# Patient Record
Sex: FEMALE | Race: BLACK OR AFRICAN AMERICAN | NOT HISPANIC OR LATINO | Employment: OTHER | URBAN - METROPOLITAN AREA
[De-identification: names, ages, dates, MRNs, and addresses within clinical notes are randomized per-mention and may not be internally consistent; named-entity substitution may affect disease eponyms.]

---

## 2017-01-03 ENCOUNTER — OFFICE VISIT (OUTPATIENT)
Dept: CARDIOLOGY | Facility: CLINIC | Age: 77
End: 2017-01-03
Payer: MEDICARE

## 2017-01-03 VITALS
HEIGHT: 60 IN | HEART RATE: 62 BPM | DIASTOLIC BLOOD PRESSURE: 66 MMHG | BODY MASS INDEX: 38.56 KG/M2 | WEIGHT: 196.44 LBS | OXYGEN SATURATION: 98 % | SYSTOLIC BLOOD PRESSURE: 143 MMHG

## 2017-01-03 DIAGNOSIS — Z98.61 POST PTCA: Chronic | ICD-10-CM

## 2017-01-03 DIAGNOSIS — I10 ESSENTIAL HYPERTENSION: Chronic | ICD-10-CM

## 2017-01-03 DIAGNOSIS — Z95.1 S/P CABG (CORONARY ARTERY BYPASS GRAFT): Chronic | ICD-10-CM

## 2017-01-03 DIAGNOSIS — Z79.4 TYPE 2 DIABETES MELLITUS WITH DIABETIC PERIPHERAL ANGIOPATHY WITHOUT GANGRENE, WITH LONG-TERM CURRENT USE OF INSULIN: ICD-10-CM

## 2017-01-03 DIAGNOSIS — E78.00 PURE HYPERCHOLESTEROLEMIA: Chronic | ICD-10-CM

## 2017-01-03 DIAGNOSIS — I25.10 CORONARY ARTERY DISEASE INVOLVING NATIVE CORONARY ARTERY OF NATIVE HEART WITHOUT ANGINA PECTORIS: Primary | Chronic | ICD-10-CM

## 2017-01-03 DIAGNOSIS — E11.51 TYPE 2 DIABETES MELLITUS WITH DIABETIC PERIPHERAL ANGIOPATHY WITHOUT GANGRENE, WITH LONG-TERM CURRENT USE OF INSULIN: ICD-10-CM

## 2017-01-03 PROCEDURE — 99999 PR PBB SHADOW E&M-EST. PATIENT-LVL III: CPT | Mod: PBBFAC,,, | Performed by: INTERNAL MEDICINE

## 2017-01-03 PROCEDURE — 99213 OFFICE O/P EST LOW 20 MIN: CPT | Mod: PBBFAC | Performed by: INTERNAL MEDICINE

## 2017-01-03 PROCEDURE — 99214 OFFICE O/P EST MOD 30 MIN: CPT | Mod: S$PBB,,, | Performed by: INTERNAL MEDICINE

## 2017-01-03 RX ORDER — ASPIRIN 81 MG/1
81 TABLET ORAL DAILY
Refills: 0 | COMMUNITY
Start: 2017-01-03 | End: 2019-05-06

## 2017-01-03 NOTE — PROGRESS NOTES
Subjective:   Patient ID:  Eliu Paz is a 76 y.o. female who presents for follow-up of Leg Swelling (x 2months) and Shortness of Breath (with exertion )      HPI:   Eliu Paz presents for follow up of coronary artery disease having had prior CABG as well as Intervention . She has had no chest pain but has been concerned about swelling of her ankles. She has some COATES ( a prior complaint ) but is limited activity due to pain. No orthopnea or PND. Normal Lv systolic and diastolic function on last echo. Eliu Paz has hypertension with elevation this visit but 120-130/ on recent Clinic visits. Eliu Paz has dyslipidemia  on high intensity statin. She has diabetes mellitus.    Review of Systems   Constitution: Negative for weakness, malaise/fatigue, weight gain and weight loss.   HENT: Negative for headaches.    Eyes: Negative for blurred vision.   Cardiovascular: Positive for dyspnea on exertion and leg swelling. Negative for chest pain, claudication, cyanosis, irregular heartbeat, near-syncope, orthopnea, palpitations, paroxysmal nocturnal dyspnea and syncope.   Respiratory: Negative for cough, shortness of breath and wheezing.    Musculoskeletal: Positive for arthritis and joint pain. Negative for falls and myalgias.   Gastrointestinal: Positive for abdominal pain. Negative for heartburn, nausea and vomiting.   Genitourinary: Negative for nocturia.   Neurological: Positive for paresthesias. Negative for brief paralysis, dizziness and focal weakness.   Psychiatric/Behavioral: Negative for altered mental status.       Current Outpatient Prescriptions   Medication Sig    amlodipine (NORVASC) 10 MG tablet TAKE 1 TABLET BY MOUTH ONCE DAILY.    baclofen (LIORESAL) 10 MG tablet Take 1 tablet (10 mg total) by mouth every evening.    carvedilol (COREG) 25 MG tablet TAKE 1 TABLET BY MOUTH TWICE DAILY.    clobetasol 0.05% (TEMOVATE) 0.05 % Oint Apply small amount to vulva area twice a day for 4  weeks then once a day for 4 weeks then once a day on Mondays and Thursdays    fluticasone (FLONASE) 50 mcg/actuation nasal spray SPRAY TWICE IN EACH NOSTRIL EVERY DAY    furosemide (LASIX) 40 MG tablet Take 1 tablet (40 mg total) by mouth once daily.    GENERLAC 10 gram/15 mL solution Take 30 mLs (20 g total) by mouth daily as needed (constipation).    hydrOXYzine pamoate (VISTARIL) 25 MG Cap Take 1 capsule (25 mg total) by mouth every 6 (six) hours as needed (Take 1-2 tablets as needed for itching).    insulin NPH-insulin regular, 70/30, (NOVOLIN 70/30) 100 unit/mL (70-30) injection Inject 18 Units into the skin 2 (two) times daily before meals.    isosorbide mononitrate (IMDUR) 30 MG 24 hr tablet Take 1 tablet (30 mg total) by mouth once daily.    ketoconazole (NIZORAL) 2 % shampoo Apply topically every other day.    latanoprost 0.005 % ophthalmic solution Place 1 drop into both eyes every evening.    losartan (COZAAR) 100 MG tablet TAKE 1 TABLET BY MOUTH ONCE DAILY.    nitroGLYCERIN 0.4 MG/DOSE TL SPRY (NITROLINGUAL) 400 mcg/spray spray PLACE 2 SPRAYS UNDER THE TONGUE EVERY 5 MINUTES AS NEEDED FOR CHEST PAIN    ONETOUCH DELICA LANCETS 30 gauge Misc 1 lancet by Misc.(Non-Drug; Combo Route) route 4 (four) times daily.    ONETOUCH VERIO Strp 1 strip by Misc.(Non-Drug; Combo Route) route 4 (four) times daily.    ranitidine (ZANTAC) 150 MG tablet Take 1 tablet (150 mg total) by mouth 2 (two) times daily.    rosuvastatin (CRESTOR) 40 MG Tab Take 1 tablet (40 mg total) by mouth once daily.    SITagliptan (JANUVIA) 100 MG Tab Take 1 tablet (100 mg total) by mouth once daily.    spironolactone (ALDACTONE) 50 MG tablet TAKE 1 TABLET (50 MG TOTAL) BY MOUTH ONCE DAILY.    TENS units Kirstin 1 Units by Misc.(Non-Drug; Combo Route) route once daily.    tramadol (ULTRAM) 50 mg tablet Take 1 tablet (50 mg total) by mouth 2 (two) times daily as needed.    triamcinolone acetonide 0.1% (KENALOG) 0.1 % ointment Apply  small amount to itchy areas on body BID PRN.  Do not use on face or in groin.    aspirin (ECOTRIN) 81 MG EC tablet Take 1 tablet (81 mg total) by mouth once daily.    travoprost (TRAVATAN Z) 0.004 % Drop Apply 1 drop to eye.     No current facility-administered medications for this visit.      Objective:   Physical Exam   Constitutional: She is oriented to person, place, and time. She appears well-developed. No distress.   BP (!) 143/66 (BP Location: Left arm, Patient Position: Sitting, BP Method: Automatic)  Pulse 62  Ht 5' (1.524 m)  Wt 89.1 kg (196 lb 6.9 oz)  SpO2 98%  BMI 38.36 kg/m2   HENT:   Head: Normocephalic.   Eyes: Conjunctivae are normal. Pupils are equal, round, and reactive to light. No scleral icterus.   Neck: Neck supple. No JVD present. No thyromegaly present.   Cardiovascular: Normal rate, regular rhythm, normal heart sounds and intact distal pulses.  PMI is not displaced.  Exam reveals no gallop and no friction rub.    No murmur heard.  Ankles puffy but no pitting edema.   Pulmonary/Chest: Effort normal and breath sounds normal. No respiratory distress. She has no wheezes. She has no rales.   Median sternotomy scar.   Abdominal: Soft. She exhibits no distension. There is no splenomegaly or hepatomegaly. There is no tenderness.   Musculoskeletal: She exhibits no edema or tenderness.   Gait normal   Neurological: She is alert and oriented to person, place, and time.   Skin: Skin is warm and dry. She is not diaphoretic.   Psychiatric: She has a normal mood and affect. Her behavior is normal.       Lab Results   Component Value Date     12/05/2016    K 3.9 12/05/2016     12/05/2016    CO2 21 (L) 12/05/2016    BUN 22 12/05/2016    CREATININE 1.2 12/05/2016     (H) 12/05/2016    HGBA1C 8.3 (H) 12/05/2016    MG 2.0 06/24/2015    AST 23 12/05/2016    ALT 13 12/05/2016    ALBUMIN 3.7 12/05/2016    PROT 7.6 12/05/2016    BILITOT 0.5 12/05/2016    WBC 7.60 10/20/2016    HGB 13.6  10/20/2016    HCT 41.1 10/20/2016    MCV 85 10/20/2016     10/20/2016    TSH 3.995 05/04/2015    CHOL 109 (L) 12/05/2016    HDL 37 (L) 12/05/2016    LDLCALC 58.8 (L) 12/05/2016    TRIG 66 12/05/2016       Assessment:     1. Coronary artery disease involving native coronary artery of native heart without angina pectoris : Stable   2. S/P CABG (coronary artery bypass graft)    3. Post PTCA    4. Essential hypertension : adequate control per other Clinic visits   5. Pure hypercholesterolemia : on high intensity statin At LDL goal   6. Type 2 diabetes mellitus with diabetic peripheral angiopathy without gangrene, with long-term current use of insulin : Not well controlled       Plan:     Eliu Steinberg was seen today for leg swelling and shortness of breath.    Diagnoses and all orders for this visit:    Coronary artery disease involving native coronary artery of native heart without angina pectoris    S/P CABG (coronary artery bypass graft)    Post PTCA    Essential hypertension  Continue current regimen  Would consider alternative for amlodipine but no pitting edema present at this time.  Pure hypercholesterolemia  Continue current regimen    Type 2 diabetes mellitus with diabetic peripheral angiopathy without gangrene, with long-term current use of insulin    Other orders  -     aspirin (ECOTRIN) 81 MG EC tablet; Take 1 tablet (81 mg total) by mouth once daily.

## 2017-01-03 NOTE — MR AVS SNAPSHOT
[facility] - Cardiology  [address]  [place] [address]  Phone: [phone]                  Eliu Paz   1/3/2017 4:00 PM   Office Visit    Description:  Female : 1940   Provider:  Manuel Aguilar MD   Department:  [facility]           Reason for Visit     Leg Swelling     Shortness of Breath           Diagnoses this Visit        Comments    Coronary artery disease involving native coronary artery of native heart without angina pectoris    -  Primary     S/P CABG (coronary artery bypass graft)         Post PTCA         Essential hypertension         Pure hypercholesterolemia         Type 2 diabetes mellitus with diabetic peripheral angiopathy without gangrene, with long-term current use of insulin                To Do List           Future Appointments        Provider Department Dept Phone    2017 9:40 AM Ryan Carlson MD [facility] - Internal Medicine [phone]    2017 8:30 AM LAB, APPOINTMENT [facility] [phone]    2017 9:00 AM DIABETES EMPOWERMENT PROGRAM [facility] -  Diabetes Program [phone]      Goals (5 Years of Data)     None      Follow-Up and Disposition     Return in about 8 months (around 9/3/2017).      PURCHASE these Medications (No prescription required)        Start End    aspirin (ECOTRIN) 81 MG EC tablet 1/3/2017 1/3/2018    Sig - Route: Take 1 tablet (81 mg total) by mouth once daily. - Oral    Class: OTC      [facility]     [facility] On Call Nurse Care Line -  Assistance  Registered nurses in the [facility] provide clinical advisement, health education, appointment booking, and other advisory services.  Call for this free service at [phone].             Medications           Message regarding Medications     Verify the changes and/or additions to your medication regime listed below are the same as discussed with your clinician today.  If any of these changes or additions  are incorrect, please notify your healthcare provider.        START taking these NEW medications        Refills    aspirin (ECOTRIN) 81 MG EC tablet 0    Sig: Take 1 tablet (81 mg total) by mouth once daily.    Class: OTC    Route: Oral      STOP taking these medications     folic acid (FOLVITE) 1 MG tablet TAKE 1 TABLET BY MOUTH ONCE DAILY.    aspirin 325 MG tablet Take 81 mg by mouth once daily.            Verify that the below list of medications is an accurate representation of the medications you are currently taking.  If none reported, the list may be blank. If incorrect, please contact your healthcare provider. Carry this list with you in case of emergency.           Current Medications     amlodipine (NORVASC) 10 MG tablet TAKE 1 TABLET BY MOUTH ONCE DAILY.    aspirin (ECOTRIN) 81 MG EC tablet Take 1 tablet (81 mg total) by mouth once daily.    baclofen (LIORESAL) 10 MG tablet Take 1 tablet (10 mg total) by mouth every evening.    carvedilol (COREG) 25 MG tablet TAKE 1 TABLET BY MOUTH TWICE DAILY.    clobetasol 0.05% (TEMOVATE) 0.05 % Oint Apply small amount to vulva area twice a day for 4 weeks then once a day for 4 weeks then once a day on Mondays and Thursdays    fluticasone (FLONASE) 50 mcg/actuation nasal spray SPRAY TWICE IN EACH NOSTRIL EVERY DAY    furosemide (LASIX) 40 MG tablet Take 1 tablet (40 mg total) by mouth once daily.    GENERLAC 10 gram/15 mL solution Take 30 mLs (20 g total) by mouth daily as needed (constipation).    hydrOXYzine pamoate (VISTARIL) 25 MG Cap Take 1 capsule (25 mg total) by mouth every 6 (six) hours as needed (Take 1-2 tablets as needed for itching).    insulin NPH-insulin regular, 70/30, (NOVOLIN 70/30) 100 unit/mL (70-30) injection Inject 18 Units into the skin 2 (two) times daily before meals.    isosorbide mononitrate (IMDUR) 30 MG 24 hr tablet Take 1 tablet (30 mg total) by mouth once daily.    ketoconazole (NIZORAL) 2 % shampoo Apply topically every other day.     latanoprost 0.005 % ophthalmic solution Place 1 drop into both eyes every evening.    losartan (COZAAR) 100 MG tablet TAKE 1 TABLET BY MOUTH ONCE DAILY.    nitroGLYCERIN 0.4 MG/DOSE TL SPRY (NITROLINGUAL) 400 mcg/spray spray PLACE 2 SPRAYS UNDER THE TONGUE EVERY 5 MINUTES AS NEEDED FOR CHEST PAIN    ONETOUCH DELICA LANCETS 30 gauge Misc 1 lancet by Misc.(Non-Drug; Combo Route) route 4 (four) times daily.    ONETOUCH VERIO Strp 1 strip by Misc.(Non-Drug; Combo Route) route 4 (four) times daily.    ranitidine (ZANTAC) 150 MG tablet Take 1 tablet (150 mg total) by mouth 2 (two) times daily.    rosuvastatin (CRESTOR) 40 MG Tab Take 1 tablet (40 mg total) by mouth once daily.    SITagliptan (JANUVIA) 100 MG Tab Take 1 tablet (100 mg total) by mouth once daily.    spironolactone (ALDACTONE) 50 MG tablet TAKE 1 TABLET (50 MG TOTAL) BY MOUTH ONCE DAILY.    TENS units Kirstin 1 Units by Misc.(Non-Drug; Combo Route) route once daily.    tramadol (ULTRAM) 50 mg tablet Take 1 tablet (50 mg total) by mouth 2 (two) times daily as needed.    travoprost (TRAVATAN Z) 0.004 % Drop Apply 1 drop to eye.    triamcinolone acetonide 0.1% (KENALOG) 0.1 % ointment Apply small amount to itchy areas on body BID PRN.  Do not use on face or in groin.           Clinical Reference Information           Vital Signs - Last Recorded  Most recent update: 1/3/2017  4:02 PM by Giselle Hunter MA    BP Pulse Ht Wt SpO2 BMI    (!) 143/66 (BP Location: Left arm, Patient Position: Sitting, BP Method: Automatic) 62 5' (1.524 m) 89.1 kg (196 lb 6.9 oz) 98% 38.36 kg/m2      Blood Pressure          Most Recent Value    Right Arm BP - Sitting  156/67    Left Arm BP - Sitting  143/66    BP  (!)  143/66      Allergies as of 1/3/2017     Pcn [Penicillins]    Trulicity [Dulaglutide]      Immunizations Administered on Date of Encounter - 1/3/2017     None      [facility] Sign-Up     Activating your [facility] account is as easy as 1-2-3!     1) Visit [email or web address],  select Sign Up Now, enter this activation code and your date of birth, then select Next.  [email or web address]  Expires: 1/16/2017 12:00 PM      2) Create a username and password to use when you visit [facility] in the future and select a security question in case you lose your password and select Next.    3) Enter your e-mail address and click Sign Up!    Additional Information  If you have questions, please e-mail [email or web address] or call [phone] to talk to our [facility] staff. Remember, [facility] is NOT to be used for urgent needs. For medical emergencies, dial 911.         Instructions    OK to stop folic acid  Continue all other medication  Take Furosemide ( fluid pill ) as needed for fluid retention

## 2017-01-03 NOTE — PATIENT INSTRUCTIONS
OK to stop folic acid  Continue all other medication  Take Furosemide ( fluid pill ) as needed for fluid retention

## 2017-02-07 ENCOUNTER — OFFICE VISIT (OUTPATIENT)
Dept: INTERNAL MEDICINE | Facility: CLINIC | Age: 77
End: 2017-02-07
Payer: MEDICARE

## 2017-02-07 VITALS
HEART RATE: 51 BPM | WEIGHT: 194 LBS | DIASTOLIC BLOOD PRESSURE: 60 MMHG | HEIGHT: 60 IN | SYSTOLIC BLOOD PRESSURE: 120 MMHG | BODY MASS INDEX: 38.09 KG/M2

## 2017-02-07 DIAGNOSIS — I25.10 CORONARY ARTERY DISEASE INVOLVING NATIVE CORONARY ARTERY OF NATIVE HEART WITHOUT ANGINA PECTORIS: Chronic | ICD-10-CM

## 2017-02-07 DIAGNOSIS — I10 ESSENTIAL HYPERTENSION: Chronic | ICD-10-CM

## 2017-02-07 DIAGNOSIS — E78.00 PURE HYPERCHOLESTEROLEMIA: Chronic | ICD-10-CM

## 2017-02-07 DIAGNOSIS — Z79.4 TYPE 2 DIABETES MELLITUS WITH DIABETIC POLYNEUROPATHY, WITH LONG-TERM CURRENT USE OF INSULIN: Primary | Chronic | ICD-10-CM

## 2017-02-07 DIAGNOSIS — E11.42 TYPE 2 DIABETES MELLITUS WITH DIABETIC POLYNEUROPATHY, WITH LONG-TERM CURRENT USE OF INSULIN: Primary | Chronic | ICD-10-CM

## 2017-02-07 PROCEDURE — 99213 OFFICE O/P EST LOW 20 MIN: CPT | Mod: PBBFAC | Performed by: INTERNAL MEDICINE

## 2017-02-07 PROCEDURE — 99214 OFFICE O/P EST MOD 30 MIN: CPT | Mod: S$PBB,,, | Performed by: INTERNAL MEDICINE

## 2017-02-07 PROCEDURE — 99999 PR PBB SHADOW E&M-EST. PATIENT-LVL III: CPT | Mod: PBBFAC,,, | Performed by: INTERNAL MEDICINE

## 2017-02-07 NOTE — PROGRESS NOTES
"Subjective:       Patient ID: Eliu Paz is a 76 y.o. female.    Chief Complaint: Follow-up    HPI Comments: Something wrong she cannot explain in her brain.    Feels like she has not been right since Trulicity use.    She has a chest cough, not severe no purulent sputum.    "I guess I am nervous".    "It's not my mind".    No new symptoms, all above chronic. Worried re her memory.      Review of Systems   Constitutional: Positive for fatigue. Negative for activity change and appetite change.   Eyes: Negative for visual disturbance.   Respiratory: Negative for chest tightness, shortness of breath and wheezing.    Cardiovascular: Negative for chest pain, palpitations and leg swelling.   Gastrointestinal: Negative for abdominal distention and abdominal pain.   Genitourinary: Negative for pelvic pain.   Musculoskeletal: Positive for back pain and neck pain. Negative for arthralgias.   Skin: Negative for rash.   Neurological: Positive for numbness. Negative for tremors, syncope, facial asymmetry, speech difficulty and weakness.   Hematological: Negative for adenopathy. Does not bruise/bleed easily.   Psychiatric/Behavioral: Negative for dysphoric mood. The patient is nervous/anxious.        Objective:      Physical Exam   Constitutional: She is oriented to person, place, and time. She appears well-developed and well-nourished. No distress.   HENT:   Head: Normocephalic and atraumatic.   Mouth/Throat: No oropharyngeal exudate.   TMs clear, roughened papule L earlobe   Eyes: Conjunctivae are normal. Pupils are equal, round, and reactive to light. No scleral icterus.   Neck: Normal range of motion. Neck supple. No thyromegaly present.   Cardiovascular: Normal rate, regular rhythm and normal heart sounds.    Pulmonary/Chest: Effort normal and breath sounds normal.   Abdominal: Soft. Bowel sounds are normal. She exhibits no distension. There is no tenderness.   Musculoskeletal: Normal range of motion. She exhibits no " edema or tenderness.   Lymphadenopathy:     She has no cervical adenopathy.   Neurological: She is alert and oriented to person, place, and time. She displays normal reflexes. No cranial nerve deficit. She exhibits normal muscle tone. Coordination normal.   Mild ankle edema, not significant lower extrem, edema   Skin: No rash noted. She is not diaphoretic.   Psychiatric: She has a normal mood and affect.   Odd complaints, diff to explain       Assessment:       No diagnosis found.    Plan:       Here for f/u.    She is stable and has not had any serious new medical issues for a while.    She has medically unexplained symptoms, reassured that her ears appears OK, she does not have a neck spinal infection, she does not have scalp dandruff, she does not have gangrene.    Dm2, has followup.    HTN well treated.    Over 25 minutes spent with patient and majority in counseling and patient education.    She declined vaccines    Return in about 6 months (around 8/7/2017).

## 2017-02-07 NOTE — MR AVS SNAPSHOT
[facility] - Internal Medicine  [address] [facility] [address]  Phone: [phone]  Fax: [phone]                  Eliu Paz   2017 9:40 AM   Office Visit    Description:  Female : 1940   Provider:  Ryan Carlson MD   Department:  [facility] - Internal Medicine           Reason for Visit     Follow-up                To Do List           Future Appointments        Provider Department Dept Phone    2017 9:30 AM Harinder Izaguirre MD [facility] - Vasc Diseases [phone]    2017 8:30 AM LAB, APPOINTMENT [facility] [phone]    2017 9:00 AM DIABETES EMPOWERMENT PROGRAM [facility] Diabetes [facility] [phone]    2017 9:00 AM Ata Fung MD [facility] - Rheumatology [phone]      Goals (5 Years of Data)     None      Follow-Up and Disposition     Return in about 6 months (around 2017).      [facility]     [facility] Care [facility] -  Assistance  Registered nurses in the [facility] provide clinical advisement, health education, appointment booking, and other advisory services.  Call for this free service at [phone].             Medications           Message regarding Medications     Verify the changes and/or additions to your medication regime listed below are the same as discussed with your clinician today.  If any of these changes or additions are incorrect, please notify your healthcare provider.        STOP taking these medications     ketoconazole (NIZORAL) 2 % shampoo Apply topically every other day.           Verify that the below list of medications is an accurate representation of the medications you are currently taking.  If none reported, the list may be blank. If incorrect, please contact your healthcare provider. Carry this list with you in case of emergency.           Current Medications     amlodipine (NORVASC) 10 MG tablet TAKE 1 TABLET BY MOUTH ONCE DAILY.     aspirin (ECOTRIN) 81 MG EC tablet Take 1 tablet (81 mg total) by mouth once daily.    baclofen (LIORESAL) 10 MG tablet Take 1 tablet (10 mg total) by mouth every evening.    carvedilol (COREG) 25 MG tablet TAKE 1 TABLET BY MOUTH TWICE DAILY.    clobetasol 0.05% (TEMOVATE) 0.05 % Oint Apply small amount to vulva area twice a day for 4 weeks then once a day for 4 weeks then once a day on Mondays and Thursdays    fluticasone (FLONASE) 50 mcg/actuation nasal spray SPRAY TWICE IN EACH NOSTRIL EVERY DAY    furosemide (LASIX) 40 MG tablet Take 1 tablet (40 mg total) by mouth once daily.    GENERLAC 10 gram/15 mL solution Take 30 mLs (20 g total) by mouth daily as needed (constipation).    hydrOXYzine pamoate (VISTARIL) 25 MG Cap Take 1 capsule (25 mg total) by mouth every 6 (six) hours as needed (Take 1-2 tablets as needed for itching).    insulin NPH-insulin regular, 70/30, (NOVOLIN 70/30) 100 unit/mL (70-30) injection Inject 18 Units into the skin 2 (two) times daily before meals.    isosorbide mononitrate (IMDUR) 30 MG 24 hr tablet Take 1 tablet (30 mg total) by mouth once daily.    latanoprost 0.005 % ophthalmic solution Place 1 drop into both eyes every evening.    losartan (COZAAR) 100 MG tablet TAKE 1 TABLET BY MOUTH ONCE DAILY.    nitroGLYCERIN 0.4 MG/DOSE TL SPRY (NITROLINGUAL) 400 mcg/spray spray PLACE 2 SPRAYS UNDER THE TONGUE EVERY 5 MINUTES AS NEEDED FOR CHEST PAIN    ONETOUCH DELICA LANCETS 30 gauge Misc 1 lancet by Misc.(Non-Drug; Combo Route) route 4 (four) times daily.    ONETOUCH VERIO Strp 1 strip by Misc.(Non-Drug; Combo Route) route 4 (four) times daily.    ranitidine (ZANTAC) 150 MG tablet Take 1 tablet (150 mg total) by mouth 2 (two) times daily.    rosuvastatin (CRESTOR) 40 MG Tab Take 1 tablet (40 mg total) by mouth once daily.    SITagliptan (JANUVIA) 100 MG Tab Take 1 tablet (100 mg total) by mouth once daily.    spironolactone (ALDACTONE) 50 MG tablet TAKE 1 TABLET (50 MG TOTAL) BY MOUTH ONCE  DAILY.    TENS units Kirstin 1 Units by Misc.(Non-Drug; Combo Route) route once daily.    tramadol (ULTRAM) 50 mg tablet Take 1 tablet (50 mg total) by mouth 2 (two) times daily as needed.    triamcinolone acetonide 0.1% (KENALOG) 0.1 % ointment Apply small amount to itchy areas on body BID PRN.  Do not use on face or in groin.    travoprost (TRAVATAN Z) 0.004 % Drop Apply 1 drop to eye.           Clinical Reference Information           Your Vitals Were     BP Pulse Height Weight BMI    120/60 51 5' (1.524 m) 88 kg (194 lb) 37.89 kg/m2      Blood Pressure          Most Recent Value    BP  120/60      Allergies as of 2/7/2017     Pcn [Penicillins]    Trulicity [Dulaglutide]      Immunizations Administered on Date of Encounter - 2/7/2017     None      [facility] Sign-Up     Activating your [facility] account is as easy as 1-2-3!     1) Visit [email or web address], select Sign Up Now, enter this activation code and your date of birth, then select Next.  [email or web address]  Expires: 3/24/2017 10:04 AM      2) Create a username and password to use when you visit [facility] in the future and select a security question in case you lose your password and select Next.    3) Enter your e-mail address and click Sign Up!    Additional Information  If you have questions, please e-mail [email or web address] or call [phone] to talk to our [facility] staff. Remember, [facility] is NOT to be used for urgent needs. For medical emergencies, dial 911.         Language Assistance Services     ATTENTION: Language assistance services are available, free of charge. Please call [phone].      ATENCIÓN: Si ana dina, tiene a jones disposición servicios gratuitos de asistencia lingüística. Llame al [phone].     CHÚ Ý: N?u b?n nói Ti?ng Vi?t, có các d?ch v? h? tr? ngôn ng? mi?n phí dành cho b?n. G?i s? [phone].         [facility] complies with applicable Federal civil rights laws and does not discriminate on the  basis of race, color, national origin, age, disability, or sex.

## 2017-02-13 ENCOUNTER — OFFICE VISIT (OUTPATIENT)
Dept: CARDIOLOGY | Facility: CLINIC | Age: 77
End: 2017-02-13
Payer: MEDICARE

## 2017-02-13 VITALS
HEART RATE: 57 BPM | DIASTOLIC BLOOD PRESSURE: 68 MMHG | WEIGHT: 197.75 LBS | BODY MASS INDEX: 38.82 KG/M2 | HEIGHT: 60 IN | SYSTOLIC BLOOD PRESSURE: 130 MMHG

## 2017-02-13 DIAGNOSIS — M25.569 KNEE PAIN, UNSPECIFIED CHRONICITY, UNSPECIFIED LATERALITY: ICD-10-CM

## 2017-02-13 DIAGNOSIS — I73.9 PAD (PERIPHERAL ARTERY DISEASE): ICD-10-CM

## 2017-02-13 DIAGNOSIS — R60.0 BILATERAL EDEMA OF LOWER EXTREMITY: Primary | ICD-10-CM

## 2017-02-13 PROCEDURE — 99999 PR PBB SHADOW E&M-EST. PATIENT-LVL IV: CPT | Mod: PBBFAC,,, | Performed by: INTERNAL MEDICINE

## 2017-02-13 PROCEDURE — 99213 OFFICE O/P EST LOW 20 MIN: CPT | Mod: S$PBB,,, | Performed by: INTERNAL MEDICINE

## 2017-02-13 PROCEDURE — 99214 OFFICE O/P EST MOD 30 MIN: CPT | Mod: PBBFAC | Performed by: INTERNAL MEDICINE

## 2017-02-13 NOTE — PROGRESS NOTES
Subjective:    Patient ID:  Eliu Keyes is a 76 y.o. female who presents for evaluation of leg pain.  HPI  Mrs. keyes presents today for a follow up visit, she reports bilateral ankle edema, she is concerned that it might be due to DVT. She still reports constant knee pain, she has bilateral knee tendon issues, she refused to undergo any procedure for her knees. Her last arterial doppler was  Mildly abnormal. Her BP, lipid are fairly controlled.          Review of Systems   Constitution: Negative for chills, decreased appetite and weight gain.   HENT: Negative for headaches, nosebleeds and stridor.    Eyes: Negative for blurred vision, vision loss in left eye, vision loss in right eye and visual disturbance.   Cardiovascular: Negative for chest pain.   Respiratory: Negative for cough, hemoptysis, shortness of breath, sleep disturbances due to breathing, sputum production and wheezing.    Hematologic/Lymphatic: Negative for bleeding problem. Does not bruise/bleed easily.   Skin: Negative for color change, dry skin, flushing and rash.   Musculoskeletal: Positive for arthritis and back pain. Negative for muscle cramps, muscle weakness and myalgias.   Gastrointestinal: Positive for constipation. Negative for abdominal pain, hematochezia and vomiting.   Genitourinary: Negative for hematuria.   Neurological: Positive for paresthesias. Negative for dizziness, loss of balance and numbness.   Psychiatric/Behavioral: Negative for altered mental status.   Allergic/Immunologic: Negative for hives and persistent infections.        Objective:    Physical Exam   Constitutional: She is oriented to person, place, and time. She appears well-developed and well-nourished. No distress.   HENT:   Head: Normocephalic and atraumatic.   Eyes: No scleral icterus.   Neck: No JVD present. Carotid bruit is not present.   Cardiovascular: Normal rate, regular rhythm, S1 normal, S2 normal, normal heart sounds and intact distal pulses.  Exam  reveals no gallop, no distant heart sounds and no friction rub.    No murmur heard.  Pulses:       Carotid pulses are 2+ on the right side, and 2+ on the left side.       Radial pulses are 2+ on the right side, and 2+ on the left side.        Dorsalis pedis pulses are 2+ on the right side, and 2+ on the left side.        Posterior tibial pulses are 1+ on the right side, and 1+ on the left side.   Pulmonary/Chest: Effort normal and breath sounds normal. No respiratory distress. She has no decreased breath sounds. She has no wheezes.   Abdominal: Soft. Normal aorta.   Musculoskeletal: She exhibits edema.   Trivial edema bilateral distal lower extremities.  Lower extremity skin tender to palpation.     Neurological: She is alert and oriented to person, place, and time.   Skin: Skin is warm and dry. No bruising, no ecchymosis and no rash noted. She is not diaphoretic. No cyanosis or erythema. No pallor. Nails show no clubbing.   Psychiatric: She has a normal mood and affect. Her behavior is normal.     Visit Vitals    /68 (BP Location: Right arm, Patient Position: Sitting, BP Method: Manual)    Pulse (!) 57    Ht 5' (1.524 m)    Wt 89.7 kg (197 lb 12 oz)    BMI 38.62 kg/m2     Past Medical History   Diagnosis Date    Allergy     Arthritis     Cataract     CKD (chronic kidney disease) stage 3, GFR 30-59 ml/min     Coronary artery disease     Diabetes mellitus type II 1981    Diabetic neuropathy     Glaucoma     Hyperlipidemia     Hypertension      Past Surgical History   Procedure Laterality Date    Carpal tunnel release       L    Tubal ligation  1970s    Colonoscopy N/A 11/2/2015     Procedure: COLONOSCOPY;  Surgeon: Sanju Clinton MD;  Location: [facility] ENDO ([address] [place] [address]);  Service: Endoscopy;  Laterality: N/A;    Cardiac catheterization  03/2010     x 2    Coronary artery bypass graft  08/13/1994     x 1     Social History   Substance Use Topics    Smoking status: Former Smoker     Years: 2.00      Quit date: 3/25/1963    Smokeless tobacco: Never Used    Alcohol use No     Current Outpatient Prescriptions   Medication Sig    amlodipine (NORVASC) 10 MG tablet TAKE 1 TABLET BY MOUTH ONCE DAILY.    aspirin (ECOTRIN) 81 MG EC tablet Take 1 tablet (81 mg total) by mouth once daily.    baclofen (LIORESAL) 10 MG tablet Take 1 tablet (10 mg total) by mouth every evening.    carvedilol (COREG) 25 MG tablet TAKE 1 TABLET BY MOUTH TWICE DAILY.    clobetasol 0.05% (TEMOVATE) 0.05 % Oint Apply small amount to vulva area twice a day for 4 weeks then once a day for 4 weeks then once a day on Mondays and Thursdays    fluticasone (FLONASE) 50 mcg/actuation nasal spray SPRAY TWICE IN EACH NOSTRIL EVERY DAY    furosemide (LASIX) 40 MG tablet Take 1 tablet (40 mg total) by mouth once daily.    GENERLAC 10 gram/15 mL solution Take 30 mLs (20 g total) by mouth daily as needed (constipation).    insulin NPH-insulin regular, 70/30, (NOVOLIN 70/30) 100 unit/mL (70-30) injection Inject 18 Units into the skin 2 (two) times daily before meals.    isosorbide mononitrate (IMDUR) 30 MG 24 hr tablet Take 1 tablet (30 mg total) by mouth once daily.    latanoprost 0.005 % ophthalmic solution Place 1 drop into both eyes every evening.    losartan (COZAAR) 100 MG tablet TAKE 1 TABLET BY MOUTH ONCE DAILY.    nitroGLYCERIN 0.4 MG/DOSE TL SPRY (NITROLINGUAL) 400 mcg/spray spray PLACE 2 SPRAYS UNDER THE TONGUE EVERY 5 MINUTES AS NEEDED FOR CHEST PAIN    ONETOUCH DELICA LANCETS 30 gauge Misc 1 lancet by Misc.(Non-Drug; Combo Route) route 4 (four) times daily.    ONETOUCH VERIO Strp 1 strip by Misc.(Non-Drug; Combo Route) route 4 (four) times daily.    ranitidine (ZANTAC) 150 MG tablet Take 1 tablet (150 mg total) by mouth 2 (two) times daily.    rosuvastatin (CRESTOR) 40 MG Tab Take 1 tablet (40 mg total) by mouth once daily.    spironolactone (ALDACTONE) 50 MG tablet TAKE 1 TABLET (50 MG TOTAL) BY MOUTH ONCE DAILY.    TENS  units Kirstin 1 Units by Misc.(Non-Drug; Combo Route) route once daily.    tramadol (ULTRAM) 50 mg tablet Take 1 tablet (50 mg total) by mouth 2 (two) times daily as needed.    triamcinolone acetonide 0.1% (KENALOG) 0.1 % ointment Apply small amount to itchy areas on body BID PRN.  Do not use on face or in groin.    hydrOXYzine pamoate (VISTARIL) 25 MG Cap Take 1 capsule (25 mg total) by mouth every 6 (six) hours as needed (Take 1-2 tablets as needed for itching).    SITagliptan (JANUVIA) 100 MG Tab Take 1 tablet (100 mg total) by mouth once daily.     No current facility-administered medications for this visit.      Review of patient's allergies indicates:   Allergen Reactions    Pcn [penicillins] Swelling     Swelling (extremities)^, Swelling (extremities)^  Swelling (extremities)^, Swelling (extremities)^  Swelling (extremities)^, Swelling (extremities)^    Trulicity [dulaglutide] Nausea Only and Rash     Carotid doppler 9/2016.  CONCLUSIONS   There is 20 - 39% right Internal Carotid stenosis.  There is 20 - 39% left Internal Carotid stenosis.  Arterial doppler 9/2016.  Impression:  1. Possible left PTA occlusin, no other significant stenosis.  2. Normal THIAGO.        Assessment/Plan:        1. Bilateral edema of lower extremity    2. Knee pain, unspecified chronicity, unspecified laterality    3. PAD (peripheral artery disease)      1. Venous doppler.  2. Compression stocking.  3. Continue CV risk modification manegment.  Harinder Izaguirre

## 2017-02-13 NOTE — MR AVS SNAPSHOT
[facility]  [address]  [place] [address]  Phone: [phone]                  Eliu Paz   2017 9:30 AM   Office Visit    Description:  Female : 1940   Provider:  Harinder Izagurire MD   Department:  [facility]           Reason for Visit     Follow-up           Diagnoses this Visit        Comments    Bilateral edema of lower extremity    -  Primary            To Do List           Future Appointments        Provider Department Dept Phone    2017 8:30 AM LAB, APPOINTMENT [facility] [phone]    2017 9:00 AM DIABETES EMPOWERMENT PROGRAM [facility] [phone]    2017 9:00 AM Ata Fung MD [facility] Rheumatology [phone]      Goals (5 Years of Data)     None      Follow-Up and Disposition     Call patient with results Return in about 1 year (around 2018).      [facility]     [facility] Care [facility] - 24/7 Assistance  Registered nurses in the [facility] provide clinical advisement, health education, appointment booking, and other advisory services.  Call for this free service at [phone].             Medications           Message regarding Medications     Verify the changes and/or additions to your medication regime listed below are the same as discussed with your clinician today.  If any of these changes or additions are incorrect, please notify your healthcare provider.        STOP taking these medications     travoprost (TRAVATAN Z) 0.004 % Drop Apply 1 drop to eye.           Verify that the below list of medications is an accurate representation of the medications you are currently taking.  If none reported, the list may be blank. If incorrect, please contact your healthcare provider. Carry this list with you in case of emergency.           Current Medications     amlodipine (NORVASC) 10 MG tablet TAKE 1 TABLET BY MOUTH ONCE  DAILY.    aspirin (ECOTRIN) 81 MG EC tablet Take 1 tablet (81 mg total) by mouth once daily.    baclofen (LIORESAL) 10 MG tablet Take 1 tablet (10 mg total) by mouth every evening.    carvedilol (COREG) 25 MG tablet TAKE 1 TABLET BY MOUTH TWICE DAILY.    clobetasol 0.05% (TEMOVATE) 0.05 % Oint Apply small amount to vulva area twice a day for 4 weeks then once a day for 4 weeks then once a day on Mondays and Thursdays    fluticasone (FLONASE) 50 mcg/actuation nasal spray SPRAY TWICE IN EACH NOSTRIL EVERY DAY    furosemide (LASIX) 40 MG tablet Take 1 tablet (40 mg total) by mouth once daily.    GENERLAC 10 gram/15 mL solution Take 30 mLs (20 g total) by mouth daily as needed (constipation).    insulin NPH-insulin regular, 70/30, (NOVOLIN 70/30) 100 unit/mL (70-30) injection Inject 18 Units into the skin 2 (two) times daily before meals.    isosorbide mononitrate (IMDUR) 30 MG 24 hr tablet Take 1 tablet (30 mg total) by mouth once daily.    latanoprost 0.005 % ophthalmic solution Place 1 drop into both eyes every evening.    losartan (COZAAR) 100 MG tablet TAKE 1 TABLET BY MOUTH ONCE DAILY.    nitroGLYCERIN 0.4 MG/DOSE TL SPRY (NITROLINGUAL) 400 mcg/spray spray PLACE 2 SPRAYS UNDER THE TONGUE EVERY 5 MINUTES AS NEEDED FOR CHEST PAIN    ONETOUCH DELICA LANCETS 30 gauge Misc 1 lancet by Misc.(Non-Drug; Combo Route) route 4 (four) times daily.    ONETOUCH VERIO Strp 1 strip by Misc.(Non-Drug; Combo Route) route 4 (four) times daily.    ranitidine (ZANTAC) 150 MG tablet Take 1 tablet (150 mg total) by mouth 2 (two) times daily.    rosuvastatin (CRESTOR) 40 MG Tab Take 1 tablet (40 mg total) by mouth once daily.    spironolactone (ALDACTONE) 50 MG tablet TAKE 1 TABLET (50 MG TOTAL) BY MOUTH ONCE DAILY.    TENS units Kirstin 1 Units by Misc.(Non-Drug; Combo Route) route once daily.    tramadol (ULTRAM) 50 mg tablet Take 1 tablet (50 mg total) by mouth 2 (two) times daily as needed.    triamcinolone acetonide 0.1% (KENALOG) 0.1  % ointment Apply small amount to itchy areas on body BID PRN.  Do not use on face or in groin.    hydrOXYzine pamoate (VISTARIL) 25 MG Cap Take 1 capsule (25 mg total) by mouth every 6 (six) hours as needed (Take 1-2 tablets as needed for itching).    SITagliptan (JANUVIA) 100 MG Tab Take 1 tablet (100 mg total) by mouth once daily.           Clinical Reference Information           Your Vitals Were     BP Pulse Height Weight BMI    130/68 (BP Location: Right arm, Patient Position: Sitting, BP Method: Manual) 57 5' (1.524 m) 89.7 kg (197 lb 12 oz) 38.62 kg/m2      Blood Pressure          Most Recent Value    BP  130/68      Allergies as of 2/13/2017     Pcn [Penicillins]    Trulicity [Dulaglutide]      Immunizations Administered on Date of Encounter - 2/13/2017     None      Orders Placed During Today's Visit     Future Labs/Procedures Expected by Expires    CAR Ultrasound doppler venous legs bilat  2/13/2017 (Approximate) 2/13/2018      [facility] Sign-Up     Activating your [facility] account is as easy as 1-2-3!     1) Visit [email or web address], select Sign Up Now, enter this activation code and your date of birth, then select Next.  [email or web address]  Expires: 3/24/2017 10:04 AM      2) Create a username and password to use when you visit [facility] in the future and select a security question in case you lose your password and select Next.    3) Enter your e-mail address and click Sign Up!    Additional Information  If you have questions, please e-mail [email or web address] or call [phone] to talk to our [facility] staff. Remember, [facility] is NOT to be used for urgent needs. For medical emergencies, dial 911.         Language Assistance Services     ATTENTION: Language assistance services are available, free of charge. Please call [phone].      ATENCIÓN: Si habla español, tiene a jones disposición servicios gratuitos de asistencia lingüística. Llame al [phone].     CHÚ Ý: N?u b?n nói Ti?ng Vi?t, có các  d?ch v? h? tr? ngôn ng? mi?n phí bebeh cho b?n. G?i s? [phone].         [facility] complies with applicable Federal civil rights laws and does not discriminate on the basis of race, color, national origin, age, disability, or sex.

## 2017-02-16 ENCOUNTER — CLINICAL SUPPORT (OUTPATIENT)
Dept: CARDIOLOGY | Facility: CLINIC | Age: 77
End: 2017-02-16
Payer: MEDICARE

## 2017-02-16 ENCOUNTER — TELEPHONE (OUTPATIENT)
Dept: CARDIOLOGY | Facility: CLINIC | Age: 77
End: 2017-02-16

## 2017-02-16 ENCOUNTER — LAB VISIT (OUTPATIENT)
Dept: LAB | Facility: HOSPITAL | Age: 77
End: 2017-02-16
Attending: NURSE PRACTITIONER
Payer: MEDICARE

## 2017-02-16 DIAGNOSIS — R60.0 BILATERAL EDEMA OF LOWER EXTREMITY: ICD-10-CM

## 2017-02-16 DIAGNOSIS — Z79.4 TYPE 2 DIABETES MELLITUS WITH DIABETIC PERIPHERAL ANGIOPATHY WITHOUT GANGRENE, WITH LONG-TERM CURRENT USE OF INSULIN: ICD-10-CM

## 2017-02-16 DIAGNOSIS — E11.51 TYPE 2 DIABETES MELLITUS WITH DIABETIC PERIPHERAL ANGIOPATHY WITHOUT GANGRENE, WITH LONG-TERM CURRENT USE OF INSULIN: ICD-10-CM

## 2017-02-16 LAB
ANION GAP SERPL CALC-SCNC: 12 MMOL/L
BUN SERPL-MCNC: 20 MG/DL
CALCIUM SERPL-MCNC: 9.8 MG/DL
CHLORIDE SERPL-SCNC: 107 MMOL/L
CO2 SERPL-SCNC: 23 MMOL/L
CREAT SERPL-MCNC: 1.2 MG/DL
EST. GFR  (AFRICAN AMERICAN): 51 ML/MIN/1.73 M^2
EST. GFR  (NON AFRICAN AMERICAN): 45 ML/MIN/1.73 M^2
GLUCOSE SERPL-MCNC: 104 MG/DL
POTASSIUM SERPL-SCNC: 4.1 MMOL/L
SODIUM SERPL-SCNC: 142 MMOL/L

## 2017-02-16 PROCEDURE — 93970 EXTREMITY STUDY: CPT | Mod: PBBFAC | Performed by: INTERNAL MEDICINE

## 2017-02-16 NOTE — TELEPHONE ENCOUNTER
Spoke with patient in regards to venous doppler results.    Informed patient that venous doppler was negative for DVT .    Continue  compression stockings.      Follow up in 1 year .    Patient verbalized understanding.

## 2017-02-16 NOTE — TELEPHONE ENCOUNTER
----- Message from Harinder Izaguirre MD sent at 2/16/2017 10:52 AM CST -----  Venous doppler is negative for DVT.    Use the compression stocking.

## 2017-02-17 LAB
ESTIMATED AVG GLUCOSE: 180 MG/DL
HBA1C MFR BLD HPLC: 7.9 %

## 2017-02-23 ENCOUNTER — CLINICAL SUPPORT (OUTPATIENT)
Dept: DIABETES | Facility: CLINIC | Age: 77
End: 2017-02-23
Payer: MEDICARE

## 2017-02-23 VITALS
DIASTOLIC BLOOD PRESSURE: 58 MMHG | SYSTOLIC BLOOD PRESSURE: 118 MMHG | HEART RATE: 58 BPM | BODY MASS INDEX: 38.75 KG/M2 | WEIGHT: 198.44 LBS

## 2017-02-23 DIAGNOSIS — N18.30 TYPE 2 DIABETES MELLITUS WITH STAGE 3 CHRONIC KIDNEY DISEASE, WITH LONG-TERM CURRENT USE OF INSULIN: Primary | Chronic | ICD-10-CM

## 2017-02-23 DIAGNOSIS — Z79.4 TYPE 2 DIABETES MELLITUS WITH STAGE 3 CHRONIC KIDNEY DISEASE, WITH LONG-TERM CURRENT USE OF INSULIN: Primary | Chronic | ICD-10-CM

## 2017-02-23 DIAGNOSIS — E11.42 TYPE 2 DIABETES MELLITUS WITH DIABETIC POLYNEUROPATHY, WITH LONG-TERM CURRENT USE OF INSULIN: Chronic | ICD-10-CM

## 2017-02-23 DIAGNOSIS — Z79.4 TYPE 2 DIABETES MELLITUS WITH HYPERGLYCEMIA, WITH LONG-TERM CURRENT USE OF INSULIN: Chronic | ICD-10-CM

## 2017-02-23 DIAGNOSIS — E11.22 TYPE 2 DIABETES MELLITUS WITH STAGE 3 CHRONIC KIDNEY DISEASE, WITH LONG-TERM CURRENT USE OF INSULIN: Primary | Chronic | ICD-10-CM

## 2017-02-23 DIAGNOSIS — E11.51 TYPE 2 DIABETES MELLITUS WITH DIABETIC PERIPHERAL ANGIOPATHY WITHOUT GANGRENE, WITH LONG-TERM CURRENT USE OF INSULIN: Chronic | ICD-10-CM

## 2017-02-23 DIAGNOSIS — I67.89 CEREBRAL MICROVASCULAR DISEASE: ICD-10-CM

## 2017-02-23 DIAGNOSIS — E78.00 PURE HYPERCHOLESTEROLEMIA: Chronic | ICD-10-CM

## 2017-02-23 DIAGNOSIS — E66.9 OBESITY, CLASS II, BMI 35-39.9: Chronic | ICD-10-CM

## 2017-02-23 DIAGNOSIS — I25.10 CORONARY ARTERY DISEASE INVOLVING NATIVE CORONARY ARTERY OF NATIVE HEART WITHOUT ANGINA PECTORIS: Chronic | ICD-10-CM

## 2017-02-23 DIAGNOSIS — N18.30 CKD (CHRONIC KIDNEY DISEASE) STAGE 3, GFR 30-59 ML/MIN: Chronic | ICD-10-CM

## 2017-02-23 DIAGNOSIS — I10 ESSENTIAL HYPERTENSION: Chronic | ICD-10-CM

## 2017-02-23 DIAGNOSIS — Z79.4 TYPE 2 DIABETES MELLITUS WITH DIABETIC POLYNEUROPATHY, WITH LONG-TERM CURRENT USE OF INSULIN: Chronic | ICD-10-CM

## 2017-02-23 DIAGNOSIS — Z79.4 TYPE 2 DIABETES MELLITUS WITH DIABETIC PERIPHERAL ANGIOPATHY WITHOUT GANGRENE, WITH LONG-TERM CURRENT USE OF INSULIN: Chronic | ICD-10-CM

## 2017-02-23 DIAGNOSIS — E11.65 TYPE 2 DIABETES MELLITUS WITH HYPERGLYCEMIA, WITH LONG-TERM CURRENT USE OF INSULIN: Chronic | ICD-10-CM

## 2017-02-23 PROCEDURE — 99999 PR PBB SHADOW E&M-EST. PATIENT-LVL V: CPT | Mod: PBBFAC,,,

## 2017-02-23 PROCEDURE — 99214 OFFICE O/P EST MOD 30 MIN: CPT | Mod: S$PBB,,, | Performed by: NURSE PRACTITIONER

## 2017-02-23 PROCEDURE — 99215 OFFICE O/P EST HI 40 MIN: CPT | Mod: PBBFAC

## 2017-02-23 RX ORDER — NAPROXEN SODIUM 220 MG
TABLET ORAL
Qty: 60 EACH | Refills: 11 | Status: SHIPPED | OUTPATIENT
Start: 2017-02-23 | End: 2017-06-15 | Stop reason: SDUPTHER

## 2017-02-23 RX ORDER — INSULIN ASPART 100 [IU]/ML
18 INJECTION, SUSPENSION SUBCUTANEOUS
Qty: 45 ML | Refills: 3 | Status: SHIPPED | OUTPATIENT
Start: 2017-02-23 | End: 2018-03-01 | Stop reason: SDUPTHER

## 2017-02-23 RX ORDER — PEN NEEDLE, DIABETIC 29 G X1/2"
NEEDLE, DISPOSABLE MISCELLANEOUS
Refills: 11 | COMMUNITY
Start: 2017-01-26 | End: 2017-02-23 | Stop reason: SDUPTHER

## 2017-02-23 NOTE — PATIENT INSTRUCTIONS
Change your insulin from Novolin to Novolog 70/30     Start Januvia 50 mg daily     Januvia will NOT cause a low blood sugar. If you have a low blood sugar, call me so I can decrease your insulin

## 2017-02-23 NOTE — PROGRESS NOTES
"CC:  Diabetes.     HPI: Eliu Paz is a 76 y.o. female referred by Dr. Carlson  for [facility] - Visit # 4.  The patient has had type 2 diabetes along with associated comorbidities indicated in the Visit Diagnosis section of this encounter. The patient was diagnosed with Type 2 diabetes in ~1981. Was on insulin soon after diagnosis - started on NPH/R and mixing them + Metformin. Then changed to Novolin 70/30. + strong family history of DM.     1st visit -She presented alone, walking with cane, with BG meter. + multiple episodes of hypoglycemia, one epsiode requiring transfer to ED 6/2015. Waking up overnight with hypoglycemia s/s (sweating, dizzy). At this visit, Novolin 70/30 doses were decreased and patient was tested for T1DM and other medications discussed.     2nd visit (4 week f/u)- Presented alone with BG logs on meter. Variable BG readings on meter, no hypo since dose adjustments, most readings <180. At this visit, attempted to start patient on Trulicity and d/c 70/30. However, patient has reports of nausea and fatigue on trulicity, changed back to 70/30    3rd visit -Presented alone with BG meter. No hypo noted on meter. Rescheduled CGMS, results received after visit. Variable BG with high readings midday noted on BG meter. CGMS illustrated uncontrolled prandial excursions    4th visit  - Presents alone with BG meter, variability still remains on logs, -270s. Did not start januvia because "scared of side effects with any medications"    DIABETES COMPLICATIONS: nephropathy, peripheral neuropathy, cardiovascular disease and cerebrovascular disease     CURRENT A1C:    Hemoglobin A1C   Date Value Ref Range Status   02/16/2017 7.9 (H) 4.5 - 6.2 % Final     Comment:     According to ADA guidelines, hemoglobin A1C <7.0% represents  optimal control in non-pregnant diabetic patients.  Different  metrics may apply to specific populations.   Standards of Medical Care in Diabetes - " 2016.  For the purpose of screening for the presence of diabetes:  <5.7%     Consistent with the absence of diabetes  5.7-6.4%  Consistent with increasing risk for diabetes   (prediabetes)  >or=6.5%  Consistent with diabetes  Currently no consensus exists for use of hemoglobin A1C  for diagnosis of diabetes for children.     12/05/2016 8.3 (H) 4.5 - 6.2 % Final     Comment:     According to ADA guidelines, hemoglobin A1C <7.0% represents  optimal control in non-pregnant diabetic patients.  Different  metrics may apply to specific populations.   Standards of Medical Care in Diabetes - 2016.  For the purpose of screening for the presence of diabetes:  <5.7%     Consistent with the absence of diabetes  5.7-6.4%  Consistent with increasing risk for diabetes   (prediabetes)  >or=6.5%  Consistent with diabetes  Currently no consensus exists for use of hemoglobin A1C  for diagnosis of diabetes for children.     11/28/2016 8.4 (H) 4.5 - 6.2 % Final     Comment:     According to ADA guidelines, hemoglobin A1C <7.0% represents  optimal control in non-pregnant diabetic patients.  Different  metrics may apply to specific populations.   Standards of Medical Care in Diabetes - 2016.  For the purpose of screening for the presence of diabetes:  <5.7%     Consistent with the absence of diabetes  5.7-6.4%  Consistent with increasing risk for diabetes   (prediabetes)  >or=6.5%  Consistent with diabetes  Currently no consensus exists for use of hemoglobin A1C  for diagnosis of diabetes for children.         GOAL A1C: <7.5% due to age    MEDICATIONS UPON START OF PROGRAM: Novolin 70/30 20 units qAM with breakfast and 15-20 units qPM with dinner  Self adjusted her doses due to hypoglycemia, missing 1-2 doses per week    CURRENT DIABETES MEDICATIONS:Novolin 70/30 18 units qAM with breakfast and 18 units qPM with dinner  Denies missing any doses    ANY DIFFICULTY AFFORDING YOUR CURRENT MEDICATION REGIMEN? no    HAVE YOU EVER APPLIED FOR  MEDICARE EXTRA HELP PROGRAM? no    DM MEDICATIONS USED IN THE PAST: Novolog and Lantus pens, Novolin 70/30, Trulicity, Levemir     HOSPITALIZATIONS FOR DIABETES OR RELATED COMPLICATIONS:  None on BG logs     BLOOD GLUCOSE MONITORING:  Checking BG 2 times per day on meter   BG on meter ranging on 117's-220s on meter     HYPOGLYCEMIA:  no    OCCUPATION: retired     CURRENT DAILY ROUTINE (meals/meds):   Eats 3 meals   Does not snack in between meals  Bedtime snack includes     ++ No changes to her diet since last visit ++    Drinks diet coke     CURRENT EXERCISE: walking daily for 30-35 minutes     MEDICATIONS, ALLERGIES, LABS, VS's, MEDICAL, SURGICAL, SOCIAL, AND FAMILY HISTORY reviewed and updated in the appropriate sections during this encounter    ROS:   Constitutional: Negative for weight change  Endocrine:  Denies polyuria, polydipsia, nocturia.  HENT: Negative for mouth sores or dental problems. Denies any personal or family history of thyroid cancer.  Denies neck swelling, lumps, horseness or trouble swallowing.   Eyes: + intermittent blurry vision   Respiratory: Negative for cough or shortness or breath.  Cardiovascular: Negative for chest pain, + intermittent claudication, no history of amputation.  Gastrointestinal: Negative for nausea, vomiting, bloating.  No history of pancreatitis or gastroparesis.  Genitourinary: Negative for urgency, frequency, frequent urinary tract infections.  Musculoskeletal: + chronic back pain.  + knee pain bilaterally  Skin: Denies prolonged wound healing, negative for rashes.  Neurological: Negative for syncope, gustatory sweating, + numbness/burning of extremities.  Psychiatric/Behavioral: Negative for depression or anxiety  All other systems reviewed and are negative.    CGMS interpretation:  Done 12/2016  No documentation of meals or DM medications taking  Excursions likely meal related but may be from snacking    PE:  Constitutional:   Visit Vitals    BP (!) 118/58     Pulse (!) 58    Wt 90 kg (198 lb 6.6 oz)    BMI 38.75 kg/m2      Well developed, well nourished, NAD.  EMNT:   External ears, nose: no masses. No significant findings.   Hearing: normal   Lips, teeth and gums:  Lips/oral mucosa normal, good dentition   Neck:  No trachial deviation present, No neck masses noticed   Thyroid:  No thyromegaly present.  No thyroid tenderness.      Cardiovascular:    Auscultation:  No murmurs or abnormal sounds   Lower extremities:  +1 pedal edema, no cyanosis.   No carotid bruits, no abdominal aorta bruits noted.  Respiratory:    Effort:  Normal, no use of accessory muscles.   Auscultation: breath sounds normal, no adventitious sounds.  Abdomen:     Exam:  Soft, non-tender, no masses.  Bowel sounds are normal.    No hernia noted.  Skin:    Inspection: no rashes, lesion or ulcers, + acanthosis nigracans.   Palpation: no induration or nodules.   Insulin injection sites: no lipoatrophy or lipohypertrophy.  Psychiatric:  Judgement and insight good with normal mood and affect.  Musculoskeletal:  Gait steady, walks with cane. No gross abnormalities.  Neurological:  Cranial nerves grossly intact.     Protective Sensation (w/ 10 gram monofilament):  Right: Intact  Left: Intact    Visual Inspection:  Callus -  bilateral heels    Pedal Pulses:   Right: Present  Left: Present    Posterior tibialis:   Right:Present  Left: Present     Decreased vibratory response to 128 Hz tuning fork bilaterally.     ______________________________________________________________________   Microalbumin/Creatinine ratio:  Lab Results   Component Value Date    MICALBCREAT 10.7 08/05/2016       ______________________________________________________________________     ASSESSMENT and PLAN:    1- Type 2 diabetes with CKD, neuropathy, hypoglycemia (past visit) and hyperglycemia - A1c improved but remains elevated, likely related to fluctuations in BG with prandial excursions and variation with 70/30.     Continue Novolin  70/30 18 units TWICE DAILY with meals. Start Januvia 50 mg daily. Discussed MOA, side effects.    Limited treatment options, as low dose Trulicity insufficient BG control and could not have tolerated increasing dose, BG too high on basal insulin alone, cannot use HALL with mixed insulin, does not wish to try Vgo - would be great Vgo candidate and would decrease BG variability if she ever wishes to try. Diabetes educator showed pt demo and demo given to pt to take home     8/2016 - Ceptide 2.8, adequate intrinsic insulin production, T2DM diagnosis confirmed.       Instructed to monitor BG twice daily, document on BG logs, and bring complete BG logs to all visits    Patient has completed the Diabetes Empowerment Program but would benefit from chronic DM care in the Endocrine department for frequent insulin titration. Transition of care discussed with patient.     2. Hypoglycemia - none since last visit.     3- CKD stage 3 - discussed correlation between uncontrolled DM and CKD    4 - Peripheral neuropathy - Educated patient to check feet daily for any foreign objects and/or wounds. Discussed with patient the importance of wearing appropriate footwear at all times, not to walk barefoot ever, and to check shoes before putting them on feet. Instructed patient to keep feet dry by regularly changing shoes and socks and drying feet after baths and exercises. Also, instructed patient to report any new lesions, discolorations, or swelling to a healthcare professional.    5. CAD - avoid hypoglycemia     6- Hypertension - goal < 140/80 mmHg -  At goal ,on ARB, continue current medications   BP Readings from Last 3 Encounters:   02/23/17 (!) 118/58   02/13/17 130/68   02/07/17 120/60     7- Hyperlipidemia - LDL at goal of <70 due to CAD, on crestor, LFT's WNL    Lab Results   Component Value Date    LDLCALC 58.8 (L) 12/05/2016     8- Body mass index is 38.75 kg/(m^2). = Obesity. Modest weight loss (5-10%) may greatly improve BG  control.       STANDARDS of CARE:  Statin:  Yes - lipitor   ACEI or ARB:  Yes - arb  ASA:  Yes - 325  Last eye exam 11/15 no retinopathy - reinforced need for yearly eye exam due this month

## 2017-02-23 NOTE — MR AVS SNAPSHOT
[facility]  [address]  [place] [address]  Phone: [phone]                  Eliu MACARIO Jackson   2017 9:00 AM   Clinical Support    Description:  Female : 1940   Provider:  [facility]   Department:  [facility]           Reason for Visit     Blood Sugar Problem           Diagnoses this Visit        Comments    Type 2 diabetes mellitus with stage 3 chronic kidney disease, with long-term current use of insulin    -  Primary     Type 2 diabetes mellitus with hyperglycemia, with long-term current use of insulin         Type 2 diabetes mellitus with diabetic polyneuropathy, with long-term current use of insulin         Type 2 diabetes mellitus with diabetic peripheral angiopathy without gangrene, with long-term current use of insulin         Pure hypercholesterolemia         Obesity, Class II, BMI 35-39.9         Essential hypertension         Coronary artery disease involving native coronary artery of native heart without angina pectoris         CKD (chronic kidney disease) stage 3, GFR 30-59 ml/min         Cerebral microvascular disease                To Do List           Future Appointments        Provider Department Dept Phone    2017 9:00 AM Ata Fung MD [facility] - Rheumatology [phone]    2017 8:00 AM LAB, APPOINTMENT [facility] [phone]    2017 10:00 AM JEANNE Cleveland,ANP-C [facility] Endocrinology [phone]      Goals (5 Years of Data)     None      Follow-Up and Disposition     Return in about 3 months (around 2017).       These Medications        Disp Refills Start End    insulin asp prt-insulin aspart, NOVOLOG 70/30, (NOVOLOG MIX 70-30) 100 unit/mL (70-30) Soln 45 mL 3 2017     Inject 18 Units into the skin 2 (two) times daily before meals. - Subcutaneous    Pharmacy: [facility]/pharmacy #[id number] - [place], [place] - [address] Ph #:  [phone]       SITagliptan (JANUVIA) 50 MG Tab 30 tablet 4 2/23/2017     Take 1 tablet (50 mg total) by mouth once daily. - Oral    Pharmacy: [facility]acy #[id number] [place] - [address] #: [phone]       insulin syringe-needle U-100 (BD INSULIN SYRINGE ULTRA-FINE) 0.5 mL 31 gauge x 5/16 Syrg 60 each 11 2/23/2017     Use to inject twice daily with insulin injections    [id number] [place], [place] - [address] #: [phone]         [facility]     [facility] On Call Nurse [facility] Line - 24/7 Assistance  Registered nurses in the [facility] Center provide clinical advisement, health education, appointment booking, and other advisory services.  Call for this free service at [phone].             Medications           Message regarding Medications     Verify the changes and/or additions to your medication regime listed below are the same as discussed with your clinician today.  If any of these changes or additions are incorrect, please notify your healthcare provider.        START taking these NEW medications        Refills    insulin asp prt-insulin aspart, NOVOLOG 70/30, (NOVOLOG MIX 70-30) 100 unit/mL (70-30) Soln 3    Sig: Inject 18 Units into the skin 2 (two) times daily before meals.    Class: Normal    Route: Subcutaneous    insulin syringe-needle U-100 (BD INSULIN SYRINGE ULTRA-FINE) 0.5 mL 31 gauge x 5/16 Syrg 11    Sig: Use to inject twice daily with insulin injections    Class: Normal      CHANGE how you are taking these medications     Start Taking Instead of    SITagliptan (JANUVIA) 50 MG Tab SITagliptan (JANUVIA) 100 MG Tab    Dosage:  Take 1 tablet (50 mg total) by mouth once daily. Dosage:  Take 1 tablet (100 mg total) by mouth once daily.    Reason for Change:  Reorder       STOP taking these medications     insulin NPH-insulin regular, 70/30, (NOVOLIN 70/30) 100 unit/mL (70-30) injection Inject 18 Units into the skin 2 (two) times daily before  meals.           Verify that the below list of medications is an accurate representation of the medications you are currently taking.  If none reported, the list may be blank. If incorrect, please contact your healthcare provider. Carry this list with you in case of emergency.           Current Medications     amlodipine (NORVASC) 10 MG tablet TAKE 1 TABLET BY MOUTH ONCE DAILY.    aspirin (ECOTRIN) 81 MG EC tablet Take 1 tablet (81 mg total) by mouth once daily.    baclofen (LIORESAL) 10 MG tablet Take 1 tablet (10 mg total) by mouth every evening.    carvedilol (COREG) 25 MG tablet TAKE 1 TABLET BY MOUTH TWICE DAILY.    clobetasol 0.05% (TEMOVATE) 0.05 % Oint Apply small amount to vulva area twice a day for 4 weeks then once a day for 4 weeks then once a day on Mondays and Thursdays    fluticasone (FLONASE) 50 mcg/actuation nasal spray SPRAY TWICE IN EACH NOSTRIL EVERY DAY    furosemide (LASIX) 40 MG tablet Take 1 tablet (40 mg total) by mouth once daily.    GENERLAC 10 gram/15 mL solution Take 30 mLs (20 g total) by mouth daily as needed (constipation).    hydrOXYzine pamoate (VISTARIL) 25 MG Cap Take 1 capsule (25 mg total) by mouth every 6 (six) hours as needed (Take 1-2 tablets as needed for itching).    insulin syringe-needle U-100 (BD INSULIN SYRINGE ULTRA-FINE) 0.5 mL 31 gauge x 5/16 Syrg Use to inject twice daily with insulin injections    isosorbide mononitrate (IMDUR) 30 MG 24 hr tablet Take 1 tablet (30 mg total) by mouth once daily.    latanoprost 0.005 % ophthalmic solution Place 1 drop into both eyes every evening.    losartan (COZAAR) 100 MG tablet TAKE 1 TABLET BY MOUTH ONCE DAILY.    nitroGLYCERIN 0.4 MG/DOSE TL SPRY (NITROLINGUAL) 400 mcg/spray spray PLACE 2 SPRAYS UNDER THE TONGUE EVERY 5 MINUTES AS NEEDED FOR CHEST PAIN    ONETOUCH DELICA LANCETS 30 gauge Misc 1 lancet by Misc.(Non-Drug; Combo Route) route 4 (four) times daily.    ONETOUCH VERIO Strp 1 strip by Misc.(Non-Drug; Combo Route) route  4 (four) times daily.    ranitidine (ZANTAC) 150 MG tablet Take 1 tablet (150 mg total) by mouth 2 (two) times daily.    rosuvastatin (CRESTOR) 40 MG Tab Take 1 tablet (40 mg total) by mouth once daily.    spironolactone (ALDACTONE) 50 MG tablet TAKE 1 TABLET (50 MG TOTAL) BY MOUTH ONCE DAILY.    TENS units Kirstin 1 Units by Misc.(Non-Drug; Combo Route) route once daily.    tramadol (ULTRAM) 50 mg tablet Take 1 tablet (50 mg total) by mouth 2 (two) times daily as needed.    triamcinolone acetonide 0.1% (KENALOG) 0.1 % ointment Apply small amount to itchy areas on body BID PRN.  Do not use on face or in groin.    insulin asp prt-insulin aspart, NOVOLOG 70/30, (NOVOLOG MIX 70-30) 100 unit/mL (70-30) Soln Inject 18 Units into the skin 2 (two) times daily before meals.    SITagliptan (JANUVIA) 50 MG Tab Take 1 tablet (50 mg total) by mouth once daily.           Clinical Reference Information           Your Vitals Were     BP Pulse Weight BMI       118/58 58 90 kg (198 lb 6.6 oz) 38.75 kg/m2       Blood Pressure          Most Recent Value    BP  (!)  118/58      Allergies as of 2/23/2017     Pcn [Penicillins]    Trulicity [Dulaglutide]      Immunizations Administered on Date of Encounter - 2/23/2017     None      Orders Placed During Today's Visit      Normal Orders This Visit    Ambulatory consult to Ophthalmology     Future Labs/Procedures Expected by Expires    Basic metabolic panel  2/23/2017 2/23/2018    Hemoglobin A1c  2/23/2017 2/23/2018    Microalbumin/creatinine urine ratio  2/23/2017 4/24/2018      [facility] Sign-Up     Activating your [facility] account is as easy as 1-2-3!     1) Visit [email or web address], select Sign Up Now, enter this activation code and your date of birth, then select Next.  [email or web address]  Expires: 3/24/2017 10:04 AM      2) Create a username and password to use when you visit [facility] in the future and select a security question in case you lose your password and select Next.    3) Enter  your e-mail address and click Sign Up!    Additional Information  If you have questions, please e-mail [email or web address] or call [phone] to talk to our [facility] staff. Remember, [facility] is NOT to be used for urgent needs. For medical emergencies, dial 911.         Instructions    Change your insulin from Novolin to Novolog 70/30     Start Januvia 50 mg daily     Januvia will NOT cause a low blood sugar. If you have a low blood sugar, call me so I can decrease your insulin        Language Assistance Services     ATTENTION: Language assistance services are available, free of charge. Please call [phone].      ATENCIÓN: Si habla español, tiene a jones disposición servicios gratuitos de asistencia lingüística. Llame al [phone].     CHÚ Ý: N?u b?n nói Ti?ng Vi?t, có các d?ch v? h? tr? ngôn ng? mi?n phí dành cho b?n. G?i s? [phone].         [facility] complies with applicable Federal civil rights laws and does not discriminate on the basis of race, color, national origin, age, disability, or sex.

## 2017-02-23 NOTE — Clinical Note
Eliu Paz has completed the Diabetes Empowerment program and would benefit from chronic Endocrine follow up for frequent insulin titration. An appointment has been scheduled in 3 months with an Endocrine NP for chronic management.   Thanks JEANNE Clark Endocrinology Nurse Practitioner

## 2017-03-08 ENCOUNTER — TELEPHONE (OUTPATIENT)
Dept: GASTROENTEROLOGY | Facility: CLINIC | Age: 77
End: 2017-03-08

## 2017-03-08 DIAGNOSIS — K21.00 GASTROESOPHAGEAL REFLUX DISEASE WITH ESOPHAGITIS: ICD-10-CM

## 2017-03-08 NOTE — TELEPHONE ENCOUNTER
----- Message from Alyssia Schaffer MA sent at 3/8/2017  1:55 PM CST -----  Contact: [facility]- [phone]      ----- Message -----     From: Nadine Shaw     Sent: 3/8/2017   9:36 AM       To: Felecia Wilcox Staff    Felecia- pharmacy called to get a 90 day supply of the pts rx ranitidine (ZANTAC) 150 MG tablet- please call pharmacy back at [phone]

## 2017-03-17 RX ORDER — NITROGLYCERIN 400 UG/1
SPRAY ORAL
Qty: 12 G | Refills: 0 | Status: SHIPPED | OUTPATIENT
Start: 2017-03-17 | End: 2017-05-06 | Stop reason: SDUPTHER

## 2017-04-11 ENCOUNTER — TELEPHONE (OUTPATIENT)
Dept: ENDOSCOPY | Facility: HOSPITAL | Age: 77
End: 2017-04-11

## 2017-04-11 ENCOUNTER — OFFICE VISIT (OUTPATIENT)
Dept: GASTROENTEROLOGY | Facility: CLINIC | Age: 77
End: 2017-04-11
Payer: MEDICARE

## 2017-04-11 VITALS
HEART RATE: 54 BPM | BODY MASS INDEX: 38.52 KG/M2 | DIASTOLIC BLOOD PRESSURE: 68 MMHG | HEIGHT: 60 IN | SYSTOLIC BLOOD PRESSURE: 140 MMHG | WEIGHT: 196.19 LBS

## 2017-04-11 DIAGNOSIS — K21.00 GASTROESOPHAGEAL REFLUX DISEASE WITH ESOPHAGITIS: ICD-10-CM

## 2017-04-11 DIAGNOSIS — K31.A0 INTESTINAL METAPLASIA OF GASTRIC MUCOSA: ICD-10-CM

## 2017-04-11 DIAGNOSIS — R13.10 DYSPHAGIA, UNSPECIFIED TYPE: Primary | ICD-10-CM

## 2017-04-11 PROCEDURE — 99214 OFFICE O/P EST MOD 30 MIN: CPT | Mod: PBBFAC | Performed by: NURSE PRACTITIONER

## 2017-04-11 PROCEDURE — 99214 OFFICE O/P EST MOD 30 MIN: CPT | Mod: S$PBB,,, | Performed by: NURSE PRACTITIONER

## 2017-04-11 PROCEDURE — 99999 PR PBB SHADOW E&M-EST. PATIENT-LVL IV: CPT | Mod: PBBFAC,,, | Performed by: NURSE PRACTITIONER

## 2017-04-11 NOTE — PROGRESS NOTES
[facility] Established Patient Visit    Reason for Visit:  The primary encounter diagnosis was Dysphagia, unspecified type. Diagnoses of Gastroesophageal reflux disease with esophagitis and Intestinal metaplasia of gastric mucosa were also pertinent to this visit.    PCP: Ryan Carlson    HPI:     This is a 77 y.o. female here for f/u of GERD and dysphagia. I saw Ms. Paz on 12/20/2016 and she was previously seen in GI by  and Dr. Calvo. She currently report she has been taking BID Zantac since her last visit with good control of retrosternal pyrosis as long as she avoids spicy foods. She states she will indulge in spicy food about once weekly. This results in retrosternal pyrosis and sometimes acid belch. She is still experiencing dysphagia to solids and liquids occurring 2-3 times per week. There is no problem initiating the swallow. She feels materials are getting hung up in her upper to mid esophagus. They will eventually pass with time or with repeated washing with liquids. She has not had to go to the ED for this. She does have a hx of LA grade A esophagitis and has been on daily PPIs in the past, but opted to switch to H2RAs in stead d/t potential s/e of long term PPI use. She also has a hx of IM dx in 2015.     Bowel habits - h/o constipation. Currently with 1 formed BM/day. Takes lactulose once weekly for bristol type 1 stools   GI bleeding - denies hematochezia, hematemesis, melena, BRBPR, black/tarry stools, and coffee ground emesis  NSAID usage - 81 mg ASA daily.     ROS:  Constitutional: No fevers, no chills, No unintentional weight loss, no fatigue,   ENT: No allergies  CV: No chest pain, no palpitations, + perif. edema, no sob on exertion  Pulm: No cough, No shortness of breath, no wheezes, no sputum  Ophtho: No vision changes  GI: see HPI; also no nausea, no vomiting, no change in appetite  Derm: No rash  Heme: No lymphadenopathy, No bruising  MSK: No arthritis, + muscle  pain, no muscle weakness  : No dysuria, No hematuria  Endo: No hot or cold intolerance  Neuro: No syncope, No seizure,     PMHX:  has a past medical history of Allergy; Arthritis; Cataract; CKD (chronic kidney disease) stage 3, GFR 30-59 ml/min; Coronary artery disease; Diabetes mellitus type II (1981); Diabetic neuropathy; GERD (gastroesophageal reflux disease); Glaucoma; Hyperlipidemia; and Hypertension.    PSHX:  has a past surgical history that includes Carpal tunnel release; Tubal ligation (1970s); Colonoscopy (N/A, 11/2/2015); Cardiac catheterization (03/2010); and Coronary artery bypass graft (08/13/1994).    The patient's social and family histories were reviewed by me and updated in the appropriate section of the electronic medical record.    Review of patient's allergies indicates:   Allergen Reactions    Pcn [penicillins] Swelling     Swelling (extremities)^, Swelling (extremities)^  Swelling (extremities)^, Swelling (extremities)^  Swelling (extremities)^, Swelling (extremities)^    Trulicity [dulaglutide] Nausea Only and Rash       Current Outpatient Prescriptions   Medication Sig    amlodipine (NORVASC) 10 MG tablet TAKE 1 TABLET BY MOUTH ONCE DAILY.    aspirin (ECOTRIN) 81 MG EC tablet Take 1 tablet (81 mg total) by mouth once daily.    baclofen (LIORESAL) 10 MG tablet Take 1 tablet (10 mg total) by mouth every evening.    carvedilol (COREG) 25 MG tablet TAKE 1 TABLET BY MOUTH TWICE DAILY.    clobetasol 0.05% (TEMOVATE) 0.05 % Oint Apply small amount to vulva area twice a day for 4 weeks then once a day for 4 weeks then once a day on Mondays and Thursdays    fluticasone (FLONASE) 50 mcg/actuation nasal spray SPRAY TWICE IN EACH NOSTRIL EVERY DAY    furosemide (LASIX) 40 MG tablet Take 1 tablet (40 mg total) by mouth once daily.    GENERLAC 10 gram/15 mL solution Take 30 mLs (20 g total) by mouth daily as needed (constipation).    hydrOXYzine pamoate (VISTARIL) 25 MG Cap Take 1 capsule (25  mg total) by mouth every 6 (six) hours as needed (Take 1-2 tablets as needed for itching).    insulin asp prt-insulin aspart, NOVOLOG 70/30, (NOVOLOG MIX 70-30) 100 unit/mL (70-30) Soln Inject 18 Units into the skin 2 (two) times daily before meals.    insulin syringe-needle U-100 (BD INSULIN SYRINGE ULTRA-FINE) 0.5 mL 31 gauge x 5/16 Syrg Use to inject twice daily with insulin injections    isosorbide mononitrate (IMDUR) 30 MG 24 hr tablet Take 1 tablet (30 mg total) by mouth once daily.    latanoprost 0.005 % ophthalmic solution Place 1 drop into both eyes every evening.    losartan (COZAAR) 100 MG tablet TAKE 1 TABLET BY MOUTH ONCE DAILY.    nitroGLYCERIN 0.4 MG/DOSE TL SPRY (NITROLINGUAL) 400 mcg/spray spray PLACE 2 SPRAYS UNDER THE TONGUE EVERY 5 MINUTES AS NEEDED FOR CHEST PAIN    ONETOUCH DELICA LANCETS 30 gauge Misc 1 lancet by Misc.(Non-Drug; Combo Route) route 4 (four) times daily.    ONETOUCH VERIO Strp 1 strip by Misc.(Non-Drug; Combo Route) route 4 (four) times daily.    ranitidine (ZANTAC) 150 MG tablet Take 1 tablet (150 mg total) by mouth 2 (two) times daily.    rosuvastatin (CRESTOR) 40 MG Tab Take 1 tablet (40 mg total) by mouth once daily.    SITagliptan (JANUVIA) 50 MG Tab Take 1 tablet (50 mg total) by mouth once daily.    spironolactone (ALDACTONE) 50 MG tablet TAKE 1 TABLET (50 MG TOTAL) BY MOUTH ONCE DAILY.    TENS units Kirstin 1 Units by Misc.(Non-Drug; Combo Route) route once daily.    tramadol (ULTRAM) 50 mg tablet Take 1 tablet (50 mg total) by mouth 2 (two) times daily as needed.    triamcinolone acetonide 0.1% (KENALOG) 0.1 % ointment Apply small amount to itchy areas on body BID PRN.  Do not use on face or in groin.     No current facility-administered medications for this visit.          Objective Findings:    Vital Signs:  BP (!) 140/68  Pulse (!) 54  Ht 5' (1.524 m)  Wt 89 kg (196 lb 3.4 oz)  BMI 38.32 kg/m2 Body mass index is 38.32 kg/(m^2).    Physical  "Exam:  General Appearance: Well appearing in no acute distress  Head:   Normocephalic, without obvious abnormality  Eyes:    No scleral icterus, EOMI  Throat: Lips, mucosa, and tongue normal; teeth and gums normal  Lungs: CTA bilaterally in anterior and posterior fields, no wheezes, no crackles.  Heart:  Regular rate and rhythm, S1, S2 normal, no murmurs heard  Abdomen: Soft, non tender, non distended with positive bowel sounds in all four quadrants. No hepatosplenomegaly, ascites, or mass  Extremities:    2+ radial pulses, no clubbing, or cyanosis + 1 edema to lower extremities bilaterally  Skin: No rash to exposed areas  Neurologic: A&Ox4      Labs:  Lab Results   Component Value Date    WBC 7.60 10/20/2016    HGB 13.6 10/20/2016    HCT 41.1 10/20/2016     10/20/2016    CHOL 109 (L) 12/05/2016    TRIG 66 12/05/2016    HDL 37 (L) 12/05/2016    ALT 13 12/05/2016    AST 23 12/05/2016     02/16/2017    K 4.1 02/16/2017     02/16/2017    CREATININE 1.2 02/16/2017    BUN 20 02/16/2017    CO2 23 02/16/2017    TSH 3.995 05/04/2015    INR 1.0 03/17/2015    HGBA1C 7.9 (H) 02/16/2017       Endoscopy:   11/2015 Colonoscopy Dr. Clinton-perianal skin tags. 10 mm transverse colon polyp. Pan tics. Path- adenovillious Repeat in 3 years (2018).     4/2015 EGD Dr. Miguelangel MARSH grade A reflux esophagitis. Small HH. Gastric erythema. Path- intestinal metaplasia. Repeat in 2-3 years (1825-5392).    Assessment:    1. Dysphagia, unspecified type    2. Gastroesophageal reflux disease with esophagitis    3. Intestinal metaplasia of gastric mucosa          Recommendations:  1.  Dysphagia- EGD r/o stricture, esophagitis, ring, obstructions. Pt states she had a test many years ago in which she swallowed "white chalky stuff" and would rather not have that testing again. Possible motility testing pending EGD results.     2. GERD- continue BID zantac and GERD healthy lifestyle. May have to take daily PPI pending EGD results.     3. " IM of gastric mucosa- EGD for surveillance.      Order summary:  Orders Placed This Encounter    Case request GI: ESOPHAGOGASTRODUODENOSCOPY (EGD)       Thank you for allowing me to participate in the care of JEANNE Perez, FNP-C

## 2017-04-11 NOTE — MR AVS SNAPSHOT
[facility]  [address] [address]  Phone: [phone]  Fax: [phone]                  Eliu Paz   2017 10:00 AM   Office Visit    Description:  Female : 1940   Provider:  Jeri Izquierdo NP   Department:  [facility]           Diagnoses this Visit        Comments    Dysphagia, unspecified type    -  Primary     Gastroesophageal reflux disease with esophagitis         Intestinal metaplasia of gastric mucosa                To Do List           Future Appointments        Provider Department Dept Phone    2017 9:00 AM Ata Fung MD [facility] [phone]    2017 8:00 AM LAB, APPOINTMENT [facility] [phone]    2017 8:10 AM LAB, APPOINTMENT [facility] [phone]    2017 10:00 AM JEANNE Cleveland,ANP-C [facility] [phone]      Goals (5 Years of Data)     None      [facility] Care Line -  Assistance  Unless otherwise directed by your provider, please contact [facility], our nurse care line that is available for  assistance.     Registered nurses in the [facility] provide: appointment scheduling, clinical advisement, health education, and other advisory services.  Call: [phone] (toll free)               Medications           Message regarding Medications     Verify the changes and/or additions to your medication regime listed below are the same as discussed with your clinician today.  If any of these changes or additions are incorrect, please notify your healthcare provider.             Verify that the below list of medications is an accurate representation of the medications you are currently taking.  If none reported, the list may be blank. If incorrect, please contact your healthcare provider. Carry this list with you in case of emergency.            Current Medications     amlodipine (NORVASC) 10 MG tablet TAKE 1 TABLET BY MOUTH ONCE DAILY.    aspirin (ECOTRIN) 81 MG EC tablet Take 1 tablet (81 mg total) by mouth once daily.    baclofen (LIORESAL) 10 MG tablet Take 1 tablet (10 mg total) by mouth every evening.    carvedilol (COREG) 25 MG tablet TAKE 1 TABLET BY MOUTH TWICE DAILY.    clobetasol 0.05% (TEMOVATE) 0.05 % Oint Apply small amount to vulva area twice a day for 4 weeks then once a day for 4 weeks then once a day on Mondays and Thursdays    fluticasone (FLONASE) 50 mcg/actuation nasal spray SPRAY TWICE IN EACH NOSTRIL EVERY DAY    furosemide (LASIX) 40 MG tablet Take 1 tablet (40 mg total) by mouth once daily.    GENERLAC 10 gram/15 mL solution Take 30 mLs (20 g total) by mouth daily as needed (constipation).    hydrOXYzine pamoate (VISTARIL) 25 MG Cap Take 1 capsule (25 mg total) by mouth every 6 (six) hours as needed (Take 1-2 tablets as needed for itching).    insulin asp prt-insulin aspart, NOVOLOG 70/30, (NOVOLOG MIX 70-30) 100 unit/mL (70-30) Soln Inject 18 Units into the skin 2 (two) times daily before meals.    insulin syringe-needle U-100 (BD INSULIN SYRINGE ULTRA-FINE) 0.5 mL 31 gauge x 5/16 Syrg Use to inject twice daily with insulin injections    isosorbide mononitrate (IMDUR) 30 MG 24 hr tablet Take 1 tablet (30 mg total) by mouth once daily.    latanoprost 0.005 % ophthalmic solution Place 1 drop into both eyes every evening.    losartan (COZAAR) 100 MG tablet TAKE 1 TABLET BY MOUTH ONCE DAILY.    nitroGLYCERIN 0.4 MG/DOSE TL SPRY (NITROLINGUAL) 400 mcg/spray spray PLACE 2 SPRAYS UNDER THE TONGUE EVERY 5 MINUTES AS NEEDED FOR CHEST PAIN    ONETOUCH DELICA LANCETS 30 gauge Misc 1 lancet by Misc.(Non-Drug; Combo Route) route 4 (four) times daily.    ONETOUCH VERIO Strp 1 strip by Misc.(Non-Drug; Combo Route) route 4 (four) times daily.    ranitidine (ZANTAC) 150 MG tablet Take 1 tablet (150 mg total) by mouth 2 (two) times daily.     rosuvastatin (CRESTOR) 40 MG Tab Take 1 tablet (40 mg total) by mouth once daily.    SITagliptan (JANUVIA) 50 MG Tab Take 1 tablet (50 mg total) by mouth once daily.    spironolactone (ALDACTONE) 50 MG tablet TAKE 1 TABLET (50 MG TOTAL) BY MOUTH ONCE DAILY.    TENS units Kirstin 1 Units by Misc.(Non-Drug; Combo Route) route once daily.    tramadol (ULTRAM) 50 mg tablet Take 1 tablet (50 mg total) by mouth 2 (two) times daily as needed.    triamcinolone acetonide 0.1% (KENALOG) 0.1 % ointment Apply small amount to itchy areas on body BID PRN.  Do not use on face or in groin.           Clinical Reference Information           Your Vitals Were     BP Pulse Height Weight BMI    140/68 54 5' (1.524 m) 89 kg (196 lb 3.4 oz) 38.32 kg/m2      Blood Pressure          Most Recent Value    BP  (!)  140/68      Allergies as of 4/11/2017     Pcn [Penicillins]    Trulicity [Dulaglutide]      Immunizations Administered on Date of Encounter - 4/11/2017     None      Orders Placed During Today's Visit      Normal Orders This Visit    Case request GI: ESOPHAGOGASTRODUODENOSCOPY (EGD)       Instructions    For GERD/Reflux:    Take Zantac/ranitadine twice daily as prescribed.    Remain upright for at least 3 hours after eating.    Elevate the head of the bead about 6 inches.  Some patients place cinder blocks under the head of the bed for elevation.    Avoid foods that you have noticed make your symptoms worse (possible triggers include:peppermint, chocolate, caffeine, spicy foods, greasy/fried foods, acidic foods).    Set a weight loss goal of 10% of your body weight.            Language Assistance Services     ATTENTION: Language assistance services are available, free of charge. Please call [phone].      ATENCIÓN: Si ana dina, tiene a jones disposición servicios gratuitos de asistencia lingüística. Llame al [phone].     CHÚ Ý: N?u b?n nói Ti?ng Vi?t, có các d?ch v? h? tr? ngôn ng? mi?n phí dành cho b?n. G?i s?  [phone].         [facility] complies with applicable Federal civil rights laws and does not discriminate on the basis of race, color, national origin, age, disability, or sex.

## 2017-04-11 NOTE — PATIENT INSTRUCTIONS
For GERD/Reflux:    Take Zantac/ranitadine twice daily as prescribed.    Remain upright for at least 3 hours after eating.    Elevate the head of the bead about 6 inches.  Some patients place cinder blocks under the head of the bed for elevation.    Avoid foods that you have noticed make your symptoms worse (possible triggers include:peppermint, chocolate, caffeine, spicy foods, greasy/fried foods, acidic foods).    Set a weight loss goal of 10% of your body weight.

## 2017-04-18 ENCOUNTER — SURGERY (OUTPATIENT)
Age: 77
End: 2017-04-18

## 2017-04-18 ENCOUNTER — HOSPITAL ENCOUNTER (OUTPATIENT)
Facility: HOSPITAL | Age: 77
Discharge: HOME | End: 2017-04-18
Attending: INTERNAL MEDICINE | Admitting: INTERNAL MEDICINE
Payer: MEDICARE

## 2017-04-18 ENCOUNTER — ANESTHESIA (OUTPATIENT)
Dept: ENDOSCOPY | Facility: HOSPITAL | Age: 77
End: 2017-04-18
Payer: MEDICARE

## 2017-04-18 ENCOUNTER — ANESTHESIA EVENT (OUTPATIENT)
Dept: ENDOSCOPY | Facility: HOSPITAL | Age: 77
End: 2017-04-18
Payer: MEDICARE

## 2017-04-18 VITALS
DIASTOLIC BLOOD PRESSURE: 68 MMHG | HEART RATE: 68 BPM | HEIGHT: 60 IN | WEIGHT: 191 LBS | TEMPERATURE: 98 F | RESPIRATION RATE: 19 BRPM | OXYGEN SATURATION: 99 % | SYSTOLIC BLOOD PRESSURE: 124 MMHG | BODY MASS INDEX: 37.5 KG/M2

## 2017-04-18 DIAGNOSIS — K21.00 GASTROESOPHAGEAL REFLUX DISEASE WITH ESOPHAGITIS: ICD-10-CM

## 2017-04-18 DIAGNOSIS — K59.09 OTHER CONSTIPATION: Primary | ICD-10-CM

## 2017-04-18 LAB — POCT GLUCOSE: 101 MG/DL (ref 70–110)

## 2017-04-18 PROCEDURE — 63600175 PHARM REV CODE 636 W HCPCS: Performed by: REGISTERED NURSE

## 2017-04-18 PROCEDURE — 43239 EGD BIOPSY SINGLE/MULTIPLE: CPT | Performed by: INTERNAL MEDICINE

## 2017-04-18 PROCEDURE — 88305 TISSUE EXAM BY PATHOLOGIST: CPT | Performed by: PATHOLOGY

## 2017-04-18 PROCEDURE — 88305 TISSUE EXAM BY PATHOLOGIST: CPT | Mod: 26,,, | Performed by: PATHOLOGY

## 2017-04-18 PROCEDURE — C1773 RET DEV, INSERTABLE: HCPCS | Performed by: INTERNAL MEDICINE

## 2017-04-18 PROCEDURE — 43239 EGD BIOPSY SINGLE/MULTIPLE: CPT | Mod: ,,, | Performed by: INTERNAL MEDICINE

## 2017-04-18 PROCEDURE — D9220A PRA ANESTHESIA: Mod: ANES,,, | Performed by: ANESTHESIOLOGY

## 2017-04-18 PROCEDURE — D9220A PRA ANESTHESIA: Mod: CRNA,,, | Performed by: REGISTERED NURSE

## 2017-04-18 PROCEDURE — 25000003 PHARM REV CODE 250: Performed by: INTERNAL MEDICINE

## 2017-04-18 PROCEDURE — 37000008 HC ANESTHESIA 1ST 15 MINUTES: Performed by: INTERNAL MEDICINE

## 2017-04-18 PROCEDURE — 27201012 HC FORCEPS, HOT/COLD, DISP: Performed by: INTERNAL MEDICINE

## 2017-04-18 PROCEDURE — 25000003 PHARM REV CODE 250: Performed by: REGISTERED NURSE

## 2017-04-18 PROCEDURE — 37000009 HC ANESTHESIA EA ADD 15 MINS: Performed by: INTERNAL MEDICINE

## 2017-04-18 PROCEDURE — 82962 GLUCOSE BLOOD TEST: CPT | Performed by: INTERNAL MEDICINE

## 2017-04-18 RX ORDER — LIDOCAINE HCL/PF 100 MG/5ML
SYRINGE (ML) INTRAVENOUS
Status: DISCONTINUED | OUTPATIENT
Start: 2017-04-18 | End: 2017-04-18

## 2017-04-18 RX ORDER — PROPOFOL 10 MG/ML
VIAL (ML) INTRAVENOUS CONTINUOUS PRN
Status: DISCONTINUED | OUTPATIENT
Start: 2017-04-18 | End: 2017-04-18

## 2017-04-18 RX ORDER — PROPOFOL 10 MG/ML
VIAL (ML) INTRAVENOUS
Status: DISCONTINUED | OUTPATIENT
Start: 2017-04-18 | End: 2017-04-18

## 2017-04-18 RX ORDER — SODIUM CHLORIDE 9 MG/ML
INJECTION, SOLUTION INTRAVENOUS CONTINUOUS
Status: DISCONTINUED | OUTPATIENT
Start: 2017-04-18 | End: 2017-04-18 | Stop reason: HOSPADM

## 2017-04-18 RX ADMIN — SODIUM CHLORIDE: 0.9 INJECTION, SOLUTION INTRAVENOUS at 09:04

## 2017-04-18 RX ADMIN — PROPOFOL 20 MG: 10 INJECTION, EMULSION INTRAVENOUS at 09:04

## 2017-04-18 RX ADMIN — PROPOFOL 150 MCG/KG/MIN: 10 INJECTION, EMULSION INTRAVENOUS at 09:04

## 2017-04-18 RX ADMIN — LIDOCAINE HYDROCHLORIDE 125 MG: 20 INJECTION, SOLUTION INTRAVENOUS at 09:04

## 2017-04-18 NOTE — ANESTHESIA RELEASE NOTE
Anesthesia Release from PACU Note    Patient: Eliu Paz    Procedure(s) Performed: Procedure(s) (LRB):  ESOPHAGOGASTRODUODENOSCOPY (EGD) (N/A)    Anesthesia type: General    Post pain: Adequate analgesia    Post assessment: no apparent anesthetic complications    Last Vitals:   Vitals:    04/18/17 0949   BP: 124/68   Pulse: 68   Resp: 19   Temp:    SpO2: 99%       Post vital signs: stable    Level of consciousness: awake    Complications: none    Airway Patency: patent    Respiratory: spontaneous    Cardiovascular: stable    Hydration: euvolemic

## 2017-04-18 NOTE — DISCHARGE INSTRUCTIONS

## 2017-04-18 NOTE — IP AVS SNAPSHOT
[facility]  [address]  [facility] [address]  Phone: [phone]           Patient Discharge Instructions   Our goal is to set you up for success. This packet includes information on your condition, medications, and your home care.  It will help you care for yourself to prevent having to return to the hospital.     Please ask your nurse if you have any questions.      There are many details to remember when preparing to leave the hospital. Here is what you will need to do:    1. Take your medicine. If you are prescribed medications, review your Medication List on the following pages. You may have new medications to  at the pharmacy and others that you'll need to stop taking. Review the instructions for how and when to take your medications. Talk with your doctor or nurses if you are unsure of what to do.     2. Go to your follow-up appointments. Specific follow-up information is listed in the following pages. Your may be contacted by a nurse or clinical provider about future appointments. Be sure we have all of the phone numbers to reach you. Please contact your provider's office if you are unable to make an appointment.     3. Watch for warning signs. Your doctor or nurse will give you detailed warning signs to watch for and when to call for assistance. These instructions may also include educational information about your condition. If you experience any of warning signs to your health, call your doctor.           [facility]  Unless otherwise directed by your provider, please   contact [facility], our nurse care line   that is available for 24/7 assistance.     [phone] (toll-free)     Registered nurses in the [facility]   provide: appointment scheduling, clinical advisement, health education, and other advisory services.                  ** Verify the list of medication(s) below is accurate and up to date. Carry this with you in case of  emergency. If your medications have changed, please notify your healthcare provider.             Medication List      CONTINUE taking these medications        Additional Info                      amlodipine 10 MG tablet   Commonly known as:  NORVASC   Quantity:  90 tablet   Refills:  11    Instructions:  TAKE 1 TABLET BY MOUTH ONCE DAILY.     Begin Date    AM    Noon    PM    Bedtime       aspirin 81 MG EC tablet   Commonly known as:  ECOTRIN   Refills:  0   Dose:  81 mg    Instructions:  Take 1 tablet (81 mg total) by mouth once daily.     Begin Date    AM    Noon    PM    Bedtime       baclofen 10 MG tablet   Commonly known as:  LIORESAL   Quantity:  90 tablet   Refills:  3   Dose:  10 mg    Instructions:  Take 1 tablet (10 mg total) by mouth every evening.     Begin Date    AM    Noon    PM    Bedtime       carvedilol 25 MG tablet   Commonly known as:  COREG   Quantity:  180 tablet   Refills:  3    Instructions:  TAKE 1 TABLET BY MOUTH TWICE DAILY.     Begin Date    AM    Noon    PM    Bedtime       clobetasol 0.05% 0.05 % Oint   Commonly known as:  TEMOVATE   Quantity:  60 g   Refills:  1    Instructions:  Apply small amount to vulva area twice a day for 4 weeks then once a day for 4 weeks then once a day on Mondays and Thursdays     Begin Date    AM    Noon    PM    Bedtime       fluticasone 50 mcg/actuation nasal spray   Commonly known as:  FLONASE   Quantity:  16 g   Refills:  11    Instructions:  SPRAY TWICE IN EACH NOSTRIL EVERY DAY     Begin Date    AM    Noon    PM    Bedtime       furosemide 40 MG tablet   Commonly known as:  LASIX   Quantity:  90 tablet   Refills:  3   Dose:  40 mg    Instructions:  Take 1 tablet (40 mg total) by mouth once daily.     Begin Date    AM    Noon    PM    Bedtime       GENERLAC 10 gram/15 mL solution   Quantity:  946 mL   Refills:  3   Dose:  20 g   Generic drug:  lactulose    Instructions:  Take 30 mLs (20 g total) by mouth daily as needed (constipation).     Begin Date     AM    Noon    PM    Bedtime       hydrOXYzine pamoate 25 MG Cap   Commonly known as:  VISTARIL   Quantity:  25 capsule   Refills:  0   Dose:  25 mg    Instructions:  Take 1 capsule (25 mg total) by mouth every 6 (six) hours as needed (Take 1-2 tablets as needed for itching).     Begin Date    AM    Noon    PM    Bedtime       insulin asp prt-insulin aspart (NOVOLOG 70/30) 100 unit/mL (70-30) Soln   Commonly known as:  NOVOLOG MIX 70-30   Quantity:  45 mL   Refills:  3   Dose:  18 Units    Instructions:  Inject 18 Units into the skin 2 (two) times daily before meals.     Begin Date    AM    Noon    PM    Bedtime       insulin syringe-needle U-100 0.5 mL 31 gauge x 5/16 Syrg   Commonly known as:  BD INSULIN SYRINGE ULTRA-FINE   Quantity:  60 each   Refills:  11    Instructions:  Use to inject twice daily with insulin injections     Begin Date    AM    Noon    PM    Bedtime       isosorbide mononitrate 30 MG 24 hr tablet   Commonly known as:  IMDUR   Quantity:  90 tablet   Refills:  3   Dose:  30 mg    Instructions:  Take 1 tablet (30 mg total) by mouth once daily.     Begin Date    AM    Noon    PM    Bedtime       latanoprost 0.005 % ophthalmic solution   Quantity:  3 Bottle   Refills:  4   Dose:  1 drop    Instructions:  Place 1 drop into both eyes every evening.     Begin Date    AM    Noon    PM    Bedtime       losartan 100 MG tablet   Commonly known as:  COZAAR   Quantity:  90 tablet   Refills:  3    Instructions:  TAKE 1 TABLET BY MOUTH ONCE DAILY.     Begin Date    AM    Noon    PM    Bedtime       nitroGLYCERIN 0.4 MG/DOSE TL SPRY 400 mcg/spray spray   Commonly known as:  NITROLINGUAL   Quantity:  12 g   Refills:  0    Instructions:  PLACE 2 SPRAYS UNDER THE TONGUE EVERY 5 MINUTES AS NEEDED FOR CHEST PAIN     Begin Date    AM    Noon    PM    Bedtime       ONETOUCH DELICA LANCETS 30 gauge Misc   Quantity:  150 each   Refills:  11   Dose:  1 lancet   Generic drug:  lancets    Instructions:  1 lancet by  Misc.(Non-Drug; Combo Route) route 4 (four) times daily.     Begin Date    AM    Noon    PM    Bedtime       ONETOUCH VERIO Strp   Quantity:  150 strip   Refills:  11   Dose:  1 strip   Generic drug:  blood sugar diagnostic    Instructions:  1 strip by Misc.(Non-Drug; Combo Route) route 4 (four) times daily.     Begin Date    AM    Noon    PM    Bedtime       ranitidine 150 MG tablet   Commonly known as:  ZANTAC   Quantity:  180 tablet   Refills:  0   Dose:  150 mg    Instructions:  Take 1 tablet (150 mg total) by mouth 2 (two) times daily.     Begin Date    AM    Noon    PM    Bedtime       rosuvastatin 40 MG Tab   Commonly known as:  CRESTOR   Quantity:  90 tablet   Refills:  3   Dose:  40 mg    Instructions:  Take 1 tablet (40 mg total) by mouth once daily.     Begin Date    AM    Noon    PM    Bedtime       SITagliptan 50 MG Tab   Commonly known as:  JANUVIA   Quantity:  30 tablet   Refills:  4   Dose:  50 mg    Instructions:  Take 1 tablet (50 mg total) by mouth once daily.     Begin Date    AM    Noon    PM    Bedtime       spironolactone 50 MG tablet   Commonly known as:  ALDACTONE   Quantity:  90 tablet   Refills:  11    Instructions:  TAKE 1 TABLET (50 MG TOTAL) BY MOUTH ONCE DAILY.     Begin Date    AM    Noon    PM    Bedtime       TENS units Kirstin   Quantity:  1 Device   Refills:  0   Dose:  1 Units    Instructions:  1 Units by Misc.(Non-Drug; Combo Route) route once daily.     Begin Date    AM    Noon    PM    Bedtime       tramadol 50 mg tablet   Commonly known as:  ULTRAM   Quantity:  60 tablet   Refills:  2   Dose:  50 mg   Comments:  This request is for a new prescription for a controlled substance as required by Federal/State law..    Instructions:  Take 1 tablet (50 mg total) by mouth 2 (two) times daily as needed.     Begin Date    AM    Noon    PM    Bedtime       triamcinolone acetonide 0.1% 0.1 % ointment   Commonly known as:  KENALOG   Refills:  0    Instructions:  Apply small amount to itchy  areas on body BID PRN.  Do not use on face or in groin.     Begin Date    AM    Noon    PM    Bedtime                  Please bring to all follow up appointments:    1. A copy of your discharge instructions.  2. All medicines you are currently taking in their original bottles.  3. Identification and insurance card.    Please arrive 15 minutes ahead of scheduled appointment time.    Please call 24 hours in advance if you must reschedule your appointment and/or time.        Your Scheduled Appointments     May 16, 2017  9:00 AM CDT   Established Patient Visit with MD Bimal Martinez - Rheumatology ([facility] )    [address]  [place] [address]   [phone]            May 25, 2017  8:00 AM CDT   Non-Fasting Lab with LAB, APPOINTMENT [facility] ([facility])    [address]  [place] [address]   [phone]            May 25, 2017  8:10 AM CDT   Urine with LAB, APPOINTMENT [facility] ([facility])    [address]  [place] [address]   [phone]            Jun 01, 2017 10:00 AM CDT   Established Patient with JEANNE Cleveland,ANP-C   [facility] Rosales - Endocrinology ([facility] )    [address] [facility] [address]   [phone]                Discharge Instructions     Future Orders    Diet general     Questions:    Total calories:      Fat restriction, if any:      Protein restriction, if any:      Na restriction, if any:      Fluid restriction:      Additional restrictions:          Discharge Instructions         Upper GI Endoscopy     During endoscopy, a long, flexible tube is used to view the inside of your upper GI tract.      Upper GI endoscopy allows your healthcare provider to look directly into the beginning of your gastrointestinal (GI) tract. The esophagus, stomach, and duodenum (the  first part of the small intestine) make up the upper GI tract.   Before the exam  Follow these and any other instructions you are given before your endoscopy. If you dont follow the healthcare providers instructions carefully, the test may need to be canceled or done over:  · Don't eat or drink anything after midnight the night before your exam. If your exam is in the afternoon, drink only clear liquids in the morning. Don't eat or drink anything for 8 hours before the exam. In some cases, you may be able to take medicines with sips of water until 2 hours before the procedure. Speak with your healthcare provider about this.   · Bring your X-rays and any other test results you have.  · Because you will be sedated, arrange for an adult to drive you home after the exam.  · Tell your healthcare provider before the exam if you are taking any medicines or have any medical problems.  The procedure  Here is what to expect:  · You will lie on the endoscopy table. Usually patients lie on the left side.  · You will be monitored and given oxygen.  · Your throat may be numbed with a spray or gargle. You are given medicine through an intravenous (IV) line that will help you relax and remain comfortable. You may be awake or asleep during the procedure.  · The healthcare provider will put the endoscope in your mouth and down your esophagus. It is thinner than most pieces of food that you swallow. It will not affect your breathing. The medicine helps keep you from gagging.  · Air is put into your GI tract to expand it. It can make you burp.  · During the procedure, the healthcare provider can take biopsies (tissue samples), remove abnormalities, such as polyps, or treat abnormalities through a variety of devices placed through the endoscope. You will not feel this.   · The endoscope carries images of your upper GI tract to a video screen. If you are awake, you may be able to look at the images.  · After the procedure is done, you  will rest for a time. An adult must drive you home.  When to call your healthcare provider  Contact your healthcare provider if you have:  · Black or tarry stools, or blood in your stool  · Fever  · Pain in your belly that does not go away  · Nausea and vomiting, or vomiting blood   Date Last Reviewed: 7/1/2016 © 2000-2016 [employer]. [address] [place] [address]. All rights reserved. This information is not intended as a substitute for professional medical care. Always follow your healthcare professional's instructions.          Instructions    Discharge Summary/Instructions for after EGD with Biopsy  Patient Name: Eliu Paz  Patient MRN: [id number]  Patient YOB: 1940  Tuesday, April 18, 2017    Hitesh Quiroga MD  RESTRICTIONS ON ACTIVITY:  - DO NOT drive a car or operate machinery until the day after the   procedure.  - Following Day:  Return to full activities including work.  - Diet:  Eat and drink normally unless instructed otherwise.  TREATMENT FOR COMMON SIDE EFFECTS:  - Sore Throat - treat with throat lozenges, gargle with warm salt water.  - Mild abdominal pain & bloating - rest and take liquids only.  SYMPTOMS TO WATCH FOR AND REPORT TO YOUR PHYSICIAN:  1.  Chills or fever occurring within 24 hours after procedure.  2.  Pain in chest.  3.  SEVERE abdominal pain or bloating.  4.  Rectal bleeding which would show as maroon or black stools.  Your doctor recommends these additional instructions:  If any biopsies were performed, my office will call you in 5 to 6 business   days with any results.  - Patient has a contact number available for emergencies.  The signs and   symptoms of potential delayed complications were discussed with the   patient.  Return to normal activities tomorrow.  Written discharge   instructions were provided to the patient.   - Discharge patient to home.   - Await pathology results.   - Repeat upper endoscopy in 3 years for surveillance based on  pathology   results.   - The findings and recommendations were discussed with the designated   responsible adult.   - Return to nurse practitioner as previously scheduled.   You have a contact number available for emergencies.  The signs and symptoms   of potential delayed complications were discussed with you.  You may return   to normal activities tomorrow.  Written discharge instructions were   provided to you.   You are being discharged to home.   We are waiting for your pathology results.   Your physician has recommended a repeat upper endoscopy in three years for   surveillance based on pathology results.   The findings and recommendations were discussed with your designated   responsible adult.   Return to your nurse practitioner as previously scheduled.  Patient has a contact number available for emergencies.  The signs and   symptoms of potential delayed complications were discussed with the   patient.  Return to normal activities tomorrow.  Written discharge   instructions were provided to the patient.   Discharge patient to home.   Await pathology results.   Repeat upper endoscopy in 3 years for surveillance based on pathology   results.   The findings and recommendations were discussed with the designated   responsible adult.   Return to nurse practitioner as previously scheduled.  If you have any questions or problems, please call your physician.  EMERGENCY PHONE NUMBER: [phone]  LAB RESULTS: [phone]         Hitesh Quiroga MD  4/18/2017 9:27:40 AM  This report has been verified and signed electronically.         Admission Information     Date & Time Provider Department CSN    4/18/2017  8:24 AM Hitesh Quiroga MD [facility] [id number]      Care Providers     Provider Role Specialty Primary office phone    Hitesh Quiroga MD Attending Provider Gastroenterology [phone]    Hitesh Quiroga MD Surgeon  Gastroenterology [phone]      Your Vitals Were     BP Pulse Temp Resp Height Weight     120/57 (BP Location: Left arm, Patient Position: Lying, BP Method: Automatic) 70 97.7 °F (36.5 °C) (Axillary) 18 5' (1.524 m) 86.6 kg (191 lb)    SpO2 BMI             98% 37.3 kg/m2         Recent Lab Values        10/15/2015 1/26/2016 5/31/2016 8/5/2016 11/8/2016 11/28/2016 12/5/2016 2/16/2017      9:52 AM 11:35 AM 10:02 AM 12:02 PM  8:24 AM  8:45 AM  8:10 AM  8:08 AM    A1C 7.9 (H) 8.0 (H) 8.4 (H) 8.2 (H) 9.2 (H) 8.4 (H) 8.3 (H) 7.9 (H)    Comment for A1C at 12:02 PM on 8/5/2016:  According to ADA guidelines, hemoglobin A1C <7.0% represents  optimal control in non-pregnant diabetic patients.  Different  metrics may apply to specific populations.   Standards of Medical Care in Diabetes - 2016.  For the purpose of screening for the presence of diabetes:  <5.7%     Consistent with the absence of diabetes  5.7-6.4%  Consistent with increasing risk for diabetes   (prediabetes)  >or=6.5%  Consistent with diabetes  Currently no consensus exists for use of hemoglobin A1C  for diagnosis of diabetes for children.      Comment for A1C at  8:24 AM on 11/8/2016:  According to ADA guidelines, hemoglobin A1C <7.0% represents  optimal control in non-pregnant diabetic patients.  Different  metrics may apply to specific populations.   Standards of Medical Care in Diabetes - 2016.  For the purpose of screening for the presence of diabetes:  <5.7%     Consistent with the absence of diabetes  5.7-6.4%  Consistent with increasing risk for diabetes   (prediabetes)  >or=6.5%  Consistent with diabetes  Currently no consensus exists for use of hemoglobin A1C  for diagnosis of diabetes for children.      Comment for A1C at  8:45 AM on 11/28/2016:  According to ADA guidelines, hemoglobin A1C <7.0% represents  optimal control in non-pregnant diabetic patients.  Different  metrics may apply to specific populations.   Standards of Medical Care in Diabetes - 2016.  For the purpose of screening for the presence of diabetes:  <5.7%      Consistent with the absence of diabetes  5.7-6.4%  Consistent with increasing risk for diabetes   (prediabetes)  >or=6.5%  Consistent with diabetes  Currently no consensus exists for use of hemoglobin A1C  for diagnosis of diabetes for children.      Comment for A1C at  8:10 AM on 12/5/2016:  According to ADA guidelines, hemoglobin A1C <7.0% represents  optimal control in non-pregnant diabetic patients.  Different  metrics may apply to specific populations.   Standards of Medical Care in Diabetes - 2016.  For the purpose of screening for the presence of diabetes:  <5.7%     Consistent with the absence of diabetes  5.7-6.4%  Consistent with increasing risk for diabetes   (prediabetes)  >or=6.5%  Consistent with diabetes  Currently no consensus exists for use of hemoglobin A1C  for diagnosis of diabetes for children.      Comment for A1C at  8:08 AM on 2/16/2017:  According to ADA guidelines, hemoglobin A1C <7.0% represents  optimal control in non-pregnant diabetic patients.  Different  metrics may apply to specific populations.   Standards of Medical Care in Diabetes - 2016.  For the purpose of screening for the presence of diabetes:  <5.7%     Consistent with the absence of diabetes  5.7-6.4%  Consistent with increasing risk for diabetes   (prediabetes)  >or=6.5%  Consistent with diabetes  Currently no consensus exists for use of hemoglobin A1C  for diagnosis of diabetes for children.        Pending Labs     Order Current Status    Specimen to Pathology - Surgery Collected (04/18/17 0922)      Allergies as of 4/18/2017        Reactions    Pcn [Penicillins] Swelling    Swelling (extremities)^, Swelling (extremities)^  Swelling (extremities)^, Swelling (extremities)^  Swelling (extremities)^, Swelling (extremities)^    Trulicity [Dulaglutide] Nausea Only, Rash      Advance Directives     An advance directive is a document which, in the event you are no longer able to make decisions for yourself, tells your healthcare  team what kind of treatment you do or do not want to receive, or who you would like to make those decisions for you.  If you do not currently have an advance directive, [facility] encourages you to create one.  For more information call:  [phone] ([phone]), [phone] ([phone]),  or log on to [email or web address].        Smoking Cessation     If you would like to quit smoking:   You may be eligible for free services if you are a [place] resident and started smoking cigarettes before September 1, 1988.  Call the Smoking Cessation Trust (SCT) toll free at (933) 983-8519 or (306) [phone].   Call 4-387-QUIT-NOW if you do not meet the above criteria.   Contact us via email: [email or web address]   View our website for more information: [email or web address]        Language Assistance Services     ATTENTION: Language assistance services are available, free of charge. Please call [phone].      ATENCIÓN: Si habla español, tiene a jones disposición servicios gratuitos de asistencia lingüística. Llame al [phone].     CHÚ Ý: N?u b?n nói Ti?ng Vi?t, có các d?ch v? h? tr? ngôn ng? mi?n phí dành cho b?n. G?i s? [phone].        Chronic Kindey Disease Education             Diabetes Discharge Instructions                                    [facility] complies with applicable Federal civil rights laws and does not discriminate on the basis of race, color, national origin, age, disability, or sex.

## 2017-04-18 NOTE — ANESTHESIA POSTPROCEDURE EVALUATION
Anesthesia Post Evaluation    Patient: Eliu Paz    Procedure(s) Performed: Procedure(s) (LRB):  ESOPHAGOGASTRODUODENOSCOPY (EGD) (N/A)    Final Anesthesia Type: general  Patient location during evaluation: PACU  Patient participation: Yes- Able to Participate  Level of consciousness: awake and alert  Post-procedure vital signs: reviewed and stable  Pain management: adequate  Airway patency: patent  PONV status at discharge: No PONV  Anesthetic complications: no      Cardiovascular status: blood pressure returned to baseline  Respiratory status: unassisted  Hydration status: euvolemic  Follow-up not needed.        Visit Vitals    /68    Pulse 68    Temp 36.5 °C (97.7 °F) (Axillary)    Resp 19    Ht 5' (1.524 m)    Wt 86.6 kg (191 lb)    SpO2 99%    Breastfeeding No    BMI 37.3 kg/m2       Pain/Linda Score: Pain Assessment Performed: Yes (4/18/2017  9:51 AM)  Presence of Pain: denies (4/18/2017  9:51 AM)  Linda Score: 10 (4/18/2017  9:51 AM)

## 2017-04-18 NOTE — PATIENT INSTRUCTIONS
Discharge Summary/Instructions for after EGD with Biopsy  Patient Name: Eliu Paz  Patient MRN: [id number]  Patient YOB: 1940  Tuesday, April 18, 2017    Hitesh Quiroga MD  RESTRICTIONS ON ACTIVITY:  - DO NOT drive a car or operate machinery until the day after the   procedure.  - Following Day:  Return to full activities including work.  - Diet:  Eat and drink normally unless instructed otherwise.  TREATMENT FOR COMMON SIDE EFFECTS:  - Sore Throat - treat with throat lozenges, gargle with warm salt water.  - Mild abdominal pain & bloating - rest and take liquids only.  SYMPTOMS TO WATCH FOR AND REPORT TO YOUR PHYSICIAN:  1.  Chills or fever occurring within 24 hours after procedure.  2.  Pain in chest.  3.  SEVERE abdominal pain or bloating.  4.  Rectal bleeding which would show as maroon or black stools.  Your doctor recommends these additional instructions:  If any biopsies were performed, my office will call you in 5 to 6 business   days with any results.  - Patient has a contact number available for emergencies.  The signs and   symptoms of potential delayed complications were discussed with the   patient.  Return to normal activities tomorrow.  Written discharge   instructions were provided to the patient.   - Discharge patient to home.   - Await pathology results.   - Repeat upper endoscopy in 3 years for surveillance based on pathology   results.   - The findings and recommendations were discussed with the designated   responsible adult.   - Return to nurse practitioner as previously scheduled.   You have a contact number available for emergencies.  The signs and symptoms   of potential delayed complications were discussed with you.  You may return   to normal activities tomorrow.  Written discharge instructions were   provided to you.   You are being discharged to home.   We are waiting for your pathology results.   Your physician has recommended a repeat upper endoscopy in three years for    surveillance based on pathology results.   The findings and recommendations were discussed with your designated   responsible adult.   Return to your nurse practitioner as previously scheduled.  Patient has a contact number available for emergencies.  The signs and   symptoms of potential delayed complications were discussed with the   patient.  Return to normal activities tomorrow.  Written discharge   instructions were provided to the patient.   Discharge patient to home.   Await pathology results.   Repeat upper endoscopy in 3 years for surveillance based on pathology   results.   The findings and recommendations were discussed with the designated   responsible adult.   Return to nurse practitioner as previously scheduled.  If you have any questions or problems, please call your physician.  EMERGENCY PHONE NUMBER: [phone]  LAB RESULTS: [phone]         Hitesh Quiroga MD  4/18/2017 9:27:40 AM  This report has been verified and signed electronically.

## 2017-04-18 NOTE — H&P (VIEW-ONLY)
[facility] Established Patient Visit    Reason for Visit:  The primary encounter diagnosis was Dysphagia, unspecified type. Diagnoses of Gastroesophageal reflux disease with esophagitis and Intestinal metaplasia of gastric mucosa were also pertinent to this visit.    PCP: Ryan Carlson    HPI:     This is a 77 y.o. female here for f/u of GERD and dysphagia. I saw Ms. Paz on 12/20/2016 and she was previously seen in GI by  and Dr. Calvo. She currently report she has been taking BID Zantac since her last visit with good control of retrosternal pyrosis as long as she avoids spicy foods. She states she will indulge in spicy food about once weekly. This results in retrosternal pyrosis and sometimes acid belch. She is still experiencing dysphagia to solids and liquids occurring 2-3 times per week. There is no problem initiating the swallow. She feels materials are getting hung up in her upper to mid esophagus. They will eventually pass with time or with repeated washing with liquids. She has not had to go to the ED for this. She does have a hx of LA grade A esophagitis and has been on daily PPIs in the past, but opted to switch to H2RAs in stead d/t potential s/e of long term PPI use. She also has a hx of IM dx in 2015.     Bowel habits - h/o constipation. Currently with 1 formed BM/day. Takes lactulose once weekly for bristol type 1 stools   GI bleeding - denies hematochezia, hematemesis, melena, BRBPR, black/tarry stools, and coffee ground emesis  NSAID usage - 81 mg ASA daily.     ROS:  Constitutional: No fevers, no chills, No unintentional weight loss, no fatigue,   ENT: No allergies  CV: No chest pain, no palpitations, + perif. edema, no sob on exertion  Pulm: No cough, No shortness of breath, no wheezes, no sputum  Ophtho: No vision changes  GI: see HPI; also no nausea, no vomiting, no change in appetite  Derm: No rash  Heme: No lymphadenopathy, No bruising  MSK: No arthritis, + muscle  pain, no muscle weakness  : No dysuria, No hematuria  Endo: No hot or cold intolerance  Neuro: No syncope, No seizure,     PMHX:  has a past medical history of Allergy; Arthritis; Cataract; CKD (chronic kidney disease) stage 3, GFR 30-59 ml/min; Coronary artery disease; Diabetes mellitus type II (1981); Diabetic neuropathy; GERD (gastroesophageal reflux disease); Glaucoma; Hyperlipidemia; and Hypertension.    PSHX:  has a past surgical history that includes Carpal tunnel release; Tubal ligation (1970s); Colonoscopy (N/A, 11/2/2015); Cardiac catheterization (03/2010); and Coronary artery bypass graft (08/13/1994).    The patient's social and family histories were reviewed by me and updated in the appropriate section of the electronic medical record.    Review of patient's allergies indicates:   Allergen Reactions    Pcn [penicillins] Swelling     Swelling (extremities)^, Swelling (extremities)^  Swelling (extremities)^, Swelling (extremities)^  Swelling (extremities)^, Swelling (extremities)^    Trulicity [dulaglutide] Nausea Only and Rash       Current Outpatient Prescriptions   Medication Sig    amlodipine (NORVASC) 10 MG tablet TAKE 1 TABLET BY MOUTH ONCE DAILY.    aspirin (ECOTRIN) 81 MG EC tablet Take 1 tablet (81 mg total) by mouth once daily.    baclofen (LIORESAL) 10 MG tablet Take 1 tablet (10 mg total) by mouth every evening.    carvedilol (COREG) 25 MG tablet TAKE 1 TABLET BY MOUTH TWICE DAILY.    clobetasol 0.05% (TEMOVATE) 0.05 % Oint Apply small amount to vulva area twice a day for 4 weeks then once a day for 4 weeks then once a day on Mondays and Thursdays    fluticasone (FLONASE) 50 mcg/actuation nasal spray SPRAY TWICE IN EACH NOSTRIL EVERY DAY    furosemide (LASIX) 40 MG tablet Take 1 tablet (40 mg total) by mouth once daily.    GENERLAC 10 gram/15 mL solution Take 30 mLs (20 g total) by mouth daily as needed (constipation).    hydrOXYzine pamoate (VISTARIL) 25 MG Cap Take 1 capsule (25  mg total) by mouth every 6 (six) hours as needed (Take 1-2 tablets as needed for itching).    insulin asp prt-insulin aspart, NOVOLOG 70/30, (NOVOLOG MIX 70-30) 100 unit/mL (70-30) Soln Inject 18 Units into the skin 2 (two) times daily before meals.    insulin syringe-needle U-100 (BD INSULIN SYRINGE ULTRA-FINE) 0.5 mL 31 gauge x 5/16 Syrg Use to inject twice daily with insulin injections    isosorbide mononitrate (IMDUR) 30 MG 24 hr tablet Take 1 tablet (30 mg total) by mouth once daily.    latanoprost 0.005 % ophthalmic solution Place 1 drop into both eyes every evening.    losartan (COZAAR) 100 MG tablet TAKE 1 TABLET BY MOUTH ONCE DAILY.    nitroGLYCERIN 0.4 MG/DOSE TL SPRY (NITROLINGUAL) 400 mcg/spray spray PLACE 2 SPRAYS UNDER THE TONGUE EVERY 5 MINUTES AS NEEDED FOR CHEST PAIN    ONETOUCH DELICA LANCETS 30 gauge Misc 1 lancet by Misc.(Non-Drug; Combo Route) route 4 (four) times daily.    ONETOUCH VERIO Strp 1 strip by Misc.(Non-Drug; Combo Route) route 4 (four) times daily.    ranitidine (ZANTAC) 150 MG tablet Take 1 tablet (150 mg total) by mouth 2 (two) times daily.    rosuvastatin (CRESTOR) 40 MG Tab Take 1 tablet (40 mg total) by mouth once daily.    SITagliptan (JANUVIA) 50 MG Tab Take 1 tablet (50 mg total) by mouth once daily.    spironolactone (ALDACTONE) 50 MG tablet TAKE 1 TABLET (50 MG TOTAL) BY MOUTH ONCE DAILY.    TENS units Kirstin 1 Units by Misc.(Non-Drug; Combo Route) route once daily.    tramadol (ULTRAM) 50 mg tablet Take 1 tablet (50 mg total) by mouth 2 (two) times daily as needed.    triamcinolone acetonide 0.1% (KENALOG) 0.1 % ointment Apply small amount to itchy areas on body BID PRN.  Do not use on face or in groin.     No current facility-administered medications for this visit.          Objective Findings:    Vital Signs:  BP (!) 140/68  Pulse (!) 54  Ht 5' (1.524 m)  Wt 89 kg (196 lb 3.4 oz)  BMI 38.32 kg/m2 Body mass index is 38.32 kg/(m^2).    Physical  "Exam:  General Appearance: Well appearing in no acute distress  Head:   Normocephalic, without obvious abnormality  Eyes:    No scleral icterus, EOMI  Throat: Lips, mucosa, and tongue normal; teeth and gums normal  Lungs: CTA bilaterally in anterior and posterior fields, no wheezes, no crackles.  Heart:  Regular rate and rhythm, S1, S2 normal, no murmurs heard  Abdomen: Soft, non tender, non distended with positive bowel sounds in all four quadrants. No hepatosplenomegaly, ascites, or mass  Extremities:    2+ radial pulses, no clubbing, or cyanosis + 1 edema to lower extremities bilaterally  Skin: No rash to exposed areas  Neurologic: A&Ox4      Labs:  Lab Results   Component Value Date    WBC 7.60 10/20/2016    HGB 13.6 10/20/2016    HCT 41.1 10/20/2016     10/20/2016    CHOL 109 (L) 12/05/2016    TRIG 66 12/05/2016    HDL 37 (L) 12/05/2016    ALT 13 12/05/2016    AST 23 12/05/2016     02/16/2017    K 4.1 02/16/2017     02/16/2017    CREATININE 1.2 02/16/2017    BUN 20 02/16/2017    CO2 23 02/16/2017    TSH 3.995 05/04/2015    INR 1.0 03/17/2015    HGBA1C 7.9 (H) 02/16/2017       Endoscopy:   11/2015 Colonoscopy Dr. Clinton-perianal skin tags. 10 mm transverse colon polyp. Pan tics. Path- adenovillious Repeat in 3 years (2018).     4/2015 EGD Dr. Miguelangel MARSH grade A reflux esophagitis. Small HH. Gastric erythema. Path- intestinal metaplasia. Repeat in 2-3 years (3826-4304).    Assessment:    1. Dysphagia, unspecified type    2. Gastroesophageal reflux disease with esophagitis    3. Intestinal metaplasia of gastric mucosa          Recommendations:  1.  Dysphagia- EGD r/o stricture, esophagitis, ring, obstructions. Pt states she had a test many years ago in which she swallowed "white chalky stuff" and would rather not have that testing again. Possible motility testing pending EGD results.     2. GERD- continue BID zantac and GERD healthy lifestyle. May have to take daily PPI pending EGD results.     3. " IM of gastric mucosa- EGD for surveillance.      Order summary:  Orders Placed This Encounter    Case request GI: ESOPHAGOGASTRODUODENOSCOPY (EGD)       Thank you for allowing me to participate in the care of JEANNE Perez, FNP-C

## 2017-04-18 NOTE — ANESTHESIA PREPROCEDURE EVALUATION
04/18/2017  Eliu Paz is a 77 y.o., female.    Anesthesia Evaluation         Review of Systems  Anesthesia Hx:  No problems with previous Anesthesia           Has had alopecia after anesthetics               Social:  Non-Smoker   Cardiovascular:   Exercise tolerance: good Hypertension CAD  CABG/stent   Denies Angina.  Functional Capacity Can you climb two flights of stairs? ==> Yes    Pulmonary:   Denies Asthma. Shortness of breath  Denies Recent URI.  Denies Sleep Apnea.    Renal/:  Renal/ Normal     Hepatic/GI:   Denies PUD. Denies Hiatal Hernia.  Denies GERD. Denies Liver Disease.  Denies Hepatitis.    Neurological:   Denies CVA. Denies Seizures.    Endocrine:   Diabetes Denies Hypothyroidism.        Physical Exam  General:  Well nourished    Airway/Jaw/Neck:  Airway Findings: Mouth Opening: Normal Tongue: Normal  General Airway Assessment: Adult  Mallampati: I  TM Distance: Normal, at least 6 cm  Jaw/Neck Findings:  Neck ROM: Normal ROM  Neck Findings:     Eyes/Ears/Nose:  EYES/EARS/NOSE FINDINGS: Normal   Dental:  Dental Findings: In tact, upper partial dentures   Chest/Lungs:  Chest/Lungs Findings: Clear to auscultation     Heart/Vascular:  Heart Findings: Rate: Normal  Rhythm: Regular Rhythm  Sounds: Normal        Mental Status:  Mental Status Findings:  Alert and Oriented         Anesthesia Plan  Type of Anesthesia, risks & benefits discussed:  Anesthesia Type:  general  Patient's Preference: Proceed with anesthesia understanding that the risks are very small but could be serious or life threatening.  Intra-op Monitoring Plan: standard ASA monitors  Intra-op Monitoring Plan Comments:   Post Op Pain Control Plan:   Post Op Pain Control Plan Comments:   Induction:   IV  Beta Blocker:  Patient is on a Beta-Blocker and has received one dose within the past 24 hours (No further documentation  required).       Informed Consent: Patient understands risks and agrees with Anesthesia plan.  Questions answered. Anesthesia consent signed with patient.  ASA Score: 3     Day of Surgery Review of History & Physical: I have interviewed and examined the patient. I have reviewed the patient's H&P dated:            Ready For Surgery From Anesthesia Perspective.

## 2017-04-18 NOTE — INTERVAL H&P NOTE
The patient has been examined and the H&P has been reviewed:    I concur with the findings and no changes have occurred since H&P was written.     History and Exam unchanged from visit.    Procedure - EGD  Neck - supple  Plan of anesthesia - General  ASA - per anesthesia  Mallampati - per anesthesia  Anesthesia problems - no  Family history of anesthesia problems - no      Anesthesia/Surgery risks, benefits and alternative options discussed and understood by patient/family.          There are no hospital problems to display for this patient.

## 2017-04-18 NOTE — TRANSFER OF CARE
Anesthesia Transfer of Care Note    Patient: Eliu Paz    Procedure(s) Performed: Procedure(s) (LRB):  ESOPHAGOGASTRODUODENOSCOPY (EGD) (N/A)    Patient location: GI    Anesthesia Type: general    Transport from OR: Transported from OR on 2-3 L/min O2 by NC with adequate spontaneous ventilation    Post pain: adequate analgesia    Post assessment: no apparent anesthetic complications and tolerated procedure well    Post vital signs: stable    Level of consciousness: sedated    Nausea/Vomiting: no nausea/vomiting    Complications: none          Last vitals:   Visit Vitals    BP (!) 120/57 (BP Location: Left arm, Patient Position: Lying, BP Method: Automatic)    Pulse 70    Temp 36.5 °C (97.7 °F) (Axillary)    Resp 18    Ht 5' (1.524 m)    Wt 86.6 kg (191 lb)    SpO2 98%    Breastfeeding No    BMI 37.3 kg/m2

## 2017-04-21 ENCOUNTER — TELEPHONE (OUTPATIENT)
Dept: GASTROENTEROLOGY | Facility: CLINIC | Age: 77
End: 2017-04-21

## 2017-04-21 DIAGNOSIS — R13.14 PHARYNGOESOPHAGEAL DYSPHAGIA: Primary | ICD-10-CM

## 2017-04-21 NOTE — TELEPHONE ENCOUNTER
Please call pt to inform: since her EGD looked good, she can stay on BID Zantac since it is controlling her reflux. I want her to practice swallow precautions for the difficulty swallowing: Take small bites.  Cut foods into tiny pieces before chewing.  Chew foods thoroughly  Wash (drink small amounts of liquids) with liquids between bites.  Take your time while eating.  Do not talk while eating.  Avoid trigger foods.    Let her know I have ordered an esophageal motility test to further evaluate her difficulty swallowing.    Thanks,  ROSALINDA Wilcox

## 2017-04-25 ENCOUNTER — TELEPHONE (OUTPATIENT)
Dept: ENDOSCOPY | Facility: HOSPITAL | Age: 77
End: 2017-04-25

## 2017-04-26 ENCOUNTER — TELEPHONE (OUTPATIENT)
Dept: GASTROENTEROLOGY | Facility: CLINIC | Age: 77
End: 2017-04-26

## 2017-05-08 RX ORDER — NITROGLYCERIN 400 UG/1
SPRAY ORAL
Qty: 12.9 G | Refills: 1 | Status: SHIPPED | OUTPATIENT
Start: 2017-05-08 | End: 2017-06-19 | Stop reason: SDUPTHER

## 2017-05-16 ENCOUNTER — OFFICE VISIT (OUTPATIENT)
Dept: RHEUMATOLOGY | Facility: CLINIC | Age: 77
End: 2017-05-16
Payer: MEDICARE

## 2017-05-16 VITALS — WEIGHT: 195.88 LBS | BODY MASS INDEX: 38.46 KG/M2 | HEIGHT: 60 IN

## 2017-05-16 DIAGNOSIS — M79.10 MYALGIA: Primary | ICD-10-CM

## 2017-05-16 DIAGNOSIS — Z79.4 TYPE 2 DIABETES MELLITUS WITH DIABETIC POLYNEUROPATHY, WITH LONG-TERM CURRENT USE OF INSULIN: Chronic | ICD-10-CM

## 2017-05-16 DIAGNOSIS — S76.112S QUADRICEPS TENDON RUPTURE, LEFT, SEQUELA: ICD-10-CM

## 2017-05-16 DIAGNOSIS — E11.42 TYPE 2 DIABETES MELLITUS WITH DIABETIC POLYNEUROPATHY, WITH LONG-TERM CURRENT USE OF INSULIN: Chronic | ICD-10-CM

## 2017-05-16 DIAGNOSIS — M15.9 PRIMARY OSTEOARTHRITIS INVOLVING MULTIPLE JOINTS: ICD-10-CM

## 2017-05-16 PROCEDURE — 99213 OFFICE O/P EST LOW 20 MIN: CPT | Mod: S$PBB,,, | Performed by: INTERNAL MEDICINE

## 2017-05-16 PROCEDURE — 99999 PR PBB SHADOW E&M-EST. PATIENT-LVL III: CPT | Mod: PBBFAC,,, | Performed by: INTERNAL MEDICINE

## 2017-05-16 PROCEDURE — 99213 OFFICE O/P EST LOW 20 MIN: CPT | Mod: PBBFAC | Performed by: INTERNAL MEDICINE

## 2017-05-16 NOTE — MR AVS SNAPSHOT
[facility]  [address]  [place] [address]  Phone: [phone]  Fax: [phone]                  Eliu Paz   2017 9:00 AM   Office Visit    Description:  Female : 1940   Provider:  Ata Fung MD   Department:  [facility] - Rheumatology           Reason for Visit     Disease Management           Diagnoses this Visit        Comments    Myalgia    -  Primary            To Do List           Future Appointments        Provider Department Dept Phone    2017 9:30 AM LAB, SAME DAY [facility] [phone]    2017 8:00 AM LAB, APPOINTMENT [facility] [phone]    2017 8:10 AM LAB, APPOINTMENT [facility] [phone]    2017 10:30 AM Jeri Izquierdo NP [facility] [phone]    2017 10:00 AM JEANNE Cleveland,ANP-C [facility] [phone]      Your Future Surgeries/Procedures     May 23, 2017   Surgery with Jorge A Funes MD   [facility] ([facility])    [address] [facility] [address] [address] [phone]              Goals (5 Years of Data)     None      Follow-Up and Disposition     Return in about 6 months (around 2017).      [facility]     [facility] Nurse Care Line -  Assistance  Unless otherwise directed by your provider, please contact [facility], our nurse care line that is available for  assistance.     Registered nurses in the [facility] provide: appointment scheduling, clinical advisement, health education, and other advisory services.  Call: [phone] (toll free)               Medications           Message regarding Medications     Verify the changes and/or additions to your medication regime listed below are the same as discussed with your clinician today.  If any of these changes or additions are incorrect, please  notify your healthcare provider.             Verify that the below list of medications is an accurate representation of the medications you are currently taking.  If none reported, the list may be blank. If incorrect, please contact your healthcare provider. Carry this list with you in case of emergency.           Current Medications     amlodipine (NORVASC) 10 MG tablet TAKE 1 TABLET BY MOUTH ONCE DAILY.    aspirin (ECOTRIN) 81 MG EC tablet Take 1 tablet (81 mg total) by mouth once daily.    baclofen (LIORESAL) 10 MG tablet Take 1 tablet (10 mg total) by mouth every evening.    carvedilol (COREG) 25 MG tablet TAKE 1 TABLET BY MOUTH TWICE DAILY.    clobetasol 0.05% (TEMOVATE) 0.05 % Oint Apply small amount to vulva area twice a day for 4 weeks then once a day for 4 weeks then once a day on Mondays and Thursdays    fluticasone (FLONASE) 50 mcg/actuation nasal spray SPRAY TWICE IN EACH NOSTRIL EVERY DAY    furosemide (LASIX) 40 MG tablet Take 1 tablet (40 mg total) by mouth once daily.    GENERLAC 10 gram/15 mL solution Take 30 mLs (20 g total) by mouth daily as needed (constipation).    hydrOXYzine pamoate (VISTARIL) 25 MG Cap Take 1 capsule (25 mg total) by mouth every 6 (six) hours as needed (Take 1-2 tablets as needed for itching).    insulin asp prt-insulin aspart, NOVOLOG 70/30, (NOVOLOG MIX 70-30) 100 unit/mL (70-30) Soln Inject 18 Units into the skin 2 (two) times daily before meals.    insulin syringe-needle U-100 (BD INSULIN SYRINGE ULTRA-FINE) 0.5 mL 31 gauge x 5/16 Syrg Use to inject twice daily with insulin injections    isosorbide mononitrate (IMDUR) 30 MG 24 hr tablet Take 1 tablet (30 mg total) by mouth once daily.    latanoprost 0.005 % ophthalmic solution Place 1 drop into both eyes every evening.    losartan (COZAAR) 100 MG tablet TAKE 1 TABLET BY MOUTH ONCE DAILY.    nitroGLYCERIN 0.4 MG/DOSE TL SPRY (NITROLINGUAL) 400 mcg/spray spray PLACE 2 SPRAYS UNDER THE TONGUE EVERY 5 MINUTES AS NEEDED FOR  CHEST PAIN    ONETOUCH DELICA LANCETS 30 gauge Misc 1 lancet by Misc.(Non-Drug; Combo Route) route 4 (four) times daily.    ONETOUCH VERIO Strp 1 strip by Misc.(Non-Drug; Combo Route) route 4 (four) times daily.    ranitidine (ZANTAC) 150 MG tablet Take 1 tablet (150 mg total) by mouth 2 (two) times daily.    rosuvastatin (CRESTOR) 40 MG Tab Take 1 tablet (40 mg total) by mouth once daily.    SITagliptan (JANUVIA) 50 MG Tab Take 1 tablet (50 mg total) by mouth once daily.    spironolactone (ALDACTONE) 50 MG tablet TAKE 1 TABLET (50 MG TOTAL) BY MOUTH ONCE DAILY.    TENS units Kirstin 1 Units by Misc.(Non-Drug; Combo Route) route once daily.    tramadol (ULTRAM) 50 mg tablet Take 1 tablet (50 mg total) by mouth 2 (two) times daily as needed.    triamcinolone acetonide 0.1% (KENALOG) 0.1 % ointment Apply small amount to itchy areas on body BID PRN.  Do not use on face or in groin.           Clinical Reference Information           Your Vitals Were     Height Weight BMI          5' (1.524 m) 88.9 kg (195 lb 14.4 oz) 38.26 kg/m2        Allergies as of 5/16/2017     Pcn [Penicillins]    Trulicity [Dulaglutide]      Immunizations Administered on Date of Encounter - 5/16/2017     None      Orders Placed During Today's Visit     Future Labs/Procedures Expected by Expires    Aldolase  5/16/2017 7/15/2018    C-reactive protein  5/16/2017 7/15/2018    CK  5/16/2017 7/15/2018    Sedimentation rate, manual  5/16/2017 7/15/2018      Language Assistance Services     ATTENTION: Language assistance services are available, free of charge. Please call [phone].      ATENCIÓN: Si ana dina, tiene a jones disposición servicios gratuitos de asistencia lingüística. Llame al [phone].     CHÚ Ý: N?u b?n nói Ti?ng Vi?t, có các d?ch v? h? tr? ngôn ng? mi?n phí dành cho b?n. G?i s? [phone].         [facility] complies with applicable Federal civil rights laws and does not discriminate on the basis of race, color,  national origin, age, disability, or sex.

## 2017-05-16 NOTE — PROGRESS NOTES
History of present illness: 77-year-old female I saw initially in June. She has a long-standing history of diabetes and was told that she has diabetic neuropathy. She injured her left leg 2 years ago and sustained a ruptured quadriceps tendon. She continues to wear a brace on that leg and uses a cane for ambulation. She has had some aching all over. She had developed a periorbital rash. She also had some dark patches on her arms. She was seen by infectious disease who did not feel it was an infection. An JOHANNE was obtained which was positive.  She also elevated CPK. Clinically I could not find evidence of an underlying connective tissue disease. I felt a lot of her problem was due to her diabetes.  I continued her on baclofen, which had been helping.  She has been intolerant of multiple medications.  She comes back for routine follow-up.    She complains of thinning of her hair.  She is now wearing a wig.  She has had no actual bald spots.  She has been told that her hair is growing back black.  She had also noticed some dark spots on her scalp.  Her aching is unchanged.  It is bad in the morning.  She has some pain with activity.  The pain does not wake her up at night.  She is also had some burning in her feet.  She complains of weakness in the arm.  She has had no joint swelling.  She denies other recent medical problems.    Physical examination:  Skin: She has no rashes.  She has thinning of the scalp but no actual alopecia  ENT: Adequate tears and saliva  Musculoskeletal: She has bony hypertrophy of the first CMC bilaterally.  She has no synovitis in the hands or wrist.  Elbows and shoulders are unremarkable.  She has bony hypertrophy of the knees but no effusion.  Ankles and feet were not examined since she is wearing support hose    Assessment:  1.  Her main problem appears to be osteoarthritis  2.  She also has evidence of diabetic neuropathy  3.  I find no evidence of an active connective tissue disease at  this time    Plans:  1.  Laboratory studies are obtained  2.  Continue baclofen as before  3.  Return to see me in 6 months

## 2017-05-17 ENCOUNTER — TELEPHONE (OUTPATIENT)
Dept: RHEUMATOLOGY | Facility: CLINIC | Age: 77
End: 2017-05-17

## 2017-05-26 RX ORDER — CARVEDILOL 25 MG/1
25 TABLET ORAL 2 TIMES DAILY
Qty: 180 TABLET | Refills: 0 | Status: SHIPPED | OUTPATIENT
Start: 2017-05-26 | End: 2017-06-15 | Stop reason: SDUPTHER

## 2017-05-26 NOTE — TELEPHONE ENCOUNTER
Spoke to pt informed pt prescription has been sent in to pharmacy and schedule appt for Carmenc[facility] 15 for 12pm

## 2017-05-29 DIAGNOSIS — R13.19 ESOPHAGEAL DYSPHAGIA: Primary | ICD-10-CM

## 2017-05-30 ENCOUNTER — LAB VISIT (OUTPATIENT)
Dept: LAB | Facility: HOSPITAL | Age: 77
End: 2017-05-30
Attending: NURSE PRACTITIONER
Payer: MEDICARE

## 2017-05-30 DIAGNOSIS — Z79.4 TYPE 2 DIABETES MELLITUS WITH STAGE 3 CHRONIC KIDNEY DISEASE, WITH LONG-TERM CURRENT USE OF INSULIN: Chronic | ICD-10-CM

## 2017-05-30 DIAGNOSIS — N18.30 TYPE 2 DIABETES MELLITUS WITH STAGE 3 CHRONIC KIDNEY DISEASE, WITH LONG-TERM CURRENT USE OF INSULIN: Chronic | ICD-10-CM

## 2017-05-30 DIAGNOSIS — E11.22 TYPE 2 DIABETES MELLITUS WITH STAGE 3 CHRONIC KIDNEY DISEASE, WITH LONG-TERM CURRENT USE OF INSULIN: Chronic | ICD-10-CM

## 2017-05-30 LAB
ANION GAP SERPL CALC-SCNC: 11 MMOL/L
BUN SERPL-MCNC: 17 MG/DL
CALCIUM SERPL-MCNC: 9.5 MG/DL
CHLORIDE SERPL-SCNC: 107 MMOL/L
CO2 SERPL-SCNC: 22 MMOL/L
CREAT SERPL-MCNC: 1.2 MG/DL
EST. GFR  (AFRICAN AMERICAN): 51 ML/MIN/1.73 M^2
EST. GFR  (NON AFRICAN AMERICAN): 44 ML/MIN/1.73 M^2
GLUCOSE SERPL-MCNC: 127 MG/DL
POTASSIUM SERPL-SCNC: 4.2 MMOL/L
SODIUM SERPL-SCNC: 140 MMOL/L

## 2017-05-30 PROCEDURE — 80048 BASIC METABOLIC PNL TOTAL CA: CPT

## 2017-05-30 PROCEDURE — 83036 HEMOGLOBIN GLYCOSYLATED A1C: CPT

## 2017-05-30 PROCEDURE — 36415 COLL VENOUS BLD VENIPUNCTURE: CPT

## 2017-05-31 LAB
ESTIMATED AVG GLUCOSE: 192 MG/DL
HBA1C MFR BLD HPLC: 8.3 %

## 2017-06-01 ENCOUNTER — OFFICE VISIT (OUTPATIENT)
Dept: ENDOCRINOLOGY | Facility: CLINIC | Age: 77
End: 2017-06-01
Payer: MEDICARE

## 2017-06-01 VITALS
HEART RATE: 69 BPM | SYSTOLIC BLOOD PRESSURE: 110 MMHG | DIASTOLIC BLOOD PRESSURE: 62 MMHG | BODY MASS INDEX: 37.31 KG/M2 | HEIGHT: 60 IN | WEIGHT: 190.06 LBS

## 2017-06-01 DIAGNOSIS — I25.10 CORONARY ARTERY DISEASE INVOLVING NATIVE CORONARY ARTERY OF NATIVE HEART WITHOUT ANGINA PECTORIS: Chronic | ICD-10-CM

## 2017-06-01 DIAGNOSIS — E11.42 TYPE 2 DIABETES MELLITUS WITH DIABETIC POLYNEUROPATHY, WITH LONG-TERM CURRENT USE OF INSULIN: Chronic | ICD-10-CM

## 2017-06-01 DIAGNOSIS — Z79.4 TYPE 2 DIABETES MELLITUS WITH DIABETIC POLYNEUROPATHY, WITH LONG-TERM CURRENT USE OF INSULIN: Chronic | ICD-10-CM

## 2017-06-01 DIAGNOSIS — Z79.4 TYPE 2 DIABETES MELLITUS WITH STAGE 3 CHRONIC KIDNEY DISEASE, WITH LONG-TERM CURRENT USE OF INSULIN: Primary | Chronic | ICD-10-CM

## 2017-06-01 DIAGNOSIS — E66.9 OBESITY, CLASS II, BMI 35-39.9: Chronic | ICD-10-CM

## 2017-06-01 DIAGNOSIS — E78.5 HYPERLIPIDEMIA, UNSPECIFIED HYPERLIPIDEMIA TYPE: ICD-10-CM

## 2017-06-01 DIAGNOSIS — I10 ESSENTIAL HYPERTENSION: Chronic | ICD-10-CM

## 2017-06-01 DIAGNOSIS — N18.30 TYPE 2 DIABETES MELLITUS WITH STAGE 3 CHRONIC KIDNEY DISEASE, WITH LONG-TERM CURRENT USE OF INSULIN: Primary | Chronic | ICD-10-CM

## 2017-06-01 DIAGNOSIS — E11.22 TYPE 2 DIABETES MELLITUS WITH STAGE 3 CHRONIC KIDNEY DISEASE, WITH LONG-TERM CURRENT USE OF INSULIN: Primary | Chronic | ICD-10-CM

## 2017-06-01 PROBLEM — E66.3 OVERWEIGHT (BMI 25.0-29.9): Status: ACTIVE | Noted: 2017-06-01

## 2017-06-01 PROCEDURE — 99214 OFFICE O/P EST MOD 30 MIN: CPT | Mod: PBBFAC | Performed by: NURSE PRACTITIONER

## 2017-06-01 PROCEDURE — 99999 PR PBB SHADOW E&M-EST. PATIENT-LVL IV: CPT | Mod: PBBFAC,,, | Performed by: NURSE PRACTITIONER

## 2017-06-01 PROCEDURE — 99214 OFFICE O/P EST MOD 30 MIN: CPT | Mod: S$PBB,,, | Performed by: NURSE PRACTITIONER

## 2017-06-01 NOTE — PROGRESS NOTES
"CHIEF COMPLAINT: Type 2 Diabetes     HPI: Ms. Zeng is a 77 y.o. female who was diagnosed with Type 2 DM at age 43. She was started on insulin soon after diagnosis--NPH/R + Metformin. Shew as then changed to Novolin 70/30. (+) FH of DM. She has been seen in [facility] with JEANNE Escobar (last seen 12/2016). Of note, she has tried Trulicity, but experienced nausea and itching. She stopped the medication after having 3 injections. Januvia ordered in addition to 70/30 in December 2016, but she reports she refused to take it. August 2016 C-peptide 2.8    Arrives today as a new patient to me.     Accompanied by significant other.     No BG logs to report.     States she is monitoring her BG 4x/day and readings are 120s-130s, but also reports higher readings up to 200s.     Symptomatic with dizziness with lower readings of 80s. States this doesn't occur often.     Doesn't miss insulin doses.     Doesn't always eat lunch. Snacks on "something I shouldn't" and recalls sandwiches or chocolate candy.     No exercise.     PREVIOUS DIABETES MEDICATIONS TRIED  Lantus and Novolog pens  Novolin 70/30  Trulicity  Levemir    CURRENT DIABETIC MEDS: Novolog 70/30: 18 units bid    Last Podiatry Exam: February 2014    REVIEW OF SYSTEMS  General: no weakness, fatigue, or weight changes.   Eyes: no double or blurred vision, eye pain, or redness; Last Eye Exam=November 2015.   Cardiovascular: no chest pain, palpitations, edema, or murmurs.   Respiratory: no cough or dyspnea.   GI: (+) chronic heartburn (takes Zantac, which she reports has been effective), nausea, or changes in bowel patterns; good appetite.   Skin: no rashes, dryness, itching, or reactions at insulin injection sites.  Neuro: bilateral legs with chronic numbness and tingling.   Endocrine: no polyuria, polydipsia, polyphagia, heat or cold intolerance.     Vital Signs  /62   Pulse 69   Ht 5' 9" (1.753 m)   Wt 86.2 kg (190 lb 0.6 oz)   BMI " 28.06 kg/m²     Hemoglobin A1C   Date Value Ref Range Status   05/30/2017 8.3 (H) 4.5 - 6.2 % Final     Comment:     According to ADA guidelines, hemoglobin A1C <7.0% represents  optimal control in non-pregnant diabetic patients.  Different  metrics may apply to specific populations.   Standards of Medical Care in Diabetes - 2016.  For the purpose of screening for the presence of diabetes:  <5.7%     Consistent with the absence of diabetes  5.7-6.4%  Consistent with increasing risk for diabetes   (prediabetes)  >or=6.5%  Consistent with diabetes  Currently no consensus exists for use of hemoglobin A1C  for diagnosis of diabetes for children.     02/16/2017 7.9 (H) 4.5 - 6.2 % Final     Comment:     According to ADA guidelines, hemoglobin A1C <7.0% represents  optimal control in non-pregnant diabetic patients.  Different  metrics may apply to specific populations.   Standards of Medical Care in Diabetes - 2016.  For the purpose of screening for the presence of diabetes:  <5.7%     Consistent with the absence of diabetes  5.7-6.4%  Consistent with increasing risk for diabetes   (prediabetes)  >or=6.5%  Consistent with diabetes  Currently no consensus exists for use of hemoglobin A1C  for diagnosis of diabetes for children.     12/05/2016 8.3 (H) 4.5 - 6.2 % Final     Comment:     According to ADA guidelines, hemoglobin A1C <7.0% represents  optimal control in non-pregnant diabetic patients.  Different  metrics may apply to specific populations.   Standards of Medical Care in Diabetes - 2016.  For the purpose of screening for the presence of diabetes:  <5.7%     Consistent with the absence of diabetes  5.7-6.4%  Consistent with increasing risk for diabetes   (prediabetes)  >or=6.5%  Consistent with diabetes  Currently no consensus exists for use of hemoglobin A1C  for diagnosis of diabetes for children.          Chemistry        Component Value Date/Time     05/30/2017 0817    K 4.2 05/30/2017 0817      05/30/2017 0817    CO2 22 (L) 05/30/2017 0817    BUN 17 05/30/2017 0817    CREATININE 1.2 05/30/2017 0817     (H) 05/30/2017 0817        Component Value Date/Time    CALCIUM 9.5 05/30/2017 0817    ALKPHOS 75 12/05/2016 0810    AST 23 12/05/2016 0810    ALT 13 12/05/2016 0810    BILITOT 0.5 12/05/2016 0810           Lab Results   Component Value Date    TSH 3.995 05/04/2015      Lab Results   Component Value Date    CHOL 109 (L) 12/05/2016    CHOL 115 (L) 11/28/2016    CHOL 117 (L) 11/08/2016     Lab Results   Component Value Date    HDL 37 (L) 12/05/2016    HDL 40 11/28/2016    HDL 41 11/08/2016     Lab Results   Component Value Date    LDLCALC 58.8 (L) 12/05/2016    LDLCALC 61.4 (L) 11/28/2016    LDLCALC 64.6 11/08/2016     Lab Results   Component Value Date    TRIG 66 12/05/2016    TRIG 68 11/28/2016    TRIG 57 11/08/2016     Lab Results   Component Value Date    CHOLHDL 33.9 12/05/2016    CHOLHDL 34.8 11/28/2016    CHOLHDL 35.0 11/08/2016         PHYSICAL EXAMINATION  Constitutional: Appears well, no distress  Neck: Supple, trachea midline.   Respiratory: CTA without wheezes, even and unlabored.  Cardiovascular: RRR; no carotid bruits or murmurs; (+) DP pulses; bilateral ankle edema.   Lymph: no lymphadenopathy palpated  Skin: warm and dry; no injection site reactions, no acanthosis nigracans observed.  Neuro:patient alert and cooperative, normal affect; steady gait.    Diabetes Foot Exam:   Protective Sensation (w/ 10 gram monofilament and tuning fork):  Right: Decreased  Left: Decreased    Visual Inspection:  Normal -  Bilateral    Pedal Pulses:   Right: Present  Left: Present    Assessment/Plan    Type 2 diabetes with CKD stage 3  DM uncontrolled.   No BG logs to review.   Continue current doses for now.   Logs to next visit.   Discussed A1C goal of 7-7.5%    Diabetic peripheral neuropathy associated with T2DM  Continue to improve BG readings.     CAD  Improve BG.   Avoid hypoglycemia.   On ASA, statin.    Managed by Cardiology, Dr. Aguilar. Last seen January 2017.     HTN  Stable.   Continue current medications.     HLP  Continue Crestor.     Obesity, class II  Body mass index is 28.06 kg/m².   Can worsen insulin resistance.   Continue to monitor diet.     FOLLOW UP  Return in about 3 months (around 9/1/2017).

## 2017-06-04 DIAGNOSIS — K21.00 GASTROESOPHAGEAL REFLUX DISEASE WITH ESOPHAGITIS: ICD-10-CM

## 2017-06-13 ENCOUNTER — HOSPITAL ENCOUNTER (OUTPATIENT)
Facility: HOSPITAL | Age: 77
Discharge: HOME | End: 2017-06-13
Attending: INTERNAL MEDICINE | Admitting: INTERNAL MEDICINE
Payer: MEDICARE

## 2017-06-13 ENCOUNTER — SURGERY (OUTPATIENT)
Age: 77
End: 2017-06-13

## 2017-06-13 VITALS
HEART RATE: 57 BPM | RESPIRATION RATE: 20 BRPM | WEIGHT: 190 LBS | OXYGEN SATURATION: 97 % | SYSTOLIC BLOOD PRESSURE: 170 MMHG | HEIGHT: 60 IN | TEMPERATURE: 99 F | DIASTOLIC BLOOD PRESSURE: 70 MMHG | BODY MASS INDEX: 37.3 KG/M2

## 2017-06-13 DIAGNOSIS — R13.10 DYSPHAGIA: ICD-10-CM

## 2017-06-13 LAB
GLUCOSE SERPL-MCNC: 177 MG/DL (ref 70–110)
POCT GLUCOSE: 177 MG/DL (ref 70–110)

## 2017-06-13 PROCEDURE — 91010 ESOPHAGUS MOTILITY STUDY: CPT | Mod: 26,,, | Performed by: INTERNAL MEDICINE

## 2017-06-13 PROCEDURE — 91010 ESOPHAGUS MOTILITY STUDY: CPT | Performed by: INTERNAL MEDICINE

## 2017-06-13 PROCEDURE — 25000003 PHARM REV CODE 250: Performed by: INTERNAL MEDICINE

## 2017-06-13 PROCEDURE — 91037 ESOPH IMPED FUNCTION TEST: CPT | Performed by: INTERNAL MEDICINE

## 2017-06-13 RX ORDER — LIDOCAINE HYDROCHLORIDE 20 MG/ML
JELLY TOPICAL ONCE
Status: COMPLETED | OUTPATIENT
Start: 2017-06-13 | End: 2017-06-13

## 2017-06-13 RX ADMIN — LIDOCAINE HYDROCHLORIDE 10 ML: 20 JELLY TOPICAL at 08:06

## 2017-06-13 NOTE — DISCHARGE INSTRUCTIONS
Esophageal Manometry     A catheter measures pressure along the esophagus.     Esophageal manometry is a test to measure the strength and function of the esophagus (the food pipe). Results can help identify causes of heartburn, swallowing problems, or chest pain. The test can also help plan surgery and determine the success of previous surgery.  Preparing for the test  Be sure to talk to your healthcare provider about any medicines you take. Some medicines can affect the test results. Also ask any questions you have about the risks of the test. These include irritation to the nose and throat. Be sure not to smoke, eat, or drink for up to 12 hours before the test.  During the test  Manometry takes about an hour. Usually you lie down during the test. Your nose and throat are numbed. Then a soft, thin tube is placed through the nose and down the esophagus. At first you may notice a gagging feeling. You will be asked to swallow several times. Holes along the tube measure the pressure while you swallow. Measurements are printed out as tracings, much like a heart test tracing. After the test, another catheter may be left in the esophagus for up to 24 hours to measure acid (pH) levels.  After esophageal manometry  Youll probably discuss the results of the test with your healthcare provider at another appointment. This is because time is needed to review the tracings. You may have a mild sore throat for a short time. As soon as the numbness in your throat is gone, you can return to eating and your normal activities.  Date Last Reviewed: 6/1/2016  © 7792-0349 [employer]. [address] [place] [address], [place] [address]. All rights reserved. This information is not intended as a substitute for professional medical care. Always follow your healthcare professional's instructions.

## 2017-06-13 NOTE — OR NURSING
Catheter to left naris without difficulty. Slight discomfort noted per facial expression during placement. Tolerated well.

## 2017-06-15 ENCOUNTER — OFFICE VISIT (OUTPATIENT)
Dept: INTERNAL MEDICINE | Facility: CLINIC | Age: 77
End: 2017-06-15
Payer: MEDICARE

## 2017-06-15 VITALS
SYSTOLIC BLOOD PRESSURE: 112 MMHG | WEIGHT: 189.63 LBS | HEIGHT: 60 IN | DIASTOLIC BLOOD PRESSURE: 56 MMHG | OXYGEN SATURATION: 98 % | HEART RATE: 63 BPM | BODY MASS INDEX: 37.23 KG/M2

## 2017-06-15 DIAGNOSIS — I25.10 CORONARY ARTERY DISEASE INVOLVING NATIVE CORONARY ARTERY OF NATIVE HEART WITHOUT ANGINA PECTORIS: Chronic | ICD-10-CM

## 2017-06-15 DIAGNOSIS — Z79.4 TYPE 2 DIABETES MELLITUS WITH STAGE 3 CHRONIC KIDNEY DISEASE, WITH LONG-TERM CURRENT USE OF INSULIN: Primary | Chronic | ICD-10-CM

## 2017-06-15 DIAGNOSIS — I25.83 CORONARY ARTERY DISEASE DUE TO LIPID RICH PLAQUE: ICD-10-CM

## 2017-06-15 DIAGNOSIS — K21.00 GASTROESOPHAGEAL REFLUX DISEASE WITH ESOPHAGITIS: ICD-10-CM

## 2017-06-15 DIAGNOSIS — I10 ESSENTIAL HYPERTENSION: Chronic | ICD-10-CM

## 2017-06-15 DIAGNOSIS — L65.9 ALOPECIA: ICD-10-CM

## 2017-06-15 DIAGNOSIS — I25.10 CORONARY ARTERY DISEASE DUE TO LIPID RICH PLAQUE: ICD-10-CM

## 2017-06-15 DIAGNOSIS — E11.22 TYPE 2 DIABETES MELLITUS WITH STAGE 3 CHRONIC KIDNEY DISEASE, WITH LONG-TERM CURRENT USE OF INSULIN: Primary | Chronic | ICD-10-CM

## 2017-06-15 DIAGNOSIS — B35.9 TINEA: ICD-10-CM

## 2017-06-15 DIAGNOSIS — N18.30 TYPE 2 DIABETES MELLITUS WITH STAGE 3 CHRONIC KIDNEY DISEASE, WITH LONG-TERM CURRENT USE OF INSULIN: Primary | Chronic | ICD-10-CM

## 2017-06-15 PROCEDURE — 99214 OFFICE O/P EST MOD 30 MIN: CPT | Mod: S$PBB,,, | Performed by: INTERNAL MEDICINE

## 2017-06-15 PROCEDURE — 99999 PR PBB SHADOW E&M-EST. PATIENT-LVL III: CPT | Mod: PBBFAC,,, | Performed by: INTERNAL MEDICINE

## 2017-06-15 PROCEDURE — 1157F ADVNC CARE PLAN IN RCRD: CPT | Mod: ,,, | Performed by: INTERNAL MEDICINE

## 2017-06-15 PROCEDURE — 3045F PR MOST RECENT HEMOGLOBIN A1C LEVEL 7.0-9.0%: CPT | Mod: ,,, | Performed by: INTERNAL MEDICINE

## 2017-06-15 PROCEDURE — 99213 OFFICE O/P EST LOW 20 MIN: CPT | Mod: PBBFAC | Performed by: INTERNAL MEDICINE

## 2017-06-15 PROCEDURE — 1159F MED LIST DOCD IN RCRD: CPT | Mod: ,,, | Performed by: INTERNAL MEDICINE

## 2017-06-15 PROCEDURE — 4010F ACE/ARB THERAPY RXD/TAKEN: CPT | Mod: ,,, | Performed by: INTERNAL MEDICINE

## 2017-06-15 RX ORDER — MINOXIDIL 2 %
SOLUTION, NON-ORAL TOPICAL 2 TIMES DAILY
Qty: 240 ML | Refills: 11 | Status: SHIPPED | OUTPATIENT
Start: 2017-06-15 | End: 2017-12-12

## 2017-06-15 RX ORDER — ROSUVASTATIN CALCIUM 40 MG/1
40 TABLET, COATED ORAL DAILY
Qty: 90 TABLET | Refills: 3 | Status: SHIPPED | OUTPATIENT
Start: 2017-06-15 | End: 2017-06-28

## 2017-06-15 RX ORDER — LOSARTAN POTASSIUM 100 MG/1
100 TABLET ORAL DAILY
Qty: 90 TABLET | Refills: 3 | Status: SHIPPED | OUTPATIENT
Start: 2017-06-15 | End: 2018-08-18 | Stop reason: SDUPTHER

## 2017-06-15 RX ORDER — KETOCONAZOLE 20 MG/G
CREAM TOPICAL DAILY
COMMUNITY
End: 2017-06-15 | Stop reason: SDUPTHER

## 2017-06-15 RX ORDER — CARVEDILOL 25 MG/1
25 TABLET ORAL 2 TIMES DAILY
Qty: 180 TABLET | Refills: 11 | Status: SHIPPED | OUTPATIENT
Start: 2017-06-15 | End: 2018-07-21 | Stop reason: SDUPTHER

## 2017-06-15 RX ORDER — ISOSORBIDE MONONITRATE 30 MG/1
30 TABLET, EXTENDED RELEASE ORAL DAILY
Qty: 90 TABLET | Refills: 3 | Status: SHIPPED | OUTPATIENT
Start: 2017-06-15 | End: 2017-08-01

## 2017-06-15 RX ORDER — SPIRONOLACTONE 50 MG/1
TABLET, FILM COATED ORAL
Qty: 90 TABLET | Refills: 11 | Status: SHIPPED | OUTPATIENT
Start: 2017-06-15 | End: 2018-07-27

## 2017-06-15 RX ORDER — KETOCONAZOLE 20 MG/G
CREAM TOPICAL DAILY
Qty: 30 G | Refills: 1 | Status: SHIPPED | OUTPATIENT
Start: 2017-06-15

## 2017-06-15 RX ORDER — AMLODIPINE BESYLATE 10 MG/1
10 TABLET ORAL DAILY
Qty: 90 TABLET | Refills: 11 | Status: SHIPPED | OUTPATIENT
Start: 2017-06-15 | End: 2018-07-05 | Stop reason: SDUPTHER

## 2017-06-15 RX ORDER — NAPROXEN SODIUM 220 MG
TABLET ORAL
Qty: 100 EACH | Refills: 11 | Status: SHIPPED | OUTPATIENT
Start: 2017-06-15 | End: 2018-10-26 | Stop reason: SDUPTHER

## 2017-06-15 RX ORDER — FUROSEMIDE 40 MG/1
40 TABLET ORAL DAILY
Qty: 90 TABLET | Refills: 3 | Status: SHIPPED | OUTPATIENT
Start: 2017-06-15 | End: 2018-12-04

## 2017-06-15 NOTE — PROGRESS NOTES
Subjective:       Patient ID: Eliu Paz is a 77 y.o. female.    Chief Complaint: Follow-up    Loss of hair, feels like sores on the scalp.    Worried re dementia just when she wakes up in am. But actually she does fine with memory as day goes on, just upon waking she feels a bit disoriented.          Review of Systems   Constitutional: Positive for fatigue. Negative for activity change and appetite change.   Eyes: Negative for visual disturbance.   Respiratory: Negative for chest tightness, shortness of breath and wheezing.    Cardiovascular: Negative for chest pain, palpitations and leg swelling.   Gastrointestinal: Negative for abdominal distention and abdominal pain.   Genitourinary: Negative for pelvic pain.   Musculoskeletal: Positive for back pain and neck pain. Negative for arthralgias.   Skin: Negative for rash.   Neurological: Positive for numbness. Negative for tremors, syncope, facial asymmetry, speech difficulty and weakness.   Hematological: Negative for adenopathy. Does not bruise/bleed easily.   Psychiatric/Behavioral: Negative for dysphoric mood. The patient is nervous/anxious.        Objective:      Physical Exam   Constitutional: She is oriented to person, place, and time. She appears well-developed and well-nourished. No distress.   HENT:   Head: Normocephalic and atraumatic.   Mouth/Throat: No oropharyngeal exudate.   TMs clear, roughened papule L earlobe   Eyes: Conjunctivae are normal. Pupils are equal, round, and reactive to light. No scleral icterus.   Neck: Normal range of motion. Neck supple. No thyromegaly present.   Cardiovascular: Normal rate, regular rhythm and normal heart sounds.    Pulmonary/Chest: Effort normal and breath sounds normal.   Abdominal: Soft. Bowel sounds are normal. She exhibits no distension. There is no tenderness.   Musculoskeletal: Normal range of motion. She exhibits no edema or tenderness.   Lymphadenopathy:     She has no cervical adenopathy.    Neurological: She is alert and oriented to person, place, and time. She displays normal reflexes. No cranial nerve deficit. She exhibits normal muscle tone. Coordination normal.   Mild ankle edema, not significant lower extrem, edema   Skin: No rash noted. She is not diaphoretic.   Thinning of hair on scalp, no focal areas of hair loss.  No seborrhea seen.  A few papules on scalp, some keratotic, some hyperpigmented, all benign appearing   Psychiatric: She has a normal mood and affect.       Assessment:       1. Tinea    2. Coronary artery disease involving native coronary artery of native heart without angina pectoris    3. Essential hypertension    4. Alopecia    5. Type 2 diabetes mellitus with stage 3 chronic kidney disease, with long-term current use of insulin    6. Coronary artery disease due to lipid rich plaque    7. Gastroesophageal reflux disease with esophagitis        Plan:       Eliu Steinberg was seen today for follow-up.    Diagnoses and all orders for this visit:    Tinea  -     ketoconazole (NIZORAL) 2 % cream; Apply topically once daily.  I did not visualize tinea on skin today but she requested RF    Coronary artery disease involving native coronary artery of native heart without angina pectoris  -     carvedilol (COREG) 25 MG tablet; Take 1 tablet (25 mg total) by mouth 2 (two) times daily.  asymptomatic    Essential hypertension  -     carvedilol (COREG) 25 MG tablet; Take 1 tablet (25 mg total) by mouth 2 (two) times daily.  -     spironolactone (ALDACTONE) 50 MG tablet; TAKE 1 TABLET (50 MG TOTAL) BY MOUTH ONCE DAILY.  -     furosemide (LASIX) 40 MG tablet; Take 1 tablet (40 mg total) by mouth once daily.  -     losartan (COZAAR) 100 MG tablet; Take 1 tablet (100 mg total) by mouth once daily.  -     amlodipine (NORVASC) 10 MG tablet; Take 1 tablet (10 mg total) by mouth once daily.  At goal    Alopecia  -     minoxidil (ROGAINE) 2 % external solution; Apply topically 2 (two) times daily.    Type  2 diabetes mellitus with stage 3 chronic kidney disease, with long-term current use of insulin  -     insulin syringe-needle U-100 (BD INSULIN SYRINGE ULTRA-FINE) 0.5 mL 31 gauge x 5/16 Syrg; Use to inject twice daily with insulin injections  -     Ambulatory Referral to Optometry  -     rosuvastatin (CRESTOR) 40 MG Tab; Take 1 tablet (40 mg total) by mouth once daily.  Lab Results   Component Value Date    HGBA1C 8.3 (H) 05/30/2017   she wants to see if she can stop attending endocrin f/u, she will discuss with Daysi Garcia      Coronary artery disease due to lipid rich plaque  -     isosorbide mononitrate (IMDUR) 30 MG 24 hr tablet; Take 1 tablet (30 mg total) by mouth once daily.    Gastroesophageal reflux disease with esophagitis  -     ranitidine (ZANTAC) 150 MG tablet; Take 1 tablet (150 mg total) by mouth 2 (two) times daily.    Return in about 6 months (around 12/15/2017).

## 2017-06-16 ENCOUNTER — OFFICE VISIT (OUTPATIENT)
Dept: GASTROENTEROLOGY | Facility: CLINIC | Age: 77
End: 2017-06-16
Payer: MEDICARE

## 2017-06-16 VITALS
DIASTOLIC BLOOD PRESSURE: 62 MMHG | BODY MASS INDEX: 37.23 KG/M2 | WEIGHT: 189.63 LBS | HEIGHT: 60 IN | SYSTOLIC BLOOD PRESSURE: 128 MMHG | HEART RATE: 50 BPM

## 2017-06-16 DIAGNOSIS — K59.09 CONSTIPATION, CHRONIC: ICD-10-CM

## 2017-06-16 DIAGNOSIS — K31.A0 INTESTINAL METAPLASIA OF GASTRIC MUCOSA: ICD-10-CM

## 2017-06-16 DIAGNOSIS — K21.9 GASTROESOPHAGEAL REFLUX DISEASE WITHOUT ESOPHAGITIS: ICD-10-CM

## 2017-06-16 DIAGNOSIS — R13.19 ESOPHAGEAL DYSPHAGIA: Primary | ICD-10-CM

## 2017-06-16 PROCEDURE — 99999 PR PBB SHADOW E&M-EST. PATIENT-LVL V: CPT | Mod: PBBFAC,,, | Performed by: NURSE PRACTITIONER

## 2017-06-16 PROCEDURE — 99215 OFFICE O/P EST HI 40 MIN: CPT | Mod: PBBFAC | Performed by: NURSE PRACTITIONER

## 2017-06-16 PROCEDURE — 1157F ADVNC CARE PLAN IN RCRD: CPT | Mod: ,,, | Performed by: NURSE PRACTITIONER

## 2017-06-16 PROCEDURE — 1126F AMNT PAIN NOTED NONE PRSNT: CPT | Mod: ,,, | Performed by: NURSE PRACTITIONER

## 2017-06-16 PROCEDURE — 1159F MED LIST DOCD IN RCRD: CPT | Mod: ,,, | Performed by: NURSE PRACTITIONER

## 2017-06-16 PROCEDURE — 99214 OFFICE O/P EST MOD 30 MIN: CPT | Mod: S$PBB,,, | Performed by: NURSE PRACTITIONER

## 2017-06-16 RX ORDER — LACTULOSE 10 G/15ML
20 SOLUTION ORAL; RECTAL DAILY PRN
Qty: 946 ML | Refills: 3 | Status: SHIPPED | OUTPATIENT
Start: 2017-06-16 | End: 2018-10-09 | Stop reason: SDUPTHER

## 2017-06-16 NOTE — PATIENT INSTRUCTIONS
Swallow precautions  Take small bites.  Cut foods into tiny pieces before chewing.  Chew foods thoroughly  Wash (drink small amounts of liquids) with liquids between each bite.  Take your time while eating.  Do not talk while eating.  Avoid trigger foods.

## 2017-06-16 NOTE — PROGRESS NOTES
[facility] Established Patient Visit    Reason for Visit:  The primary encounter diagnosis was Esophageal dysphagia. Diagnoses of Gastroesophageal reflux disease without esophagitis, Intestinal metaplasia of gastric mucosa, and Constipation, chronic were also pertinent to this visit.    PCP: Ryan Carlson    HPI: This is a 77 y.o. female here for f/u of GERD and dysphagia. Ms. Paz's last GI visit was with me on 4/11/2017. Subsequent EGD w/ bx revealed focal IM no dysplasia (initially dx in 2015).   She currently reports she has been taking BID Zantac with good control of reflux. She does have a hx of LA grade A esophagitis (normal esophagus on recent EGD) and has been on daily PPIs in the past, but opted to switch to H2RAs in stead d/t potential s/e of long term PPI use. Recent esophageal manometry was normal. She reports improved swallowing, but may have occasional difficulties swallowing pills, frannie if she attempts to swallow more than one at a time. She currently reports problems with thinning hair and states he PCP has RX meds for it that she is considering trying.     Bowel habits - h/o constipation. Currently with 1 formed BM/day almost daily. Takes lactulose PRN for bristol type 1 stools   GI bleeding - denies hematochezia, hematemesis, melena, BRBPR, black/tarry stools, and coffee ground emesis  NSAID usage - 81 mg ASA daily.     ROS:  Constitutional: No fevers, no chills, No unintentional weight loss, no fatigue,   ENT: No allergies  CV: No chest pain, no palpitations, + perif. edema, no sob on exertion  Pulm: No cough, No shortness of breath, no wheezes, no sputum  Ophtho: No vision changes  GI: see HPI; also no nausea, no vomiting, no change in appetite  Derm: No rash  Heme: No lymphadenopathy, No bruising  MSK: No arthritis, + muscle pain, no muscle weakness  : No dysuria, No hematuria  Endo: No hot or cold intolerance  Neuro: No syncope, No seizure,     PMHX:  has a past medical  history of Allergy; Arthritis; Cataract; CKD (chronic kidney disease) stage 3, GFR 30-59 ml/min; Coronary artery disease; Diabetes mellitus type II (1981); Diabetic neuropathy; GERD (gastroesophageal reflux disease); Glaucoma; Hyperlipidemia; and Hypertension.    PSHX:  has a past surgical history that includes Carpal tunnel release; Tubal ligation (1970s); Colonoscopy (N/A, 11/2/2015); Cardiac catheterization (03/2010); and Coronary artery bypass graft (08/13/1994).    The patient's social and family histories were reviewed by me and updated in the appropriate section of the electronic medical record.    Review of patient's allergies indicates:   Allergen Reactions    Pcn [penicillins] Swelling     Swelling (extremities)^, Swelling (extremities)^  Swelling (extremities)^, Swelling (extremities)^  Swelling (extremities)^, Swelling (extremities)^    Trulicity [dulaglutide] Nausea Only and Rash       Current Outpatient Prescriptions   Medication Sig    amlodipine (NORVASC) 10 MG tablet Take 1 tablet (10 mg total) by mouth once daily.    aspirin (ECOTRIN) 81 MG EC tablet Take 1 tablet (81 mg total) by mouth once daily.    baclofen (LIORESAL) 10 MG tablet Take 1 tablet (10 mg total) by mouth every evening.    carvedilol (COREG) 25 MG tablet Take 1 tablet (25 mg total) by mouth 2 (two) times daily.    clobetasol 0.05% (TEMOVATE) 0.05 % Oint Apply small amount to vulva area twice a day for 4 weeks then once a day for 4 weeks then once a day on Mondays and Thursdays    fluticasone (FLONASE) 50 mcg/actuation nasal spray SPRAY TWICE IN EACH NOSTRIL EVERY DAY    furosemide (LASIX) 40 MG tablet Take 1 tablet (40 mg total) by mouth once daily.    GENERLAC 10 gram/15 mL solution Take 30 mLs (20 g total) by mouth daily as needed (constipation).    hydrOXYzine pamoate (VISTARIL) 25 MG Cap Take 1 capsule (25 mg total) by mouth every 6 (six) hours as needed (Take 1-2 tablets as needed for itching).    insulin asp  prt-insulin aspart, NOVOLOG 70/30, (NOVOLOG MIX 70-30) 100 unit/mL (70-30) Soln Inject 18 Units into the skin 2 (two) times daily before meals.    insulin syringe-needle U-100 (BD INSULIN SYRINGE ULTRA-FINE) 0.5 mL 31 gauge x 5/16 Syrg Use to inject twice daily with insulin injections    isosorbide mononitrate (IMDUR) 30 MG 24 hr tablet Take 1 tablet (30 mg total) by mouth once daily.    ketoconazole (NIZORAL) 2 % cream Apply topically once daily.    latanoprost 0.005 % ophthalmic solution Place 1 drop into both eyes every evening.    losartan (COZAAR) 100 MG tablet Take 1 tablet (100 mg total) by mouth once daily.    minoxidil (ROGAINE) 2 % external solution Apply topically 2 (two) times daily.    nitroGLYCERIN 0.4 MG/DOSE TL SPRY (NITROLINGUAL) 400 mcg/spray spray PLACE 2 SPRAYS UNDER THE TONGUE EVERY 5 MINUTES AS NEEDED FOR CHEST PAIN    ONETOUCH DELICA LANCETS 30 gauge Misc 1 lancet by Misc.(Non-Drug; Combo Route) route 4 (four) times daily.    ONETOUCH VERIO Strp 1 strip by Misc.(Non-Drug; Combo Route) route 4 (four) times daily.    ranitidine (ZANTAC) 150 MG tablet Take 1 tablet (150 mg total) by mouth 2 (two) times daily.    rosuvastatin (CRESTOR) 40 MG Tab Take 1 tablet (40 mg total) by mouth once daily.    spironolactone (ALDACTONE) 50 MG tablet TAKE 1 TABLET (50 MG TOTAL) BY MOUTH ONCE DAILY.    tramadol (ULTRAM) 50 mg tablet Take 1 tablet (50 mg total) by mouth 2 (two) times daily as needed.    triamcinolone acetonide 0.1% (KENALOG) 0.1 % ointment Apply small amount to itchy areas on body BID PRN.  Do not use on face or in groin.     No current facility-administered medications for this visit.          Objective Findings:    Vital Signs:  /62   Pulse (!) 50   Ht 5' (1.524 m)   Wt 86 kg (189 lb 9.5 oz)   BMI 37.03 kg/m²  Body mass index is 37.03 kg/m².    Physical Exam:  General Appearance: Well appearing in no acute distress  Head:   Normocephalic, without obvious  abnormality  Eyes:    No scleral icterus, EOMI  Throat: Lips, mucosa, and tongue normal; teeth and gums normal  Lungs: CTA bilaterally in anterior and posterior fields, no wheezes, no crackles.  Heart:  Regular rate and rhythm, S1, S2 normal, no murmurs heard  Abdomen: Soft, non tender, non distended with positive bowel sounds in all four quadrants. No hepatosplenomegaly, ascites, or mass  Extremities:    2+ radial pulses, no clubbing, or cyanosis + 1 edema to lower extremities bilaterally  Skin: No rash to exposed areas  Neurologic: A&Ox4      Labs:  Lab Results   Component Value Date    WBC 7.60 10/20/2016    HGB 13.6 10/20/2016    HCT 41.1 10/20/2016     10/20/2016    CHOL 109 (L) 12/05/2016    TRIG 66 12/05/2016    HDL 37 (L) 12/05/2016    ALT 13 12/05/2016    AST 23 12/05/2016     05/30/2017    K 4.2 05/30/2017     05/30/2017    CREATININE 1.2 05/30/2017    BUN 17 05/30/2017    CO2 22 (L) 05/30/2017    TSH 3.995 05/04/2015    INR 1.0 03/17/2015    HGBA1C 8.3 (H) 05/30/2017       Endoscopy:   6/13/2017 esophageal manometry Dr. Quiroga- branden  4/18/2017 EGD -branden esophagus. Gastric erythema. bx-focal IM no dysplasia. Repeat in 3 years.     11/2015 Colonoscopy Dr. Clinton-perianal skin tags. 10 mm transverse colon polyp. Pan tics. Path- adenovillious Repeat in 3 years (2018).     4/2015 EGD Dr. Miguelangel MARSH grade A reflux esophagitis. Small HH. Gastric erythema. Path- intestinal metaplasia. Repeat in 2-3 years (4135-2896).    Assessment:    1. Esophageal dysphagia    2. Gastroesophageal reflux disease without esophagitis    3. Intestinal metaplasia of gastric mucosa    4. Constipation, chronic          Recommendations:  1.  Dysphagia- improved. Continue swallow precautions as per handout provided. Swallow one pill at at time.  2. GERD- continue BID zantac and GERD healthy lifestyle.    3. IM of gastric mucosa-no dysplasia. EGD in 3 years for surveillance.    4. Constipation- better. Continue  lactulose PRN.    Order summary:  Orders Placed This Encounter    GENERLAC 10 gram/15 mL solution     Visit time: 30 minutes with greater than 50% of the time spent in face to face pt  discussing the aforementioned diagnoses, recommendations, test results, and answering pt questions.    Thank you for allowing me to participate in the care of Eliu Izquierdo, JEANNE, FNP-C

## 2017-06-20 ENCOUNTER — TELEPHONE (OUTPATIENT)
Dept: ENDOSCOPY | Facility: HOSPITAL | Age: 77
End: 2017-06-20

## 2017-06-20 RX ORDER — NITROGLYCERIN 400 UG/1
SPRAY ORAL
Qty: 36 G | Refills: 0 | Status: SHIPPED | OUTPATIENT
Start: 2017-06-20 | End: 2018-12-27 | Stop reason: SDUPTHER

## 2017-06-28 RX ORDER — ROSUVASTATIN CALCIUM 40 MG/1
TABLET, COATED ORAL
Qty: 90 TABLET | Refills: 3 | Status: SHIPPED | OUTPATIENT
Start: 2017-06-28 | End: 2018-05-31

## 2017-06-29 ENCOUNTER — OFFICE VISIT (OUTPATIENT)
Dept: OPTOMETRY | Facility: CLINIC | Age: 77
End: 2017-06-29
Payer: MEDICARE

## 2017-06-29 ENCOUNTER — CLINICAL SUPPORT (OUTPATIENT)
Dept: OPHTHALMOLOGY | Facility: CLINIC | Age: 77
End: 2017-06-29
Payer: MEDICARE

## 2017-06-29 DIAGNOSIS — E11.9 DIABETES MELLITUS TYPE 2 WITHOUT RETINOPATHY: ICD-10-CM

## 2017-06-29 DIAGNOSIS — H25.13 NUCLEAR SCLEROSIS, BILATERAL: ICD-10-CM

## 2017-06-29 DIAGNOSIS — H40.1133 PRIMARY OPEN-ANGLE GLAUCOMA, BILATERAL, SEVERE STAGE: ICD-10-CM

## 2017-06-29 DIAGNOSIS — H40.1133 PRIMARY OPEN-ANGLE GLAUCOMA, BILATERAL, SEVERE STAGE: Primary | ICD-10-CM

## 2017-06-29 PROCEDURE — 92014 COMPRE OPH EXAM EST PT 1/>: CPT | Mod: S$PBB,,, | Performed by: OPTOMETRIST

## 2017-06-29 PROCEDURE — 92133 CPTRZD OPH DX IMG PST SGM ON: CPT | Mod: PBBFAC

## 2017-06-29 PROCEDURE — 92083 EXTENDED VISUAL FIELD XM: CPT | Mod: 26,S$PBB,, | Performed by: OPTOMETRIST

## 2017-06-29 PROCEDURE — 99999 PR PBB SHADOW E&M-EST. PATIENT-LVL II: CPT | Mod: PBBFAC,,, | Performed by: OPTOMETRIST

## 2017-06-29 PROCEDURE — 92083 EXTENDED VISUAL FIELD XM: CPT | Mod: PBBFAC

## 2017-06-29 PROCEDURE — 92133 CPTRZD OPH DX IMG PST SGM ON: CPT | Mod: 26,S$PBB,, | Performed by: OPTOMETRIST

## 2017-06-29 RX ORDER — LATANOPROST 50 UG/ML
1 SOLUTION/ DROPS OPHTHALMIC NIGHTLY
Qty: 3 BOTTLE | Refills: 4 | Status: SHIPPED | OUTPATIENT
Start: 2017-06-29 | End: 2018-05-15 | Stop reason: SDUPTHER

## 2017-06-29 NOTE — LETTER
June 29, 2017      Ryan Carlson MD  [address]  [facility] [address]           [facility]  [address]  [facility] [address]  Phone: [phone]  Fax: [phone]          Patient: Eliu Paz   MR Number: [id number]   YOB: 1940   Date of Visit: 6/29/2017       Dear Dr. Ryan Carlson:    Thank you for referring Eliu Paz to me for evaluation. Attached you will find relevant portions of my assessment and plan of care.    If you have questions, please do not hesitate to call me. I look forward to following Eliu Paz along with you.    Sincerely,    Joe Dudley, OD    Enclosure  CC:  No Recipients    If you would like to receive this communication electronically, please contact [email or web address] or [phone] to request more information on [employer] access.    For providers and/or their staff who would like to refer a patient to [facility], please contact us through our one-stop-shop provider referral line, [facility] , at [phone].    If you feel you have received this communication in error or would no longer like to receive these types of communications, please e-mail [email or web address]

## 2017-06-29 NOTE — PROGRESS NOTES
HPI      is here for annual diabetic/ Glaucoma  eye exam. Pt sts no   vision complaints; she uses eye drops as directed but misses 1-2x/wk and   hasn't used it in over week; pt sts feel like OS feels swollen mucous   discharge upon waking   Eyemeds:latanoprost QHS, last can't remember  Blood Sugar- 119 am   Hemoglobin A1C       Date                     Value               Ref Range             Status                05/30/2017               8.3 (H)             4.5 - 6.2 %           Final                 02/16/2017               7.9 (H)             4.5 - 6.2 %           Final                 12/05/2016               8.3 (H)             4.5 - 6.2 %           Final            (-)Flashes (-)Floaters  (+)Itch, (-)tear, (-)burn, (-)Dryness. (-) OTC Drops   (-)Photophobia  (-)Glare (-)diplopia (-) headaches          Last edited by Joe Dudley, OD on 6/29/2017  9:58 AM. (History)            Assessment /Plan     For exam results, see Encounter Report.    Primary open-angle glaucoma, bilateral, severe stage  -     OCT - Optic Nerve; Future  -     [place] Visual Field - OU - Extended - Both Eyes; Future  -     latanoprost 0.005 % ophthalmic solution; Place 1 drop into both eyes every evening.  Dispense: 3 Bottle; Refill: 4  -Poor Compliance, elevated IOP and progression  -Latanoprost QHS, reviewed importance of nightly instill    Diabetes mellitus type 2 without retinopathy  -No retinopathy noted today.  Continued control with primary care physician and annual comprehensive eye exam.    Nuclear sclerosis, bilateral  -     Ambulatory Referral to Ophthalmology  -Referral to Dr. Francis for cataract extraction evaluation.  Reviewed implant options and gave patient a copy of cataract FAQ.      RTC as directed OMD

## 2017-06-30 DIAGNOSIS — G56.01 CARPAL TUNNEL SYNDROME, RIGHT: ICD-10-CM

## 2017-06-30 RX ORDER — TRAMADOL HYDROCHLORIDE 50 MG/1
50 TABLET ORAL 2 TIMES DAILY PRN
Qty: 60 TABLET | Refills: 2 | Status: SHIPPED | OUTPATIENT
Start: 2017-06-30 | End: 2018-11-23 | Stop reason: SDUPTHER

## 2017-07-10 ENCOUNTER — INITIAL CONSULT (OUTPATIENT)
Dept: OPHTHALMOLOGY | Facility: CLINIC | Age: 77
End: 2017-07-10
Payer: MEDICARE

## 2017-07-10 DIAGNOSIS — E11.9 TYPE 2 DIABETES MELLITUS WITHOUT OPHTHALMIC MANIFESTATIONS: ICD-10-CM

## 2017-07-10 DIAGNOSIS — H35.363 MACULAR DRUSEN, BILATERAL: ICD-10-CM

## 2017-07-10 DIAGNOSIS — H25.013 CORTICAL AGE-RELATED CATARACT, BILATERAL: ICD-10-CM

## 2017-07-10 DIAGNOSIS — H40.1133 PRIMARY OPEN-ANGLE GLAUCOMA, BILATERAL, SEVERE STAGE: Primary | ICD-10-CM

## 2017-07-10 DIAGNOSIS — H25.13 SENILE NUCLEAR SCLEROSIS, BILATERAL: ICD-10-CM

## 2017-07-10 DIAGNOSIS — H35.30 MACULAR DEGENERATION, BILATERAL: ICD-10-CM

## 2017-07-10 PROCEDURE — 92014 COMPRE OPH EXAM EST PT 1/>: CPT | Mod: S$PBB,,, | Performed by: OPHTHALMOLOGY

## 2017-07-10 PROCEDURE — 99999 PR PBB SHADOW E&M-EST. PATIENT-LVL II: CPT | Mod: PBBFAC,,, | Performed by: OPHTHALMOLOGY

## 2017-07-10 PROCEDURE — 99212 OFFICE O/P EST SF 10 MIN: CPT | Mod: PBBFAC | Performed by: OPHTHALMOLOGY

## 2017-07-10 PROCEDURE — 92250 FUNDUS PHOTOGRAPHY W/I&R: CPT | Mod: PBBFAC | Performed by: OPHTHALMOLOGY

## 2017-07-10 NOTE — LETTER
July 13, 2017      Joe Dudley, OD  [address]  [place] [address]           [facility] - Ophthalmology  [address]  [place] [address]  Phone: [phone]  Fax: [phone]          Patient: Eliu Paz   MR Number: [id number]   YOB: 1940   Date of Visit: 7/10/2017       Dear Dr. Joe Dudley:    Thank you for referring Eliu aPz to me for evaluation. Attached you will find relevant portions of my assessment and plan of care.    If you have questions, please do not hesitate to call me. I look forward to following Eliu Paz along with you.    Sincerely,    Zuleika Francis MD    Enclosure  CC:  No Recipients    If you would like to receive this communication electronically, please contact [email or web address] or [phone] to request more information on [facility] Link access.    For providers and/or their staff who would like to refer a patient to [facility], please contact us through our one-stop-shop provider referral line, [facility] , at [phone].    If you feel you have received this communication in error or would no longer like to receive these types of communications, please e-mail [email or web address]

## 2017-07-13 NOTE — PROGRESS NOTES
HPI     Cataract    Additional comments: cataract eval           Comments   Pt here for cataract evaluation per Dr. Dudley. Pt has noticed a change in   her vision OS.    1. POAG OU  2. Type 2 DM no DR  3. NS OU    MEDS:  Latanoprost QHS OU       Last edited by Rand Vergara MA on 7/10/2017  9:06 AM. (History)            Assessment /Plan     For exam results, see Encounter Report.    Primary open-angle glaucoma, bilateral, severe stage  -     Color Fundus Photography - OU - Both Eyes  -     [place] Retina Tomography (HRT) - OU - Both Eyes; Future    Macular degeneration, bilateral  -     Color Fundus Photography - OU - Both Eyes    Macular drusen, bilateral    Senile nuclear sclerosis, bilateral    Cortical age-related cataract, bilateral    Type 2 diabetes mellitus without ophthalmic manifestations      ***

## 2017-07-13 NOTE — PROGRESS NOTES
HPI     Cataract    Additional comments: cataract eval           Comments   Pt here for cataract evaluation per Dr. Dudley. Pt has noticed a change in   her vision OS.    1. POAG OU  2. Type 2 DM no DR  3. NS OU    MEDS:  Latanoprost QHS OU       Last edited by Rand Vergara MA on 7/10/2017  9:06 AM. (History)            Assessment /Plan     For exam results, see Encounter Report.    Primary open-angle glaucoma, bilateral, severe stage  -     Color Fundus Photography - OU - Both Eyes  -     [place] Retina Tomography (HRT) - OU - Both Eyes; Future    Macular degeneration, bilateral  -     Color Fundus Photography - OU - Both Eyes    Macular drusen, bilateral    Senile nuclear sclerosis, bilateral    Cortical age-related cataract, bilateral    Type 2 diabetes mellitus without ophthalmic manifestations           Glaucoma (type and duration)    POAG severe   First HVF   2013   First photos   2017   Treatment / Drops started   Latanoprost, travatan           Family history    ?        Glaucoma meds    latanoprost        H/O adverse rxn to glaucoma drops    none        LASERS    none        GLAUCOMA SURGERIES    none        OTHER EYE SURGERIES    none        CDR    0.9/0.9        Tbase    16-20/16-20          Tmax    20/20            Ttarget    ?             HVF    4 test 2013 to  2017 - SAD, INS od // gen depression, SAD, IAD os        Gonio    3+ ou        CCT    ?        OCT    4 test 2013 to 2017 - RNFL - decr TS, T, globally od // TS, T, TI, N, and globally os        HRT    none        Disc photos    2017    - Ttoday    12/13  - Test done today    Disc photos    POAG severe  Severe RNFL thinning with progressive field loss OU, Disc photos today  - on latanoprost qhs OU, continue  - will get HRT, pachymetry next visit      NS cataract OU - hold on CE  stabalize glaucoma work up then can consider CE prn    Macular Drusen OU, significant, photos today  Consider Retinal eval prior to CE     F/u 2 mo, check HRT

## 2017-07-29 DIAGNOSIS — I25.83 CORONARY ARTERY DISEASE DUE TO LIPID RICH PLAQUE: ICD-10-CM

## 2017-07-29 DIAGNOSIS — I25.10 CORONARY ARTERY DISEASE DUE TO LIPID RICH PLAQUE: ICD-10-CM

## 2017-08-01 RX ORDER — ISOSORBIDE MONONITRATE 30 MG/1
30 TABLET, EXTENDED RELEASE ORAL DAILY
Qty: 90 TABLET | Refills: 2 | Status: SHIPPED | OUTPATIENT
Start: 2017-08-01 | End: 2018-05-26 | Stop reason: SDUPTHER

## 2017-08-16 ENCOUNTER — PATIENT OUTREACH (OUTPATIENT)
Dept: ADMINISTRATIVE | Facility: HOSPITAL | Age: 77
End: 2017-08-16

## 2017-08-16 NOTE — PROGRESS NOTES
Attempted to contact pt to schedule diabetes f/up with PCP. Per last visit note, Dr. Carlson would like to see pt back in 12/2017. No answer, left voice mail for return call.

## 2017-08-21 ENCOUNTER — TELEPHONE (OUTPATIENT)
Dept: INTERNAL MEDICINE | Facility: CLINIC | Age: 77
End: 2017-08-21

## 2017-08-21 NOTE — TELEPHONE ENCOUNTER
----- Message from Varun Shah sent at 8/21/2017  2:39 PM CDT -----  Contact: self [phone]  Patient is returning a call from the office . Please call and advise , Thanks !

## 2017-08-24 RX ORDER — BLOOD-GLUCOSE METER
EACH MISCELLANEOUS
Qty: 150 STRIP | Refills: 11 | Status: SHIPPED | OUTPATIENT
Start: 2017-08-24 | End: 2018-07-03 | Stop reason: SDUPTHER

## 2017-08-25 ENCOUNTER — LAB VISIT (OUTPATIENT)
Dept: LAB | Facility: HOSPITAL | Age: 77
End: 2017-08-25
Payer: MEDICARE

## 2017-08-25 DIAGNOSIS — N18.30 TYPE 2 DIABETES MELLITUS WITH STAGE 3 CHRONIC KIDNEY DISEASE, WITH LONG-TERM CURRENT USE OF INSULIN: Chronic | ICD-10-CM

## 2017-08-25 DIAGNOSIS — Z79.4 TYPE 2 DIABETES MELLITUS WITH STAGE 3 CHRONIC KIDNEY DISEASE, WITH LONG-TERM CURRENT USE OF INSULIN: Chronic | ICD-10-CM

## 2017-08-25 DIAGNOSIS — E11.22 TYPE 2 DIABETES MELLITUS WITH STAGE 3 CHRONIC KIDNEY DISEASE, WITH LONG-TERM CURRENT USE OF INSULIN: Chronic | ICD-10-CM

## 2017-08-25 LAB
ESTIMATED AVG GLUCOSE: 192 MG/DL
HBA1C MFR BLD HPLC: 8.3 %
T4 FREE SERPL-MCNC: 0.91 NG/DL
TSH SERPL DL<=0.005 MIU/L-ACNC: 4.22 UIU/ML

## 2017-08-25 PROCEDURE — 36415 COLL VENOUS BLD VENIPUNCTURE: CPT

## 2017-08-25 PROCEDURE — 83036 HEMOGLOBIN GLYCOSYLATED A1C: CPT

## 2017-08-25 PROCEDURE — 84439 ASSAY OF FREE THYROXINE: CPT

## 2017-08-25 PROCEDURE — 84443 ASSAY THYROID STIM HORMONE: CPT

## 2017-08-25 NOTE — LETTER
August 25, 2017    Eliu Paz  [address] [place] [address]             [facility] - Endo/Diab/Metab  [address]  [place] [address]  Phone: [phone]  Fax: [phone] Dear Ms. Siddiqi:    Below are the results from your recent visit:    Resulted Orders   Hemoglobin A1c   Result Value Ref Range    Hemoglobin A1C 8.3 (H) 4.0 - 5.6 %      Comment:      According to ADA guidelines, hemoglobin A1c <7.0% represents  optimal control in non-pregnant diabetic patients. Different  metrics may apply to specific patient populations.   Standards of Medical Care in Diabetes-2016.  For the purpose of screening for the presence of diabetes:  <5.7%     Consistent with the absence of diabetes  5.7-6.4%  Consistent with increasing risk for diabetes   (prediabetes)  >or=6.5%  Consistent with diabetes  Currently, no consensus exists for use of hemoglobin A1c  for diagnosis of diabetes for children.  This Hemoglobin A1c assay has significant interference with fetal   hemoglobin   (HbF). The results are invalid for patients with abnormal amounts of   HbF,   including those with known Hereditary Persistence   of Fetal Hemoglobin. Heterozygous hemoglobin variants (HbAS, HbAC,   HbAD, HbAE, HbA2) do not significantly interfere with this assay;   however, presence of multiple variants in a sample may impact the %   interference.      Estimated Avg Glucose 192 (H) 68 - 131 mg/dL     Your results are noted above. .  Please review the attached information and don't hesitate to call our office if you have any questions or concerns.      Sincerely,        Daysi Garcia DNP, NP

## 2017-08-25 NOTE — LETTER
August 28, 2017    Eliu Paz  [address] [facility] [place] [address]             [facility] - Endo/Diab/Metab  [address]  [place] [address]  Phone: [phone]  Fax: [phone] Dear Ms. Siddiqi:    Below are the results from your recent visit:    Resulted Orders   Hemoglobin A1c   Result Value Ref Range    Hemoglobin A1C 8.3 (H) 4.0 - 5.6 %      Comment:      According to ADA guidelines, hemoglobin A1c <7.0% represents  optimal control in non-pregnant diabetic patients. Different  metrics may apply to specific patient populations.   Standards of Medical Care in Diabetes-2016.  For the purpose of screening for the presence of diabetes:  <5.7%     Consistent with the absence of diabetes  5.7-6.4%  Consistent with increasing risk for diabetes   (prediabetes)  >or=6.5%  Consistent with diabetes  Currently, no consensus exists for use of hemoglobin A1c  for diagnosis of diabetes for children.  This Hemoglobin A1c assay has significant interference with fetal   hemoglobin   (HbF). The results are invalid for patients with abnormal amounts of   HbF,   including those with known Hereditary Persistence   of Fetal Hemoglobin. Heterozygous hemoglobin variants (HbAS, HbAC,   HbAD, HbAE, HbA2) do not significantly interfere with this assay;   however, presence of multiple variants in a sample may impact the %   interference.      Estimated Avg Glucose 192 (H) 68 - 131 mg/dL   TSH   Result Value Ref Range    TSH 4.221 (H) 0.400 - 4.000 uIU/mL   T4, free   Result Value Ref Range    Free T4 0.91 0.71 - 1.51 ng/dL     Your results are documented above..  Please review the attached information and don't hesitate to call our office if you have any questions or concerns.      Sincerely,        Daysi Garcia DNP, NP

## 2017-08-28 NOTE — ADDENDUM NOTE
Encounter addended by: Daysi Garcia DNP, NP on: 8/28/2017  9:23 AM<BR>    Actions taken: Letter status changed

## 2017-09-01 ENCOUNTER — TELEPHONE (OUTPATIENT)
Dept: ENDOCRINOLOGY | Facility: CLINIC | Age: 77
End: 2017-09-01

## 2017-09-01 ENCOUNTER — TELEPHONE (OUTPATIENT)
Dept: DERMATOLOGY | Facility: CLINIC | Age: 77
End: 2017-09-01

## 2017-09-01 ENCOUNTER — OFFICE VISIT (OUTPATIENT)
Dept: ENDOCRINOLOGY | Facility: CLINIC | Age: 77
End: 2017-09-01
Payer: MEDICARE

## 2017-09-01 VITALS
SYSTOLIC BLOOD PRESSURE: 108 MMHG | HEART RATE: 68 BPM | BODY MASS INDEX: 36.52 KG/M2 | WEIGHT: 186 LBS | HEIGHT: 60 IN | DIASTOLIC BLOOD PRESSURE: 62 MMHG

## 2017-09-01 DIAGNOSIS — E78.5 HYPERLIPIDEMIA, UNSPECIFIED HYPERLIPIDEMIA TYPE: ICD-10-CM

## 2017-09-01 DIAGNOSIS — I10 ESSENTIAL HYPERTENSION: Chronic | ICD-10-CM

## 2017-09-01 DIAGNOSIS — I25.10 CORONARY ARTERY DISEASE INVOLVING NATIVE CORONARY ARTERY OF NATIVE HEART WITHOUT ANGINA PECTORIS: Chronic | ICD-10-CM

## 2017-09-01 DIAGNOSIS — D22.9 MULTIPLE NEVI: ICD-10-CM

## 2017-09-01 DIAGNOSIS — Z79.4 TYPE 2 DIABETES MELLITUS WITH DIABETIC POLYNEUROPATHY, WITH LONG-TERM CURRENT USE OF INSULIN: Chronic | ICD-10-CM

## 2017-09-01 DIAGNOSIS — E11.22 TYPE 2 DIABETES MELLITUS WITH STAGE 3 CHRONIC KIDNEY DISEASE, WITH LONG-TERM CURRENT USE OF INSULIN: Primary | Chronic | ICD-10-CM

## 2017-09-01 DIAGNOSIS — Z79.4 TYPE 2 DIABETES MELLITUS WITH STAGE 3 CHRONIC KIDNEY DISEASE, WITH LONG-TERM CURRENT USE OF INSULIN: Primary | Chronic | ICD-10-CM

## 2017-09-01 DIAGNOSIS — N18.30 TYPE 2 DIABETES MELLITUS WITH STAGE 3 CHRONIC KIDNEY DISEASE, WITH LONG-TERM CURRENT USE OF INSULIN: Primary | Chronic | ICD-10-CM

## 2017-09-01 DIAGNOSIS — E66.9 OBESITY, CLASS II, BMI 35-39.9: Chronic | ICD-10-CM

## 2017-09-01 DIAGNOSIS — E11.42 TYPE 2 DIABETES MELLITUS WITH DIABETIC POLYNEUROPATHY, WITH LONG-TERM CURRENT USE OF INSULIN: Chronic | ICD-10-CM

## 2017-09-01 PROCEDURE — 99999 PR PBB SHADOW E&M-EST. PATIENT-LVL V: CPT | Mod: PBBFAC,,, | Performed by: NURSE PRACTITIONER

## 2017-09-01 PROCEDURE — 1126F AMNT PAIN NOTED NONE PRSNT: CPT | Mod: ,,, | Performed by: NURSE PRACTITIONER

## 2017-09-01 PROCEDURE — 3078F DIAST BP <80 MM HG: CPT | Mod: ,,, | Performed by: NURSE PRACTITIONER

## 2017-09-01 PROCEDURE — 99214 OFFICE O/P EST MOD 30 MIN: CPT | Mod: S$PBB,,, | Performed by: NURSE PRACTITIONER

## 2017-09-01 PROCEDURE — 1157F ADVNC CARE PLAN IN RCRD: CPT | Mod: ,,, | Performed by: NURSE PRACTITIONER

## 2017-09-01 PROCEDURE — 3045F PR MOST RECENT HEMOGLOBIN A1C LEVEL 7.0-9.0%: CPT | Mod: ,,, | Performed by: NURSE PRACTITIONER

## 2017-09-01 PROCEDURE — 3074F SYST BP LT 130 MM HG: CPT | Mod: ,,, | Performed by: NURSE PRACTITIONER

## 2017-09-01 PROCEDURE — 99215 OFFICE O/P EST HI 40 MIN: CPT | Mod: PBBFAC | Performed by: NURSE PRACTITIONER

## 2017-09-01 PROCEDURE — 1159F MED LIST DOCD IN RCRD: CPT | Mod: ,,, | Performed by: NURSE PRACTITIONER

## 2017-09-01 PROCEDURE — 4010F ACE/ARB THERAPY RXD/TAKEN: CPT | Mod: ,,, | Performed by: NURSE PRACTITIONER

## 2017-09-01 NOTE — PROGRESS NOTES
"CHIEF COMPLAINT: Type 2 Diabetes     HPI: Ms. Zeng is a 77 y.o. female who was diagnosed with Type 2 DM at age 43. She was started on insulin soon after diagnosis--NPH/R + Metformin. She was then changed to Novolin 70/30. (+) FH of DM. She has been seen in [facility] program with JEANNE Escobar (last seen 2016). Of note, she has tried Trulicity, but experienced nausea and itching. She stopped the medication after having 3 injections. Januvia ordered in addition to 70/30 in 2016, but she refused to take it. 2016 C-peptide 2.8    Since last visit, she had a fall and injured left knee. States this limits her from exercising.     No changes at last visit. A1C is stable.    No logs or meter to clinic. BG checks 4x/day. Recall:  Fastins  Dinner: 140-150s  Bedtime:180-190s    States BGs are only higher when she eats sweets.     Symptomatic with dizziness with lower readings of <120s. States this doesn't occur often.  Drinks glucose liquid to alleviate sx.    Doesn't miss insulin doses.     Doesn't always eat lunch. Snacks on "something I shouldn't" and recalls sandwiches or chocolate candy, crackers.     PREVIOUS DIABETES MEDICATIONS TRIED  Lantus and Novolog pens  Novolin 70/30  Trulicity  Levemir    CURRENT DIABETIC MEDS: Novolog 70/30: 18 units bid    Last Podiatry Exam: n/a    REVIEW OF SYSTEMS  General: no weakness, fatigue, 4# wt gain.   Eyes: no double or blurred vision, eye pain, or redness; Last Eye Exam=2017.   Cardiovascular: no chest pain, palpitations, or murmurs, +edema,.   Respiratory: no cough or dyspnea.   GI: (+) chronic heartburn (takes Zantac, which she reports has been effective), nausea, or changes in bowel patterns; good appetite.   Skin: + rashes, no dryness, itching, or reactions at insulin injection sites,+ moles.  Neuro: bilateral legs with chronic numbness and tingling.   Endocrine: no polyuria, polydipsia, polyphagia, heat or cold intolerance. "     Vital Signs  /62   Pulse 68   Ht 5' (1.524 m)   Wt 84.4 kg (186 lb)   BMI 36.33 kg/m²     Hemoglobin A1C   Date Value Ref Range Status   08/25/2017 8.3 (H) 4.0 - 5.6 % Final     Comment:     According to ADA guidelines, hemoglobin A1c <7.0% represents  optimal control in non-pregnant diabetic patients. Different  metrics may apply to specific patient populations.   Standards of Medical Care in Diabetes-2016.  For the purpose of screening for the presence of diabetes:  <5.7%     Consistent with the absence of diabetes  5.7-6.4%  Consistent with increasing risk for diabetes   (prediabetes)  >or=6.5%  Consistent with diabetes  Currently, no consensus exists for use of hemoglobin A1c  for diagnosis of diabetes for children.  This Hemoglobin A1c assay has significant interference with fetal   hemoglobin   (HbF). The results are invalid for patients with abnormal amounts of   HbF,   including those with known Hereditary Persistence   of Fetal Hemoglobin. Heterozygous hemoglobin variants (HbAS, HbAC,   HbAD, HbAE, HbA2) do not significantly interfere with this assay;   however, presence of multiple variants in a sample may impact the %   interference.     05/30/2017 8.3 (H) 4.5 - 6.2 % Final     Comment:     According to ADA guidelines, hemoglobin A1C <7.0% represents  optimal control in non-pregnant diabetic patients.  Different  metrics may apply to specific populations.   Standards of Medical Care in Diabetes - 2016.  For the purpose of screening for the presence of diabetes:  <5.7%     Consistent with the absence of diabetes  5.7-6.4%  Consistent with increasing risk for diabetes   (prediabetes)  >or=6.5%  Consistent with diabetes  Currently no consensus exists for use of hemoglobin A1C  for diagnosis of diabetes for children.     02/16/2017 7.9 (H) 4.5 - 6.2 % Final     Comment:     According to ADA guidelines, hemoglobin A1C <7.0% represents  optimal control in non-pregnant diabetic patients.   Different  metrics may apply to specific populations.   Standards of Medical Care in Diabetes - 2016.  For the purpose of screening for the presence of diabetes:  <5.7%     Consistent with the absence of diabetes  5.7-6.4%  Consistent with increasing risk for diabetes   (prediabetes)  >or=6.5%  Consistent with diabetes  Currently no consensus exists for use of hemoglobin A1C  for diagnosis of diabetes for children.          Chemistry        Component Value Date/Time     05/30/2017 0817    K 4.2 05/30/2017 0817     05/30/2017 0817    CO2 22 (L) 05/30/2017 0817    BUN 17 05/30/2017 0817    CREATININE 1.2 05/30/2017 0817     (H) 05/30/2017 0817        Component Value Date/Time    CALCIUM 9.5 05/30/2017 0817    ALKPHOS 75 12/05/2016 0810    AST 23 12/05/2016 0810    ALT 13 12/05/2016 0810    BILITOT 0.5 12/05/2016 0810           Lab Results   Component Value Date    TSH 4.221 (H) 08/25/2017      Lab Results   Component Value Date    CHOL 109 (L) 12/05/2016    CHOL 115 (L) 11/28/2016    CHOL 117 (L) 11/08/2016     Lab Results   Component Value Date    HDL 37 (L) 12/05/2016    HDL 40 11/28/2016    HDL 41 11/08/2016     Lab Results   Component Value Date    LDLCALC 58.8 (L) 12/05/2016    LDLCALC 61.4 (L) 11/28/2016    LDLCALC 64.6 11/08/2016     Lab Results   Component Value Date    TRIG 66 12/05/2016    TRIG 68 11/28/2016    TRIG 57 11/08/2016     Lab Results   Component Value Date    CHOLHDL 33.9 12/05/2016    CHOLHDL 34.8 11/28/2016    CHOLHDL 35.0 11/08/2016         PHYSICAL EXAMINATION  Constitutional: Appears well, no distress  Neck: Supple, trachea midline.   Respiratory: CTA without wheezes, even and unlabored.  Cardiovascular: RRR; no carotid bruits or murmurs; (+) DP pulses; bilateral ankle edema.   Lymph: no lymphadenopathy palpated  Skin: warm and dry; no injection site reactions, no acanthosis nigracans observed.  Neuro:patient alert and cooperative, normal affect; steady gait.  Diabetes  Foot Exam: see exam from note dated 6/1/2017    Assessment/Plan    Type 2 diabetes with CKD stage 3  DM uncontrolled.   Continue current doses for now as her higher readings are related to dietary indiscretion  Logs to next visit--continue to monitor 4x/day  Instructed on where to inject insulin  Discussed A1C goal of 7-7.5%  -takes ASA, statin, ARB    Diabetic peripheral neuropathy associated with T2DM  Continue to improve BG readings, continue gabapentin.    CAD  Improve BG.   Avoid hypoglycemia.   On ASA, statin.   Managed by Cardiology, Dr. Aguilar. Last seen January 2017.     HTN  Stable.   Continue current medications.     HLP  Continue Crestor.     Obesity, class II  Body mass index is 36.33 kg/m².   Can worsen insulin resistance.   Continue to monitor diet.     Multiple Nevi  Follow up with dermatology    FOLLOW UP  Return in about 3 months (around 12/1/2017).     Seen in conjunction with ELENO Magana PA-C

## 2017-09-01 NOTE — PATIENT INSTRUCTIONS
"  Using Insulin  Insulin is given with shots (injections) in the fatty layer under the skin (subcutaneous). Some people use an implanted device called an insulin pump, while others inject insulin using prefilled "pens." Your healthcare provider, nurse, diabetes educator, or others will give you instructions about using insulin. Make sure you follow all instructions about when and where you use it.  Where to inject your insulin     Injection sites in adults include the belly (abdomen), front of thighs, back of upper arms and upper buttocks.   · Injecting insulin in the same area (like your belly) means that insulin is absorbed the same way.  · Insulin is most often injected in the abdominal fat. That is where it is absorbed most quickly.  · Change the injection site each time you give yourself insulin. This helps prevent problems.  · Have an organized way of moving from site to site.  · Leave at least 2 inches around your belly button (navel).  When to inject your insulin  · Make sure you follow your healthcare provider's instructions about when you should give yourself insulin.  · The timing of insulin injections with meals or snacks is very important.  Getting ready to inject insulin from a bottle  · Wash your hands. Use soap and warm water.  · Wipe the top of the insulin bottle (vial) with alcohol.    Preparing the insulin  · Pull back the plunger until the end of the plunger is even with the number of units of insulin you take. Always read the numbers of units of insulin at your eye level.   · Insert the needle into the top of the bottle. Hold the needle and bottle straight up and down. Make sure the needle is in the insulin. Then push the plunger in all the way, pushing air into the insulin.  · Turn the bottle and syringe upside down so that the bottle is on top.  · Pull back on the plunger until the end of the plunger is even with the number of units of insulin you take.  · Remove the needle. Then tap the " syringe with a fingertip to remove any air bubbles.    Injecting the insulin  · Gently pinch up about 1 inch of skin. Do not squeeze the skin.  · Insert the needle.  · Push in the plunger. Press until the syringe is empty. Let go of the skin. Then remove the needle. Dont rub the site after you remove the needle.  Disposing of the syringe  · Put the needle and syringe in a sharps container. And dont recap the needle.  · When the sharps container is full, put it into a garbage bag and secure the top. Label the bag needles or sharps.  · Call your local waste company to ask about removing the sharps container. You can also check with the [employer] for Safe [employer] Needle Disposal at 217-428-9287, www.safeneedledisposal.org.  Storing your insulin  · Keep unopened insulin bottles in the refrigerator. An open bottle can be stored at room temperature (such as on the kitchen counter). But dont let the insulin get too hot. Always keep it below 86°F (30°C). And never let it freeze!  · Always use insulin before the expiration date on the bottle. Throw  bottles away.  · Use insulin within 28 days of opening the bottle. After 28 days, throw it away. To remember, write the date you opened it on the bottle.  · When you travel, be sure to take all of your diabetes supplies. Put them in a bag made to protect insulin from heat and cold. Always keep them with you. If there is a delay or your suitcase is lost, you will have what you need.  · Never leave insulin in the car. It can get too hot or too cold.  Date Last Reviewed: 2016  © 7712-8027 [employer]. [address] [place] [address], [place] [address]. All rights reserved. This information is not intended as a substitute for professional medical care. Always follow your healthcare professional's instructions.

## 2017-09-11 ENCOUNTER — OFFICE VISIT (OUTPATIENT)
Dept: OPHTHALMOLOGY | Facility: CLINIC | Age: 77
End: 2017-09-11
Payer: MEDICARE

## 2017-09-11 ENCOUNTER — CLINICAL SUPPORT (OUTPATIENT)
Dept: OPHTHALMOLOGY | Facility: CLINIC | Age: 77
End: 2017-09-11
Payer: MEDICARE

## 2017-09-11 DIAGNOSIS — H35.30 MACULAR DEGENERATION, BILATERAL: ICD-10-CM

## 2017-09-11 DIAGNOSIS — H40.1133 PRIMARY OPEN-ANGLE GLAUCOMA, BILATERAL, SEVERE STAGE: Primary | ICD-10-CM

## 2017-09-11 DIAGNOSIS — H25.13 SENILE NUCLEAR SCLEROSIS, BILATERAL: ICD-10-CM

## 2017-09-11 DIAGNOSIS — H40.1133 PRIMARY OPEN-ANGLE GLAUCOMA, BILATERAL, SEVERE STAGE: ICD-10-CM

## 2017-09-11 DIAGNOSIS — H25.013 CORTICAL AGE-RELATED CATARACT, BILATERAL: ICD-10-CM

## 2017-09-11 DIAGNOSIS — H35.363 MACULAR DRUSEN, BILATERAL: ICD-10-CM

## 2017-09-11 PROCEDURE — 92012 INTRM OPH EXAM EST PATIENT: CPT | Mod: S$PBB,,, | Performed by: OPHTHALMOLOGY

## 2017-09-11 PROCEDURE — 99212 OFFICE O/P EST SF 10 MIN: CPT | Mod: PBBFAC | Performed by: OPHTHALMOLOGY

## 2017-09-11 PROCEDURE — 92134 CPTRZ OPH DX IMG PST SGM RTA: CPT | Mod: 26,S$PBB,, | Performed by: OPHTHALMOLOGY

## 2017-09-11 PROCEDURE — 99999 PR PBB SHADOW E&M-EST. PATIENT-LVL II: CPT | Mod: PBBFAC,,, | Performed by: OPHTHALMOLOGY

## 2017-09-11 PROCEDURE — 92020 GONIOSCOPY: CPT | Mod: PBBFAC | Performed by: OPHTHALMOLOGY

## 2017-09-11 PROCEDURE — 92020 GONIOSCOPY: CPT | Mod: S$PBB,,, | Performed by: OPHTHALMOLOGY

## 2017-09-11 PROCEDURE — 92134 CPTRZ OPH DX IMG PST SGM RTA: CPT | Mod: 50,PBBFAC

## 2017-09-11 NOTE — PROGRESS NOTES
HPI     DLS: 7/10/17    Pt here for HRT review;  Pt states that her OS is drooping and been having a nose bleed out of left   nasal. Pt also c/o OU having white discharge.     Meds: Latanoprost qhs ou    1. Primary open angle glaucoma, bilateral, severe stage  2. Macular degeneration, bilateral  3. Macular drusen, bilateral  4. Senile nuclear sclerosis, bilateral  5. Cortical age-related cataract, bilateral  6. Type 2 diabetes mellitus without ophthalmic manifestations     Last edited by Kimberly Diaz on 9/11/2017  9:39 AM. (History)            Assessment /Plan     For exam results, see Encounter Report.    Primary open-angle glaucoma, bilateral, severe stage    Macular degeneration, bilateral    Macular drusen, bilateral    Senile nuclear sclerosis, bilateral    Cortical age-related cataract, bilateral             Glaucoma (type and duration)    POAG severe   First HVF   2013   First photos   2017   Treatment / Drops started   Latanoprost, travatan           Family history    ?        Glaucoma meds    latanoprost        H/O adverse rxn to glaucoma drops    none        LASERS    none        GLAUCOMA SURGERIES    none        OTHER EYE SURGERIES    none        CDR    0.9/0.9        Tbase    16-20/16-20          Tmax    20/20            Ttarget    ?             HVF    4 test 2013 to  2017 - SAD, INS od // gen depression, SAD, IAD os        Gonio    3+ ou        CCT    ?        OCT    4 test 2013 to 2017 - RNFL - decr TS, T, globally od // TS, T, TI, N, and globally os        HRT    none        Disc photos    2017    - Ttoday    12/12  - Test done today    HRT / gonio     POAG severe  Severe RNFL thinning with progressive field loss OU, Disc photos today  - on latanoprost qhs OU, continue  - will get HRT, pachymetry next visit      NS cataract OU - hold on CE  stabalize glaucoma work up then can consider CE prn    Macular Drusen OU, significant, photos today  Consider Retinal eval prior to CE     F/u 4 months with  AR/MR/BAT - cataract check and CCT ou

## 2017-10-13 ENCOUNTER — INITIAL CONSULT (OUTPATIENT)
Dept: DERMATOLOGY | Facility: CLINIC | Age: 77
End: 2017-10-13
Payer: MEDICARE

## 2017-10-13 DIAGNOSIS — L72.0 EPIDERMAL INCLUSION CYST: ICD-10-CM

## 2017-10-13 DIAGNOSIS — L29.9 PRURITIC CONDITION: ICD-10-CM

## 2017-10-13 DIAGNOSIS — L21.9 SEBORRHEIC DERMATITIS: Primary | ICD-10-CM

## 2017-10-13 DIAGNOSIS — D22.9 MULTIPLE BENIGN NEVI: ICD-10-CM

## 2017-10-13 DIAGNOSIS — L82.1 SEBORRHEIC KERATOSES: ICD-10-CM

## 2017-10-13 PROCEDURE — 99999 PR PBB SHADOW E&M-EST. PATIENT-LVL II: CPT | Mod: PBBFAC,,, | Performed by: DERMATOLOGY

## 2017-10-13 PROCEDURE — 99212 OFFICE O/P EST SF 10 MIN: CPT | Mod: PBBFAC | Performed by: DERMATOLOGY

## 2017-10-13 PROCEDURE — 99202 OFFICE O/P NEW SF 15 MIN: CPT | Mod: S$PBB,,, | Performed by: DERMATOLOGY

## 2017-10-13 RX ORDER — FLUOCINONIDE TOPICAL SOLUTION USP, 0.05% 0.5 MG/ML
SOLUTION TOPICAL 2 TIMES DAILY
Qty: 60 ML | Refills: 1 | Status: SHIPPED | OUTPATIENT
Start: 2017-10-13 | End: 2021-03-24

## 2017-10-13 RX ORDER — TRIAMCINOLONE ACETONIDE 1 MG/G
CREAM TOPICAL 2 TIMES DAILY
Qty: 30 G | Refills: 0 | Status: SHIPPED | OUTPATIENT
Start: 2017-10-13 | End: 2018-01-25 | Stop reason: SDUPTHER

## 2017-10-13 RX ORDER — KETOCONAZOLE 20 MG/ML
SHAMPOO, SUSPENSION TOPICAL
Qty: 120 ML | Refills: 5 | Status: SHIPPED | OUTPATIENT
Start: 2017-10-16 | End: 2018-09-18 | Stop reason: SDUPTHER

## 2017-10-13 NOTE — PROGRESS NOTES
Subjective:       Patient ID:  Eliu Paz is a 77 y.o. female who presents for   Chief Complaint   Patient presents with    Rash     1 year, right scalp, left hip, right shoulder, itches    Hair Loss     Rash  - Initial  Affected locations: left hip and right shoulder  Duration: 1 year  Signs / symptoms: itching  Severity: mild to moderate  Timing: constant  Aggravated by: nothing  Relieving factors/Treatments tried: nothing  Improvement on treatment: no relief    Hair Loss  - Initial  Affected locations: scalp  Duration: 1 year  Severity: mild  Timing: constant  Aggravated by: nothing  Relieving factors/Treatments tried: nothing  Improvement on treatment: no relief    Hair thinned out after wrist surgery (carpal tunnel)  Past Medical History:   Diagnosis Date    Allergy     Arthritis     Cataract     CKD (chronic kidney disease) stage 3, GFR 30-59 ml/min     Coronary artery disease     Diabetes mellitus type II 1981    Diabetic neuropathy     GERD (gastroesophageal reflux disease)     Glaucoma     Hyperlipidemia     Hypertension      Review of Systems   Constitutional: Negative for fever, chills, weight loss, weight gain, fatigue, night sweats and malaise.   Skin: Positive for itching and rash. Negative for dry skin, recent sunburn and dry lips.   Hematologic/Lymphatic: Does not bruise/bleed easily.        Objective:    Physical Exam   Constitutional: She appears well-developed and well-nourished.   HENT:   Ears:    Neurological: She is alert and oriented to person, place, and time.   Psychiatric: She has a normal mood and affect.   Skin:   Areas Examined (abnormalities noted in diagram):   Scalp / Hair Palpated and Inspected  Head / Face Inspection Performed  Neck Inspection Performed  Chest / Axilla Inspection Performed  Genitals / Buttocks / Groin Inspection Performed  Back Inspection Performed  RUE Inspected  LUE Inspection Performed                   Diagram Legend     Erythematous scaling  macule/papule c/w actinic keratosis       Vascular papule c/w angioma      Pigmented verrucoid papule/plaque c/w seborrheic keratosis      Yellow umbilicated papule c/w sebaceous hyperplasia      Irregularly shaped tan macule c/w lentigo     1-2 mm smooth white papules consistent with Milia      Movable subcutaneous cyst with punctum c/w epidermal inclusion cyst      Subcutaneous movable cyst c/w pilar cyst      Firm pink to brown papule c/w dermatofibroma      Pedunculated fleshy papule(s) c/w skin tag(s)      Evenly pigmented macule c/w junctional nevus     Mildly variegated pigmented, slightly irregular-bordered macule c/w mildly atypical nevus      Flesh colored to evenly pigmented papule c/w intradermal nevus       Pink pearly papule/plaque c/w basal cell carcinoma      Erythematous hyperkeratotic cursted plaque c/w SCC      Surgical scar with no sign of skin cancer recurrence      Open and closed comedones      Inflammatory papules and pustules      Verrucoid papule consistent consistent with wart     Erythematous eczematous patches and plaques     Dystrophic onycholytic nail with subungual debris c/w onychomycosis     Umbilicated papule    Erythematous-base heme-crusted tan verrucoid plaque consistent with inflamed seborrheic keratosis     Erythematous Silvery Scaling Plaque c/w Psoriasis     See annotation      Assessment / Plan:        Seborrheic dermatitis  Scalp looks well-controlled at this time  I discussed the side effects of topical steroids including atrophy, telangiectasias, striae.  Avoid use on the face and contact with the eyes  -     ketoconazole (NIZORAL) 2 % shampoo; Apply topically twice a week.  Dispense: 120 mL; Refill: 5  -     fluocinonide (LIDEX) 0.05 % external solution; Apply topically 2 (two) times daily.  Dispense: 60 mL; Refill: 1    Pruritic condition/Xerosis/Excoriations  Encouraged patient to stop scratching/rubbing as this can exacerbate the condition  -     triamcinolone  acetonide 0.1% (KENALOG) 0.1 % cream; Apply topically 2 (two) times daily.  Dispense: 30 g; Refill: 0    Benign Nevi  Reassurance that her nevi appear benign with regular and consistent pigment pattern on dermoscopy    Seborrheic Keratosis  These are benign inherited growths without a malignant potential. Reassurance given to patient. No treatment is necessary.     EIC  Discussed that this is a benign lesion and that mgmt options include observation vs excision.  Because the cyst is asymptomatic, pt would like to observe for now          Return in about 3 months (around 1/13/2018).

## 2017-10-13 NOTE — LETTER
October 15, 2017      Daysi Garcia, DNP, NP  [address] [facility] [address]           [facility] Dermatology  [address]  [place] [address]  Phone: [phone]  Fax: [phone]          Patient: Eliu Paz   MR Number: [id number]   YOB: 1940   Date of Visit: 10/13/2017       Dear Daysi Garcia:    Thank you for referring Eliu Paz to me for evaluation. Attached you will find relevant portions of my assessment and plan of care.    If you have questions, please do not hesitate to call me. I look forward to following Eliu Paz along with you.    Sincerely,    Keely Smith MD    Enclosure  CC:  No Recipients    If you would like to receive this communication electronically, please contact [email or web address] or [phone] to request more information on [email or web address] Link access.    For providers and/or their staff who would like to refer a patient to [facility], please contact us through our one-stop-shop provider referral line, [facility], [place], at [phone].    If you feel you have received this communication in error or would no longer like to receive these types of communications, please e-mail [email or web address]

## 2017-11-30 ENCOUNTER — LAB VISIT (OUTPATIENT)
Dept: LAB | Facility: HOSPITAL | Age: 77
End: 2017-11-30
Payer: MEDICARE

## 2017-11-30 DIAGNOSIS — N18.30 TYPE 2 DIABETES MELLITUS WITH STAGE 3 CHRONIC KIDNEY DISEASE, WITH LONG-TERM CURRENT USE OF INSULIN: Chronic | ICD-10-CM

## 2017-11-30 DIAGNOSIS — Z79.4 TYPE 2 DIABETES MELLITUS WITH STAGE 3 CHRONIC KIDNEY DISEASE, WITH LONG-TERM CURRENT USE OF INSULIN: Chronic | ICD-10-CM

## 2017-11-30 DIAGNOSIS — E11.22 TYPE 2 DIABETES MELLITUS WITH STAGE 3 CHRONIC KIDNEY DISEASE, WITH LONG-TERM CURRENT USE OF INSULIN: Chronic | ICD-10-CM

## 2017-11-30 LAB
ALBUMIN SERPL BCP-MCNC: 3.8 G/DL
ALP SERPL-CCNC: 75 U/L
ALT SERPL W/O P-5'-P-CCNC: 12 U/L
ANION GAP SERPL CALC-SCNC: 9 MMOL/L
AST SERPL-CCNC: 24 U/L
BILIRUB SERPL-MCNC: 0.5 MG/DL
BUN SERPL-MCNC: 18 MG/DL
CALCIUM SERPL-MCNC: 9.7 MG/DL
CHLORIDE SERPL-SCNC: 109 MMOL/L
CHOLEST SERPL-MCNC: 115 MG/DL
CHOLEST/HDLC SERPL: 2.3 {RATIO}
CO2 SERPL-SCNC: 22 MMOL/L
CREAT SERPL-MCNC: 1.1 MG/DL
EST. GFR  (AFRICAN AMERICAN): 57 ML/MIN/1.73 M^2
EST. GFR  (NON AFRICAN AMERICAN): 49 ML/MIN/1.73 M^2
ESTIMATED AVG GLUCOSE: 186 MG/DL
GLUCOSE SERPL-MCNC: 139 MG/DL
HBA1C MFR BLD HPLC: 8.1 %
HDLC SERPL-MCNC: 51 MG/DL
HDLC SERPL: 44.3 %
LDLC SERPL CALC-MCNC: 53 MG/DL
NONHDLC SERPL-MCNC: 64 MG/DL
POTASSIUM SERPL-SCNC: 4.5 MMOL/L
PROT SERPL-MCNC: 7.9 G/DL
SODIUM SERPL-SCNC: 140 MMOL/L
TRIGL SERPL-MCNC: 55 MG/DL

## 2017-11-30 PROCEDURE — 80053 COMPREHEN METABOLIC PANEL: CPT

## 2017-11-30 PROCEDURE — 83036 HEMOGLOBIN GLYCOSYLATED A1C: CPT

## 2017-11-30 PROCEDURE — 80061 LIPID PANEL: CPT

## 2017-11-30 PROCEDURE — 36415 COLL VENOUS BLD VENIPUNCTURE: CPT

## 2017-12-05 ENCOUNTER — OFFICE VISIT (OUTPATIENT)
Dept: INTERNAL MEDICINE | Facility: CLINIC | Age: 77
End: 2017-12-05
Payer: MEDICARE

## 2017-12-05 VITALS
DIASTOLIC BLOOD PRESSURE: 54 MMHG | BODY MASS INDEX: 37.95 KG/M2 | WEIGHT: 193.31 LBS | OXYGEN SATURATION: 98 % | SYSTOLIC BLOOD PRESSURE: 110 MMHG | HEIGHT: 60 IN | HEART RATE: 52 BPM

## 2017-12-05 DIAGNOSIS — M26.609 TMJ (TEMPOROMANDIBULAR JOINT SYNDROME): Primary | ICD-10-CM

## 2017-12-05 DIAGNOSIS — E66.01 SEVERE OBESITY WITH BODY MASS INDEX (BMI) OF 35.0 TO 35.9 AND COMORBIDITY: ICD-10-CM

## 2017-12-05 DIAGNOSIS — Z79.4 TYPE 2 DIABETES MELLITUS WITH DIABETIC POLYNEUROPATHY, WITH LONG-TERM CURRENT USE OF INSULIN: Chronic | ICD-10-CM

## 2017-12-05 DIAGNOSIS — E11.42 TYPE 2 DIABETES MELLITUS WITH DIABETIC POLYNEUROPATHY, WITH LONG-TERM CURRENT USE OF INSULIN: Chronic | ICD-10-CM

## 2017-12-05 DIAGNOSIS — K59.00 CONSTIPATION, UNSPECIFIED CONSTIPATION TYPE: ICD-10-CM

## 2017-12-05 PROCEDURE — 99214 OFFICE O/P EST MOD 30 MIN: CPT | Mod: S$PBB,,, | Performed by: INTERNAL MEDICINE

## 2017-12-05 PROCEDURE — 99999 PR PBB SHADOW E&M-EST. PATIENT-LVL III: CPT | Mod: PBBFAC,,, | Performed by: INTERNAL MEDICINE

## 2017-12-05 PROCEDURE — 99213 OFFICE O/P EST LOW 20 MIN: CPT | Mod: PBBFAC | Performed by: INTERNAL MEDICINE

## 2017-12-05 RX ORDER — POLYETHYLENE GLYCOL 3350 17 G/17G
17 POWDER, FOR SOLUTION ORAL DAILY
Qty: 30 PACKET | Refills: 11 | Status: SHIPPED | OUTPATIENT
Start: 2017-12-05 | End: 2018-10-25 | Stop reason: SDUPTHER

## 2017-12-05 NOTE — PROGRESS NOTES
Subjective:       Patient ID: Eliu Paz is a 77 y.o. female.    Chief Complaint: Follow-up    Inner ears bothering her, some TMJ pains as well.    Nit sure what to use steroid solution for.    No new other symptoms.      Review of Systems   Constitutional: Positive for fatigue. Negative for activity change and appetite change.   Eyes: Negative for visual disturbance.   Respiratory: Negative for chest tightness, shortness of breath and wheezing.    Cardiovascular: Positive for leg swelling (mild at ankles). Negative for chest pain and palpitations.   Gastrointestinal: Negative for abdominal distention and abdominal pain.   Genitourinary: Negative for pelvic pain.   Musculoskeletal: Positive for back pain and neck pain. Negative for arthralgias.   Skin: Negative for rash.   Neurological: Positive for numbness. Negative for tremors, syncope, facial asymmetry, speech difficulty and weakness.   Hematological: Negative for adenopathy. Does not bruise/bleed easily.   Psychiatric/Behavioral: Negative for dysphoric mood. The patient is nervous/anxious.        Objective:      Physical Exam   Constitutional: She is oriented to person, place, and time. She appears well-developed and well-nourished. No distress.   HENT:   Head: Normocephalic and atraumatic.   Mouth/Throat: No oropharyngeal exudate.   TMs clear bilat.  Mild TMJ tenderness to deep palpation bilat, normal jaw opening bilat   Eyes: Conjunctivae are normal. Pupils are equal, round, and reactive to light. No scleral icterus.   Neck: Normal range of motion. Neck supple. No thyromegaly present.   Cardiovascular: Normal rate, regular rhythm and normal heart sounds.    Pulmonary/Chest: Effort normal and breath sounds normal.   Abdominal: Soft. Bowel sounds are normal. She exhibits no distension. There is no tenderness.   Musculoskeletal: Normal range of motion. She exhibits edema (mild bilat ankles, not tender). She exhibits no tenderness.   Lymphadenopathy:      She has no cervical adenopathy.   Neurological: She is alert and oriented to person, place, and time. She displays normal reflexes. No cranial nerve deficit. She exhibits normal muscle tone. Coordination normal.   Mild ankle edema, not significant lower extrem, edema   Skin: No rash noted. She is not diaphoretic.   Thinning of hair on scalp, no focal areas of hair loss.  No seborrhea seen.  A few papules on scalp, some keratotic, some hyperpigmented, all benign appearing   Psychiatric: She has a normal mood and affect.       Assessment:       1. TMJ (temporomandibular joint syndrome)    2. Type 2 diabetes mellitus with diabetic polyneuropathy, with long-term current use of insulin    3. Severe obesity with body mass index (BMI) of 35.0 to 35.9 and comorbidity        Plan:       Eliu Steinberg was seen today for follow-up.    Diagnoses and all orders for this visit:    TMJ (temporomandibular joint syndrome)  -     Ambulatory Referral to [facility] ([facility]Way)  -     [facility] [employer] ASSIGN QUESTIONNAIRE SERIES (eZWay)  -     [facility] Patient Entered [facility] (MEDFIT)  Pt given handouts.      Type 2 diabetes mellitus with diabetic polyneuropathy, with long-term current use of insulin  -     Ambulatory Referral to [facility] ([facility]Way)  -     [facility] [employer] ASSIGN QUESTIONNAIRE SERIES (eZWay)  -     netomat Patient Entered [facility] (MEDFIT)  Continue with endocrin    Severe obesity with body mass index (BMI) of 35.0 to 35.9 and comorbidity  -     Ambulatory Referral to [facility] ([facility]Way)  -     [facility] [employer] ASSIGN QUESTIONNAIRE SERIES ([employer]ZWay)  -     netomat Patient Entered [facility] (MEDFIT)        Health Maintenance       Date Due Completion Date    Influenza Vaccine 08/01/2017 2/7/2017 (Declined)    Override on 2/7/2017: Declined (per provider note)    Override on 10/15/2015: Not Clinically Appropriate (declines)    Hemoglobin A1c 05/30/2018 11/30/2017    Foot Exam 06/01/2018 6/1/2017    Override on  12/2/2016: Done    Override on 8/24/2016: Done (by cardiologist)    Override on 7/14/2015: Done    Eye Exam 09/11/2018 9/11/2017    DEXA SCAN 11/05/2018 11/5/2015    Lipid Panel 11/30/2018 11/30/2017    Colonoscopy 11/02/2020 11/2/2015    TETANUS VACCINE 02/07/2027 2/7/2017 (Declined)    Override on 2/7/2017: Declined (per provider note)      Declines flu vax    Steroid solution -- call Dr Smith's office    Return in about 6 months (around 6/5/2018).

## 2017-12-05 NOTE — PATIENT INSTRUCTIONS
Dr. Keely Smith, skin doctor, [phone]     TMJ Syndrome  The temporomandibular joint (TMJ) is the joint that connects your lower jaw to your head. You can feel it in front of your ears when you open and close your mouth. TMJ disorders involve chronic or recurrent pain in the joint. When treated, symptoms of TMJ disorders usually go away within a few months.  Causes  There is no widely agreed-on cause of TMJ disorders. They have been linked to injury, arthritis, chronic fatigue syndrome, and fibromyalgia. A definite connection has not been shown, though.  Symptoms  · Pain in the face, jaw, or neck  · Pain with jaw movement or chewing  · Locking or catching sensation of the jaw  · Clicking, popping, or grinding sounds with movement of the TMJ  · Headache  · Ear pain  Home care  Modest, nonsurgical treatments are a good first step toward relieving symptoms. Try the approaches described below.  · Rest the jaw by avoiding crunchy or hard-to-chew foods. Do not eat hard or sticky candies. Soft foods and liquids are easier on the jaw.  · Protect your jaw while yawning. If you need to yawn, put your fist under your chin to prevent your mouth from opening up too wide.  · To help relieve pain, try applying hot or cold packs to the painful area. Try both hot and cold to find out which works best for you. If you use hot packs (small towels soaked in hot water), be careful not to burn yourself.  · You may take acetaminophen or ibuprofen for pain, unless you were given a different pain medicine. (Note: If you have chronic liver or kidney disease or have ever had a stomach ulcer or gastrointestinal bleeding, talk with your healthcare provider before using these medicines. Also talk to your provider if you are taking medicine to prevent blood clots.) Aspirin should never be given to anyone younger than 18 years of age who is ill with a viral infection or fever. It may cause severe liver or brain damage.  Reducing stress  If  stress seems to be contributing to your symptoms, try to identify the sources of stress in your life. These arent always obvious. Common stressors include:  · Everyday hassles (which can add up), such as traffic jams, missed appointments, or car trouble  · Major life changes, both good (such as a new baby or job promotion) and bad (loss of job or loss of a loved one)  · Overload: The feeling that you have too many responsibilities and can't take care of everything at once  · Helplessness: Feeling like your problems are more than you can solve  When possible, do something about your sources of stress. See if you can avoid hassles, limit the amount of change in your life at one time, and take breaks when you feel overloaded.  Unfortunately, many stressful situations cannot be avoided. So learning how to manage stress better is very important. Getting regular exercise, eating nutritious, balanced meals, and getting adequate rest all help to make everyday stress more manageable. Certain techniques are also helpful: relaxation and breathing exercises, visualization, biofeedback, meditation, or simply taking some time out to clear your mind. For more information, talk with your healthcare provider.  Follow-up care  Follow up with your healthcare provider, or as advised. Further testing and additional treatment may be required. If changes to your lifestyle do not improve your symptoms, talk with your healthcare provider about other available therapies. These include bite guards for help with teeth grinding, stress management techniques, and more. If stress is an important factor and does not respond to the above simple measures, talk to your doctor about a referral for stress management.  · If X-rays were done, they will be reviewed by a specialist. You will be notified of the results, especially if they affect treatment.  Call 911  Call emergency services right away if any of these occur:  · Trouble breathing or  swallowing, wheezing  · Confusion  · Extreme drowsiness or trouble awakening  · Fainting or loss of consciousness  · Rapid heart rate  When to seek medical advice  Call your healthcare provider right away if any of these occur:  · Your face becomes swollen or red.  · Your pain worsens.  · You have increasing neck, mouth, tooth, or throat pain.  · You develop a fever of 100.4F (38°C) or higher  Date Last Reviewed: 7/30/2015  © 8575-5267 [employer]. [address] [place] [address]. All rights reserved. This information is not intended as a substitute for professional medical care. Always follow your healthcare professional's instructions.

## 2017-12-08 ENCOUNTER — OFFICE VISIT (OUTPATIENT)
Dept: ENDOCRINOLOGY | Facility: CLINIC | Age: 77
End: 2017-12-08
Payer: MEDICARE

## 2017-12-08 VITALS
HEIGHT: 60 IN | SYSTOLIC BLOOD PRESSURE: 128 MMHG | HEART RATE: 68 BPM | BODY MASS INDEX: 38.24 KG/M2 | WEIGHT: 194.81 LBS | DIASTOLIC BLOOD PRESSURE: 72 MMHG

## 2017-12-08 DIAGNOSIS — Z79.4 TYPE 2 DIABETES MELLITUS WITH STAGE 3 CHRONIC KIDNEY DISEASE, WITH LONG-TERM CURRENT USE OF INSULIN: Primary | Chronic | ICD-10-CM

## 2017-12-08 DIAGNOSIS — E11.22 TYPE 2 DIABETES MELLITUS WITH STAGE 3 CHRONIC KIDNEY DISEASE, WITH LONG-TERM CURRENT USE OF INSULIN: Primary | Chronic | ICD-10-CM

## 2017-12-08 DIAGNOSIS — E66.9 OBESITY, CLASS II, BMI 35-39.9: Chronic | ICD-10-CM

## 2017-12-08 DIAGNOSIS — E78.5 HYPERLIPIDEMIA, UNSPECIFIED HYPERLIPIDEMIA TYPE: ICD-10-CM

## 2017-12-08 DIAGNOSIS — E11.42 DM TYPE 2 WITH DIABETIC PERIPHERAL NEUROPATHY: ICD-10-CM

## 2017-12-08 DIAGNOSIS — I10 ESSENTIAL HYPERTENSION: Chronic | ICD-10-CM

## 2017-12-08 DIAGNOSIS — N18.30 TYPE 2 DIABETES MELLITUS WITH STAGE 3 CHRONIC KIDNEY DISEASE, WITH LONG-TERM CURRENT USE OF INSULIN: Primary | Chronic | ICD-10-CM

## 2017-12-08 DIAGNOSIS — I25.10 CORONARY ARTERY DISEASE INVOLVING NATIVE CORONARY ARTERY OF NATIVE HEART WITHOUT ANGINA PECTORIS: Chronic | ICD-10-CM

## 2017-12-08 PROBLEM — E66.3 OVERWEIGHT (BMI 25.0-29.9): Status: RESOLVED | Noted: 2017-06-01 | Resolved: 2017-12-08

## 2017-12-08 PROCEDURE — 99214 OFFICE O/P EST MOD 30 MIN: CPT | Mod: S$PBB,,, | Performed by: NURSE PRACTITIONER

## 2017-12-08 PROCEDURE — 99214 OFFICE O/P EST MOD 30 MIN: CPT | Mod: PBBFAC | Performed by: NURSE PRACTITIONER

## 2017-12-08 PROCEDURE — 99999 PR PBB SHADOW E&M-EST. PATIENT-LVL IV: CPT | Mod: PBBFAC,,, | Performed by: NURSE PRACTITIONER

## 2017-12-08 NOTE — PATIENT INSTRUCTIONS
Duloxetine delayed-release capsules  What is this medicine?  DULOXETINE (doo LOX e teen) is used to treat depression, anxiety, and different types of chronic pain.  How should I use this medicine?  Take this medicine by mouth with a glass of water. Follow the directions on the prescription label. Do not cut, crush or chew this medicine. You can take this medicine with or without food. Take your medicine at regular intervals. Do not take your medicine more often than directed. Do not stop taking this medicine suddenly except upon the advice of your doctor. Stopping this medicine too quickly may cause serious side effects or your condition may worsen.  A special MedGuide will be given to you by the pharmacist with each prescription and refill. Be sure to read this information carefully each time.  Talk to your pediatrician regarding the use of this medicine in children. While this drug may be prescribed for children as young as 7 years of age for selected conditions, precautions do apply.  What side effects may I notice from receiving this medicine?  Side effects that you should report to your doctor or health care professional as soon as possible:  · allergic reactions like skin rash, itching or hives, swelling of the face, lips, or tongue  · changes in blood pressure  · confusion  · dark urine  · dizziness  · fast talking and excited feelings or actions that are out of control  · fast, irregular heartbeat  · fever  · general ill feeling or flu-like symptoms  · hallucination, loss of contact with reality  · light-colored stools  · loss of balance or coordination  · redness, blistering, peeling or loosening of the skin, including inside the mouth  · right upper belly pain  · seizures  · suicidal thoughts or other mood changes  · trouble concentrating  · trouble passing urine or change in the amount of urine  · unusual bleeding or bruising  · unusually weak or tired  · yellowing of the eyes or skin  Side effects that  usually do not require medical attention (report to your doctor or health care professional if they continue or are bothersome):  · blurred vision  · change in appetite  · change in sex drive or performance  · headache  · increased sweating  · nausea  What may interact with this medicine?  Do not take this medicine with any of the following medications:  · certain diet drugs like dexfenfluramine, fenfluramine  · desvenlafaxine  · linezolid  · MAOIs like Azilect, Carbex, Eldepryl, Marplan, Nardil, and Parnate  · methylene blue (intravenous)  · milnacipran  · thioridazine  · venlafaxine  This medicine may also interact with the following medications:  · alcohol  · aspirin and aspirin-like medicines  · certain antibiotics like ciprofloxacin and enoxacin  · certain medicines for blood pressure, heart disease, irregular heart beat  · certain medicines for depression, anxiety, or psychotic disturbances  · certain medicines for migraine headache like almotriptan, eletriptan, frovatriptan, naratriptan, rizatriptan, sumatriptan, zolmitriptan  · certain medicines that treat or prevent blood clots like warfarin, enoxaparin, and dalteparin  · cimetidine  · fentanyl  · lithium  · NSAIDS, medicines for pain and inflammation, like ibuprofen or naproxen  · phentermine  · procarbazine  · sibutramine  · Jolanta's wort  · theophylline  · tramadol  · tryptophan  What if I miss a dose?  If you miss a dose, take it as soon as you can. If it is almost time for your next dose, take only that dose. Do not take double or extra doses.  Where should I keep my medicine?  Keep out of the reach of children.  Store at room temperature between 20 and 25 degrees C (68 to 77 degrees F). Throw away any unused medicine after the expiration date.  What should I tell my health care provider before I take this medicine?  They need to know if you have any of these conditions:  · bipolar disorder or a family history of bipolar  disorder  · glaucoma  · kidney disease  · liver disease  · suicidal thoughts or a previous suicide attempt  · taken medicines called MAOIs like Carbex, Eldepryl, Marplan, Nardil, and Parnate within 14 days  · an unusual reaction to duloxetine, other medicines, foods, dyes, or preservatives  · pregnant or trying to get pregnant  · breast-feeding  What should I watch for while using this medicine?  Tell your doctor if your symptoms do not get better or if they get worse. Visit your doctor or health care professional for regular checks on your progress. Because it may take several weeks to see the full effects of this medicine, it is important to continue your treatment as prescribed by your doctor.  Patients and their families should watch out for new or worsening thoughts of suicide or depression. Also watch out for sudden changes in feelings such as feeling anxious, agitated, panicky, irritable, hostile, aggressive, impulsive, severely restless, overly excited and hyperactive, or not being able to sleep. If this happens, especially at the beginning of treatment or after a change in dose, call your health care professional.  You may get drowsy or dizzy. Do not drive, use machinery, or do anything that needs mental alertness until you know how this medicine affects you. Do not stand or sit up quickly, especially if you are an older patient. This reduces the risk of dizzy or fainting spells. Alcohol may interfere with the effect of this medicine. Avoid alcoholic drinks.  This medicine can cause an increase in blood pressure. This medicine can also cause a sudden drop in your blood pressure, which may make you feel faint and increase the chance of a fall. These effects are most common when you first start the medicine or when the dose is increased, or during use of other medicines that can cause a sudden drop in blood pressure. Check with your doctor for instructions on monitoring your blood pressure while taking this  medicine.  Your mouth may get dry. Chewing sugarless gum or sucking hard candy, and drinking plenty of water may help. Contact your doctor if the problem does not go away or is severe.  NOTE:This sheet is a summary. It may not cover all possible information. If you have questions about this medicine, talk to your doctor, pharmacist, or health care provider. Copyright© 2017 [employer]

## 2017-12-08 NOTE — PROGRESS NOTES
"CHIEF COMPLAINT: Type 2 Diabetes     HPI: Ms. Zeng is a 77 y.o. female who was diagnosed with Type 2 DM at age 43. She was started on insulin soon after diagnosis--NPH/R + Metformin. She was then changed to Novolin 70/30. (+) FH of DM. She has been seen in [facility] with JEANNE Escobar (last seen 12/2016). Of note, she has tried Trulicity, but experienced nausea and itching. She stopped the medication after having 3 injections. States she hasn't been the same since taking Trulicity. Adiuvia ordered in addition to 70/30 in December 2016, but she refused to take it. August 2016 C-peptide 2.8.    No changes at her last visit.     A1C has slightly trended down.     No logs to review; states BGs have been "outrageous" this week r/t her food intake, but reports with her improved appetite her readings are improving.     Reports AM readings are usually 150s-180s. Bedtime readings are 90s-lower 100s.     No frequent hypoglycemia.     Doesn't miss insulin doses.     Skips lunch at times.     PREVIOUS DIABETES MEDICATIONS TRIED  Lantus and Novolog pens  Novolin 70/30  Trulicity  Levemir    CURRENT DIABETIC MEDS: Novolog 70/30: 18 units bid    Last Podiatry Exam: n/a    REVIEW OF SYSTEMS  General: no weakness, fatigue, 8# wt gain.   Eyes: no double or blurred vision, eye pain, or redness; Last Eye Exam=September 2017.   Cardiovascular: no chest pain, palpitations, or murmurs, + chronic bilateral ankle edema.   Respiratory: no cough or dyspnea.   GI: (+) chronic heartburn (some effectiveness with use of Ranitidine); no current nausea or changes in bowel patterns; good appetite.   Skin: no rashes, no dryness, itching, or reactions at insulin injection sites.  Neuro: bilateral legs and toes with chronic numbness and tingling.   Endocrine: no polyuria, polydipsia, polyphagia, heat or cold intolerance.     Vital Signs  /72 (BP Location: Right arm, Patient Position: Sitting)   Pulse 68   Ht 5' (1.524 " m)   Wt 88.4 kg (194 lb 12.8 oz)   BMI 38.04 kg/m²     Hemoglobin A1C   Date Value Ref Range Status   11/30/2017 8.1 (H) 4.0 - 5.6 % Final     Comment:     According to ADA guidelines, hemoglobin A1c <7.0% represents  optimal control in non-pregnant diabetic patients. Different  metrics may apply to specific patient populations.   Standards of Medical Care in Diabetes-2016.  For the purpose of screening for the presence of diabetes:  <5.7%     Consistent with the absence of diabetes  5.7-6.4%  Consistent with increasing risk for diabetes   (prediabetes)  >or=6.5%  Consistent with diabetes  Currently, no consensus exists for use of hemoglobin A1c  for diagnosis of diabetes for children.  This Hemoglobin A1c assay has significant interference with fetal   hemoglobin   (HbF). The results are invalid for patients with abnormal amounts of   HbF,   including those with known Hereditary Persistence   of Fetal Hemoglobin. Heterozygous hemoglobin variants (HbAS, HbAC,   HbAD, HbAE, HbA2) do not significantly interfere with this assay;   however, presence of multiple variants in a sample may impact the %   interference.     08/25/2017 8.3 (H) 4.0 - 5.6 % Final     Comment:     According to ADA guidelines, hemoglobin A1c <7.0% represents  optimal control in non-pregnant diabetic patients. Different  metrics may apply to specific patient populations.   Standards of Medical Care in Diabetes-2016.  For the purpose of screening for the presence of diabetes:  <5.7%     Consistent with the absence of diabetes  5.7-6.4%  Consistent with increasing risk for diabetes   (prediabetes)  >or=6.5%  Consistent with diabetes  Currently, no consensus exists for use of hemoglobin A1c  for diagnosis of diabetes for children.  This Hemoglobin A1c assay has significant interference with fetal   hemoglobin   (HbF). The results are invalid for patients with abnormal amounts of   HbF,   including those with known Hereditary Persistence   of Fetal  Hemoglobin. Heterozygous hemoglobin variants (HbAS, HbAC,   HbAD, HbAE, HbA2) do not significantly interfere with this assay;   however, presence of multiple variants in a sample may impact the %   interference.     05/30/2017 8.3 (H) 4.5 - 6.2 % Final     Comment:     According to ADA guidelines, hemoglobin A1C <7.0% represents  optimal control in non-pregnant diabetic patients.  Different  metrics may apply to specific populations.   Standards of Medical Care in Diabetes - 2016.  For the purpose of screening for the presence of diabetes:  <5.7%     Consistent with the absence of diabetes  5.7-6.4%  Consistent with increasing risk for diabetes   (prediabetes)  >or=6.5%  Consistent with diabetes  Currently no consensus exists for use of hemoglobin A1C  for diagnosis of diabetes for children.          Chemistry        Component Value Date/Time     11/30/2017 0748    K 4.5 11/30/2017 0748     11/30/2017 0748    CO2 22 (L) 11/30/2017 0748    BUN 18 11/30/2017 0748    CREATININE 1.1 11/30/2017 0748     (H) 11/30/2017 0748        Component Value Date/Time    CALCIUM 9.7 11/30/2017 0748    ALKPHOS 75 11/30/2017 0748    AST 24 11/30/2017 0748    ALT 12 11/30/2017 0748    BILITOT 0.5 11/30/2017 0748           Lab Results   Component Value Date    TSH 4.221 (H) 08/25/2017      Lab Results   Component Value Date    CHOL 115 (L) 11/30/2017    CHOL 109 (L) 12/05/2016    CHOL 115 (L) 11/28/2016     Lab Results   Component Value Date    HDL 51 11/30/2017    HDL 37 (L) 12/05/2016    HDL 40 11/28/2016     Lab Results   Component Value Date    LDLCALC 53.0 (L) 11/30/2017    LDLCALC 58.8 (L) 12/05/2016    LDLCALC 61.4 (L) 11/28/2016     Lab Results   Component Value Date    TRIG 55 11/30/2017    TRIG 66 12/05/2016    TRIG 68 11/28/2016     Lab Results   Component Value Date    CHOLHDL 44.3 11/30/2017    CHOLHDL 33.9 12/05/2016    CHOLHDL 34.8 11/28/2016       Lab Results   Component Value Date    MICALBCREAT 37.4  (H) 05/30/2017     Assessment/Plan    Type 2 diabetes with CKD stage 3  DM uncontrolled.   Continue current doses for now as her higher readings are related to dietary indiscretion  Logs to next visit--continue to monitor 4x/day  Instructed on where to inject insulin  Discussed A1C goal of 7-7.5%    Diabetic peripheral neuropathy associated with T2DM  Continue to improve BG readings  Was taken off of Gabapentin r/t ineffective therapy with PCP  Discussed Cymbalta, but pt refuses to take medication at this time over fear of side effects  Literature given on Cymbalta to read    CAD  Improve BG.   Avoid hypoglycemia.   On ASA, statin.   Managed by Cardiology, Dr. Aguilar. Next appointment 12/17    HTN  Stable.   Continue current medications     HLP  Continue Crestor.     Obesity, class II  Body mass index is 38.04 kg/m².   Can worsen insulin resistance.   Slight weight gain since last visit.   Continue to monitor diet.     FOLLOW UP  Return in about 4 months (around 4/8/2018).

## 2017-12-12 ENCOUNTER — OFFICE VISIT (OUTPATIENT)
Dept: CARDIOLOGY | Facility: CLINIC | Age: 77
End: 2017-12-12
Payer: MEDICARE

## 2017-12-12 VITALS
HEART RATE: 56 BPM | HEIGHT: 60 IN | OXYGEN SATURATION: 98 % | DIASTOLIC BLOOD PRESSURE: 66 MMHG | WEIGHT: 194 LBS | SYSTOLIC BLOOD PRESSURE: 122 MMHG | BODY MASS INDEX: 38.09 KG/M2

## 2017-12-12 DIAGNOSIS — I25.10 CORONARY ARTERY DISEASE INVOLVING NATIVE CORONARY ARTERY OF NATIVE HEART WITHOUT ANGINA PECTORIS: Primary | Chronic | ICD-10-CM

## 2017-12-12 DIAGNOSIS — R06.02 SHORTNESS OF BREATH ON EXERTION: ICD-10-CM

## 2017-12-12 DIAGNOSIS — Z95.1 S/P CABG (CORONARY ARTERY BYPASS GRAFT): Chronic | ICD-10-CM

## 2017-12-12 DIAGNOSIS — E78.5 HYPERLIPIDEMIA, UNSPECIFIED HYPERLIPIDEMIA TYPE: ICD-10-CM

## 2017-12-12 DIAGNOSIS — E11.42 TYPE 2 DIABETES MELLITUS WITH DIABETIC POLYNEUROPATHY, WITH LONG-TERM CURRENT USE OF INSULIN: Chronic | ICD-10-CM

## 2017-12-12 DIAGNOSIS — K80.20 CALCULUS OF GALLBLADDER WITHOUT CHOLECYSTITIS WITHOUT OBSTRUCTION: ICD-10-CM

## 2017-12-12 DIAGNOSIS — E66.9 OBESITY, CLASS II, BMI 35-39.9: Chronic | ICD-10-CM

## 2017-12-12 DIAGNOSIS — Z79.4 TYPE 2 DIABETES MELLITUS WITH DIABETIC POLYNEUROPATHY, WITH LONG-TERM CURRENT USE OF INSULIN: Chronic | ICD-10-CM

## 2017-12-12 DIAGNOSIS — Z98.61 POST PTCA: Chronic | ICD-10-CM

## 2017-12-12 DIAGNOSIS — I10 ESSENTIAL HYPERTENSION: Chronic | ICD-10-CM

## 2017-12-12 PROCEDURE — 99214 OFFICE O/P EST MOD 30 MIN: CPT | Mod: S$PBB,,, | Performed by: INTERNAL MEDICINE

## 2017-12-12 PROCEDURE — 99999 PR PBB SHADOW E&M-EST. PATIENT-LVL IV: CPT | Mod: PBBFAC,,, | Performed by: INTERNAL MEDICINE

## 2017-12-12 PROCEDURE — 99214 OFFICE O/P EST MOD 30 MIN: CPT | Mod: PBBFAC | Performed by: INTERNAL MEDICINE

## 2017-12-12 NOTE — PROGRESS NOTES
"Subjective:   Patient ID:  Eliu Paz is a 77 y.o. female who presents for follow-up of Coronary Artery Disease (Annual Exam) and Shortness of Breath      HPI:   Eliu Paz presents for follow up of coronary artery disease having both CABG and intervention in the past. She has had problems with swallowing evaluated by Gi but also is having RUQ burning discomfort radiating to her right AAL when she eats fried or spicy foods and associated with " gas ". Has cholelithiasis documented previously. Has COATES that is chronic and mild edema. Is not active. No exertional chest pain. Eliu Paz denies palpitations, presyncope , or syncope. Eliu Paz has hypertension with good control.Eliu Paz has dyslipidemia At goal  Review of Systems   Constitution: Negative for weakness, malaise/fatigue, weight gain and weight loss.   Eyes: Negative for blurred vision.   Cardiovascular: Positive for dyspnea on exertion and leg swelling. Negative for chest pain, claudication, cyanosis, irregular heartbeat, near-syncope, orthopnea, palpitations, paroxysmal nocturnal dyspnea and syncope.   Respiratory: Negative for cough, shortness of breath and wheezing.    Musculoskeletal: Positive for arthritis and joint pain. Negative for falls and myalgias.   Gastrointestinal: Positive for bloating, abdominal pain and dysphagia. Negative for heartburn, nausea and vomiting.   Genitourinary: Negative for nocturia.   Neurological: Positive for paresthesias. Negative for brief paralysis, dizziness, focal weakness, headaches and numbness.   Psychiatric/Behavioral: Negative for altered mental status.       Current Outpatient Prescriptions   Medication Sig    amlodipine (NORVASC) 10 MG tablet Take 1 tablet (10 mg total) by mouth once daily.    aspirin (ECOTRIN) 81 MG EC tablet Take 1 tablet (81 mg total) by mouth once daily.    carvedilol (COREG) 25 MG tablet Take 1 tablet (25 mg total) by mouth 2 (two) times daily.    " clobetasol 0.05% (TEMOVATE) 0.05 % Oint Apply small amount to vulva area twice a day for 4 weeks then once a day for 4 weeks then once a day on Mondays and Thursdays    fluocinonide (LIDEX) 0.05 % external solution Apply topically 2 (two) times daily.    fluticasone (FLONASE) 50 mcg/actuation nasal spray SPRAY TWICE IN EACH NOSTRIL EVERY DAY    furosemide (LASIX) 40 MG tablet Take 1 tablet (40 mg total) by mouth once daily.    GENERLAC 10 gram/15 mL solution Take 30 mLs (20 g total) by mouth daily as needed (constipation).    insulin asp prt-insulin aspart, NOVOLOG 70/30, (NOVOLOG MIX 70-30) 100 unit/mL (70-30) Soln Inject 18 Units into the skin 2 (two) times daily before meals.    insulin syringe-needle U-100 (BD INSULIN SYRINGE ULTRA-FINE) 0.5 mL 31 gauge x 5/16 Syrg Use to inject twice daily with insulin injections    isosorbide mononitrate (IMDUR) 30 MG 24 hr tablet TAKE 1 TABLET (30 MG TOTAL) BY MOUTH ONCE DAILY.    ketoconazole (NIZORAL) 2 % cream Apply topically once daily.    ketoconazole (NIZORAL) 2 % shampoo Apply topically twice a week.    latanoprost 0.005 % ophthalmic solution Place 1 drop into both eyes every evening.    losartan (COZAAR) 100 MG tablet Take 1 tablet (100 mg total) by mouth once daily.    nitroGLYCERIN 0.4 MG/DOSE TL SPRY (NITROLINGUAL) 400 mcg/spray spray PLACE 2 SPRAYS UNDER THE TONGUE EVERY 5 MINUTES AS NEEDED FOR CHEST PAIN    ONETOUCH VERIO Strp USE TO TEST BLOOD SUGAR 4 TIMES A DAY    polyethylene glycol (GLYCOLAX) 17 gram PwPk TAKE 17 G BY MOUTH ONCE DAILY.    ranitidine (ZANTAC) 150 MG tablet Take 1 tablet (150 mg total) by mouth 2 (two) times daily.    rosuvastatin (CRESTOR) 40 MG Tab TAKE 1 TABLET BY MOUTH ONCE DAILY    spironolactone (ALDACTONE) 50 MG tablet TAKE 1 TABLET (50 MG TOTAL) BY MOUTH ONCE DAILY.    tramadol (ULTRAM) 50 mg tablet Take 1 tablet (50 mg total) by mouth 2 (two) times daily as needed.    triamcinolone acetonide 0.1% (KENALOG) 0.1 %  cream Apply topically 2 (two) times daily.    triamcinolone acetonide 0.1% (KENALOG) 0.1 % ointment Apply small amount to itchy areas on body BID PRN.  Do not use on face or in groin.    baclofen (LIORESAL) 10 MG tablet Take 1 tablet (10 mg total) by mouth every evening.    ONETOUCH DELICA LANCETS 30 gauge Misc 1 lancet by Misc.(Non-Drug; Combo Route) route 4 (four) times daily.     No current facility-administered medications for this visit.      Objective:   Physical Exam   Constitutional: She is oriented to person, place, and time. She appears well-developed. No distress.   /66 (BP Location: Left arm, Patient Position: Sitting, BP Method: Medium (Automatic))   Pulse (!) 56   Ht 5' (1.524 m)   Wt 88 kg (194 lb 0.1 oz)   SpO2 98%   BMI 37.89 kg/m²    HENT:   Head: Normocephalic.   Eyes: Conjunctivae are normal. Pupils are equal, round, and reactive to light. No scleral icterus.   Neck: Neck supple. No JVD present. No thyromegaly present.   Cardiovascular: Normal rate, regular rhythm and intact distal pulses.  PMI is not displaced.  Exam reveals no gallop and no friction rub.    Murmur heard.   Midsystolic murmur is present with a grade of 2/6  at the upper left sternal border, lower left sternal border Trace edema on the left  ? mild JVD  Pulmonary/Chest: Effort normal and breath sounds normal. No respiratory distress. She has no wheezes. She has no rales.   Median sternotomy scar.   Abdominal: Soft. She exhibits no distension. There is no splenomegaly or hepatomegaly. There is no tenderness.   Musculoskeletal: She exhibits no edema or tenderness.   Gait normal   Neurological: She is alert and oriented to person, place, and time.   Skin: Skin is warm and dry. She is not diaphoretic.   Psychiatric: She has a normal mood and affect. Her behavior is normal.       Lab Results   Component Value Date     11/30/2017    K 4.5 11/30/2017     11/30/2017    CO2 22 (L) 11/30/2017    BUN 18 11/30/2017     CREATININE 1.1 11/30/2017     (H) 11/30/2017    HGBA1C 8.1 (H) 11/30/2017    MG 2.0 06/24/2015    AST 24 11/30/2017    ALT 12 11/30/2017    ALBUMIN 3.8 11/30/2017    PROT 7.9 11/30/2017    BILITOT 0.5 11/30/2017    WBC 7.60 10/20/2016    HGB 13.6 10/20/2016    HCT 41.1 10/20/2016    MCV 85 10/20/2016     10/20/2016    TSH 4.221 (H) 08/25/2017    CHOL 115 (L) 11/30/2017    HDL 51 11/30/2017    LDLCALC 53.0 (L) 11/30/2017    TRIG 55 11/30/2017       Assessment:     1. Coronary artery disease involving native coronary artery of native heart without angina pectoris : Stable   2. S/P CABG (coronary artery bypass graft)    3. Post PTCA    4. Essential hypertension : Good control.   5. Hyperlipidemia, unspecified hyperlipidemia type : At goal   6. Obesity, Class II, BMI 35-39.9 : Stable   7. Type 2 diabetes mellitus with diabetic polyneuropathy, with long-term current use of insulin    8. Calculus of gallbladder without cholecystitis without obstruction : Appears symptomatic clinically   9. Shortness of breath on exertion : Doubt entirely cardiac in origin       Plan:     Eliu Steinberg was seen today for coronary artery disease and shortness of breath.    Diagnoses and all orders for this visit:    Coronary artery disease involving native coronary artery of native heart without angina pectoris    S/P CABG (coronary artery bypass graft)    Post PTCA    Essential hypertension  Continue current regimen    Hyperlipidemia, unspecified hyperlipidemia type  Continue current regimen    Obesity, Class II, BMI 35-39.9    Type 2 diabetes mellitus with diabetic polyneuropathy, with long-term current use of insulin    Calculus of gallbladder without cholecystitis without obstruction  -     Ambulatory consult to Gastroenterology    Shortness of breath on exertion  -     2D Echo w/ Color Flow Doppler; Future

## 2017-12-13 ENCOUNTER — HOSPITAL ENCOUNTER (OUTPATIENT)
Dept: CARDIOLOGY | Facility: CLINIC | Age: 77
Discharge: HOME | End: 2017-12-13
Attending: INTERNAL MEDICINE
Payer: MEDICARE

## 2017-12-13 DIAGNOSIS — R06.02 SHORTNESS OF BREATH ON EXERTION: ICD-10-CM

## 2017-12-13 LAB
ESTIMATED PA SYSTOLIC PRESSURE: 28.81
MITRAL VALVE MOBILITY: NORMAL
RETIRED EF AND QEF - SEE NOTES: 60 (ref 55–65)
TRICUSPID VALVE REGURGITATION: NORMAL

## 2017-12-13 PROCEDURE — 93306 TTE W/DOPPLER COMPLETE: CPT | Mod: PBBFAC | Performed by: INTERNAL MEDICINE

## 2017-12-14 ENCOUNTER — TELEPHONE (OUTPATIENT)
Dept: CARDIOLOGY | Facility: CLINIC | Age: 77
End: 2017-12-14

## 2017-12-14 NOTE — TELEPHONE ENCOUNTER
----- Message from Manuel Aguilar MD sent at 12/13/2017  1:03 PM CST -----  Please inform the patient that her echo has no significant abnormalities with her heart valves and she has  good heart squeezing function .

## 2017-12-20 ENCOUNTER — OFFICE VISIT (OUTPATIENT)
Dept: GASTROENTEROLOGY | Facility: CLINIC | Age: 77
End: 2017-12-20
Payer: MEDICARE

## 2017-12-20 VITALS
BODY MASS INDEX: 37.78 KG/M2 | SYSTOLIC BLOOD PRESSURE: 129 MMHG | WEIGHT: 192.44 LBS | HEART RATE: 59 BPM | DIASTOLIC BLOOD PRESSURE: 58 MMHG | HEIGHT: 60 IN

## 2017-12-20 DIAGNOSIS — D36.9 TUBULOVILLOUS ADENOMA: ICD-10-CM

## 2017-12-20 DIAGNOSIS — K80.20 GALLSTONES: Primary | ICD-10-CM

## 2017-12-20 DIAGNOSIS — K80.50 BILIARY COLIC: ICD-10-CM

## 2017-12-20 DIAGNOSIS — K21.9 GASTROESOPHAGEAL REFLUX DISEASE, ESOPHAGITIS PRESENCE NOT SPECIFIED: ICD-10-CM

## 2017-12-20 PROCEDURE — 99999 PR PBB SHADOW E&M-EST. PATIENT-LVL III: CPT | Mod: PBBFAC,GC,, | Performed by: INTERNAL MEDICINE

## 2017-12-20 PROCEDURE — 99214 OFFICE O/P EST MOD 30 MIN: CPT | Mod: S$PBB,GC,, | Performed by: INTERNAL MEDICINE

## 2017-12-20 PROCEDURE — 99213 OFFICE O/P EST LOW 20 MIN: CPT | Mod: PBBFAC | Performed by: INTERNAL MEDICINE

## 2017-12-20 RX ORDER — URSODIOL 300 MG/1
300 CAPSULE ORAL 2 TIMES DAILY
Qty: 60 CAPSULE | Refills: 11 | Status: SHIPPED | OUTPATIENT
Start: 2017-12-20 | End: 2018-09-04

## 2017-12-20 NOTE — PROGRESS NOTES
GASTROENTEROLOGY CLINIC NOTE    Reason for visit: The primary encounter diagnosis was Gallstones. A diagnosis of Biliary colic was also pertinent to this visit.  Referring provider/PCP: Ryan Carlson MD    HPI:  Eliu Paz is a 77 y.o. female here for evaluation of RUQ discomfort. She reports having burning sensation starting epigastrium and radiating to RUQ. Worse after meals and fatty food. Does not improve with antiacids. She has also GERD and burning sensation of throat along with belching and bloating but reports RUQ discomfort is different than this. Had RUQ US in 2014 showed gallstones. Recent EGD with HP negative. Colonoscopy in 2015 with TVA. Continues to have intermittent dysphagia but reports this is not bothersome.     Prior GI studies:  EGD:2017 negative HP  Colon:2015 - TVA    Review of Systems   Constitutional: Negative for chills, fever and weight loss.   HENT: Negative for sore throat.    Eyes: Negative for blurred vision and double vision.   Respiratory: Negative for cough, hemoptysis and stridor.    Cardiovascular: Negative for chest pain and palpitations.   Gastrointestinal: Positive for abdominal pain and heartburn. Negative for blood in stool, constipation, diarrhea, melena, nausea and vomiting.   Genitourinary: Negative for dysuria and flank pain.   Musculoskeletal: Negative for joint pain.   Skin: Negative for rash.   Neurological: Negative for dizziness and seizures.   Endo/Heme/Allergies: Negative for polydipsia.   Psychiatric/Behavioral: Negative for depression and suicidal ideas.       Past Medical History: has a past medical history of Allergy; Arthritis; Cataract; CKD (chronic kidney disease) stage 3, GFR 30-59 ml/min; Coronary artery disease; Diabetes mellitus type II; Diabetic neuropathy; GERD (gastroesophageal reflux disease); Glaucoma; Hyperlipidemia; and Hypertension.    Past Surgical History: has a past surgical history that includes Carpal tunnel release; Tubal  ligation (1970s); Colonoscopy (N/A, 11/2/2015); Cardiac catheterization (03/2010); and Coronary artery bypass graft (08/13/1994).    Family History:family history includes Blindness in her brother; Diabetes in her brother, father, maternal grandmother, paternal aunt, and paternal uncle.    Allergies:   Review of patient's allergies indicates:   Allergen Reactions    Pcn [penicillins] Swelling    Trulicity [dulaglutide] Nausea Only and Rash       Social History: reports that she quit smoking about 54 years ago. She quit after 2.00 years of use. She has never used smokeless tobacco. She reports that she does not drink alcohol or use drugs.    Home medications:   Current Outpatient Prescriptions on File Prior to Visit   Medication Sig Dispense Refill    amlodipine (NORVASC) 10 MG tablet Take 1 tablet (10 mg total) by mouth once daily. 90 tablet 11    aspirin (ECOTRIN) 81 MG EC tablet Take 1 tablet (81 mg total) by mouth once daily.  0    carvedilol (COREG) 25 MG tablet Take 1 tablet (25 mg total) by mouth 2 (two) times daily. 180 tablet 11    clobetasol 0.05% (TEMOVATE) 0.05 % Oint Apply small amount to vulva area twice a day for 4 weeks then once a day for 4 weeks then once a day on Mondays and Thursdays 60 g 1    fluocinonide (LIDEX) 0.05 % external solution Apply topically 2 (two) times daily. 60 mL 1    fluticasone (FLONASE) 50 mcg/actuation nasal spray SPRAY TWICE IN EACH NOSTRIL EVERY DAY 16 g 11    furosemide (LASIX) 40 MG tablet Take 1 tablet (40 mg total) by mouth once daily. 90 tablet 3    GENERLAC 10 gram/15 mL solution Take 30 mLs (20 g total) by mouth daily as needed (constipation). 946 mL 3    insulin asp prt-insulin aspart, NOVOLOG 70/30, (NOVOLOG MIX 70-30) 100 unit/mL (70-30) Soln Inject 18 Units into the skin 2 (two) times daily before meals. 45 mL 3    insulin syringe-needle U-100 (BD INSULIN SYRINGE ULTRA-FINE) 0.5 mL 31 gauge x 5/16 Syrg Use to inject twice daily with insulin injections  100 each 11    isosorbide mononitrate (IMDUR) 30 MG 24 hr tablet TAKE 1 TABLET (30 MG TOTAL) BY MOUTH ONCE DAILY. 90 tablet 2    ketoconazole (NIZORAL) 2 % cream Apply topically once daily. 30 g 1    ketoconazole (NIZORAL) 2 % shampoo Apply topically twice a week. 120 mL 5    latanoprost 0.005 % ophthalmic solution Place 1 drop into both eyes every evening. 3 Bottle 4    losartan (COZAAR) 100 MG tablet Take 1 tablet (100 mg total) by mouth once daily. 90 tablet 3    nitroGLYCERIN 0.4 MG/DOSE TL SPRY (NITROLINGUAL) 400 mcg/spray spray PLACE 2 SPRAYS UNDER THE TONGUE EVERY 5 MINUTES AS NEEDED FOR CHEST PAIN 36 g 0    ONETOUCH VERIO Strp USE TO TEST BLOOD SUGAR 4 TIMES A  strip 11    polyethylene glycol (GLYCOLAX) 17 gram PwPk TAKE 17 G BY MOUTH ONCE DAILY. 30 packet 11    ranitidine (ZANTAC) 150 MG tablet Take 1 tablet (150 mg total) by mouth 2 (two) times daily. 180 tablet 11    rosuvastatin (CRESTOR) 40 MG Tab TAKE 1 TABLET BY MOUTH ONCE DAILY 90 tablet 3    spironolactone (ALDACTONE) 50 MG tablet TAKE 1 TABLET (50 MG TOTAL) BY MOUTH ONCE DAILY. 90 tablet 11    tramadol (ULTRAM) 50 mg tablet Take 1 tablet (50 mg total) by mouth 2 (two) times daily as needed. 60 tablet 2    triamcinolone acetonide 0.1% (KENALOG) 0.1 % cream Apply topically 2 (two) times daily. 30 g 0    triamcinolone acetonide 0.1% (KENALOG) 0.1 % ointment Apply small amount to itchy areas on body BID PRN.  Do not use on face or in groin.      baclofen (LIORESAL) 10 MG tablet Take 1 tablet (10 mg total) by mouth every evening. 90 tablet 3    ONETOUCH DELICA LANCETS 30 gauge Misc 1 lancet by Misc.(Non-Drug; Combo Route) route 4 (four) times daily. 150 each 11     No current facility-administered medications on file prior to visit.        Vital signs:  BP (!) 129/58   Pulse (!) 59   Ht 5' (1.524 m)   Wt 87.3 kg (192 lb 7.4 oz)   BMI 37.59 kg/m²     Physical Exam   Constitutional: She is oriented to person, place, and time.  She appears well-developed and well-nourished.   Eyes: No scleral icterus.   Neck: Neck supple.   Cardiovascular: Normal rate.  Exam reveals no gallop.    Sternotomy scar   Pulmonary/Chest: Effort normal. No respiratory distress.   Abdominal: Soft. Bowel sounds are normal. She exhibits no distension and no mass. There is no tenderness. There is no rebound and no guarding. No hernia.   Musculoskeletal: She exhibits no edema.   Neurological: She is alert and oriented to person, place, and time.   Skin: Skin is warm.   Psychiatric: She has a normal mood and affect.       Routine labs:  Lab Results   Component Value Date    WBC 7.60 10/20/2016    HGB 13.6 10/20/2016    HCT 41.1 10/20/2016    MCV 85 10/20/2016     10/20/2016     Lab Results   Component Value Date    INR 1.0 03/17/2015     No results found for: IRON, FERRITIN, TIBC, FESATURATED  Lab Results   Component Value Date     11/30/2017    K 4.5 11/30/2017     11/30/2017    CO2 22 (L) 11/30/2017    BUN 18 11/30/2017    CREATININE 1.1 11/30/2017     Lab Results   Component Value Date    ALBUMIN 3.8 11/30/2017    ALT 12 11/30/2017    AST 24 11/30/2017    ALKPHOS 75 11/30/2017    BILITOT 0.5 11/30/2017     No results found for: GLUCOSE    Imaging:  As noted in HPI.    Assessment:  Eliu Paz is a 77 y.o. female with     1. Gallstones    2. Biliary colic    3. Tubulovillous adenoma    4. Gastroesophageal reflux disease, esophagitis presence not specified      She does have typical sx of biliary colic, including postprandial, worse with fatty meals, RUQ discomfort and US with gallstones. Negative EGD with H.pylori is reassuring.     Plan:      US Abdomen Limited (GALLBLADDER)    ursodiol (ACTIGALL) 300 mg capsule  Colonoscopy in 2018 for surveillance  Continue H2RB for GERD     We will repeat the RUQ US and try medical treatment with actigall to see if she will have any improvement. If not, we will discuss the surgical options with her.      RTC in 3 months with me.     Marianna Starkey MD  Gastroenterology & Hepatology Fellow  Pager: [phone]

## 2017-12-26 ENCOUNTER — HOSPITAL ENCOUNTER (OUTPATIENT)
Dept: RADIOLOGY | Facility: HOSPITAL | Age: 77
Discharge: HOME | End: 2017-12-26
Attending: INTERNAL MEDICINE
Payer: MEDICARE

## 2017-12-26 PROCEDURE — 76705 ECHO EXAM OF ABDOMEN: CPT | Mod: TC

## 2017-12-26 PROCEDURE — 76705 ECHO EXAM OF ABDOMEN: CPT | Mod: 26,GC,, | Performed by: RADIOLOGY

## 2018-01-03 ENCOUNTER — TELEPHONE (OUTPATIENT)
Dept: GASTROENTEROLOGY | Facility: CLINIC | Age: 78
End: 2018-01-03

## 2018-01-03 NOTE — TELEPHONE ENCOUNTER
----- Message from Marianna Starkey MD sent at 12/26/2017 12:33 PM CST -----  Erica, please let her know that US showed gallstones only. We will try medical therapy as discussed and see if there is any improvement of her abdominal pain. I will see her in clinic as scheduled.

## 2018-01-15 ENCOUNTER — OFFICE VISIT (OUTPATIENT)
Dept: OPHTHALMOLOGY | Facility: CLINIC | Age: 78
End: 2018-01-15
Payer: MEDICARE

## 2018-01-15 DIAGNOSIS — H35.363 MACULAR DRUSEN, BILATERAL: ICD-10-CM

## 2018-01-15 DIAGNOSIS — H40.1133 PRIMARY OPEN-ANGLE GLAUCOMA, BILATERAL, SEVERE STAGE: Primary | ICD-10-CM

## 2018-01-15 DIAGNOSIS — H35.3131 EARLY DRY STAGE NONEXUDATIVE AGE-RELATED MACULAR DEGENERATION OF BOTH EYES: ICD-10-CM

## 2018-01-15 DIAGNOSIS — E11.9 TYPE 2 DIABETES MELLITUS WITHOUT OPHTHALMIC MANIFESTATIONS: ICD-10-CM

## 2018-01-15 DIAGNOSIS — H25.13 SENILE NUCLEAR SCLEROSIS, BILATERAL: ICD-10-CM

## 2018-01-15 DIAGNOSIS — H25.013 CORTICAL AGE-RELATED CATARACT, BILATERAL: ICD-10-CM

## 2018-01-15 PROCEDURE — 92012 INTRM OPH EXAM EST PATIENT: CPT | Mod: S$PBB,,, | Performed by: OPHTHALMOLOGY

## 2018-01-15 PROCEDURE — 99211 OFF/OP EST MAY X REQ PHY/QHP: CPT | Mod: PBBFAC | Performed by: OPHTHALMOLOGY

## 2018-01-15 PROCEDURE — 99999 PR PBB SHADOW E&M-EST. PATIENT-LVL I: CPT | Mod: PBBFAC,,, | Performed by: OPHTHALMOLOGY

## 2018-01-15 RX ORDER — BACLOFEN 10 MG/1
10 TABLET ORAL NIGHTLY
Qty: 90 TABLET | Refills: 0 | Status: SHIPPED | OUTPATIENT
Start: 2018-01-15 | End: 2018-04-23 | Stop reason: SDUPTHER

## 2018-01-15 NOTE — PROGRESS NOTES
HPI     DLS: 9/11/17    Pt here for 4 month check;   Pt c/o droopy eyelid OS. Pt c/o a white substance above both eyelids that   happens everyday and pt is concerned about it.     Meds: Latanoprost qhs ou    1. Primary open angle glaucoma, bilateral, severe stage   2. Macular degeneration, bilateral   3. Macular drusen, bilateral   4. Senile nuclear sclerosis, bilateral   5. Cortical age-related cataract, bilateral   6. Type 2 diabetes mellitus without ophthalmic manifestations    Last edited by Kimberly Diaz on 1/15/2018  9:51 AM. (History)            Assessment /Plan     For exam results, see Encounter Report.    Primary open-angle glaucoma, bilateral, severe stage    Early dry stage nonexudative age-related macular degeneration of both eyes    Macular drusen, bilateral    Senile nuclear sclerosis, bilateral    Cortical age-related cataract, bilateral    Type 2 diabetes mellitus without ophthalmic manifestations         Glaucoma (type and duration)    POAG severe   First HVF   2013   First photos   2017   Treatment / Drops started   Latanoprost, travatan           Family history    ?        Glaucoma meds    latanoprost        H/O adverse rxn to glaucoma drops    none        LASERS    none        GLAUCOMA SURGERIES    none        OTHER EYE SURGERIES    none        CDR    0.9/0.9        Tbase    16-20/16-20          Tmax    20/20            Ttarget    ?             HVF    4 test 2013 to  2017 - SAD, INS od // gen depression, SAD, IAD os        Gonio    3+ ou        CCT    544/525        OCT    4 test 2013 to 2017 - RNFL - decr TS, T, globally od // TS, T, TI, N, and globally os        HRT    none        Disc photos    2017    - Ttoday    14/14  - Test done today    Follow up, CCT    POAG severe  Severe RNFL thinning with progressive field loss OU, Disc photos today  - on latanoprost qhs OU, continue  - will get HVF / DFE / OCT - 4 months       NS cataract OU - hold on CE - pt is not having any problems with vision  presently     Macular Drusen OU, significant, photos today  Consider Retinal eval prior to CE     F/u 4 months with HVF / DFE / OCT

## 2018-01-25 DIAGNOSIS — L29.9 PRURITIC CONDITION: ICD-10-CM

## 2018-01-26 RX ORDER — TRIAMCINOLONE ACETONIDE 1 MG/G
CREAM TOPICAL 2 TIMES DAILY
Qty: 30 G | Refills: 0 | Status: SHIPPED | OUTPATIENT
Start: 2018-01-26 | End: 2018-06-01 | Stop reason: SDUPTHER

## 2018-02-28 ENCOUNTER — OFFICE VISIT (OUTPATIENT)
Dept: GASTROENTEROLOGY | Facility: CLINIC | Age: 78
End: 2018-02-28
Payer: MEDICARE

## 2018-02-28 VITALS
HEIGHT: 60 IN | SYSTOLIC BLOOD PRESSURE: 132 MMHG | BODY MASS INDEX: 37.27 KG/M2 | HEART RATE: 60 BPM | WEIGHT: 189.81 LBS | DIASTOLIC BLOOD PRESSURE: 70 MMHG

## 2018-02-28 DIAGNOSIS — K21.00 GASTROESOPHAGEAL REFLUX DISEASE WITH ESOPHAGITIS: Primary | ICD-10-CM

## 2018-02-28 DIAGNOSIS — K59.09 OTHER CONSTIPATION: ICD-10-CM

## 2018-02-28 DIAGNOSIS — K80.20 CALCULUS OF GALLBLADDER WITHOUT CHOLECYSTITIS WITHOUT OBSTRUCTION: ICD-10-CM

## 2018-02-28 DIAGNOSIS — D36.9 TUBULOVILLOUS ADENOMA: ICD-10-CM

## 2018-02-28 PROCEDURE — 99214 OFFICE O/P EST MOD 30 MIN: CPT | Mod: S$PBB,GC,, | Performed by: INTERNAL MEDICINE

## 2018-02-28 PROCEDURE — 99215 OFFICE O/P EST HI 40 MIN: CPT | Mod: PBBFAC | Performed by: INTERNAL MEDICINE

## 2018-02-28 PROCEDURE — 99999 PR PBB SHADOW E&M-EST. PATIENT-LVL V: CPT | Mod: PBBFAC,GC,, | Performed by: INTERNAL MEDICINE

## 2018-02-28 NOTE — PROGRESS NOTES
I have discussed the case with the fellow, and I agree with the note and plan as outlined above.

## 2018-02-28 NOTE — PROGRESS NOTES
GASTROENTEROLOGY CLINIC NOTE    Reason for visit: The primary encounter diagnosis was Gastroesophageal reflux disease with esophagitis. A diagnosis of Calculus of gallbladder without cholecystitis without obstruction was also pertinent to this visit.  Referring provider/PCP: Ryan Carlson MD    HPI:  Eliu Paz is a 77 y.o. female here for follow up of RUQ discomfort, GERD and constipation. During her last visit, she reported having burning sensation starting epigastrium and radiating to RUQ. Worse after meals and fatty food. Does not improve with antiacids. US done showed gallstones without cholecystitis. She did not want to have cholecystectomy and we started her Actigall. She reports taking it intermittently without much benefit. Now, she also reports epigastric burning and heartburn. Does not take PPI but intermittently takes Zantac. Reports she does not like taking medications. Her previous workup with EGD with HP negative. Colonoscopy in 2015 with TVA.   Reports some straining and constipation. Improves with Miralax.     Prior GI studies:  EGD:2017 negative HP  Colon:2015 - TVA    Review of Systems   Constitutional: Negative for chills, fever and weight loss.   HENT: Negative for sore throat.    Eyes: Negative for blurred vision and double vision.   Respiratory: Negative for cough, hemoptysis and stridor.    Cardiovascular: Negative for chest pain and palpitations.   Gastrointestinal: Positive for abdominal pain and heartburn. Negative for blood in stool, constipation, diarrhea, melena, nausea and vomiting.   Genitourinary: Negative for dysuria and flank pain.   Musculoskeletal: Negative for joint pain.   Skin: Negative for rash.   Neurological: Negative for dizziness and seizures.   Endo/Heme/Allergies: Negative for polydipsia.   Psychiatric/Behavioral: Negative for depression and suicidal ideas.       Past Medical History: has a past medical history of Allergy; Arthritis; Cataract; CKD (chronic  kidney disease) stage 3, GFR 30-59 ml/min; Coronary artery disease; Diabetes mellitus type II; Diabetic neuropathy; GERD (gastroesophageal reflux disease); Glaucoma; Hyperlipidemia; and Hypertension.    Past Surgical History: has a past surgical history that includes Carpal tunnel release; Tubal ligation (1970s); Colonoscopy (N/A, 11/2/2015); Cardiac catheterization (03/2010); and Coronary artery bypass graft (08/13/1994).    Family History:family history includes Blindness in her brother; Diabetes in her brother, father, maternal grandmother, paternal aunt, and paternal uncle.    Allergies:   Review of patient's allergies indicates:   Allergen Reactions    Pcn [penicillins] Swelling    Trulicity [dulaglutide] Nausea Only and Rash       Social History: reports that she quit smoking about 54 years ago. She quit after 2.00 years of use. She has never used smokeless tobacco. She reports that she does not drink alcohol or use drugs.    Home medications:   Current Outpatient Prescriptions on File Prior to Visit   Medication Sig Dispense Refill    amlodipine (NORVASC) 10 MG tablet Take 1 tablet (10 mg total) by mouth once daily. 90 tablet 11    baclofen (LIORESAL) 10 MG tablet TAKE 1 TABLET (10 MG TOTAL) BY MOUTH EVERY EVENING. 90 tablet 0    carvedilol (COREG) 25 MG tablet Take 1 tablet (25 mg total) by mouth 2 (two) times daily. 180 tablet 11    clobetasol 0.05% (TEMOVATE) 0.05 % Oint Apply small amount to vulva area twice a day for 4 weeks then once a day for 4 weeks then once a day on Mondays and Thursdays 60 g 1    fluocinonide (LIDEX) 0.05 % external solution Apply topically 2 (two) times daily. 60 mL 1    fluticasone (FLONASE) 50 mcg/actuation nasal spray SPRAY TWICE IN EACH NOSTRIL EVERY DAY 16 g 11    furosemide (LASIX) 40 MG tablet Take 1 tablet (40 mg total) by mouth once daily. 90 tablet 3    GENERLAC 10 gram/15 mL solution Take 30 mLs (20 g total) by mouth daily as needed (constipation). 946 mL 3     insulin asp prt-insulin aspart, NOVOLOG 70/30, (NOVOLOG MIX 70-30) 100 unit/mL (70-30) Soln Inject 18 Units into the skin 2 (two) times daily before meals. 45 mL 3    insulin syringe-needle U-100 (BD INSULIN SYRINGE ULTRA-FINE) 0.5 mL 31 gauge x 5/16 Syrg Use to inject twice daily with insulin injections 100 each 11    isosorbide mononitrate (IMDUR) 30 MG 24 hr tablet TAKE 1 TABLET (30 MG TOTAL) BY MOUTH ONCE DAILY. 90 tablet 2    ketoconazole (NIZORAL) 2 % cream Apply topically once daily. 30 g 1    ketoconazole (NIZORAL) 2 % shampoo Apply topically twice a week. 120 mL 5    latanoprost 0.005 % ophthalmic solution Place 1 drop into both eyes every evening. 3 Bottle 4    losartan (COZAAR) 100 MG tablet Take 1 tablet (100 mg total) by mouth once daily. 90 tablet 3    nitroGLYCERIN 0.4 MG/DOSE TL SPRY (NITROLINGUAL) 400 mcg/spray spray PLACE 2 SPRAYS UNDER THE TONGUE EVERY 5 MINUTES AS NEEDED FOR CHEST PAIN 36 g 0    ONETOUCH VERIO Strp USE TO TEST BLOOD SUGAR 4 TIMES A  strip 11    polyethylene glycol (GLYCOLAX) 17 gram PwPk TAKE 17 G BY MOUTH ONCE DAILY. 30 packet 11    ranitidine (ZANTAC) 150 MG tablet Take 1 tablet (150 mg total) by mouth 2 (two) times daily. 180 tablet 11    rosuvastatin (CRESTOR) 40 MG Tab TAKE 1 TABLET BY MOUTH ONCE DAILY 90 tablet 3    spironolactone (ALDACTONE) 50 MG tablet TAKE 1 TABLET (50 MG TOTAL) BY MOUTH ONCE DAILY. 90 tablet 11    tramadol (ULTRAM) 50 mg tablet Take 1 tablet (50 mg total) by mouth 2 (two) times daily as needed. 60 tablet 2    triamcinolone acetonide 0.1% (KENALOG) 0.1 % cream Apply topically 2 (two) times daily. 30 g 0    triamcinolone acetonide 0.1% (KENALOG) 0.1 % ointment Apply small amount to itchy areas on body BID PRN.  Do not use on face or in groin.      ursodiol (ACTIGALL) 300 mg capsule Take 1 capsule (300 mg total) by mouth 2 (two) times daily. 60 capsule 11    aspirin (ECOTRIN) 81 MG EC tablet Take 1 tablet (81 mg total) by mouth  once daily.  0    ONETOUCH DELICA LANCETS 30 gauge Misc 1 lancet by Misc.(Non-Drug; Combo Route) route 4 (four) times daily. 150 each 11     No current facility-administered medications on file prior to visit.        Vital signs:  /70   Pulse 60   Ht 5' (1.524 m)   Wt 86.1 kg (189 lb 13.1 oz)   BMI 37.07 kg/m²     Physical Exam   Constitutional: She is oriented to person, place, and time. She appears well-developed and well-nourished.   Eyes: No scleral icterus.   Neck: Neck supple.   Cardiovascular: Normal rate.  Exam reveals no gallop.    Sternotomy scar   Pulmonary/Chest: Effort normal. No respiratory distress.   Abdominal: Soft. Bowel sounds are normal. She exhibits no distension and no mass. There is no tenderness. There is no rebound and no guarding. No hernia.   Musculoskeletal: She exhibits no edema.   Neurological: She is alert and oriented to person, place, and time.   Skin: Skin is warm.   Psychiatric: She has a normal mood and affect.       Routine labs:  Lab Results   Component Value Date    WBC 7.60 10/20/2016    HGB 13.6 10/20/2016    HCT 41.1 10/20/2016    MCV 85 10/20/2016     10/20/2016     Lab Results   Component Value Date    INR 1.0 03/17/2015     No results found for: IRON, FERRITIN, TIBC, FESATURATED  Lab Results   Component Value Date     11/30/2017    K 4.5 11/30/2017     11/30/2017    CO2 22 (L) 11/30/2017    BUN 18 11/30/2017    CREATININE 1.1 11/30/2017     Lab Results   Component Value Date    ALBUMIN 3.8 11/30/2017    ALT 12 11/30/2017    AST 24 11/30/2017    ALKPHOS 75 11/30/2017    BILITOT 0.5 11/30/2017     No results found for: GLUCOSE    Imaging:  As noted in HPI.    Assessment:  Eliu Paz is a 77 y.o. female with     1. Gastroesophageal reflux disease with esophagitis    2. Calculus of gallbladder without cholecystitis without obstruction    3. Tubulovillous adenoma    4. Other constipation      She continues to have sx of biliary colic,  including postprandial, worse with fatty meals, RUQ discomfort and US with gallstones. Negative EGD with H.pylori. No improvement with christine. Patient still would like to proceed with expectant management.     Plan:      Stop taking ACTIGALL    Colonoscopy in 2018 for surveillance  Stop taking Zantac and START taking Prilosec OTC for GERD  If no improvement, we will obtain CT A/P during our next visit and re-discuss surgery as an option but at this moment, she would like to have watchful waiting rather than cholecystectomy.   Take Miralax daily for constipation  She will also visit her PCP for annual exam and routine labs.      RTC in 3 months with me.     Marianna Starkey MD  Gastroenterology & Hepatology Fellow  Pager: [phone]

## 2018-03-01 DIAGNOSIS — Z79.4 TYPE 2 DIABETES MELLITUS WITH STAGE 3 CHRONIC KIDNEY DISEASE, WITH LONG-TERM CURRENT USE OF INSULIN: Chronic | ICD-10-CM

## 2018-03-01 DIAGNOSIS — E11.22 TYPE 2 DIABETES MELLITUS WITH STAGE 3 CHRONIC KIDNEY DISEASE, WITH LONG-TERM CURRENT USE OF INSULIN: Chronic | ICD-10-CM

## 2018-03-01 DIAGNOSIS — N18.30 TYPE 2 DIABETES MELLITUS WITH STAGE 3 CHRONIC KIDNEY DISEASE, WITH LONG-TERM CURRENT USE OF INSULIN: Chronic | ICD-10-CM

## 2018-03-01 RX ORDER — INSULIN ASPART 100 [IU]/ML
18 INJECTION, SUSPENSION SUBCUTANEOUS
Qty: 50 ML | Refills: 11 | Status: SHIPPED | OUTPATIENT
Start: 2018-03-01 | End: 2018-04-13 | Stop reason: SDUPTHER

## 2018-03-15 RX ORDER — TRIAMCINOLONE ACETONIDE 1 MG/G
OINTMENT TOPICAL 2 TIMES DAILY
Qty: 80 G | Refills: 1 | Status: SHIPPED | OUTPATIENT
Start: 2018-03-15 | End: 2021-03-24

## 2018-03-28 ENCOUNTER — LAB VISIT (OUTPATIENT)
Dept: LAB | Facility: HOSPITAL | Age: 78
End: 2018-03-28
Payer: MEDICARE

## 2018-03-28 DIAGNOSIS — Z79.4 TYPE 2 DIABETES MELLITUS WITH STAGE 3 CHRONIC KIDNEY DISEASE, WITH LONG-TERM CURRENT USE OF INSULIN: Chronic | ICD-10-CM

## 2018-03-28 DIAGNOSIS — N18.30 TYPE 2 DIABETES MELLITUS WITH STAGE 3 CHRONIC KIDNEY DISEASE, WITH LONG-TERM CURRENT USE OF INSULIN: Chronic | ICD-10-CM

## 2018-03-28 DIAGNOSIS — E11.22 TYPE 2 DIABETES MELLITUS WITH STAGE 3 CHRONIC KIDNEY DISEASE, WITH LONG-TERM CURRENT USE OF INSULIN: Chronic | ICD-10-CM

## 2018-03-28 LAB
ESTIMATED AVG GLUCOSE: 194 MG/DL
HBA1C MFR BLD HPLC: 8.4 %

## 2018-03-28 PROCEDURE — 83036 HEMOGLOBIN GLYCOSYLATED A1C: CPT

## 2018-03-28 PROCEDURE — 36415 COLL VENOUS BLD VENIPUNCTURE: CPT

## 2018-04-12 NOTE — PROGRESS NOTES
CHIEF COMPLAINT: Type 2 Diabetes     HPI: Ms. Zeng is a 78 y.o. female who was diagnosed with Type 2 DM at age 43. She was started on insulin soon after diagnosis--NPH/R + Metformin. She was then changed to Novolin 70/30. (+) FH of DM. She has been seen in [facility] program with JEANNE Escobar (last seen 12/2016). Of note, she has tried Trulicity, but experienced nausea and itching. She stopped the medication after having 3 injections. States she hasn't been the same since taking Trulicity. Januvia ordered in addition to 70/30 in December 2016, but she refused to take it. August 2016 C-peptide 2.8.    a1c elevated. Last seen by SHRUTHI Garcia DNP in 2017 and is now being seen by me for the first time.  Has concerns about circulation w/ legs.  Lab Results   Component Value Date    HGBA1C 8.4 (H) 03/28/2018     No logs to review; states BGs have been 120s-low 200s mainly. Checks mainly in the morning.    Reports bad reaction w/ trulicity, had f/u with GI in 2/2018. Seen by Dr. Aguilar in 2017 (cardiology), good report w/ recent echo.     No frequent hypoglycemia.      Doesn't miss insulin doses.     PREVIOUS DIABETES MEDICATIONS TRIED  Lantus and Novolog pens  Novolin 70/30  Trulicity-bad reaction   Levemir     CURRENT DIABETIC MEDS: Novolog 70/30: 18 units bid    Last Podiatry Exam: n/a    REVIEW OF SYSTEMS  General: no weakness, fatigue, 3# wt gain.   Eyes: no double or blurred vision, eye pain, or redness; Last Eye Exam=September 2017.   Cardiovascular: no chest pain, palpitations, or murmurs, + chronic bilateral ankle edema.   Respiratory: no cough or dyspnea.   GI: (+) chronic heartburn (some effectiveness with use of Ranitidine); no current nausea or changes in bowel patterns; good appetite. +dysphagia  Skin: no rashes, no dryness, itching, or reactions at insulin injection sites. H/o lesions (shingles outbreak) on face, trunk, hips x 10-13 years ago, +has pruritis at times on back  Neuro: bilateral legs  "and toes with chronic numbness and tingling.   Endocrine: no polyuria, polydipsia, polyphagia, heat or cold intolerance.     Vital Signs  BP (!) 100/50   Pulse 78   Resp 20   Ht 5' 2" (1.575 m)   Wt 85.8 kg (189 lb 3.2 oz)   BMI 34.61 kg/m²     Hemoglobin A1C   Date Value Ref Range Status   03/28/2018 8.4 (H) 4.0 - 5.6 % Final     Comment:     According to ADA guidelines, hemoglobin A1c <7.0% represents  optimal control in non-pregnant diabetic patients. Different  metrics may apply to specific patient populations.   Standards of Medical Care in Diabetes-2016.  For the purpose of screening for the presence of diabetes:  <5.7%     Consistent with the absence of diabetes  5.7-6.4%  Consistent with increasing risk for diabetes   (prediabetes)  >or=6.5%  Consistent with diabetes  Currently, no consensus exists for use of hemoglobin A1c  for diagnosis of diabetes for children.  This Hemoglobin A1c assay has significant interference with fetal   hemoglobin   (HbF). The results are invalid for patients with abnormal amounts of   HbF,   including those with known Hereditary Persistence   of Fetal Hemoglobin. Heterozygous hemoglobin variants (HbAS, HbAC,   HbAD, HbAE, HbA2) do not significantly interfere with this assay;   however, presence of multiple variants in a sample may impact the %   interference.     11/30/2017 8.1 (H) 4.0 - 5.6 % Final     Comment:     According to ADA guidelines, hemoglobin A1c <7.0% represents  optimal control in non-pregnant diabetic patients. Different  metrics may apply to specific patient populations.   Standards of Medical Care in Diabetes-2016.  For the purpose of screening for the presence of diabetes:  <5.7%     Consistent with the absence of diabetes  5.7-6.4%  Consistent with increasing risk for diabetes   (prediabetes)  >or=6.5%  Consistent with diabetes  Currently, no consensus exists for use of hemoglobin A1c  for diagnosis of diabetes for children.  This Hemoglobin A1c assay " has significant interference with fetal   hemoglobin   (HbF). The results are invalid for patients with abnormal amounts of   HbF,   including those with known Hereditary Persistence   of Fetal Hemoglobin. Heterozygous hemoglobin variants (HbAS, HbAC,   HbAD, HbAE, HbA2) do not significantly interfere with this assay;   however, presence of multiple variants in a sample may impact the %   interference.     08/25/2017 8.3 (H) 4.0 - 5.6 % Final     Comment:     According to ADA guidelines, hemoglobin A1c <7.0% represents  optimal control in non-pregnant diabetic patients. Different  metrics may apply to specific patient populations.   Standards of Medical Care in Diabetes-2016.  For the purpose of screening for the presence of diabetes:  <5.7%     Consistent with the absence of diabetes  5.7-6.4%  Consistent with increasing risk for diabetes   (prediabetes)  >or=6.5%  Consistent with diabetes  Currently, no consensus exists for use of hemoglobin A1c  for diagnosis of diabetes for children.  This Hemoglobin A1c assay has significant interference with fetal   hemoglobin   (HbF). The results are invalid for patients with abnormal amounts of   HbF,   including those with known Hereditary Persistence   of Fetal Hemoglobin. Heterozygous hemoglobin variants (HbAS, HbAC,   HbAD, HbAE, HbA2) do not significantly interfere with this assay;   however, presence of multiple variants in a sample may impact the %   interference.          Chemistry        Component Value Date/Time     11/30/2017 0748    K 4.5 11/30/2017 0748     11/30/2017 0748    CO2 22 (L) 11/30/2017 0748    BUN 18 11/30/2017 0748    CREATININE 1.1 11/30/2017 0748     (H) 11/30/2017 0748        Component Value Date/Time    CALCIUM 9.7 11/30/2017 0748    ALKPHOS 75 11/30/2017 0748    AST 24 11/30/2017 0748    ALT 12 11/30/2017 0748    BILITOT 0.5 11/30/2017 0748           Lab Results   Component Value Date    TSH 4.221 (H) 08/25/2017      Lab  Results   Component Value Date    CHOL 115 (L) 11/30/2017    CHOL 109 (L) 12/05/2016    CHOL 115 (L) 11/28/2016     Lab Results   Component Value Date    HDL 51 11/30/2017    HDL 37 (L) 12/05/2016    HDL 40 11/28/2016     Lab Results   Component Value Date    LDLCALC 53.0 (L) 11/30/2017    LDLCALC 58.8 (L) 12/05/2016    LDLCALC 61.4 (L) 11/28/2016     Lab Results   Component Value Date    TRIG 55 11/30/2017    TRIG 66 12/05/2016    TRIG 68 11/28/2016     Lab Results   Component Value Date    CHOLHDL 44.3 11/30/2017    CHOLHDL 33.9 12/05/2016    CHOLHDL 34.8 11/28/2016       Lab Results   Component Value Date    MICALBCREAT 37.4 (H) 05/30/2017     PHYSICAL EXAMINATION  Constitutional:  Well developed, well nourished, NAD.  ENT: External ears no masses with nose patent; normal hearing.   Neck:  Supple; trachea midline  Cardiovascular: Normal heart sounds, +1 ankle/LE edema.     Lungs:  Normal effort; even and unlabored.  Abdomen:  Soft, no masses,  no hernias.  MS: No clubbing or cyanosis of nails noted; normal gait w/ assistance of walker use  Skin: No rashes, lesions, or ulcers; no nodules.  Psychiatric: Good judgement and insight; normal mood and affect.  Neurological: Cranial nerves II to XII are grossly intact.      Assessment/Plan    1. DM type 2 with diabetic peripheral neuropathy  Hemoglobin A1c next time   F/u in 3 mos  Change novolog 70/30 16 units in am, 20 units at dinner time   Has fear of hypoglycemia  Watch portions of fruits, discussed dietary habits.   a1c goal less than 8%  a1c above goal-see above instructions   F/u with primary      2. Type 2 diabetes mellitus with stage 3 chronic kidney disease, with long-term current use of insulin  See above, kidney functions are stable    3. Type 2 diabetes mellitus with diabetic polyneuropathy, with long-term current use of insulin  See above   4. Type 2 diabetes mellitus with diabetic peripheral angiopathy without gangrene, with long-term current use of  insulin  Ambulatory referral to vascular medicine    5. CKD (chronic kidney disease) stage 3, GFR 30-59 ml/min  See above   6. Obesity, Class II, BMI 35-39.9  Body mass index is 34.61 kg/m². may increase insulin resistance.  Discussed walking more      7. Essential hypertension  Controlled, continue med(s)   8. Hyperlipidemia, unspecified hyperlipidemia type  Lab Results   Component Value Date    LDLCALC 53.0 (L) 11/30/2017     At goal, continue crestor   9. Coronary artery disease involving native coronary artery of native heart without angina pectoris  Ambulatory Referral to Vascular Medicine   10. Myalgia  F/u needed with rheumatology     FOLLOW UP  Follow-up in about 3 months (around 7/13/2018).

## 2018-04-13 ENCOUNTER — OFFICE VISIT (OUTPATIENT)
Dept: ENDOCRINOLOGY | Facility: CLINIC | Age: 78
End: 2018-04-13
Payer: MEDICARE

## 2018-04-13 VITALS
WEIGHT: 189.19 LBS | HEART RATE: 78 BPM | BODY MASS INDEX: 34.82 KG/M2 | RESPIRATION RATE: 20 BRPM | HEIGHT: 62 IN | SYSTOLIC BLOOD PRESSURE: 100 MMHG | DIASTOLIC BLOOD PRESSURE: 50 MMHG

## 2018-04-13 DIAGNOSIS — E66.9 OBESITY, CLASS II, BMI 35-39.9: Chronic | ICD-10-CM

## 2018-04-13 DIAGNOSIS — M79.10 MYALGIA: ICD-10-CM

## 2018-04-13 DIAGNOSIS — E78.5 HYPERLIPIDEMIA, UNSPECIFIED HYPERLIPIDEMIA TYPE: ICD-10-CM

## 2018-04-13 DIAGNOSIS — I25.10 CORONARY ARTERY DISEASE INVOLVING NATIVE CORONARY ARTERY OF NATIVE HEART WITHOUT ANGINA PECTORIS: Chronic | ICD-10-CM

## 2018-04-13 DIAGNOSIS — E11.42 TYPE 2 DIABETES MELLITUS WITH DIABETIC POLYNEUROPATHY, WITH LONG-TERM CURRENT USE OF INSULIN: Chronic | ICD-10-CM

## 2018-04-13 DIAGNOSIS — N18.30 CKD (CHRONIC KIDNEY DISEASE) STAGE 3, GFR 30-59 ML/MIN: Chronic | ICD-10-CM

## 2018-04-13 DIAGNOSIS — Z79.4 TYPE 2 DIABETES MELLITUS WITH STAGE 3 CHRONIC KIDNEY DISEASE, WITH LONG-TERM CURRENT USE OF INSULIN: Chronic | ICD-10-CM

## 2018-04-13 DIAGNOSIS — Z79.4 TYPE 2 DIABETES MELLITUS WITH DIABETIC PERIPHERAL ANGIOPATHY WITHOUT GANGRENE, WITH LONG-TERM CURRENT USE OF INSULIN: Chronic | ICD-10-CM

## 2018-04-13 DIAGNOSIS — I10 ESSENTIAL HYPERTENSION: Chronic | ICD-10-CM

## 2018-04-13 DIAGNOSIS — E11.22 TYPE 2 DIABETES MELLITUS WITH STAGE 3 CHRONIC KIDNEY DISEASE, WITH LONG-TERM CURRENT USE OF INSULIN: Chronic | ICD-10-CM

## 2018-04-13 DIAGNOSIS — Z79.4 TYPE 2 DIABETES MELLITUS WITH DIABETIC POLYNEUROPATHY, WITH LONG-TERM CURRENT USE OF INSULIN: Chronic | ICD-10-CM

## 2018-04-13 DIAGNOSIS — N18.30 TYPE 2 DIABETES MELLITUS WITH STAGE 3 CHRONIC KIDNEY DISEASE, WITH LONG-TERM CURRENT USE OF INSULIN: Chronic | ICD-10-CM

## 2018-04-13 DIAGNOSIS — E11.51 TYPE 2 DIABETES MELLITUS WITH DIABETIC PERIPHERAL ANGIOPATHY WITHOUT GANGRENE, WITH LONG-TERM CURRENT USE OF INSULIN: Chronic | ICD-10-CM

## 2018-04-13 DIAGNOSIS — E11.42 DM TYPE 2 WITH DIABETIC PERIPHERAL NEUROPATHY: Primary | ICD-10-CM

## 2018-04-13 PROCEDURE — 99215 OFFICE O/P EST HI 40 MIN: CPT | Mod: PBBFAC | Performed by: NURSE PRACTITIONER

## 2018-04-13 PROCEDURE — 99999 PR PBB SHADOW E&M-EST. PATIENT-LVL V: CPT | Mod: PBBFAC,,, | Performed by: NURSE PRACTITIONER

## 2018-04-13 PROCEDURE — 99215 OFFICE O/P EST HI 40 MIN: CPT | Mod: S$PBB,,, | Performed by: NURSE PRACTITIONER

## 2018-04-13 RX ORDER — INSULIN ASPART 100 [IU]/ML
INJECTION, SUSPENSION SUBCUTANEOUS
Qty: 50 ML | Refills: 11
Start: 2018-04-13 | End: 2019-05-25 | Stop reason: SDUPTHER

## 2018-04-13 NOTE — PATIENT INSTRUCTIONS
Novolog 70/30 16 units at breakfast  20 units at dinner       Snacks can be an important part of a balanced, healthy meal plan. They allow you to eat more frequently, feeling full and satisfied throughout the day. Also, they allow you to spread carbohydrates evenly, which may stabilize blood sugars.  Plus, snacks are enjoyable!     The amount of carbohydrate needed at snacks varies. Generally, about 15-30 grams of carbohydrate per snack is recommended.  Below you will find some tasty treats.       0-5 gm carb   Crystal Light   Vitamin Water Zero   Herbal tea, unsweetened   2 tsp peanut butter on celery   1./2 cup sugar-free jell-o   1 sugar-free popsicle   ¼ cup blueberries   8oz Blue Myla unsweetened almond milk   5 baby carrots & celery sticks, cucumbers, bell peppers dipped in ¼ cup salsa, 2Tbsp light ranch dressing or 2Tbsp plain Greek yogurt   10 Goldfish crackers   ½ oz low-fat cheese or string cheese   1 closed handful of nuts, unsalted   1 Tbsp of sunflower seeds, unsalted   1 cup Smart Pop popcorn   1 whole grain brown rice cake        15 gm carb   1 small piece of fruit or ½ banana or 1/2 cup lite canned fruit   3 nava cracker squares   3 cups Smart Pop popcorn, top spray butter, Barbosa lite salt or cinnamon and Truvia   5 Vanilla Wafers   ½ cup low fat, no added sugar ice cream or frozen yogurt (Blue bell, Blue Bunny, Weight Watchers, Skinny Cow)   ½ turkey, ham, or chicken sandwich   ½ c fruit with ½ c Cottage cheese   4-6 unsalted wheat crackers with 1 oz low fat cheese or 1 tbsp peanut butter    30-45 goldfish crackers (depending on flavor)    7-8 Worship mini brown rice cakes (caramel, apple cinnamon, chocolate)    12 Worship mini brown rice cakes (cheddar, bbq, ranch)    1/3 cup hummus dip with raw veg   1/2 whole wheat isaac, 1Tbsp hummus   Mini Pizza (1/2 whole wheat English muffin, low-fat  cheese, tomato sauce)   100 calorie snack pack (Oreo, Chips Ahoy, Ritz Mix,  Baked Cheetos)   4-6 oz. light or Greek Style yogurt (Chobani, Yoplait, Okios, [place])   ½ cup sugar-free pudding     6 in. wheat tortilla or isaac oven toasted chips (topped with spray butter flavoring, cinnamon, Truvia OR spray butter, garlic powder, chili powder)    18 BBQ Popchips (available at [employer], [employer], Fresh Market)                   Diabetes Support Group Meetings         Date: Topic:   February 8 Health Promotion/Cooking Demo   March 8 Taking Care of Your Kidneys   April 12 Taking Care of Your Feet   May 10 Ease Your Mind with Diabetes   Carmencita 14 Summer Treats/Cooking Demo   July 12 Super Market Sweep   August 9 Taking Care of Your Eyes   Sept 13 Technology/ADA updates   October 11 Recipes & Treats/Cooking Demo   November 8 Heart Health/Pump it up!   December 13 Year-End Close Out        Meetings are held in the [facility] (A) of the [facility] located at [address] [place] [address]. Please call [phone] for additional information.    Free service, offered every 2nd Thursday of every month! Family members and/or friends are welcome as well!  Support group is for patients with type 1 or type 2 diabetes.    From 3:30p to 4:30p

## 2018-04-14 RX ORDER — BACLOFEN 10 MG/1
10 TABLET ORAL NIGHTLY
Qty: 90 TABLET | Refills: 0 | OUTPATIENT
Start: 2018-04-14 | End: 2019-04-14

## 2018-04-23 RX ORDER — BACLOFEN 10 MG/1
10 TABLET ORAL NIGHTLY
Qty: 90 TABLET | Refills: 0 | Status: SHIPPED | OUTPATIENT
Start: 2018-04-23 | End: 2018-07-16 | Stop reason: SDUPTHER

## 2018-05-14 NOTE — PROGRESS NOTES
Assessment /Plan     For exam results, see Encounter Report.    Primary open-angle glaucoma, bilateral, severe stage    Senile nuclear sclerosis, bilateral    Cortical age-related cataract, bilateral    Macular drusen, bilateral    Bilateral nonexudative age-related macular degeneration, unspecified stage    Type 2 diabetes mellitus without ophthalmic manifestations           Glaucoma (type and duration)    POAG severe   First HVF   2013   First photos   2017   Treatment / Drops started   Latanoprost, travatan           Family history    ?        Glaucoma meds    latanoprost        H/O adverse rxn to glaucoma drops    none        LASERS    none        GLAUCOMA SURGERIES    none        OTHER EYE SURGERIES    none        CDR    0.9/0.9        Tbase    16-20/16-20          Tmax    20/20            Ttarget    15/15           HVF    5 test 2013 to  2018 - SAD od // gen depression, SAD, IAD os (?prog os0         Gonio    3+ ou        CCT    544/525        OCT    5 test 2013 to 2018 - RNFL - decr thru out od // dec. Thru out  os        HRT    none        Disc photos    2017    - Ttoday    14/14  - Test done today     POAG severe  Severe RNFL thinning with progressive field loss OU, Disc photos today  - on latanoprost qhs OU, continue    NS cataract OU - hold on CE - pt is not having any problems with vision presently     Macular Drusen OU, significant, photos today  Consider Retinal eval prior to CE    Ptosis   Pt C/O droopy lid OS > OD        F/u 4 months witth HRT  // consult retina - drusen armd // consider adding a second gtts if IOP increases   Hold off on CE for now - but may need to consider it soon

## 2018-05-15 ENCOUNTER — OFFICE VISIT (OUTPATIENT)
Dept: OPHTHALMOLOGY | Facility: CLINIC | Age: 78
End: 2018-05-15
Payer: MEDICARE

## 2018-05-15 ENCOUNTER — CLINICAL SUPPORT (OUTPATIENT)
Dept: OPHTHALMOLOGY | Facility: CLINIC | Age: 78
End: 2018-05-15
Payer: MEDICARE

## 2018-05-15 DIAGNOSIS — E11.9 TYPE 2 DIABETES MELLITUS WITHOUT OPHTHALMIC MANIFESTATIONS: ICD-10-CM

## 2018-05-15 DIAGNOSIS — H35.363 MACULAR DRUSEN, BILATERAL: ICD-10-CM

## 2018-05-15 DIAGNOSIS — H25.013 CORTICAL AGE-RELATED CATARACT, BILATERAL: ICD-10-CM

## 2018-05-15 DIAGNOSIS — H40.1133 PRIMARY OPEN-ANGLE GLAUCOMA, BILATERAL, SEVERE STAGE: Primary | ICD-10-CM

## 2018-05-15 DIAGNOSIS — H40.1133 PRIMARY OPEN-ANGLE GLAUCOMA, BILATERAL, SEVERE STAGE: ICD-10-CM

## 2018-05-15 DIAGNOSIS — H35.3130 BILATERAL NONEXUDATIVE AGE-RELATED MACULAR DEGENERATION, UNSPECIFIED STAGE: ICD-10-CM

## 2018-05-15 DIAGNOSIS — H25.13 SENILE NUCLEAR SCLEROSIS, BILATERAL: ICD-10-CM

## 2018-05-15 PROCEDURE — 99999 PR PBB SHADOW E&M-EST. PATIENT-LVL II: CPT | Mod: PBBFAC,,, | Performed by: OPHTHALMOLOGY

## 2018-05-15 PROCEDURE — 92133 CPTRZD OPH DX IMG PST SGM ON: CPT | Mod: 26,S$PBB,, | Performed by: OPHTHALMOLOGY

## 2018-05-15 PROCEDURE — 92014 COMPRE OPH EXAM EST PT 1/>: CPT | Mod: S$PBB,,, | Performed by: OPHTHALMOLOGY

## 2018-05-15 PROCEDURE — 92083 EXTENDED VISUAL FIELD XM: CPT | Mod: PBBFAC

## 2018-05-15 PROCEDURE — 99212 OFFICE O/P EST SF 10 MIN: CPT | Mod: PBBFAC | Performed by: OPHTHALMOLOGY

## 2018-05-15 PROCEDURE — 92083 EXTENDED VISUAL FIELD XM: CPT | Mod: 26,S$PBB,, | Performed by: OPHTHALMOLOGY

## 2018-05-15 PROCEDURE — 92133 CPTRZD OPH DX IMG PST SGM ON: CPT | Mod: PBBFAC

## 2018-05-15 RX ORDER — LATANOPROST 50 UG/ML
1 SOLUTION/ DROPS OPHTHALMIC NIGHTLY
Qty: 3 BOTTLE | Refills: 4 | Status: SHIPPED | OUTPATIENT
Start: 2018-05-15 | End: 2019-01-15 | Stop reason: SDUPTHER

## 2018-05-26 DIAGNOSIS — I25.10 CORONARY ARTERY DISEASE DUE TO LIPID RICH PLAQUE: ICD-10-CM

## 2018-05-26 DIAGNOSIS — I25.83 CORONARY ARTERY DISEASE DUE TO LIPID RICH PLAQUE: ICD-10-CM

## 2018-05-28 RX ORDER — ISOSORBIDE MONONITRATE 30 MG/1
30 TABLET, EXTENDED RELEASE ORAL DAILY
Qty: 90 TABLET | Refills: 11 | Status: SHIPPED | OUTPATIENT
Start: 2018-05-28 | End: 2019-07-31 | Stop reason: SDUPTHER

## 2018-05-31 DIAGNOSIS — N18.30 TYPE 2 DIABETES MELLITUS WITH STAGE 3 CHRONIC KIDNEY DISEASE, WITH LONG-TERM CURRENT USE OF INSULIN: Chronic | ICD-10-CM

## 2018-05-31 DIAGNOSIS — E11.22 TYPE 2 DIABETES MELLITUS WITH STAGE 3 CHRONIC KIDNEY DISEASE, WITH LONG-TERM CURRENT USE OF INSULIN: Chronic | ICD-10-CM

## 2018-05-31 DIAGNOSIS — Z79.4 TYPE 2 DIABETES MELLITUS WITH STAGE 3 CHRONIC KIDNEY DISEASE, WITH LONG-TERM CURRENT USE OF INSULIN: Chronic | ICD-10-CM

## 2018-05-31 RX ORDER — ROSUVASTATIN CALCIUM 40 MG/1
TABLET, COATED ORAL
Qty: 90 TABLET | Refills: 3 | Status: SHIPPED | OUTPATIENT
Start: 2018-05-31 | End: 2019-08-09 | Stop reason: SDUPTHER

## 2018-06-01 ENCOUNTER — INITIAL CONSULT (OUTPATIENT)
Dept: OPHTHALMOLOGY | Facility: CLINIC | Age: 78
End: 2018-06-01
Payer: MEDICARE

## 2018-06-01 VITALS — HEART RATE: 53 BPM | DIASTOLIC BLOOD PRESSURE: 60 MMHG | SYSTOLIC BLOOD PRESSURE: 130 MMHG

## 2018-06-01 DIAGNOSIS — E11.9 TYPE 2 DIABETES MELLITUS WITHOUT OPHTHALMIC MANIFESTATIONS: ICD-10-CM

## 2018-06-01 DIAGNOSIS — H35.3132 NONEXUDATIVE AGE-RELATED MACULAR DEGENERATION, BILATERAL, INTERMEDIATE DRY STAGE: Primary | ICD-10-CM

## 2018-06-01 DIAGNOSIS — H25.13 SENILE NUCLEAR SCLEROSIS, BILATERAL: ICD-10-CM

## 2018-06-01 DIAGNOSIS — H40.1133 PRIMARY OPEN-ANGLE GLAUCOMA, BILATERAL, SEVERE STAGE: ICD-10-CM

## 2018-06-01 PROCEDURE — 99213 OFFICE O/P EST LOW 20 MIN: CPT | Mod: PBBFAC | Performed by: OPHTHALMOLOGY

## 2018-06-01 PROCEDURE — 92134 CPTRZ OPH DX IMG PST SGM RTA: CPT | Mod: PBBFAC | Performed by: OPHTHALMOLOGY

## 2018-06-01 PROCEDURE — 99999 PR PBB SHADOW E&M-EST. PATIENT-LVL III: CPT | Mod: PBBFAC,,, | Performed by: OPHTHALMOLOGY

## 2018-06-01 PROCEDURE — 92225 PR SPECIAL EYE EXAM, INITIAL: CPT | Mod: 50,S$PBB,, | Performed by: OPHTHALMOLOGY

## 2018-06-01 PROCEDURE — 92225 PR SPECIAL EYE EXAM, INITIAL: CPT | Mod: 50,PBBFAC | Performed by: OPHTHALMOLOGY

## 2018-06-01 PROCEDURE — 92014 COMPRE OPH EXAM EST PT 1/>: CPT | Mod: S$PBB,,, | Performed by: OPHTHALMOLOGY

## 2018-06-01 NOTE — PROGRESS NOTES
HPI     DLS: 05/15/2018 Dr. Francis    Pt states that she was referred by Dr. Francis to get retina exam due to   the diabetes    Va stable and good OU.  No pain.  No Headaches, flashes ,floaters or double vision.    Meds: Latanoprost QHS - OU    LBS: 286 x today am. hour     Hemoglobin A1C       Date                     Value               Ref Range             Status                03/28/2018               8.4 (H)             4.0 - 5.6 %           Final                     11/30/2017               8.1 (H)             4.0 - 5.6 %           Final                    08/25/2017               8.3 (H)             4.0 - 5.6 %           Final                  1. Primary open angle glaucoma, bilateral, severe stage   2. Macular degeneration, bilateral   3. Macular drusen, bilateral   4. Senile nuclear sclerosis, bilateral   5. Cortical age-related cataract, bilateral   6. Type 2 diabetes mellitus without ophthalmic manifestations        Last edited by Ramos Cloud MD on 6/5/2018  4:33 PM. (History)        [place] SDOCT:   OD: good quality, drusen above RPE, no IRF/SRF  OS: good quality, drusen above RPE, no IRF/SRF      Assessment /Plan     For exam results, see Encounter Report.    Nonexudative age-related macular degeneration, bilateral, intermediate dry stage  -     Posterior Segment OCT Retina-Both eyes  -     Posterior Segment OCT Retina-Both eyes; Future    Senile nuclear sclerosis, bilateral    Primary open-angle glaucoma, bilateral, severe stage    Type 2 diabetes mellitus without ophthalmic manifestations      AMD OU- has opthlamoscopic appearance of dom drusen but look like reticular pseudodrusen on OCT  Discussed Dry and Wet AMD in detail  Recommend AREDS 2 Vitamins  Home [employer] Grid Testing    NS OU- happy with Va-observe    OAG CPM and f/u per Dr Francis  RTC 6 months sooner PRN    BS,BP,Chol control

## 2018-06-01 NOTE — LETTER
June 5, 2018      Zuleika Francis MD  [address]  [facility] [address]           [facility]hthalmology  [address]  [facility] [address]  Phone: [phone]  Fax: [phone]          Patient: Eliu Paz   MR Number: [id number]   YOB: 1940   Date of Visit: 6/1/2018       Dear Dr. Zuleika Francis:    Thank you for referring Eliu Paz to me for evaluation. Attached you will find relevant portions of my assessment and plan of care.    If you have questions, please do not hesitate to call me. I look forward to following Eliu Paz along with you.    Sincerely,    Ramos Cloud MD    Enclosure  CC:  No Recipients    If you would like to receive this communication electronically, please contact [email or web address] or [phone] to request more information on [employer] access.    For providers and/or their staff who would like to refer a patient to [facility], please contact us through our one-stop-shop provider referral line, [facility], at [phone].    If you feel you have received this communication in error or would no longer like to receive these types of communications, please e-mail [email or web address]

## 2018-06-04 ENCOUNTER — OFFICE VISIT (OUTPATIENT)
Dept: RHEUMATOLOGY | Facility: CLINIC | Age: 78
End: 2018-06-04
Payer: MEDICARE

## 2018-06-04 VITALS
WEIGHT: 184 LBS | SYSTOLIC BLOOD PRESSURE: 107 MMHG | HEART RATE: 58 BPM | BODY MASS INDEX: 33.65 KG/M2 | DIASTOLIC BLOOD PRESSURE: 62 MMHG

## 2018-06-04 DIAGNOSIS — M15.9 PRIMARY OSTEOARTHRITIS INVOLVING MULTIPLE JOINTS: Primary | ICD-10-CM

## 2018-06-04 PROCEDURE — 99999 PR PBB SHADOW E&M-EST. PATIENT-LVL III: CPT | Mod: PBBFAC,,, | Performed by: INTERNAL MEDICINE

## 2018-06-04 PROCEDURE — 99213 OFFICE O/P EST LOW 20 MIN: CPT | Mod: PBBFAC | Performed by: INTERNAL MEDICINE

## 2018-06-04 PROCEDURE — 99213 OFFICE O/P EST LOW 20 MIN: CPT | Mod: S$PBB,,, | Performed by: INTERNAL MEDICINE

## 2018-06-06 NOTE — PROGRESS NOTES
History of present illness:  78-year-old female  I had initially evaluated 2 years ago.  She had diffuse aching.  She had a positive JOHANNE.  I did not find evidence of an underlying connective tissue disease.  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  I had her on baclofen.  She comes back for follow-up.      Her aching persists.  It is bad in the morning.  She denies any joint swelling.  Baclofen is not helping as much as it did initially.  She is still tolerating it.  She is taking Tylenol with some relief.  Her diabetes is not doing that well.    Physical examination was not performed, the entire time was counseling.    Assessment:  Chronic pain     Plans:  Increase baclofen to 20 mg at bedtime.  If this does not help, she is to contact me.  Otherwise I will see her in follow-up in 12 months.

## 2018-07-03 RX ORDER — BLOOD-GLUCOSE METER
EACH MISCELLANEOUS
Qty: 150 STRIP | Refills: 11 | Status: SHIPPED | OUTPATIENT
Start: 2018-07-03 | End: 2019-04-30

## 2018-07-05 DIAGNOSIS — I10 ESSENTIAL HYPERTENSION: Chronic | ICD-10-CM

## 2018-07-05 RX ORDER — AMLODIPINE BESYLATE 10 MG/1
10 TABLET ORAL DAILY
Qty: 90 TABLET | Refills: 11 | Status: SHIPPED | OUTPATIENT
Start: 2018-07-05 | End: 2019-07-17 | Stop reason: SDUPTHER

## 2018-07-18 RX ORDER — BACLOFEN 20 MG/1
20 TABLET ORAL NIGHTLY
Qty: 90 TABLET | Refills: 0 | Status: SHIPPED | OUTPATIENT
Start: 2018-07-18 | End: 2018-10-13 | Stop reason: SDUPTHER

## 2018-07-21 DIAGNOSIS — I10 ESSENTIAL HYPERTENSION: Chronic | ICD-10-CM

## 2018-07-21 DIAGNOSIS — I25.10 CORONARY ARTERY DISEASE INVOLVING NATIVE CORONARY ARTERY OF NATIVE HEART WITHOUT ANGINA PECTORIS: Chronic | ICD-10-CM

## 2018-07-23 ENCOUNTER — TELEPHONE (OUTPATIENT)
Dept: OPTOMETRY | Facility: CLINIC | Age: 78
End: 2018-07-23

## 2018-07-23 RX ORDER — CARVEDILOL 25 MG/1
25 TABLET ORAL 2 TIMES DAILY
Qty: 180 TABLET | Refills: 11 | Status: SHIPPED | OUTPATIENT
Start: 2018-07-23 | End: 2019-09-16 | Stop reason: SDUPTHER

## 2018-07-27 RX ORDER — SPIRONOLACTONE 25 MG/1
TABLET ORAL
Qty: 180 TABLET | Refills: 0 | Status: SHIPPED | OUTPATIENT
Start: 2018-07-27 | End: 2018-12-13 | Stop reason: SDUPTHER

## 2018-07-27 NOTE — TELEPHONE ENCOUNTER
Hi, please call patient and let the patient know that at [facility] we have developed a diabetes registry of patients and I see that this patient is due to see me back and have the diabetes evaluated along with overall health. Please offer an appointment.  I have sent in her spironolactone.  Thank you, Ryan Carlson

## 2018-08-18 DIAGNOSIS — I10 ESSENTIAL HYPERTENSION: Chronic | ICD-10-CM

## 2018-08-20 RX ORDER — LOSARTAN POTASSIUM 100 MG/1
100 TABLET ORAL DAILY
Qty: 90 TABLET | Refills: 3 | Status: SHIPPED | OUTPATIENT
Start: 2018-08-20 | End: 2019-07-08 | Stop reason: ALTCHOICE

## 2018-08-28 ENCOUNTER — TELEPHONE (OUTPATIENT)
Dept: INTERNAL MEDICINE | Facility: CLINIC | Age: 78
End: 2018-08-28

## 2018-08-28 NOTE — TELEPHONE ENCOUNTER
----- Message from Devi Daniels sent at 8/28/2018  2:03 PM CDT -----  Contact: call pt at [phone]  Patient is returning a phone call.  Who left a message for the patient: Zainab Cruz MA   Does patient know what this is regarding:    Comments:

## 2018-08-29 ENCOUNTER — TELEPHONE (OUTPATIENT)
Dept: INTERNAL MEDICINE | Facility: CLINIC | Age: 78
End: 2018-08-29

## 2018-09-04 ENCOUNTER — OFFICE VISIT (OUTPATIENT)
Dept: GASTROENTEROLOGY | Facility: CLINIC | Age: 78
End: 2018-09-04
Payer: MEDICARE

## 2018-09-04 VITALS
DIASTOLIC BLOOD PRESSURE: 56 MMHG | SYSTOLIC BLOOD PRESSURE: 123 MMHG | HEART RATE: 54 BPM | HEIGHT: 60 IN | BODY MASS INDEX: 36.1 KG/M2 | WEIGHT: 183.88 LBS

## 2018-09-04 DIAGNOSIS — R11.2 NAUSEA AND VOMITING, INTRACTABILITY OF VOMITING NOT SPECIFIED, UNSPECIFIED VOMITING TYPE: Primary | ICD-10-CM

## 2018-09-04 DIAGNOSIS — Z12.11 SCREEN FOR COLON CANCER: ICD-10-CM

## 2018-09-04 DIAGNOSIS — R68.81 EARLY SATIETY: ICD-10-CM

## 2018-09-04 DIAGNOSIS — K21.00 GASTROESOPHAGEAL REFLUX DISEASE WITH ESOPHAGITIS: ICD-10-CM

## 2018-09-04 DIAGNOSIS — K59.00 CONSTIPATION, UNSPECIFIED CONSTIPATION TYPE: ICD-10-CM

## 2018-09-04 PROCEDURE — 99999 PR PBB SHADOW E&M-EST. PATIENT-LVL V: CPT | Mod: PBBFAC,,, | Performed by: NURSE PRACTITIONER

## 2018-09-04 PROCEDURE — 99215 OFFICE O/P EST HI 40 MIN: CPT | Mod: PBBFAC | Performed by: NURSE PRACTITIONER

## 2018-09-04 PROCEDURE — 99214 OFFICE O/P EST MOD 30 MIN: CPT | Mod: S$PBB,,, | Performed by: NURSE PRACTITIONER

## 2018-09-04 NOTE — PATIENT INSTRUCTIONS
For reflux/heartburn:  Take Zantac/ranitadine 150 mg twice daily, every day.    For constipation:  Stop lactulose/generlac and start Miralax once daily, every day. You may increase it to twice daily if needed.

## 2018-09-04 NOTE — PROGRESS NOTES
[facility] Established Patient Visit    Reason for Visit:  The primary encounter diagnosis was Nausea and vomiting, intractability of vomiting not specified, unspecified vomiting type. Diagnoses of Early satiety, Gastroesophageal reflux disease with esophagitis, Constipation, unspecified constipation type, and Screen for colon cancer were also pertinent to this visit.    PCP: Ryan Carlson    HPI:   This is a 78 y.o. female here for f/u of GERD and constipation. . Ms. Paz's last GI visit was with me in 6/2017. 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   She was subsequently seen by Dr. Starkey in fellows clinic with GB stones,which were being treated medically with actigall. She reported no improvement in s/s at that time with actigall, so it was d/c. She currently reports she has been taking  Zantac PRN for reflux. She has prysos occurring almost daily. She does have a hx of LA grade A esophagitis w/ documented healing. She had been on daily PPIs in the past, but opted to switch to H2RAs in stead d/t potential s/e of long term PPI use. She had dysphagia only if she swallowing a handful of pills at one time. No problems with foods nor liquids. Previous esophageal manometry was normal.     Nausea, vomiting, and early satiety if eats regular sized meal, so eats smaller portions. Occurring for 1-2 months. Occurs once a week or once a month. H/o DM. Sugars have been fluctuating. Last HbA1c 8.4 in 3/2018  abd pain-occ LUQ abd discomfort.  Bowel habits - h/o constipation. currently with one soft BM/ week on lactulose 10 g once weekly. Does not take daily d/t S/e gas.   GI bleeding - denies hematochezia, hematemesis, melena, BRBPR, black/tarry stools, and coffee ground emesis  NSAID usage - 81 mg ASA daily.     ROS:  Constitutional: No fevers, no chills, No unintentional weight loss, no fatigue,   ENT: No allergies  CV: No chest pain, no palpitations, + perif. edema, no  sob on exertion  Pulm: No cough, No shortness of breath, no wheezes, no sputum  Ophtho: No vision changes  GI: see HPI; also no nausea, no vomiting, no change in appetite  Derm: No rash  Heme: No lymphadenopathy, No bruising  MSK: No arthritis, + muscle pain, no muscle weakness  : No dysuria, No hematuria  Endo: No hot or cold intolerance  Neuro: No syncope, No seizure,     PMHX:  has a past medical history of Allergy, Arthritis, Cataract, CKD (chronic kidney disease) stage 3, GFR 30-59 ml/min, Coronary artery disease, Diabetes mellitus type II (1981), Diabetic neuropathy, GERD (gastroesophageal reflux disease), Glaucoma, Hyperlipidemia, and Hypertension.    PSHX:  has a past surgical history that includes Carpal tunnel release; Tubal ligation (1970s); Cardiac catheterization (03/2010); Coronary artery bypass graft (08/13/1994); MANOMETRY-ESOPHAGEAL-WITH IMPEDANCE (N/A, 6/13/2017); ESOPHAGOGASTRODUODENOSCOPY (EGD) (N/A, 4/18/2017); Carpal Tunnel Release, Right hand (Right, 10/19/2016); RELEASE-FINGER-TRIGGER / Index / Long (Right, 10/19/2016); COLONOSCOPY (N/A, 11/2/2015); and ESOPHAGOGASTRODUODENOSCOPY (EGD) (N/A, 4/27/2015).    The patient's social and family histories were reviewed by me and updated in the appropriate section of the electronic medical record.    Review of patient's allergies indicates:   Allergen Reactions    Pcn [penicillins] Swelling     Swelling (extremities)^, Swelling (extremities)^  Swelling (extremities)^, Swelling (extremities)^  Swelling (extremities)^, Swelling (extremities)^    Trulicity [dulaglutide] Nausea Only and Rash       Current Outpatient Medications   Medication Sig    amLODIPine (NORVASC) 10 MG tablet TAKE 1 TABLET (10 MG TOTAL) BY MOUTH ONCE DAILY.    baclofen (LIORESAL) 20 MG tablet Take 1 tablet (20 mg total) by mouth every evening.    carvedilol (COREG) 25 MG tablet TAKE 1 TABLET (25 MG TOTAL) BY MOUTH 2 (TWO) TIMES DAILY.    clobetasol 0.05% (TEMOVATE) 0.05 %  Oint Apply small amount to vulva area twice a day for 4 weeks then once a day for 4 weeks then once a day on Mondays and Thursdays    fluocinonide (LIDEX) 0.05 % external solution Apply topically 2 (two) times daily.    fluticasone (FLONASE) 50 mcg/actuation nasal spray SPRAY TWICE IN EACH NOSTRIL EVERY DAY    furosemide (LASIX) 40 MG tablet Take 1 tablet (40 mg total) by mouth once daily.    GENERLAC 10 gram/15 mL solution Take 30 mLs (20 g total) by mouth daily as needed (constipation).    insulin asp prt-insulin aspart, NOVOLOG 70/30, (NOVOLOG MIX 70-30 U-100 INSULN) 100 unit/mL (70-30) Soln Inject 16 units w/ breakfast and 20 units at dinner time.    insulin syringe-needle U-100 (BD INSULIN SYRINGE ULTRA-FINE) 0.5 mL 31 gauge x 5/16 Syrg Use to inject twice daily with insulin injections    isosorbide mononitrate (IMDUR) 30 MG 24 hr tablet TAKE 1 TABLET (30 MG TOTAL) BY MOUTH ONCE DAILY.    ketoconazole (NIZORAL) 2 % cream Apply topically once daily.    ketoconazole (NIZORAL) 2 % shampoo Apply topically twice a week.    latanoprost 0.005 % ophthalmic solution Place 1 drop into both eyes every evening.    losartan (COZAAR) 100 MG tablet TAKE 1 TABLET (100 MG TOTAL) BY MOUTH ONCE DAILY.    nitroGLYCERIN 0.4 MG/DOSE TL SPRY (NITROLINGUAL) 400 mcg/spray spray PLACE 2 SPRAYS UNDER THE TONGUE EVERY 5 MINUTES AS NEEDED FOR CHEST PAIN    ONETOUCH VERIO Strp USE TO TEST BLOOD SUGAR 4 TIMES A DAY    polyethylene glycol (GLYCOLAX) 17 gram PwPk TAKE 17 G BY MOUTH ONCE DAILY.    ranitidine (ZANTAC) 150 MG tablet Take 1 tablet (150 mg total) by mouth 2 (two) times daily.    rosuvastatin (CRESTOR) 40 MG Tab TAKE 1 TABLET BY MOUTH ONCE DAILY.    spironolactone (ALDACTONE) 25 MG tablet TAKE 2 TABLETS BY MOUTH EVERY DAY    tramadol (ULTRAM) 50 mg tablet Take 1 tablet (50 mg total) by mouth 2 (two) times daily as needed.    triamcinolone acetonide 0.1% (KENALOG) 0.1 % ointment Apply topically 2 (two) times daily.  Apply small amount to itchy areas on body BID PRN.  Do not use on face or in groin.    aspirin (ECOTRIN) 81 MG EC tablet Take 1 tablet (81 mg total) by mouth once daily.    ONETOUCH DELICA LANCETS 30 gauge Misc 1 lancet by Misc.(Non-Drug; Combo Route) route 4 (four) times daily.     No current facility-administered medications for this visit.          Objective Findings:    Vital Signs:  BP (!) 123/56   Pulse (!) 54   Ht 5' (1.524 m)   Wt 83.4 kg (183 lb 13.8 oz)   BMI 35.91 kg/m²  Body mass index is 35.91 kg/m².    Physical Exam:  General Appearance: Well appearing in no acute distress  Head:   Normocephalic, without obvious abnormality  Eyes:    No scleral icterus, EOMI  Throat: Lips, mucosa, and tongue normal; teeth and gums normal  Lungs: CTA bilaterally in anterior and posterior fields, no wheezes, no crackles.  Heart:  Regular rate and rhythm, S1, S2 normal, no murmurs heard  Abdomen: Soft, non tender, non distended with positive bowel sounds in all four quadrants. No hepatosplenomegaly, ascites, or mass  Extremities:    2+ radial pulses, no clubbing, or cyanosis + 1 edema to lower extremities bilaterally  Skin: No rash to exposed areas  Neurologic: A&Ox4      Labs:  Lab Results   Component Value Date    WBC 7.60 10/20/2016    HGB 13.6 10/20/2016    HCT 41.1 10/20/2016     10/20/2016    CHOL 115 (L) 11/30/2017    TRIG 55 11/30/2017    HDL 51 11/30/2017    ALT 12 11/30/2017    AST 24 11/30/2017     11/30/2017    K 4.5 11/30/2017     11/30/2017    CREATININE 1.1 11/30/2017    BUN 18 11/30/2017    CO2 22 (L) 11/30/2017    TSH 4.221 (H) 08/25/2017    INR 1.0 03/17/2015    HGBA1C 8.4 (H) 03/28/2018       Endoscopy:   6/13/2017 esophageal manometry Dr. Quiroga- branden  4/18/2017 EGD -normal esophagus. Gastric erythema. bx-focal IM no dysplasia. Repeat in 3 years.     11/2015 Colonoscopy Dr. Clinton-perianal skin tags. 10 mm transverse colon polyp. Pan tics. Path- adenovillious Repeat in 3  years (2018).     4/2015 EGD Dr. Miguelangel MARSH grade A reflux esophagitis. Small HH. Gastric erythema. Path- intestinal metaplasia. Repeat in 2-3 years ([id number]).    Assessment:    1. Nausea and vomiting, intractability of vomiting not specified, unspecified vomiting type    2. Early satiety    3. Gastroesophageal reflux disease with esophagitis    4. Constipation, unspecified constipation type    5. Screen for colon cancer          Recommendations:  1,2.  Nausea and vomiting, early statiety-GES  3. GERD-take zantac 150 mg BID  4. Constipation- stop lactulose. take miralax 1-2 times daily, every day  5. Screen for colon cancer- due this November. Order placed.    Order summary:  Orders Placed This Encounter    NM Gastric Emptying    Case request GI: COLONOSCOPY     Visit time: 30 minutes with greater than 50% of the time spent in face to face pt  discussing the aforementioned diagnoses, recommendations, test results, and answering pt questions.    Thank you for allowing me to participate in the care of Eliu Izquierdo, APRN, FNP-C

## 2018-09-05 ENCOUNTER — OFFICE VISIT (OUTPATIENT)
Dept: INTERNAL MEDICINE | Facility: CLINIC | Age: 78
End: 2018-09-05
Payer: MEDICARE

## 2018-09-05 ENCOUNTER — TELEPHONE (OUTPATIENT)
Dept: GASTROENTEROLOGY | Facility: CLINIC | Age: 78
End: 2018-09-05

## 2018-09-05 ENCOUNTER — LAB VISIT (OUTPATIENT)
Dept: LAB | Facility: HOSPITAL | Age: 78
End: 2018-09-05
Attending: INTERNAL MEDICINE
Payer: MEDICARE

## 2018-09-05 VITALS
DIASTOLIC BLOOD PRESSURE: 56 MMHG | WEIGHT: 184.5 LBS | BODY MASS INDEX: 36.22 KG/M2 | HEIGHT: 60 IN | OXYGEN SATURATION: 98 % | HEART RATE: 55 BPM | SYSTOLIC BLOOD PRESSURE: 110 MMHG

## 2018-09-05 DIAGNOSIS — S76.112S RUPTURE OF LEFT QUADRICEPS TENDON, SEQUELA: ICD-10-CM

## 2018-09-05 DIAGNOSIS — E66.01 MORBID OBESITY: ICD-10-CM

## 2018-09-05 DIAGNOSIS — Z79.4 TYPE 2 DIABETES MELLITUS WITH DIABETIC POLYNEUROPATHY, WITH LONG-TERM CURRENT USE OF INSULIN: Chronic | ICD-10-CM

## 2018-09-05 DIAGNOSIS — I67.89 CEREBRAL MICROVASCULAR DISEASE: ICD-10-CM

## 2018-09-05 DIAGNOSIS — E11.42 TYPE 2 DIABETES MELLITUS WITH DIABETIC POLYNEUROPATHY, WITH LONG-TERM CURRENT USE OF INSULIN: Chronic | ICD-10-CM

## 2018-09-05 DIAGNOSIS — H91.90 HEARING LOSS, UNSPECIFIED HEARING LOSS TYPE, UNSPECIFIED LATERALITY: Primary | ICD-10-CM

## 2018-09-05 LAB
ESTIMATED AVG GLUCOSE: 197 MG/DL
HBA1C MFR BLD HPLC: 8.5 %

## 2018-09-05 PROCEDURE — 83036 HEMOGLOBIN GLYCOSYLATED A1C: CPT

## 2018-09-05 PROCEDURE — 36415 COLL VENOUS BLD VENIPUNCTURE: CPT

## 2018-09-05 PROCEDURE — 99999 PR PBB SHADOW E&M-EST. PATIENT-LVL IV: CPT | Mod: PBBFAC,,, | Performed by: INTERNAL MEDICINE

## 2018-09-05 PROCEDURE — 99214 OFFICE O/P EST MOD 30 MIN: CPT | Mod: PBBFAC | Performed by: INTERNAL MEDICINE

## 2018-09-05 PROCEDURE — 99214 OFFICE O/P EST MOD 30 MIN: CPT | Mod: S$PBB,,, | Performed by: INTERNAL MEDICINE

## 2018-09-05 NOTE — TELEPHONE ENCOUNTER
Message left for patient to return my call regarding Gastric Emptying Study.  She is scheduled on 9/11 for 8:30.  Mailed confirmation along with instructions.

## 2018-09-05 NOTE — PROGRESS NOTES
Subjective:       Patient ID: Eliu Paz is a 78 y.o. female.    Chief Complaint: Annual Exam and Edema    Patient is here for followup for chronic conditions.    bilat ankle swelling, not new.    Chronic neck and back pains.    Worried that infection at base of brain returning.    Also worried since neurologist said that she has spots on her brain 4 yrs ago.    DM2:  Claims good med adherence  Does not adhere to dm diet, no changed Diet  > 140 AM sugars  usu <200 Post-prandial sugars  stable Numbness in feet  no sores in feet  no Visual changes  no Polyuria/polydipsia.          Review of Systems   Constitutional: Positive for fatigue. Negative for activity change and appetite change.   Eyes: Negative for visual disturbance.   Respiratory: Negative for chest tightness, shortness of breath and wheezing.    Cardiovascular: Positive for leg swelling (mild at ankles). Negative for chest pain and palpitations.   Gastrointestinal: Negative for abdominal distention and abdominal pain.   Genitourinary: Negative for pelvic pain.   Musculoskeletal: Positive for back pain and neck pain. Negative for arthralgias.   Skin: Negative for rash.   Neurological: Positive for weakness (L leg from quad tear) and numbness. Negative for tremors, syncope, facial asymmetry and speech difficulty.   Hematological: Negative for adenopathy. Does not bruise/bleed easily.   Psychiatric/Behavioral: Negative for dysphoric mood. The patient is nervous/anxious.        Objective:      Physical Exam   Constitutional: She is oriented to person, place, and time. She appears well-developed and well-nourished. No distress.   HENT:   Head: Normocephalic and atraumatic.   Mouth/Throat: No oropharyngeal exudate.   TMs clear bilat.  Mild TMJ tenderness to deep palpation bilat, normal jaw opening bilat   Eyes: Conjunctivae are normal. Pupils are equal, round, and reactive to light. No scleral icterus.   TMs clear bilat   Neck: Normal range of motion. Neck  supple. No thyromegaly present.   Cardiovascular: Normal rate, regular rhythm and normal heart sounds.   Pulmonary/Chest: Effort normal and breath sounds normal.   Abdominal: Soft. Bowel sounds are normal. She exhibits no distension. There is no tenderness.   Musculoskeletal: Normal range of motion. She exhibits edema (mild bilat ankles, not tender). She exhibits no tenderness.   Weak L quadricep mechanism    Protective Sensation (w/ 10 gram monofilament):  Right: Intact  Left: Intact    Visual Inspection:  Normal -  Bilateral    Pedal Pulses:   Right: Present  Left: Present    Posterior tibialis:   Right:Present  Left: Present     Lymphadenopathy:     She has no cervical adenopathy.   Neurological: She is alert and oriented to person, place, and time. She displays normal reflexes. No cranial nerve deficit. She exhibits normal muscle tone. Coordination normal.   Mild ankle edema, not significant lower extrem, edema   Skin: No rash noted. She is not diaphoretic.   Thinning of hair on scalp, no focal areas of hair loss.  No seborrhea seen.  A few papules on scalp, some keratotic, some hyperpigmented, all benign appearing    Mild keloid formation scalp line posterior neck   Psychiatric: She has a normal mood and affect. Her behavior is normal.       Assessment:       1. Hearing loss, unspecified hearing loss type, unspecified laterality    2. Type 2 diabetes mellitus with diabetic polyneuropathy, with long-term current use of insulin    3. Morbid obesity    4. Body mass index (BMI) of 35.0 to 35.9 with comorbidity    5. Rupture of left quadriceps tendon, sequela    6. Cerebral microvascular disease        Plan:       Eliu Steinberg was seen today for annual exam and edema.    Diagnoses and all orders for this visit:    Hearing loss, unspecified hearing loss type, unspecified laterality  -     Ambulatory Referral to ENT  -     W[facility]ER FOR HOME USE  She feels like aides to not fit well    Type 2 diabetes mellitus with diabetic  polyneuropathy, with long-term current use of insulin  -     Hemoglobin A1c; Future  Not at goal mostly from diet non-adh since she does adhere well to insulin    Morbid obesity  Body mass index (BMI) of 35.0 to 35.9 with comorbidity  -     WALKER FOR HOME USE  Lengthy discussion re importance of wt loss    Rupture of left quadriceps tendon, sequela  -     WALKER FOR HOME USE    Cerebral microvascular disease  -     WALKER FOR HOME USE  Offered car US to monitor CVD she declines      Health Maintenance       Date Due Completion Date    Influenza Vaccine 08/01/2018 2/7/2017 (Declined)    Override on 2/7/2017: Declined (per provider note)    Override on 10/15/2015: Not Clinically Appropriate (declines)    Hemoglobin A1c 09/28/2018 3/28/2018    DEXA SCAN 11/05/2018 11/5/2015    Lipid Panel 11/30/2018 11/30/2017    Eye Exam 06/01/2019 6/1/2018    Foot Exam 09/05/2019 9/5/2018 (Done)    Override on 9/5/2018: Done    Override on 9/1/2017: Done (podiatry)    Override on 12/2/2016: Done    Override on 8/24/2016: Done (by cardiologist)    Override on 7/14/2015: Done    Colonoscopy 11/02/2020 11/2/2015    TETANUS VACCINE 02/07/2027 2/7/2017 (Declined)    Override on 2/7/2017: Declined (per provider note)          Follow-up in about 3 months (around 12/5/2018).    Future Appointments   Date Time Provider Department Center   9/10/2018 10:30 AM Manuel Aguilar MD [facility] CARDIO [facility]   9/11/2018  8:30 AM [facility] NM3 MG1 400LB LIMIT [facility] NUCLEAR [facility]   9/18/2018  9:45 AM STEVENS, VISUAL FIELDS [facility] OPHTHAL [facility]   9/18/2018 10:00 AM Zuleika Francis MD [facility] OPHTHAL [facility]   10/15/2018  1:45 PM Ran Grullon MD [facility] ENT [facility]   12/6/2018  9:20 AM Ryan Carlson MD [facility] PCW

## 2018-09-05 NOTE — PATIENT INSTRUCTIONS
I will ask my assistant to send in the order to [facility] again. Here is there number as well, [phone], you can call the office directly and ask about the status of the order.

## 2018-09-10 ENCOUNTER — OFFICE VISIT (OUTPATIENT)
Dept: CARDIOLOGY | Facility: CLINIC | Age: 78
End: 2018-09-10
Payer: MEDICARE

## 2018-09-10 VITALS
HEIGHT: 60 IN | HEART RATE: 55 BPM | SYSTOLIC BLOOD PRESSURE: 132 MMHG | BODY MASS INDEX: 36.44 KG/M2 | WEIGHT: 185.63 LBS | DIASTOLIC BLOOD PRESSURE: 62 MMHG

## 2018-09-10 DIAGNOSIS — I25.10 CORONARY ARTERY DISEASE INVOLVING NATIVE CORONARY ARTERY OF NATIVE HEART WITHOUT ANGINA PECTORIS: Primary | Chronic | ICD-10-CM

## 2018-09-10 DIAGNOSIS — E78.5 HYPERLIPIDEMIA, UNSPECIFIED HYPERLIPIDEMIA TYPE: ICD-10-CM

## 2018-09-10 DIAGNOSIS — I10 ESSENTIAL HYPERTENSION: Chronic | ICD-10-CM

## 2018-09-10 DIAGNOSIS — E11.42 TYPE 2 DIABETES MELLITUS WITH DIABETIC POLYNEUROPATHY, WITH LONG-TERM CURRENT USE OF INSULIN: Chronic | ICD-10-CM

## 2018-09-10 DIAGNOSIS — Z98.61 POST PTCA: Chronic | ICD-10-CM

## 2018-09-10 DIAGNOSIS — Z79.4 TYPE 2 DIABETES MELLITUS WITH DIABETIC POLYNEUROPATHY, WITH LONG-TERM CURRENT USE OF INSULIN: Chronic | ICD-10-CM

## 2018-09-10 DIAGNOSIS — E66.9 OBESITY, CLASS II, BMI 35-39.9: Chronic | ICD-10-CM

## 2018-09-10 DIAGNOSIS — Z95.1 S/P CABG (CORONARY ARTERY BYPASS GRAFT): Chronic | ICD-10-CM

## 2018-09-10 PROCEDURE — 99213 OFFICE O/P EST LOW 20 MIN: CPT | Mod: PBBFAC | Performed by: INTERNAL MEDICINE

## 2018-09-10 PROCEDURE — 99999 PR PBB SHADOW E&M-EST. PATIENT-LVL III: CPT | Mod: PBBFAC,,, | Performed by: INTERNAL MEDICINE

## 2018-09-10 PROCEDURE — 99214 OFFICE O/P EST MOD 30 MIN: CPT | Mod: S$PBB,,, | Performed by: INTERNAL MEDICINE

## 2018-09-10 NOTE — PATIENT INSTRUCTIONS
Continue current regimen  Increase activity as tolerated.  Mediterranean Diet recommended with carbohydrate restriction as we discussed

## 2018-09-10 NOTE — PROGRESS NOTES
"Subjective:   Patient ID:  Eliu Paz is a 78 y.o. female who presents for follow-up of Coronary artery disease involving native coronary artery of  (8 months fu)      HPI:   Eliu Paz presents for follow up of coronary artery disease having had prior CABG and intervention. @ once a month, she will notice a " pressure" sensation in her upper chest relieved by belching and occasionally she will spray TNG.  No exertional discomfort although she is at limited activity. Eliu Paz has hypertension with adequate control. Eliu Paz denies  shortness of breath,  presyncope , or syncope. Some mild edema not currently present. Rare sensation of " fluttering ' in her upper chest and back.. Eliu Paz has dyslipidemia At LDL goal  on high intensity statin.  Review of Systems   Constitution: Negative for weakness, malaise/fatigue, weight gain and weight loss.   HENT: Positive for hearing loss.    Eyes: Negative for blurred vision.   Cardiovascular: Positive for chest pain and leg swelling. Negative for claudication, cyanosis, dyspnea on exertion, irregular heartbeat, near-syncope, orthopnea, palpitations, paroxysmal nocturnal dyspnea and syncope.   Respiratory: Negative for cough, shortness of breath and wheezing.    Musculoskeletal: Positive for back pain, joint pain and neck pain. Negative for falls and myalgias.        Occasional pain in both legs.   Gastrointestinal: Positive for dysphagia. Negative for abdominal pain, heartburn, nausea and vomiting.   Genitourinary: Negative for nocturia.   Neurological: Positive for paresthesias. Negative for brief paralysis, dizziness, focal weakness, headaches and numbness.   Psychiatric/Behavioral: Negative for altered mental status.       Current Outpatient Medications   Medication Sig    amLODIPine (NORVASC) 10 MG tablet TAKE 1 TABLET (10 MG TOTAL) BY MOUTH ONCE DAILY.    aspirin (ECOTRIN) 81 MG EC tablet Take 1 tablet (81 mg total) by mouth once " daily.    baclofen (LIORESAL) 20 MG tablet Take 1 tablet (20 mg total) by mouth every evening.    carvedilol (COREG) 25 MG tablet TAKE 1 TABLET (25 MG TOTAL) BY MOUTH 2 (TWO) TIMES DAILY.    clobetasol 0.05% (TEMOVATE) 0.05 % Oint Apply small amount to vulva area twice a day for 4 weeks then once a day for 4 weeks then once a day on Mondays and Thursdays    fluocinonide (LIDEX) 0.05 % external solution Apply topically 2 (two) times daily.    fluticasone (FLONASE) 50 mcg/actuation nasal spray SPRAY TWICE IN EACH NOSTRIL EVERY DAY    furosemide (LASIX) 40 MG tablet Take 1 tablet (40 mg total) by mouth once daily.    GENERLAC 10 gram/15 mL solution Take 30 mLs (20 g total) by mouth daily as needed (constipation).    insulin asp prt-insulin aspart, NOVOLOG 70/30, (NOVOLOG MIX 70-30 U-100 INSULN) 100 unit/mL (70-30) Soln Inject 16 units w/ breakfast and 20 units at dinner time.    insulin syringe-needle U-100 (BD INSULIN SYRINGE ULTRA-FINE) 0.5 mL 31 gauge x 5/16 Syrg Use to inject twice daily with insulin injections    isosorbide mononitrate (IMDUR) 30 MG 24 hr tablet TAKE 1 TABLET (30 MG TOTAL) BY MOUTH ONCE DAILY.    ketoconazole (NIZORAL) 2 % cream Apply topically once daily.    ketoconazole (NIZORAL) 2 % shampoo Apply topically twice a week.    latanoprost 0.005 % ophthalmic solution Place 1 drop into both eyes every evening.    losartan (COZAAR) 100 MG tablet TAKE 1 TABLET (100 MG TOTAL) BY MOUTH ONCE DAILY.    nitroGLYCERIN 0.4 MG/DOSE TL SPRY (NITROLINGUAL) 400 mcg/spray spray PLACE 2 SPRAYS UNDER THE TONGUE EVERY 5 MINUTES AS NEEDED FOR CHEST PAIN    ONETOUCH DELICA LANCETS 30 gauge Misc 1 lancet by Misc.(Non-Drug; Combo Route) route 4 (four) times daily.    ONETOUCH VERIO Strp USE TO TEST BLOOD SUGAR 4 TIMES A DAY    polyethylene glycol (GLYCOLAX) 17 gram PwPk TAKE 17 G BY MOUTH ONCE DAILY.    ranitidine (ZANTAC) 150 MG tablet Take 1 tablet (150 mg total) by mouth 2 (two) times daily.     rosuvastatin (CRESTOR) 40 MG Tab TAKE 1 TABLET BY MOUTH ONCE DAILY.    spironolactone (ALDACTONE) 25 MG tablet TAKE 2 TABLETS BY MOUTH EVERY DAY    tramadol (ULTRAM) 50 mg tablet Take 1 tablet (50 mg total) by mouth 2 (two) times daily as needed.    triamcinolone acetonide 0.1% (KENALOG) 0.1 % ointment Apply topically 2 (two) times daily. Apply small amount to itchy areas on body BID PRN.  Do not use on face or in groin.     No current facility-administered medications for this visit.      Objective:   Physical Exam   Constitutional: She is oriented to person, place, and time. She appears well-developed. No distress.   /62 (BP Location: Left arm, Patient Position: Sitting, BP Method: X-Large (Automatic))   Pulse (!) 55   Ht 5' (1.524 m)   Wt 84.2 kg (185 lb 10 oz)   BMI 36.25 kg/m²    HENT:   Head: Normocephalic.   Eyes: Conjunctivae are normal. Pupils are equal, round, and reactive to light. No scleral icterus.   Neck: Neck supple. No JVD present. No thyromegaly present.   Cardiovascular: Normal rate, regular rhythm and intact distal pulses. PMI is not displaced. Exam reveals no gallop and no friction rub.   Murmur heard.   Medium-pitched midsystolic murmur is present with a grade of 2/6 at the upper left sternal border and lower left sternal border.  Pulmonary/Chest: Effort normal and breath sounds normal. No respiratory distress. She has no wheezes. She has no rales.   Median sternotomy scar.    Abdominal: Soft. She exhibits no distension. There is no splenomegaly or hepatomegaly. There is no tenderness.   Musculoskeletal: She exhibits no edema or tenderness.   Uses a cane.   Neurological: She is alert and oriented to person, place, and time.   Skin: Skin is warm and dry. She is not diaphoretic.   Psychiatric: She has a normal mood and affect. Her behavior is normal.       Lab Results   Component Value Date     11/30/2017    K 4.5 11/30/2017     11/30/2017    CO2 22 (L) 11/30/2017    BUN  18 11/30/2017    CREATININE 1.1 11/30/2017     (H) 11/30/2017    HGBA1C 8.5 (H) 09/05/2018    MG 2.0 06/24/2015    AST 24 11/30/2017    ALT 12 11/30/2017    ALBUMIN 3.8 11/30/2017    PROT 7.9 11/30/2017    BILITOT 0.5 11/30/2017    WBC 7.60 10/20/2016    HGB 13.6 10/20/2016    HCT 41.1 10/20/2016    MCV 85 10/20/2016     10/20/2016    TSH 4.221 (H) 08/25/2017    CHOL 115 (L) 11/30/2017    HDL 51 11/30/2017    LDLCALC 53.0 (L) 11/30/2017    TRIG 55 11/30/2017       Assessment:     1. Coronary artery disease involving native coronary artery of native heart without angina pectoris ; stable   2. Post PTCA    3. S/P CABG (coronary artery bypass graft)    4. Essential hypertension : Adequate control   5. Hyperlipidemia, unspecified hyperlipidemia type :  on high intensity statin At LDL goal   6. Obesity, Class II, BMI 35-39.9    7. Type 2 diabetes mellitus with diabetic polyneuropathy, with long-term current use of insulin; not well controlled ( likes sweets)        Plan:     Eliu Steinberg was seen today for coronary artery disease involving native coronary artery of .    Diagnoses and all orders for this visit:    Coronary artery disease involving native coronary artery of native heart without angina pectoris    Post PTCA    S/P CABG (coronary artery bypass graft)    Essential hypertension  Continue current regimen    Hyperlipidemia, unspecified hyperlipidemia type  Continue current regimen  Mediterranean Diet recommended with carbohydrate restriction  Obesity, Class II, BMI 35-39.9  See above   Type 2 diabetes mellitus with diabetic polyneuropathy, with long-term current use of insulin  See above

## 2018-09-11 ENCOUNTER — HOSPITAL ENCOUNTER (OUTPATIENT)
Dept: RADIOLOGY | Facility: HOSPITAL | Age: 78
Discharge: HOME | End: 2018-09-11
Attending: NURSE PRACTITIONER
Payer: MEDICARE

## 2018-09-11 DIAGNOSIS — R68.81 EARLY SATIETY: ICD-10-CM

## 2018-09-11 DIAGNOSIS — R11.2 NAUSEA AND VOMITING, INTRACTABILITY OF VOMITING NOT SPECIFIED, UNSPECIFIED VOMITING TYPE: ICD-10-CM

## 2018-09-11 PROCEDURE — 78264 GASTRIC EMPTYING IMG STUDY: CPT | Mod: TC

## 2018-09-11 PROCEDURE — 78264 GASTRIC EMPTYING IMG STUDY: CPT | Mod: 26,,, | Performed by: RADIOLOGY

## 2018-09-11 PROCEDURE — A9541 TC99M SULFUR COLLOID: HCPCS

## 2018-09-18 ENCOUNTER — CLINICAL SUPPORT (OUTPATIENT)
Dept: OPHTHALMOLOGY | Facility: CLINIC | Age: 78
End: 2018-09-18
Payer: MEDICARE

## 2018-09-18 ENCOUNTER — OFFICE VISIT (OUTPATIENT)
Dept: OPHTHALMOLOGY | Facility: CLINIC | Age: 78
End: 2018-09-18
Payer: MEDICARE

## 2018-09-18 DIAGNOSIS — E11.9 TYPE 2 DIABETES MELLITUS WITHOUT OPHTHALMIC MANIFESTATIONS: ICD-10-CM

## 2018-09-18 DIAGNOSIS — H25.013 CORTICAL AGE-RELATED CATARACT, BILATERAL: ICD-10-CM

## 2018-09-18 DIAGNOSIS — H35.3132 NONEXUDATIVE AGE-RELATED MACULAR DEGENERATION, BILATERAL, INTERMEDIATE DRY STAGE: ICD-10-CM

## 2018-09-18 DIAGNOSIS — H40.1133 PRIMARY OPEN-ANGLE GLAUCOMA, BILATERAL, SEVERE STAGE: Primary | ICD-10-CM

## 2018-09-18 DIAGNOSIS — H40.1133 PRIMARY OPEN-ANGLE GLAUCOMA, BILATERAL, SEVERE STAGE: ICD-10-CM

## 2018-09-18 DIAGNOSIS — H25.13 SENILE NUCLEAR SCLEROSIS, BILATERAL: ICD-10-CM

## 2018-09-18 PROCEDURE — 92012 INTRM OPH EXAM EST PATIENT: CPT | Mod: S$PBB,,, | Performed by: OPHTHALMOLOGY

## 2018-09-18 PROCEDURE — 92133 CPTRZD OPH DX IMG PST SGM ON: CPT | Mod: PBBFAC

## 2018-09-18 PROCEDURE — 99212 OFFICE O/P EST SF 10 MIN: CPT | Mod: PBBFAC,25 | Performed by: OPHTHALMOLOGY

## 2018-09-18 PROCEDURE — 99999 PR PBB SHADOW E&M-EST. PATIENT-LVL II: CPT | Mod: PBBFAC,,, | Performed by: OPHTHALMOLOGY

## 2018-09-18 NOTE — PROGRESS NOTES
Assessment /Plan     For exam results, see Encounter Report.    Primary open-angle glaucoma, bilateral, severe stage    Senile nuclear sclerosis, bilateral    Cortical age-related cataract, bilateral    Nonexudative age-related macular degeneration, bilateral, intermediate dry stage    Type 2 diabetes mellitus without ophthalmic manifestations         Glaucoma (type and duration)    POAG severe   First HVF   2013   First photos   2017   Treatment / Drops started   Latanoprost, travatan           Family history    ?        Glaucoma meds    latanoprost        H/O adverse rxn to glaucoma drops    none        LASERS    none        GLAUCOMA SURGERIES    none        OTHER EYE SURGERIES    none        CDR    0.9/0.9        Tbase    16-20/16-20          Tmax    20/20            Ttarget    15/15           HVF    5 test 2013 to  2018 - SAD od // gen depression, SAD, IAD os (?prog os0         Gonio    3+ ou        CCT    544/525        OCT    5 test 2013 to 2018 - RNFL - decr thru out od // dec. Thru out  os        HRT   2 test 2017 to 2018 MR -  Dec. S od // dec. I, abdield S/T os /// CDR 0.78 od // 0.760 os        Disc photos    2017    - Ttoday    16/16  - Test done today HRT / IOP    POAG severe  Severe RNFL thinning with progressive field loss   - on latanoprost qhs OU, continue    NS cataract OU - hold on CE - pt is not having any problems with vision presently     Macular Drusen OU  AMD OU, seeing [facility]   AREDS 2 Vitamins  Home A[facility]ler Grid Testing    Ptosis   Pt C/O droopy lid OS > OD        F/u 4 months gonio    - seen by  retina - drusen armeugenia - saw [facility] 6/1/2018 - to F/U in 6 months     -  consider adding a second gtts if IOP increases     - Hold off on CE for now - but may need to consider it soon

## 2018-09-25 ENCOUNTER — TELEPHONE (OUTPATIENT)
Dept: GASTROENTEROLOGY | Facility: CLINIC | Age: 78
End: 2018-09-25

## 2018-09-25 NOTE — TELEPHONE ENCOUNTER
Please call pt to inform: GES shows delayed gastric emptying. This means the stomach empties too slowly. This can cause nausea, vomiting, and abdominal pain. I want her to start a gastroparesis (slow stomach) diet (please mail her the handout on the gastroparesis diet). Please have her schedule a follow up appt with me in general GI to discuss further.    Thanks,  ROSALINDA Wilcox

## 2018-10-09 RX ORDER — LACTULOSE 10 G/15ML
SOLUTION ORAL; RECTAL
Qty: 946 ML | Refills: 1 | Status: SHIPPED | OUTPATIENT
Start: 2018-10-09 | End: 2020-07-14 | Stop reason: SDUPTHER

## 2018-10-15 ENCOUNTER — OFFICE VISIT (OUTPATIENT)
Dept: OTOLARYNGOLOGY | Facility: CLINIC | Age: 78
End: 2018-10-15
Payer: MEDICARE

## 2018-10-15 ENCOUNTER — CLINICAL SUPPORT (OUTPATIENT)
Dept: AUDIOLOGY | Facility: CLINIC | Age: 78
End: 2018-10-15
Payer: MEDICARE

## 2018-10-15 VITALS — HEIGHT: 60 IN | WEIGHT: 182.31 LBS | BODY MASS INDEX: 35.79 KG/M2

## 2018-10-15 DIAGNOSIS — H90.3 SENSORINEURAL HEARING LOSS, BILATERAL: Primary | ICD-10-CM

## 2018-10-15 DIAGNOSIS — H91.13 PRESBYCUSIS OF BOTH EARS: Primary | ICD-10-CM

## 2018-10-15 PROCEDURE — 99213 OFFICE O/P EST LOW 20 MIN: CPT | Mod: PBBFAC,25 | Performed by: OTOLARYNGOLOGY

## 2018-10-15 PROCEDURE — 92557 COMPREHENSIVE HEARING TEST: CPT | Mod: PBBFAC | Performed by: AUDIOLOGIST

## 2018-10-15 PROCEDURE — 99999 PR PBB SHADOW E&M-EST. PATIENT-LVL III: CPT | Mod: PBBFAC,,, | Performed by: OTOLARYNGOLOGY

## 2018-10-15 PROCEDURE — 99214 OFFICE O/P EST MOD 30 MIN: CPT | Mod: S$PBB,,, | Performed by: OTOLARYNGOLOGY

## 2018-10-15 PROCEDURE — 92567 TYMPANOMETRY: CPT | Mod: PBBFAC | Performed by: AUDIOLOGIST

## 2018-10-15 RX ORDER — BACLOFEN 20 MG/1
TABLET ORAL
Qty: 90 TABLET | Refills: 0 | Status: SHIPPED | OUTPATIENT
Start: 2018-10-15 | End: 2018-12-25

## 2018-10-15 NOTE — PROGRESS NOTES
Subjective:       Patient ID: Eliu Paz is a 78 y.o. female.    Chief Complaint: Hearing Loss (Bilateral)    HPI: Hx of prog HL.    Troub in noise/ understanding.    Has ITC's.    Past Medical History: Patient has a past medical history of Allergy, Arthritis, Cataract, CKD (chronic kidney disease) stage 3, GFR 30-59 ml/min, Coronary artery disease, Diabetes mellitus type II (1981), Diabetic neuropathy, GERD (gastroesophageal reflux disease), Glaucoma, Hyperlipidemia, and Hypertension.    Past Surgical History: Patient has a past surgical history that includes Carpal tunnel release; Tubal ligation (1970s); Cardiac catheterization (03/2010); Coronary artery bypass graft (08/13/1994); MANOMETRY-ESOPHAGEAL-WITH IMPEDANCE (N/A, 6/13/2017); ESOPHAGOGASTRODUODENOSCOPY (EGD) (N/A, 4/18/2017); Carpal Tunnel Release, Right hand (Right, 10/19/2016); RELEASE-FINGER-TRIGGER / Index / Long (Right, 10/19/2016); COLONOSCOPY (N/A, 11/2/2015); and ESOPHAGOGASTRODUODENOSCOPY (EGD) (N/A, 4/27/2015).    Social History: Patient reports that she quit smoking about 55 years ago. She quit after 2.00 years of use. she has never used smokeless tobacco. She reports that she does not drink alcohol or use drugs.    Family History: family history includes Blindness in her brother; Diabetes in her brother, father, maternal grandmother, paternal aunt, and paternal uncle.    Medications:   Current Outpatient Medications   Medication Sig    amLODIPine (NORVASC) 10 MG tablet TAKE 1 TABLET (10 MG TOTAL) BY MOUTH ONCE DAILY.    baclofen (LIORESAL) 20 MG tablet TAKE 1 TABLET BY MOUTH EVERY DAY IN THE EVENING    carvedilol (COREG) 25 MG tablet TAKE 1 TABLET (25 MG TOTAL) BY MOUTH 2 (TWO) TIMES DAILY.    clobetasol 0.05% (TEMOVATE) 0.05 % Oint Apply small amount to vulva area twice a day for 4 weeks then once a day for 4 weeks then once a day on Mondays and Thursdays    fluocinonide (LIDEX) 0.05 % external solution Apply topically 2 (two) times  daily.    fluticasone (FLONASE) 50 mcg/actuation nasal spray SPRAY TWICE IN EACH NOSTRIL EVERY DAY    furosemide (LASIX) 40 MG tablet Take 1 tablet (40 mg total) by mouth once daily.    insulin asp prt-insulin aspart, NOVOLOG 70/30, (NOVOLOG MIX 70-30 U-100 INSULN) 100 unit/mL (70-30) Soln Inject 16 units w/ breakfast and 20 units at dinner time.    insulin syringe-needle U-100 (BD INSULIN SYRINGE ULTRA-FINE) 0.5 mL 31 gauge x 5/16 Syrg Use to inject twice daily with insulin injections    isosorbide mononitrate (IMDUR) 30 MG 24 hr tablet TAKE 1 TABLET (30 MG TOTAL) BY MOUTH ONCE DAILY.    ketoconazole (NIZORAL) 2 % cream Apply topically once daily.    lactulose (CHRONULAC) 10 gram/15 mL solution TAKE 30 MLS (20 G TOTAL) BY MOUTH DAILY AS NEEDED (CONSTIPATION).    latanoprost 0.005 % ophthalmic solution Place 1 drop into both eyes every evening.    losartan (COZAAR) 100 MG tablet TAKE 1 TABLET (100 MG TOTAL) BY MOUTH ONCE DAILY.    nitroGLYCERIN 0.4 MG/DOSE TL SPRY (NITROLINGUAL) 400 mcg/spray spray PLACE 2 SPRAYS UNDER THE TONGUE EVERY 5 MINUTES AS NEEDED FOR CHEST PAIN    ONETOUCH VERIO Strp USE TO TEST BLOOD SUGAR 4 TIMES A DAY    polyethylene glycol (GLYCOLAX) 17 gram PwPk TAKE 17 G BY MOUTH ONCE DAILY.    ranitidine (ZANTAC) 150 MG tablet Take 1 tablet (150 mg total) by mouth 2 (two) times daily.    rosuvastatin (CRESTOR) 40 MG Tab TAKE 1 TABLET BY MOUTH ONCE DAILY.    spironolactone (ALDACTONE) 25 MG tablet TAKE 2 TABLETS BY MOUTH EVERY DAY    tramadol (ULTRAM) 50 mg tablet Take 1 tablet (50 mg total) by mouth 2 (two) times daily as needed.    triamcinolone acetonide 0.1% (KENALOG) 0.1 % ointment Apply topically 2 (two) times daily. Apply small amount to itchy areas on body BID PRN.  Do not use on face or in groin.    aspirin (ECOTRIN) 81 MG EC tablet Take 1 tablet (81 mg total) by mouth once daily.    ONETOUCH DELICA LANCETS 30 gauge Misc 1 lancet by Misc.(Non-Drug; Combo Route) route 4  (four) times daily.     No current facility-administered medications for this visit.        Allergies: Patient is allergic to penicillins and trulicity [dulaglutide].        Review of Systems   Constitutional: Negative for activity change, appetite change, chills, diaphoresis, fatigue, fever and unexpected weight change.   HENT: Positive for hearing loss. Negative for congestion, ear discharge, ear pain, facial swelling, nosebleeds, postnasal drip, rhinorrhea, sinus pressure, sneezing, sore throat, tinnitus, trouble swallowing and voice change.    Eyes: Negative for photophobia, pain, discharge, redness and visual disturbance.   Respiratory: Negative for cough, chest tightness, shortness of breath and wheezing.    Cardiovascular: Negative for chest pain and palpitations.   Gastrointestinal: Negative for abdominal pain, constipation, diarrhea and nausea.   Genitourinary: Negative for dysuria and frequency.   Musculoskeletal: Negative for arthralgias, back pain, gait problem, joint swelling, myalgias, neck pain and neck stiffness.   Skin: Negative for color change, pallor and rash.   Neurological: Negative for dizziness, tremors, seizures, syncope, facial asymmetry, speech difficulty, weakness, light-headedness, numbness and headaches.   Hematological: Negative for adenopathy. Does not bruise/bleed easily.   Psychiatric/Behavioral: Negative for agitation, confusion, decreased concentration, dysphoric mood and sleep disturbance. The patient is not nervous/anxious and is not hyperactive.        Objective:      Physical Exam   Constitutional: She is oriented to person, place, and time. She appears well-developed and well-nourished. She is cooperative.  Non-toxic appearance. She does not have a sickly appearance. She does not appear ill. No distress.   HENT:   Head: Normocephalic and atraumatic. Not macrocephalic and not microcephalic. Head is without raccoon's eyes, without Leiva's sign, without abrasion, without  contusion, without laceration, without right periorbital erythema and without left periorbital erythema. Hair is normal.   Right Ear: Ear canal normal. No lacerations. No drainage, swelling or tenderness. No foreign bodies. No mastoid tenderness. Tympanic membrane is not injected, not scarred, not perforated, not erythematous, not retracted and not bulging. Tympanic membrane mobility is normal. No middle ear effusion. No hemotympanum. Decreased hearing is noted.   Left Ear: Ear canal normal. No lacerations. No drainage, swelling or tenderness. No foreign bodies. No mastoid tenderness. Tympanic membrane is not injected, not scarred, not perforated, not erythematous, not retracted and not bulging. Tympanic membrane mobility is normal.  No middle ear effusion. No hemotympanum. Decreased hearing is noted.   Nose: No mucosal edema, rhinorrhea, nose lacerations, sinus tenderness, nasal deformity, septal deviation or nasal septal hematoma. No epistaxis.  No foreign bodies. Right sinus exhibits no maxillary sinus tenderness. Left sinus exhibits no maxillary sinus tenderness.   Eyes: Conjunctivae, EOM and lids are normal. Pupils are equal, round, and reactive to light.   Neck: Normal range of motion. Neck supple. No JVD present. No tracheal tenderness and no muscular tenderness present. No neck rigidity. No tracheal deviation, no edema, no erythema and normal range of motion present. No thyroid mass and no thyromegaly present.   Cardiovascular: Normal rate and regular rhythm.   Pulmonary/Chest: Effort normal. No accessory muscle usage or stridor. No apnea, no tachypnea and no bradypnea. No respiratory distress.   Abdominal: Soft. Normal appearance.   Musculoskeletal: Normal range of motion.   Lymphadenopathy:        Head (right side): No submental, no submandibular, no tonsillar, no preauricular and no posterior auricular adenopathy present.        Head (left side): No submental, no submandibular, no tonsillar, no  preauricular and no posterior auricular adenopathy present.     She has no cervical adenopathy.        Right cervical: No superficial cervical, no deep cervical and no posterior cervical adenopathy present.       Left cervical: No superficial cervical, no deep cervical and no posterior cervical adenopathy present.   Neurological: She is alert and oriented to person, place, and time. She displays no atrophy and no tremor. No cranial nerve deficit or sensory deficit. She exhibits normal muscle tone. She displays no seizure activity.   Skin: Skin is warm, dry and intact. No abrasion, no bruising, no burn, no ecchymosis, no laceration, no lesion and no rash noted. She is not diaphoretic. No erythema. No pallor.   Psychiatric: She has a normal mood and affect. Her behavior is normal. Judgment and thought content normal. Her speech is not rapid and/or pressured and not slurred. Cognition and memory are normal. She is communicative.   Nursing note and vitals reviewed.              Assessment:       1. Presbycusis of both ears        Plan:         Patient to see Audiology for hearing aid evaluation.    RTC 1 yr.    May need CI eval in future.

## 2018-10-15 NOTE — LETTER
October 15, 2018      Ryan Carlson MD  [address]  [facility] [address]           [facility] - Otorhinolaryngology  [address]  [facility] [address]  Phone: [phone]  Fax: [phone]          Patient: Eliu Paz   MR Number: [id number]   YOB: 1940   Date of Visit: 10/15/2018       Dear Dr. Ryan Carlson:    Thank you for referring Eliu Paz to me for evaluation. Attached you will find relevant portions of my assessment and plan of care.    If you have questions, please do not hesitate to call me. I look forward to following Eliu Paz along with you.    Sincerely,    Ran Grullon MD    Enclosure  CC:  No Recipients    If you would like to receive this communication electronically, please contact [email or web address] or [phone] to request more information on [facility] access.    For providers and/or their staff who would like to refer a patient to [facility], please contact us through our one-stop-shop provider referral line, [facility], at [phone].    If you feel you have received this communication in error or would no longer like to receive these types of communications, please e-mail [email or web address]

## 2018-10-25 ENCOUNTER — OFFICE VISIT (OUTPATIENT)
Dept: GASTROENTEROLOGY | Facility: CLINIC | Age: 78
End: 2018-10-25
Payer: MEDICARE

## 2018-10-25 VITALS
HEART RATE: 63 BPM | BODY MASS INDEX: 36.1 KG/M2 | HEIGHT: 60 IN | WEIGHT: 183.88 LBS | SYSTOLIC BLOOD PRESSURE: 135 MMHG | DIASTOLIC BLOOD PRESSURE: 65 MMHG

## 2018-10-25 DIAGNOSIS — K59.00 CONSTIPATION, UNSPECIFIED CONSTIPATION TYPE: ICD-10-CM

## 2018-10-25 DIAGNOSIS — K21.00 GASTROESOPHAGEAL REFLUX DISEASE WITH ESOPHAGITIS: ICD-10-CM

## 2018-10-25 DIAGNOSIS — K31.84 GASTROPARESIS: Primary | ICD-10-CM

## 2018-10-25 PROCEDURE — 99215 OFFICE O/P EST HI 40 MIN: CPT | Mod: PBBFAC | Performed by: NURSE PRACTITIONER

## 2018-10-25 PROCEDURE — 99999 PR PBB SHADOW E&M-EST. PATIENT-LVL V: CPT | Mod: PBBFAC,,, | Performed by: NURSE PRACTITIONER

## 2018-10-25 PROCEDURE — 99213 OFFICE O/P EST LOW 20 MIN: CPT | Mod: S$PBB,,, | Performed by: NURSE PRACTITIONER

## 2018-10-25 RX ORDER — POLYETHYLENE GLYCOL 3350 17 G/17G
17 POWDER, FOR SOLUTION ORAL 2 TIMES DAILY
Qty: 1020 G | Refills: 11 | Status: SHIPPED | OUTPATIENT
Start: 2018-10-25 | End: 2018-11-24

## 2018-10-25 NOTE — PATIENT INSTRUCTIONS
For constipation: take miralax 1 cap twice daily.  For reflux: take zantac 150 mg twice daily.   For gastroparesis (slow stomach) Start the gastroparesis diet. We have referred you to a dietitian for further assistance this diet.   Try elis for nausea.

## 2018-10-25 NOTE — PROGRESS NOTES
[facility] Established Patient Visit    Reason for Visit:  The primary encounter diagnosis was Gastroparesis. Diagnoses of Gastroesophageal reflux disease with esophagitis and Constipation, unspecified constipation type were also pertinent to this visit.    PCP: Ryan Carlson    HPI:    This is a 78 y.o. female here for f/u of GERD,constipation, nausea. Ms. Paz's last GI visit was with me in 9/2018. Subsequent GES revealed delayed gastric emptying. 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  Previously treated with acitigall for GB stones.    We discussed her GES today. States she did receive information re GP and GP diet in the mail.    She is treating her acid reflux with zantac 150 mg 1-2 times daily with some improvement in almost daily pyrosis. She does have a hx of LA grade A esophagitis w/ documented healing. She had been on daily PPIs in the past, but opted to switch to H2RAs in stead d/t potential s/e of long term PPI use.     Nausea, vomiting, and early satiety if eats regular sized meal, so eats smaller portions. Nausea occurring almost daily. Does not vomit as often.  H/o DM. H/o elevated hba1c. Was on trulicity, but d/c d/t s/e.     abd pain-occ LUQ abd discomfort.  Bowel habits - h/o constipation.Tried miralax once daily , but feels it takes too long to work. Cannot recall how long she tried it for. Still taking lactulose PRN- it does cause gas. Having 1 bristol type 4 followed by type 5 BM/week  GI bleeding - denies hematochezia, hematemesis, melena, BRBPR, black/tarry stools, and coffee ground emesis  NSAID usage - ASA 81 daily.     ROS:  Constitutional: No fevers, no chills, No unintentional weight loss, no fatigue,   ENT: No allergies  CV: No chest pain, no palpitations, + perif. edema, no sob on exertion  Pulm: No cough, No shortness of breath, no wheezes, no sputum  Ophtho: No vision changes  GI: see HPI;   Derm: No rash  Heme: No lymphadenopathy,  No bruising  MSK: No arthritis, + muscle pain/back, no muscle weakness  : No dysuria, No hematuria  Endo: No hot or cold intolerance  Neuro: No syncope, No seizure,     PMHX:  has a past medical history of Allergy, Arthritis, Cataract, CKD (chronic kidney disease) stage 3, GFR 30-59 ml/min, Coronary artery disease, Diabetes mellitus type II (1981), Diabetic neuropathy, GERD (gastroesophageal reflux disease), Glaucoma, Hyperlipidemia, and Hypertension.    PSHX:  has a past surgical history that includes Carpal tunnel release; Tubal ligation (1970s); Cardiac catheterization (03/2010); Coronary artery bypass graft (08/13/1994); MANOMETRY-ESOPHAGEAL-WITH IMPEDANCE (N/A, 6/13/2017); ESOPHAGOGASTRODUODENOSCOPY (EGD) (N/A, 4/18/2017); Carpal Tunnel Release, Right hand (Right, 10/19/2016); RELEASE-FINGER-TRIGGER / Index / Long (Right, 10/19/2016); COLONOSCOPY (N/A, 11/2/2015); and ESOPHAGOGASTRODUODENOSCOPY (EGD) (N/A, 4/27/2015).    The patient's social and family histories were reviewed by me and updated in the appropriate section of the electronic medical record.    Review of patient's allergies indicates:   Allergen Reactions    Pcn [penicillins] Swelling     Swelling (extremities)^, Swelling (extremities)^  Swelling (extremities)^, Swelling (extremities)^  Swelling (extremities)^, Swelling (extremities)^    Trulicity [dulaglutide] Nausea Only and Rash       Current Outpatient Medications   Medication Sig    amLODIPine (NORVASC) 10 MG tablet TAKE 1 TABLET (10 MG TOTAL) BY MOUTH ONCE DAILY.    baclofen (LIORESAL) 20 MG tablet TAKE 1 TABLET BY MOUTH EVERY DAY IN THE EVENING    carvedilol (COREG) 25 MG tablet TAKE 1 TABLET (25 MG TOTAL) BY MOUTH 2 (TWO) TIMES DAILY.    clobetasol 0.05% (TEMOVATE) 0.05 % Oint Apply small amount to vulva area twice a day for 4 weeks then once a day for 4 weeks then once a day on Mondays and Thursdays    fluocinonide (LIDEX) 0.05 % external solution Apply topically 2 (two) times  daily.    fluticasone (FLONASE) 50 mcg/actuation nasal spray SPRAY TWICE IN EACH NOSTRIL EVERY DAY    furosemide (LASIX) 40 MG tablet Take 1 tablet (40 mg total) by mouth once daily.    insulin asp prt-insulin aspart, NOVOLOG 70/30, (NOVOLOG MIX 70-30 U-100 INSULN) 100 unit/mL (70-30) Soln Inject 16 units w/ breakfast and 20 units at dinner time.    insulin syringe-needle U-100 (BD INSULIN SYRINGE ULTRA-FINE) 0.5 mL 31 gauge x 5/16 Syrg Use to inject twice daily with insulin injections    isosorbide mononitrate (IMDUR) 30 MG 24 hr tablet TAKE 1 TABLET (30 MG TOTAL) BY MOUTH ONCE DAILY.    ketoconazole (NIZORAL) 2 % cream Apply topically once daily.    lactulose (CHRONULAC) 10 gram/15 mL solution TAKE 30 MLS (20 G TOTAL) BY MOUTH DAILY AS NEEDED (CONSTIPATION).    latanoprost 0.005 % ophthalmic solution Place 1 drop into both eyes every evening.    losartan (COZAAR) 100 MG tablet TAKE 1 TABLET (100 MG TOTAL) BY MOUTH ONCE DAILY.    nitroGLYCERIN 0.4 MG/DOSE TL SPRY (NITROLINGUAL) 400 mcg/spray spray PLACE 2 SPRAYS UNDER THE TONGUE EVERY 5 MINUTES AS NEEDED FOR CHEST PAIN    ONETOUCH VERIO Strp USE TO TEST BLOOD SUGAR 4 TIMES A DAY    rosuvastatin (CRESTOR) 40 MG Tab TAKE 1 TABLET BY MOUTH ONCE DAILY.    spironolactone (ALDACTONE) 25 MG tablet TAKE 2 TABLETS BY MOUTH EVERY DAY    tramadol (ULTRAM) 50 mg tablet Take 1 tablet (50 mg total) by mouth 2 (two) times daily as needed.    triamcinolone acetonide 0.1% (KENALOG) 0.1 % ointment Apply topically 2 (two) times daily. Apply small amount to itchy areas on body BID PRN.  Do not use on face or in groin.    aspirin (ECOTRIN) 81 MG EC tablet Take 1 tablet (81 mg total) by mouth once daily.    ONETOUCH DELICA LANCETS 30 gauge Misc 1 lancet by Misc.(Non-Drug; Combo Route) route 4 (four) times daily.    polyethylene glycol (GLYCOLAX) 17 gram/dose powder Take 17 g by mouth 2 (two) times daily.    ranitidine (ZANTAC) 150 MG tablet Take 1 tablet (150 mg  total) by mouth 2 (two) times daily.     No current facility-administered medications for this visit.          Objective Findings:    Vital Signs:  /65   Pulse 63   Ht 5' (1.524 m)   Wt 83.4 kg (183 lb 13.8 oz)   BMI 35.91 kg/m²  Body mass index is 35.91 kg/m².    Physical Exam:  General Appearance: Well appearing in no acute distress  Head:   Normocephalic, without obvious abnormality  Eyes:    No scleral icterus, EOMI  Throat: Lips, mucosa, and tongue normal; teeth and gums normal  Lungs: CTA bilaterally in anterior and posterior fields, no wheezes, no crackles.  Heart:  Regular rate and rhythm, S1, S2 normal, no murmurs heard  Abdomen: Soft, non tender, non distended with positive bowel sounds in all four quadrants. No hepatosplenomegaly, ascites, or mass  Extremities:    2+ radial pulses, no clubbing, or cyanosis + 2 edema to lower extremities bilaterally  Skin: No rash to exposed areas  Neurologic: A&Ox4      Labs:  Lab Results   Component Value Date    WBC 7.60 10/20/2016    HGB 13.6 10/20/2016    HCT 41.1 10/20/2016     10/20/2016    CHOL 115 (L) 11/30/2017    TRIG 55 11/30/2017    HDL 51 11/30/2017    ALT 12 11/30/2017    AST 24 11/30/2017     11/30/2017    K 4.5 11/30/2017     11/30/2017    CREATININE 1.1 11/30/2017    BUN 18 11/30/2017    CO2 22 (L) 11/30/2017    TSH 4.221 (H) 08/25/2017    INR 1.0 03/17/2015    HGBA1C 8.5 (H) 09/05/2018       Endoscopy:   6/13/2017 esophageal manometry Dr. Miguelangel otero    4/18/2017 EGD  esophagus. Gastric erythema. bx-focal IM no dysplasia. Repeat in 3 years.     11/2015 Colonoscopy Dr. Clniton-perianal skin tags. 10 mm transverse colon polyp. Pan tics. Path- adenovillious Repeat in 3 years (2018). colonoscopy is scheduled  for 11/2019.     4/2015 EGD Dr. Means LA grade A reflux esophagitis. Small HH. Gastric erythema. Path- intestinal metaplasia. Repeat in 2-3 years (0840-5468).    Assessment:    1. Gastroparesis    2.  Gastroesophageal reflux disease with esophagitis    3. Constipation, unspecified constipation type          Recommendations:  1. Gastroparesis -Discussed pathophysiology, prognosis, workup and treatment of gastroparesis, including surgical options.  Provided handout. Discussed gastroparesis diet, provided handout.  Referral to GI dietitian. Try elis for nausea.   2. GERD- Zantac 150 mg BID. GP diet.  3. Constipation- miralax BID.     Order summary:  Orders Placed This Encounter    polyethylene glycol (GLYCOLAX) 17 gram/dose powder    ranitidine (ZANTAC) 150 MG tablet         Thank you for allowing me to participate in the care of Eliu Izquierdo, APRN, FNP-C

## 2018-10-26 DIAGNOSIS — E11.22 TYPE 2 DIABETES MELLITUS WITH STAGE 3 CHRONIC KIDNEY DISEASE, WITH LONG-TERM CURRENT USE OF INSULIN: Chronic | ICD-10-CM

## 2018-10-26 DIAGNOSIS — N18.30 TYPE 2 DIABETES MELLITUS WITH STAGE 3 CHRONIC KIDNEY DISEASE, WITH LONG-TERM CURRENT USE OF INSULIN: Chronic | ICD-10-CM

## 2018-10-26 DIAGNOSIS — Z79.4 TYPE 2 DIABETES MELLITUS WITH STAGE 3 CHRONIC KIDNEY DISEASE, WITH LONG-TERM CURRENT USE OF INSULIN: Chronic | ICD-10-CM

## 2018-10-26 RX ORDER — PEN NEEDLE, DIABETIC 29 G X1/2"
NEEDLE, DISPOSABLE MISCELLANEOUS
Qty: 100 EACH | Refills: 7 | Status: SHIPPED | OUTPATIENT
Start: 2018-10-26 | End: 2018-12-04

## 2018-11-06 ENCOUNTER — NUTRITION (OUTPATIENT)
Dept: GASTROENTEROLOGY | Facility: CLINIC | Age: 78
End: 2018-11-06

## 2018-11-06 VITALS — HEIGHT: 60 IN | BODY MASS INDEX: 35.93 KG/M2 | WEIGHT: 183 LBS

## 2018-11-06 DIAGNOSIS — E11.22 TYPE 2 DIABETES MELLITUS WITH STAGE 3 CHRONIC KIDNEY DISEASE, WITH LONG-TERM CURRENT USE OF INSULIN: Primary | ICD-10-CM

## 2018-11-06 DIAGNOSIS — Z79.4 TYPE 2 DIABETES MELLITUS WITH STAGE 3 CHRONIC KIDNEY DISEASE, WITH LONG-TERM CURRENT USE OF INSULIN: Primary | ICD-10-CM

## 2018-11-06 DIAGNOSIS — N18.30 TYPE 2 DIABETES MELLITUS WITH STAGE 3 CHRONIC KIDNEY DISEASE, WITH LONG-TERM CURRENT USE OF INSULIN: Primary | ICD-10-CM

## 2018-11-06 DIAGNOSIS — R68.81 EARLY SATIETY: ICD-10-CM

## 2018-11-06 DIAGNOSIS — K31.84 GASTROPARESIS: ICD-10-CM

## 2018-11-06 DIAGNOSIS — K59.09 OTHER CONSTIPATION: ICD-10-CM

## 2018-11-06 PROCEDURE — 99999 PR PBB SHADOW E&M-EST. PATIENT-LVL I: CPT | Mod: PBBFAC,,,

## 2018-11-06 NOTE — PROGRESS NOTES
Referring Physician:  Jeri Torres NP   Patient did not bring her glasses today and is hard of hearing ; 25 minutes spent obtaining needed information   Type of Visit : an initial evaluation  Reason for Visit:  Gastroparesis   DM     Clinical Signs and Symptoms:   Early satiety , nausea , vomiting , wide variations in BG , constipation     Pertinent Social History:  Marital Status:   Occupation: retired teacher   Activity/nutrition: physically active with adl's cooking , housekeeping   Living situation: with spouse      Previous Medical History:  Past Medical History:   Diagnosis Date    Allergy     Arthritis     Cataract     CKD (chronic kidney disease) stage 3, GFR 30-59 ml/min     Coronary artery disease     Diabetes mellitus type II 1981    Diabetic neuropathy     GERD (gastroesophageal reflux disease)     Glaucoma     Hyperlipidemia     Hypertension        Previous Surgical History:  Past Surgical History:   Procedure Laterality Date    CARDIAC CATHETERIZATION  03/2010    x 2    CARPAL TUNNEL RELEASE      L    Carpal Tunnel Release, Right hand Right 10/19/2016    Performed by Marnie Sanders MD at [facility] OR 1ST FLR    COLONOSCOPY N/A 11/2/2015    Procedure: COLONOSCOPY;  Surgeon: Sanju Clinton MD;  Location: [address]);  Service: Endoscopy;  Laterality: N/A;    COLONOSCOPY N/A 11/2/2015    Performed by Sanju Clinton MD at [facility] (4TH FLR)    CORONARY ARTERY BYPASS GRAFT  08/13/1994    x 1    ESOPHAGOGASTRODUODENOSCOPY (EGD) N/A 4/18/2017    Performed by Hitesh Quiroga MD at [facility] (4TH FLR)    ESOPHAGOGASTRODUODENOSCOPY (EGD) N/A 4/27/2015    Performed by Hitesh Quiroga MD at [facility] (4TH FLR)    MANOMETRY-ESOPHAGEAL-WITH IMPEDANCE N/A 6/13/2017    Performed by Kenneth Oconnell MD at [facility] (4TH FLR)    RELEASE-FINGER-TRIGGER / Index / Long Right 10/19/2016    Performed by Marnie Sanders MD at [facility] OR 1ST [facility]    TUBAL LIGATION  1970s        Medication:    Current Outpatient Medications:     amLODIPine (NORVASC) 10 MG tablet, TAKE 1 TABLET (10 MG TOTAL) BY MOUTH ONCE DAILY., Disp: 90 tablet, Rfl: 11    aspirin (ECOTRIN) 81 MG EC tablet, Take 1 tablet (81 mg total) by mouth once daily., Disp: , Rfl: 0    baclofen (LIORESAL) 20 MG tablet, TAKE 1 TABLET BY MOUTH EVERY DAY IN THE EVENING, Disp: 90 tablet, Rfl: 0    BD INSULIN SYRINGE ULTRA-FINE 0.5 mL 31 gauge x 5/16 Syrg, USE TO INJECT TWICE DAILY WITH INSULIN INJECTIONS, Disp: 100 each, Rfl: 7    carvedilol (COREG) 25 MG tablet, TAKE 1 TABLET (25 MG TOTAL) BY MOUTH 2 (TWO) TIMES DAILY., Disp: 180 tablet, Rfl: 11    clobetasol 0.05% (TEMOVATE) 0.05 % Oint, Apply small amount to vulva area twice a day for 4 weeks then once a day for 4 weeks then once a day on Mondays and Thursdays, Disp: 60 g, Rfl: 1    fluocinonide (LIDEX) 0.05 % external solution, Apply topically 2 (two) times daily., Disp: 60 mL, Rfl: 1    fluticasone (FLONASE) 50 mcg/actuation nasal spray, SPRAY TWICE IN EACH NOSTRIL EVERY DAY, Disp: 16 g, Rfl: 11    furosemide (LASIX) 40 MG tablet, Take 1 tablet (40 mg total) by mouth once daily., Disp: 90 tablet, Rfl: 3    insulin asp prt-insulin aspart, NOVOLOG 70/30, (NOVOLOG MIX 70-30 U-100 INSULN) 100 unit/mL (70-30) Soln, Inject 16 units w/ breakfast and 20 units at dinner time., Disp: 50 mL, Rfl: 11    isosorbide mononitrate (IMDUR) 30 MG 24 hr tablet, TAKE 1 TABLET (30 MG TOTAL) BY MOUTH ONCE DAILY., Disp: 90 tablet, Rfl: 11    ketoconazole (NIZORAL) 2 % cream, Apply topically once daily., Disp: 30 g, Rfl: 1    lactulose (CHRONULAC) 10 gram/15 mL solution, TAKE 30 MLS (20 G TOTAL) BY MOUTH DAILY AS NEEDED (CONSTIPATION)., Disp: 946 mL, Rfl: 1    latanoprost 0.005 % ophthalmic solution, Place 1 drop into both eyes every evening., Disp: 3 Bottle, Rfl: 4    losartan (COZAAR) 100 MG tablet, TAKE 1 TABLET (100 MG TOTAL) BY MOUTH ONCE DAILY., Disp: 90 tablet, Rfl: 3     nitroGLYCERIN 0.4 MG/DOSE TL SPRY (NITROLINGUAL) 400 mcg/spray spray, PLACE 2 SPRAYS UNDER THE TONGUE EVERY 5 MINUTES AS NEEDED FOR CHEST PAIN, Disp: 36 g, Rfl: 0    ONETOUCH DELICA LANCETS 30 gauge Misc, 1 lancet by Misc.(Non-Drug; Combo Route) route 4 (four) times daily., Disp: 150 each, Rfl: 11    ONETOUCH VERIO Strp, USE TO TEST BLOOD SUGAR 4 TIMES A DAY, Disp: 150 strip, Rfl: 11    polyethylene glycol (GLYCOLAX) 17 gram/dose powder, Take 17 g by mouth 2 (two) times daily., Disp: 1020 g, Rfl: 11    ranitidine (ZANTAC) 150 MG tablet, Take 1 tablet (150 mg total) by mouth 2 (two) times daily., Disp: 60 tablet, Rfl: 11    rosuvastatin (CRESTOR) 40 MG Tab, TAKE 1 TABLET BY MOUTH ONCE DAILY., Disp: 90 tablet, Rfl: 3    spironolactone (ALDACTONE) 25 MG tablet, TAKE 2 TABLETS BY MOUTH EVERY DAY, Disp: 180 tablet, Rfl: 0    tramadol (ULTRAM) 50 mg tablet, Take 1 tablet (50 mg total) by mouth 2 (two) times daily as needed., Disp: 60 tablet, Rfl: 2    triamcinolone acetonide 0.1% (KENALOG) 0.1 % ointment, Apply topically 2 (two) times daily. Apply small amount to itchy areas on body BID PRN.  Do not use on face or in groin., Disp: 80 g, Rfl: 1      Vitamin/Supplements/Herbs:     Potential interactions with food    Allergies:  Review of patient's allergies indicates:   Allergen Reactions    Penicillins Swelling     Swelling (extremities)    Trulicity [dulaglutide] Nausea Only and Rash       Labs:Last  Checks BG frequently throughout the day - did not bring BG results but notes changes : will awaken during the night or early morning with low BG requiring treatment ( reg peanut butter ) ; when she treats low BG notes it will rise into 200's ; recent change: consuming less food due to longstanding satiety after a meal   Glucose   Date Value Ref Range Status   11/30/2017 139 (H) 70 - 110 mg/dL Final   05/30/2017 127 (H) 70 - 110 mg/dL Final     BUN, Bld   Date Value Ref Range Status   11/30/2017 18 8 - 23 mg/dL Final    05/30/2017 17 8 - 23 mg/dL Final     Creatinine   Date Value Ref Range Status   11/30/2017 1.1 0.5 - 1.4 mg/dL Final   05/30/2017 1.2 0.5 - 1.4 mg/dL Final     Sodium   Date Value Ref Range Status   11/30/2017 140 136 - 145 mmol/L Final   05/30/2017 140 136 - 145 mmol/L Final     Potassium   Date Value Ref Range Status   11/30/2017 4.5 3.5 - 5.1 mmol/L Final   05/30/2017 4.2 3.5 - 5.1 mmol/L Final     Phosphorus   Date Value Ref Range Status   06/24/2015 3.1 2.7 - 4.5 mg/dL Final   01/08/2014 3.0 2.7 - 4.5 mg/dL Final     Calcium   Date Value Ref Range Status   11/30/2017 9.7 8.7 - 10.5 mg/dL Final   05/30/2017 9.5 8.7 - 10.5 mg/dL Final     No results found for: PREALBUMIN  Total Protein   Date Value Ref Range Status   11/30/2017 7.9 6.0 - 8.4 g/dL Final   12/05/2016 7.6 6.0 - 8.4 g/dL Final     Cholesterol   Date Value Ref Range Status   11/30/2017 115 (L) 120 - 199 mg/dL Final     Comment:     The National Cholesterol Education Program (NCEP) has set the  following guidelines (reference ranges) for Cholesterol:  Optimal.....................<200 mg/dL  Borderline High.............200-239 mg/dL  High........................> or = 240 mg/dL     12/05/2016 109 (L) 120 - 199 mg/dL Final     Comment:     The National Cholesterol Education Program (NCEP) has set the  following guidelines (reference ranges) for Cholesterol:  Optimal.....................<200 mg/dL  Borderline High.............200-239 mg/dL  High........................> or = 240 mg/dL       Hemoglobin A1C   Date Value Ref Range Status   09/05/2018 8.5 (H) 4.0 - 5.6 % Final     Comment:     ADA Screening Guidelines:  5.7-6.4%  Consistent with prediabetes  >or=6.5%  Consistent with diabetes  High levels of fetal hemoglobin interfere with the HbA1C  assay. Heterozygous hemoglobin variants (HbS, HgC, etc)do  not significantly interfere with this assay.   However, presence of multiple variants may affect accuracy.     03/28/2018 8.4 (H) 4.0 - 5.6 % Final      "Comment:     According to ADA guidelines, hemoglobin A1c <7.0% represents  optimal control in non-pregnant diabetic patients. Different  metrics may apply to specific patient populations.   Standards of Medical Care in Diabetes-2016.  For the purpose of screening for the presence of diabetes:  <5.7%     Consistent with the absence of diabetes  5.7-6.4%  Consistent with increasing risk for diabetes   (prediabetes)  >or=6.5%  Consistent with diabetes  Currently, no consensus exists for use of hemoglobin A1c  for diagnosis of diabetes for children.  This Hemoglobin A1c assay has significant interference with fetal   hemoglobin   (HbF). The results are invalid for patients with abnormal amounts of   HbF,   including those with known Hereditary Persistence   of Fetal Hemoglobin. Heterozygous hemoglobin variants (HbAS, HbAC,   HbAD, HbAE, HbA2) do not significantly interfere with this assay;   however, presence of multiple variants in a sample may impact the %   interference.       Hemoglobin   Date Value Ref Range Status   10/20/2016 13.6 12.0 - 16.0 g/dL Final   2016 12.9 12.0 - 16.0 g/dL Final     Hematocrit   Date Value Ref Range Status   10/20/2016 41.1 37.0 - 48.5 % Final   2016 40.2 37.0 - 48.5 % Final     No results found for: IRON  No components found for: FROLATE  No results found for: [id number]  WBC   Date Value Ref Range Status   10/20/2016 7.60 3.90 - 12.70 K/uL Final   2016 6.99 3.90 - 12.70 K/uL Final       : 1940  Age: 78 y.o.    Anthropometrics  Estimated body mass index is 35.74 kg/m² as calculated from the following:    Height as of this encounter: 5' (1.524 m).    Weight as of this encounter: 83 kg (182 lb 15.7 oz).    For Adults 20 Years and Older:    BMI Weight Status   Below 18.5 Underweight   18.6-24.9 Normal/Healthy   25.0-29.9 Overweight   30.0 & Above Obese     Ideal Weight Range for Your Height: 5'0" = 97 - 127 lbs    Weight History:  Wt Readings from Last 12 " Encounters:   11/06/18 83 kg (182 lb 15.7 oz)   10/25/18 83.4 kg (183 lb 13.8 oz)   10/15/18 82.7 kg (182 lb 5.1 oz)   09/10/18 84.2 kg (185 lb 10 oz)   09/05/18 83.7 kg (184 lb 8.4 oz)   09/04/18 83.4 kg (183 lb 13.8 oz)   06/04/18 83.5 kg (184 lb)   04/13/18 85.8 kg (189 lb 3.2 oz)   02/28/18 86.1 kg (189 lb 13.1 oz)   12/20/17 87.3 kg (192 lb 7.4 oz)   12/12/17 88 kg (194 lb 0.1 oz)   12/08/17 88.4 kg (194 lb 12.8 oz)   ]      Weight Changes/Trend:   has decreased 12  pounds over last year    Estimated Nutrition Needs:   Energy Needs:  (MSJ x  PAL)1400 calorie ( 1.1)  Protein Needs: ( gm Protein/kg)70 grams   Fluid Needs:   (1 mL/calorie)1800cc     Nutrition Focused Physical Findings:   Well Nourished. No Malnutrition Focus Physical findings present on exa       Nutrition History Historically has consumed a high fiber diet but notes recently that feelings of fullness persist hours after meal . Admits to sweet tooth for chocolate candies, sweetened oatmeal, use of regular jelly at home     Meal Pattern: meals per day  Breakfast:Instant sweetened oatmeal with apples and raisins added  Or grits toast with egg   Lunch:sandwich with whole wheat bread and cold cuts varying in fat content ( chopped ham, bologna)   Dinner: large salad , beans, rice, pork chop   Snacks: nuts, chocolate while watching TV   Beverage Intake: diet coke, decreased intake of water '  lactose free milk ,  coffee     Food Intolerance: raw and dried fruits and raw vegetables ( salad) and high fat meals ( nuts) cause fullness     Meal Preparation:self   Dining Out: limited     Dentition: Difficulty chewing or swallowing? No       Motivation to make Changes :   action - ready to set action plan and implement to improve BG and nausea/ constipation     Anticipated barriers to making changes and/ or expected compliance   Food and nutrition-related knowledge deficit      Nutrition Diagnosis  Nutrition Problem  Altered GI Function  Inadequate oral food  or beverage intake  Related to (etiology):   Gastroparesis     Signs and Symptoms (as evidenced by):    nausea, early satiety , erratic BG     Nutrition Diagnosis Status:   New      Recommendations/Interventions/Goals:  Provided [facility] GI Nutrition Gastroparesis and Diabetes diet ; stressed liquids empty better than solids, cooked better than raw and to avoid whole grains and high fat foods . We also discussed possible use of Glucerna or yogurt as a bedtime snack in lieu of chocolate and nuts .   WRITTEN INFORMATION GIVEN:   1. Nutrition Therapy for Gastroparesis (Nutrition Care Manual)  2. Gastroparesis ([facility])  3. Low Fiber Diet ([facility])  Gastroparesis Diet Tips & Diet Intervention for Gastroparesis ([facility])Gastroparesis  Gastroparesis (also called delayed gastric emptying) occurs when the stomach takes longer than normal to empty of food. This is due to a problem with motility (the movement of the muscles in the digestive tract). For many people, gastroparesis is a lifelong condition. But treatment can help relieve symptoms and prevent complications. Read on to learn more about gastroparesis and how it can be managed.  How Gastroparesis Develops     Gastroparesis means that food and fluids move too slowly out of the stomach into the duodenum.   With normal motility, signals from nerves tell the stomach muscles when to contract. These muscles move food from the stomach into the duodenum (first part of the small bowel). With gastroparesis, the nerves or muscles are damaged. This causes motility to slow down or stop completely. As a result, food cannot move from the stomach properly. This delayed emptying can cause nausea, vomiting, and other symptoms. Malnutrition can result. Bezoars (hardened lumps of food) can form in the stomach and cause other complications as well.  Causes of Gastroparesis  Gastroparesis can be caused by any of the following:  Diabetes  Surgery involving any  of the digestive organs, such as the stomach and bowels  Certain medications, such as strong pain medications (narcotics)  Certain conditions, such as systemic scleroderma, Parkinsons disease, and thyroid disease  In many cases, the cause of gastroparesis cannot be found.  Signs and Symptoms of Gastroparesis  These can include:  Nausea and vomiting  Feeling full quickly when eating  Abdominal pain  Heartburn  Abdominal bloating  Weight loss  Loss of appetite  High and low blood sugar levels (in diabetes patients)  Diagnosing Gastroparesis  Your doctor will ask about your symptoms and health history. Youll also be examined. In addition, blood tests and x-rays are often done to check your health and rule out other problems. To confirm the problem, you may need other tests as well. These can include:  Upper endoscopy. This is done to see inside the stomach and duodenum. For the test, an endoscope is used. This is a thin, flexible tube with a tiny camera on the end. Its inserted through the mouth and down into the stomach and duodenum.  Upper gastrointestinal (GI) series. This is done to take x-rays of the upper GI tract from the mouth to the small bowel. For the test, a substance called barium is used. The barium coats the upper GI tract so that it will show up clearly on x-rays.  Gastric emptying scan. This is done to measure how quickly food leaves the stomach. For the test, a meal containing a harmless radioactive substance (tracer) is eaten. Then scans of the stomach are done. The tracer shows up clearly on the scans and shows the movement of the food through the stomach.  Gastroduodenal manometry. This is done to measure the pressure changes that occur as the muscles in the stomach and small bowel contract during digestion. For the test, a thin tube is inserted through the nose and guided down into the stomach and small bowel. The tube is connected to a computer that collects and records the data.  Wireless  capsule device. This is a harmless device shaped like a pill that is swallowed to collect information about the GI tract. As the capsule travels through the GI tract, data is sent to a  worn on a necklace or belt. This data is then analyzed. The capsule is passed out of the body through the stool within a few days.  Treating Gastroparesis  The goal of treatment is to help you manage your condition. Treatment may include one or more of the following:  Dietary changes. You may need to make changes to your eating habits and daily diet. For instance, your doctor may instruct you to eat small meals throughout the day. Doing this can keep you from feeling full too quickly. You may be placed on a liquid or soft diet. This means youll eat liquid foods or foods that are mashed or put through a . In addition, you may need to avoid foods high in fats and fiber. These can slow digestion. For more help with your diet, your doctor can refer you to a dietitian. In severe cases, you may need a feeding tube. This sends liquid food or medication directly to your small bowel.  Medications. These can help manage symptoms, such as nausea and vomiting. They can also improve motility. Each medication has specific risks and side effects. Your doctor can tell you more about any medication that is prescribed for you.  Surgery. This may be needed to place a tube into the stomach. The tube removes excess air and fluid. This can relieve severe symptoms of nausea and vomiting. In rare cases, other surgery may be needed on the stomach or small bowel. This is to create a new passageway for food to be emptied from the stomach.  Other treatments. These include botulin toxin and gastric electrical stimulation. They are done less often and may not be available. Your doctor can tell you more about these treatments, if they are options for you.  Diabetes and Gastroparesis  If you have diabetes, gastroparesis can make it harder to  manage your blood sugar level. Youll need to take extra steps in your treatment to prevent complications. Work with your doctor to learn what you can do to protect your health. For more information, contact the American Diabetes Association, www.diabetes.org.   Long-term Concerns   With treatment, most people can manage their symptoms and maintain their usual routine. If your symptoms are moderate to severe, you may need to see your doctor more frequently for checkups. Also, other treatments will likely be needed.  4. © 3430-3952 [employer], [address] [place] [address], [place] [address]. All rights reserved. This information is not intended as a substitute for professional medical care. Always follow your healthcare professional's instructions.        Comprehension: of recommendations :   Ability to recall nutrition goals and Self-monitoring at agreed upon rate    Reviewed treatment for low BG at home and when out of home  . Has glucose tablets and suggested these over peanut butter     Monitoring :   weight, BG , status of nausea ( improved or not)       Routed to Jeri Torres NP and Angie Ozuna MD     Consultation Time:60 minutes.      Follow Up:1 month.

## 2018-11-23 DIAGNOSIS — G56.01 CARPAL TUNNEL SYNDROME, RIGHT: ICD-10-CM

## 2018-11-25 RX ORDER — TRAMADOL HYDROCHLORIDE 50 MG/1
TABLET ORAL
Qty: 60 TABLET | Refills: 0 | Status: SHIPPED | OUTPATIENT
Start: 2018-11-25 | End: 2019-07-23 | Stop reason: SDUPTHER

## 2018-12-04 ENCOUNTER — LAB VISIT (OUTPATIENT)
Dept: LAB | Facility: HOSPITAL | Age: 78
End: 2018-12-04
Attending: INTERNAL MEDICINE
Payer: MEDICARE

## 2018-12-04 ENCOUNTER — OFFICE VISIT (OUTPATIENT)
Dept: INTERNAL MEDICINE | Facility: CLINIC | Age: 78
End: 2018-12-04
Payer: MEDICARE

## 2018-12-04 VITALS
BODY MASS INDEX: 36.05 KG/M2 | OXYGEN SATURATION: 98 % | SYSTOLIC BLOOD PRESSURE: 118 MMHG | DIASTOLIC BLOOD PRESSURE: 60 MMHG | HEART RATE: 64 BPM | HEIGHT: 60 IN | WEIGHT: 183.63 LBS

## 2018-12-04 DIAGNOSIS — N18.30 TYPE 2 DIABETES MELLITUS WITH STAGE 3 CHRONIC KIDNEY DISEASE, WITH LONG-TERM CURRENT USE OF INSULIN: Chronic | ICD-10-CM

## 2018-12-04 DIAGNOSIS — E11.22 TYPE 2 DIABETES MELLITUS WITH STAGE 3 CHRONIC KIDNEY DISEASE, WITH LONG-TERM CURRENT USE OF INSULIN: Chronic | ICD-10-CM

## 2018-12-04 DIAGNOSIS — Z79.4 TYPE 2 DIABETES MELLITUS WITH STAGE 3 CHRONIC KIDNEY DISEASE, WITH LONG-TERM CURRENT USE OF INSULIN: Chronic | ICD-10-CM

## 2018-12-04 LAB
ALBUMIN SERPL BCP-MCNC: 3.7 G/DL
ALP SERPL-CCNC: 74 U/L
ALT SERPL W/O P-5'-P-CCNC: 14 U/L
ANION GAP SERPL CALC-SCNC: 9 MMOL/L
AST SERPL-CCNC: 21 U/L
BASOPHILS # BLD AUTO: 0.01 K/UL
BASOPHILS NFR BLD: 0.2 %
BILIRUB SERPL-MCNC: 0.5 MG/DL
BUN SERPL-MCNC: 16 MG/DL
CALCIUM SERPL-MCNC: 9.6 MG/DL
CHLORIDE SERPL-SCNC: 108 MMOL/L
CHOLEST SERPL-MCNC: 113 MG/DL
CHOLEST/HDLC SERPL: 2.3 {RATIO}
CO2 SERPL-SCNC: 22 MMOL/L
CREAT SERPL-MCNC: 1.1 MG/DL
DIFFERENTIAL METHOD: ABNORMAL
EOSINOPHIL # BLD AUTO: 0.2 K/UL
EOSINOPHIL NFR BLD: 2.9 %
ERYTHROCYTE [DISTWIDTH] IN BLOOD BY AUTOMATED COUNT: 15.3 %
EST. GFR  (AFRICAN AMERICAN): 56 ML/MIN/1.73 M^2
EST. GFR  (NON AFRICAN AMERICAN): 49 ML/MIN/1.73 M^2
ESTIMATED AVG GLUCOSE: 180 MG/DL
GLUCOSE SERPL-MCNC: 143 MG/DL
HBA1C MFR BLD HPLC: 7.9 %
HCT VFR BLD AUTO: 36.9 %
HDLC SERPL-MCNC: 50 MG/DL
HDLC SERPL: 44.2 %
HGB BLD-MCNC: 11.3 G/DL
LDLC SERPL CALC-MCNC: 45.8 MG/DL
LYMPHOCYTES # BLD AUTO: 1.7 K/UL
LYMPHOCYTES NFR BLD: 28.6 %
MCH RBC QN AUTO: 27 PG
MCHC RBC AUTO-ENTMCNC: 30.6 G/DL
MCV RBC AUTO: 88 FL
MONOCYTES # BLD AUTO: 0.6 K/UL
MONOCYTES NFR BLD: 9.6 %
NEUTROPHILS # BLD AUTO: 3.5 K/UL
NEUTROPHILS NFR BLD: 58.5 %
NONHDLC SERPL-MCNC: 63 MG/DL
PLATELET # BLD AUTO: 156 K/UL
PMV BLD AUTO: 11.2 FL
POTASSIUM SERPL-SCNC: 4.4 MMOL/L
PROT SERPL-MCNC: 7.6 G/DL
RBC # BLD AUTO: 4.18 M/UL
SODIUM SERPL-SCNC: 139 MMOL/L
TRIGL SERPL-MCNC: 86 MG/DL
WBC # BLD AUTO: 5.94 K/UL

## 2018-12-04 PROCEDURE — 99213 OFFICE O/P EST LOW 20 MIN: CPT | Mod: PBBFAC | Performed by: INTERNAL MEDICINE

## 2018-12-04 PROCEDURE — 99999 PR PBB SHADOW E&M-EST. PATIENT-LVL III: CPT | Mod: PBBFAC,,, | Performed by: INTERNAL MEDICINE

## 2018-12-04 PROCEDURE — 80061 LIPID PANEL: CPT

## 2018-12-04 PROCEDURE — 80053 COMPREHEN METABOLIC PANEL: CPT

## 2018-12-04 PROCEDURE — 85025 COMPLETE CBC W/AUTO DIFF WBC: CPT

## 2018-12-04 PROCEDURE — 83036 HEMOGLOBIN GLYCOSYLATED A1C: CPT

## 2018-12-04 PROCEDURE — 99214 OFFICE O/P EST MOD 30 MIN: CPT | Mod: S$PBB,,, | Performed by: INTERNAL MEDICINE

## 2018-12-04 PROCEDURE — 36415 COLL VENOUS BLD VENIPUNCTURE: CPT

## 2018-12-04 RX ORDER — NAPROXEN SODIUM 220 MG
TABLET ORAL
Qty: 300 EACH | Refills: 11 | Status: SHIPPED | OUTPATIENT
Start: 2018-12-04 | End: 2019-12-26

## 2018-12-04 NOTE — PROGRESS NOTES
Subjective:       Patient ID: Eliu Paz is a 78 y.o. female.    Chief Complaint: Follow-up (3 month)    Patient is here for followup for chronic conditions.    Wants to see another ENT doctor, chronic hearing loss and tinnitus. Reviewed that she saw ENT 2 mos ago and had hearing test as well.    DM2:  good med adherence  no changed Diet  100 this am, 140 AM sugars  <200 mostly Post-prandial sugars  stable Numbness in feet  no sores in feet  no Visual changes  no Polyuria/polydipsia.          Review of Systems   Constitutional: Positive for fatigue. Negative for activity change and appetite change.   Eyes: Negative for visual disturbance.   Respiratory: Negative for chest tightness, shortness of breath and wheezing.    Cardiovascular: Positive for leg swelling (mild at ankles). Negative for chest pain and palpitations.   Gastrointestinal: Negative for abdominal distention and abdominal pain.   Genitourinary: Negative for pelvic pain.   Musculoskeletal: Positive for back pain and neck pain. Negative for arthralgias.   Skin: Negative for rash.   Neurological: Positive for weakness (L leg from quad tear) and numbness. Negative for tremors, syncope, facial asymmetry and speech difficulty.   Hematological: Negative for adenopathy. Does not bruise/bleed easily.   Psychiatric/Behavioral: Negative for dysphoric mood. The patient is nervous/anxious.        Objective:      Physical Exam   Constitutional: She is oriented to person, place, and time. She appears well-developed and well-nourished. No distress.   HENT:   Head: Normocephalic and atraumatic.   Mouth/Throat: No oropharyngeal exudate.   TMs clear bilat.  Mild TMJ tenderness to deep palpation bilat, normal jaw opening bilat   Eyes: Conjunctivae are normal. Pupils are equal, round, and reactive to light. No scleral icterus.   Neck: Normal range of motion. Neck supple. No thyromegaly present.   Cardiovascular: Normal rate, regular rhythm and normal heart sounds.  "  Pulmonary/Chest: Effort normal and breath sounds normal.   Abdominal: Soft. Bowel sounds are normal. She exhibits no distension. There is no tenderness.   Musculoskeletal: Normal range of motion. She exhibits edema (mild bilat ankles, not tender). She exhibits no tenderness.   Weak L quadricep mechanism    Some painful R knee rom, has brace R knee   Lymphadenopathy:     She has no cervical adenopathy.   Neurological: She is alert and oriented to person, place, and time. She displays normal reflexes. No cranial nerve deficit. She exhibits normal muscle tone. Coordination normal.   Mild ankle edema, not significant lower extrem, edema   Skin: No rash noted. She is not diaphoretic.   Thinning of hair on scalp, no focal areas of hair loss.  No seborrhea seen.  A few papules on scalp, some keratotic, some hyperpigmented, all benign appearing    Mild keloid formation scalp line posterior neck   Psychiatric: She has a normal mood and affect. Her behavior is normal.       Assessment:       1. Type 2 diabetes mellitus with stage 3 chronic kidney disease, with long-term current use of insulin        Plan:       lEiu Steinberg was seen today for follow-up.    Diagnoses and all orders for this visit:    Type 2 diabetes mellitus with stage 3 chronic kidney disease, with long-term current use of insulin  -     insulin syringe-needle U-100 (BD INSULIN SYRINGE ULTRA-FINE) 0.5 mL 31 gauge x 5/16" Syrg; USE TO INJECT TWICE DAILY WITH INSULIN INJECTIONS  -     CBC auto differential; Future  -     Comprehensive metabolic panel; Future  -     Lipid panel; Future  -     Hemoglobin A1c; Future    Hearing loss, reviewed last ENT visit    Health Maintenance       Date Due Completion Date    Influenza Vaccine 08/01/2018 2/7/2017 (Declined)    Override on 2/7/2017: Declined (per provider note)    Override on 10/15/2015: Not Clinically Appropriate (declines)    DEXA SCAN 11/05/2018 11/5/2015    Hemoglobin A1c 06/04/2019 12/4/2018    Foot Exam " 09/05/2019 9/5/2018    Override on 9/5/2018: Done    Override on 9/1/2017: Done (podiatry)    Override on 12/2/2016: Done    Override on 8/24/2016: Done (by cardiologist)    Override on 7/14/2015: Done    Eye Exam 09/18/2019 9/18/2018    Lipid Panel 12/04/2019 12/4/2018    Colonoscopy 11/02/2020 11/2/2015    TETANUS VACCINE 02/07/2027 2/7/2017 (Declined)    Override on 2/7/2017: Declined (per provider note)      Declines vaccines    Follow-up in about 6 months (around 6/4/2019).    Future Appointments   Date Time Provider Department Center   1/15/2019 10:30 AM Zuleika Francis MD [facility]   1/30/2019  9:30 AM Jeri Izquierdo NP [facility]

## 2018-12-13 RX ORDER — SPIRONOLACTONE 25 MG/1
TABLET ORAL
Qty: 180 TABLET | Refills: 11 | Status: SHIPPED | OUTPATIENT
Start: 2018-12-13 | End: 2020-01-16

## 2018-12-13 NOTE — TELEPHONE ENCOUNTER
Patient is complaining of leg and knee pain in both. Knees are swollen and the ligaments are damaged. Ankles are swollen. Can barely  walk and get up and down.

## 2018-12-13 NOTE — TELEPHONE ENCOUNTER
----- Message from Cammy Ruvalcaba sent at 12/13/2018  2:39 PM CST -----  Contact: Patient [phone]  Patient is requesting a call back in regard to leg problems (pain).     Please call and advise  Thank you

## 2018-12-14 ENCOUNTER — TELEPHONE (OUTPATIENT)
Dept: INTERNAL MEDICINE | Facility: CLINIC | Age: 78
End: 2018-12-14

## 2018-12-14 NOTE — TELEPHONE ENCOUNTER
----- Message from Eduarda Mendoza sent at 12/14/2018  3:53 PM CST -----  Contact: Patient [phone]  Type: Returning a call    Who left a message?Maude Larson LPN    When did the practice call?Today    Comments:Please call back.    Thanks

## 2018-12-14 NOTE — TELEPHONE ENCOUNTER
Hi, I could see her in clinic today with resident doctor -- Dr Byrd at 315 if she would like an appointment. This sounds like her chronic pain issues and not actually something new though. Let me know if she had a new injury or if she thinks this is something new.  Dr Fung had previously given her Baclofen for pain/muscle pains, please ask if she has been taking that.    Let me know if patient has any questions.  Thank you, Ryan Carlson

## 2018-12-18 ENCOUNTER — TELEPHONE (OUTPATIENT)
Dept: INTERNAL MEDICINE | Facility: CLINIC | Age: 78
End: 2018-12-18

## 2018-12-18 ENCOUNTER — HOSPITAL ENCOUNTER (OUTPATIENT)
Dept: RADIOLOGY | Facility: HOSPITAL | Age: 78
Discharge: HOME | End: 2018-12-18
Attending: INTERNAL MEDICINE
Payer: MEDICARE

## 2018-12-18 ENCOUNTER — OFFICE VISIT (OUTPATIENT)
Dept: INTERNAL MEDICINE | Facility: CLINIC | Age: 78
End: 2018-12-18
Payer: MEDICARE

## 2018-12-18 VITALS
OXYGEN SATURATION: 98 % | HEIGHT: 62 IN | SYSTOLIC BLOOD PRESSURE: 126 MMHG | BODY MASS INDEX: 33.26 KG/M2 | WEIGHT: 180.75 LBS | HEART RATE: 63 BPM | DIASTOLIC BLOOD PRESSURE: 60 MMHG

## 2018-12-18 DIAGNOSIS — M54.50 CHRONIC BILATERAL LOW BACK PAIN WITHOUT SCIATICA: ICD-10-CM

## 2018-12-18 DIAGNOSIS — R29.898 LEG WEAKNESS, BILATERAL: Primary | ICD-10-CM

## 2018-12-18 DIAGNOSIS — G89.29 CHRONIC BILATERAL LOW BACK PAIN WITHOUT SCIATICA: ICD-10-CM

## 2018-12-18 DIAGNOSIS — E11.42 TYPE 2 DIABETES MELLITUS WITH DIABETIC POLYNEUROPATHY, WITH LONG-TERM CURRENT USE OF INSULIN: Chronic | ICD-10-CM

## 2018-12-18 DIAGNOSIS — Z79.4 TYPE 2 DIABETES MELLITUS WITH DIABETIC POLYNEUROPATHY, WITH LONG-TERM CURRENT USE OF INSULIN: Chronic | ICD-10-CM

## 2018-12-18 DIAGNOSIS — R29.898 LEG WEAKNESS, BILATERAL: ICD-10-CM

## 2018-12-18 PROCEDURE — 99214 OFFICE O/P EST MOD 30 MIN: CPT | Mod: PBBFAC,25 | Performed by: INTERNAL MEDICINE

## 2018-12-18 PROCEDURE — 72100 X-RAY EXAM L-S SPINE 2/3 VWS: CPT | Mod: TC

## 2018-12-18 PROCEDURE — 99214 OFFICE O/P EST MOD 30 MIN: CPT | Mod: S$PBB,,, | Performed by: INTERNAL MEDICINE

## 2018-12-18 PROCEDURE — 72100 X-RAY EXAM L-S SPINE 2/3 VWS: CPT | Mod: 26,,, | Performed by: RADIOLOGY

## 2018-12-18 PROCEDURE — 99999 PR PBB SHADOW E&M-EST. PATIENT-LVL IV: CPT | Mod: PBBFAC,,, | Performed by: INTERNAL MEDICINE

## 2018-12-18 NOTE — PROGRESS NOTES
Subjective:       Patient ID: Eliu Paz is a 78 y.o. female.    Chief Complaint: Leg Pain (ankles swollen flare up )    Patient is here for followup for chronic conditions.    She feels like increased swelling bilat ankles.    Feels like she can barely walk due to leg pains. Denies significant am stiffness. Current symptoms really localized to lower extrem, denies upper extrem pains. She further describes some low back pains (corried re kindeys) and endorses that her leg pains eased with walking with a cart and are exertional.      Review of Systems   Constitutional: Positive for fatigue. Negative for activity change and appetite change.   Eyes: Negative for visual disturbance.   Respiratory: Negative for chest tightness, shortness of breath and wheezing.    Cardiovascular: Positive for leg swelling (mild at ankles). Negative for chest pain and palpitations.   Gastrointestinal: Negative for abdominal distention and abdominal pain.   Genitourinary: Negative for pelvic pain.   Musculoskeletal: Positive for back pain and neck pain. Negative for arthralgias.   Skin: Negative for rash.   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 Negative for tremors, syncope, facial asymmetry and speech difficulty.   Hematological: Negative for adenopathy. Does not bruise/bleed easily.   Psychiatric/Behavioral: Negative for dysphoric mood. The patient is nervous/anxious.        Objective:      Physical Exam   Constitutional: She is oriented to person, place, and time. She appears well-developed and well-nourished. No distress.   HENT:   Head: Normocephalic and atraumatic.   Mouth/Throat: No oropharyngeal exudate.   Eyes: Conjunctivae are normal. Pupils are equal, round, and reactive to light. No scleral icterus.   Neck: Normal range of motion. Neck supple. No thyromegaly present.   Cardiovascular: Normal rate, regular rhythm and normal heart sounds.   Pulmonary/Chest: Effort  normal and breath sounds normal.   Abdominal: Soft. Bowel sounds are normal. She exhibits no distension. There is no tenderness.   Musculoskeletal: Normal range of motion. She exhibits edema (mild bilat ankles, not tender). She exhibits no tenderness.   No midline spine tenderness to deep palpation.  Difficulty rising to exam table. No focal lower leg weakness except to LLE extension at knee 2/2 quad rupture   Lymphadenopathy:     She has no cervical adenopathy.   Neurological: She is alert and oriented to person, place, and time. She displays normal reflexes. No cranial nerve deficit. She exhibits normal muscle tone. Coordination normal.   Mild ankle edema, not significant lower extrem, edema   Skin: No rash noted. She is not diaphoretic.   Thinning of hair on scalp, no focal areas of hair loss.  No seborrhea seen.  A few papules on scalp, some keratotic, some hyperpigmented, all benign appearing    Mild keloid formation scalp line posterior neck   Psychiatric: She has a normal mood and affect. Her behavior is normal.       Assessment:       1. Leg weakness, bilateral    2. Chronic bilateral low back pain without sciatica    3. Type 2 diabetes mellitus with diabetic polyneuropathy, with long-term current use of insulin        Plan:       Eliu Steinberg was seen today for leg pain.    Diagnoses and all orders for this visit:    Leg weakness, bilateral  -     X-Ray Lumbar Spine Ap And Lateral; Future  -     Ambulatory Referral to Physical Medicine Rehab  Chronic bilateral low back pain without sciatica  -     X-Ray Lumbar Spine Ap And Lateral; Future  -     Ambulatory Referral to Physical Medicine Rehab  I think her symptoms are from spinal stenosis/neurogenic claudication.      Type 2 diabetes mellitus with diabetic polyneuropathy, with long-term current use of insulin  -     Ambulatory referral to Podiatry        Health Maintenance       Date Due Completion Date    Influenza Vaccine 08/01/2018 2/7/2017 (Declined)     Override on 2/7/2017: Declined (per provider note)    Override on 10/15/2015: Not Clinically Appropriate (declines)    DEXA SCAN 11/05/2018 11/5/2015    Hemoglobin A1c 06/04/2019 12/4/2018    Foot Exam 09/05/2019 9/5/2018    Override on 9/5/2018: Done    Override on 9/1/2017: Done (podiatry)    Override on 12/2/2016: Done    Override on 8/24/2016: Done (by cardiologist)    Override on 7/14/2015: Done    Eye Exam 09/18/2019 9/18/2018    Lipid Panel 12/04/2019 12/4/2018    Colonoscopy 11/02/2020 11/2/2015    TETANUS VACCINE 02/07/2027 2/7/2017 (Declined)    Override on 2/7/2017: Declined (per provider note)          Return to clinic previously agreed (at last visit) next due visit      Future Appointments   Date Time Provider Department Center   12/20/2018  8:30 AM Johnson Lorenzo DPM [facility]   1/8/2019  9:00 AM Wendi Merida MD [facility]   1/15/2019 10:30 AM Zuleika Francis MD [facility]   1/30/2019  9:30 AM Jeri Izquierdo NP [facility]

## 2018-12-18 NOTE — TELEPHONE ENCOUNTER
----- Message from Lorin Montano sent at 12/17/2018  4:53 PM CST -----  Contact: [phone]  Type: Returning a call    Who left a message?  Maude    When did the practice call? today    Comments:  Please advise, thank you

## 2018-12-20 ENCOUNTER — OFFICE VISIT (OUTPATIENT)
Dept: PODIATRY | Facility: CLINIC | Age: 78
End: 2018-12-20
Payer: MEDICARE

## 2018-12-20 VITALS
SYSTOLIC BLOOD PRESSURE: 101 MMHG | DIASTOLIC BLOOD PRESSURE: 54 MMHG | BODY MASS INDEX: 33.13 KG/M2 | WEIGHT: 180 LBS | HEART RATE: 64 BPM | HEIGHT: 62 IN

## 2018-12-20 DIAGNOSIS — B35.1 ONYCHOMYCOSIS DUE TO DERMATOPHYTE: ICD-10-CM

## 2018-12-20 DIAGNOSIS — M20.11 VALGUS DEFORMITY OF BOTH GREAT TOES: ICD-10-CM

## 2018-12-20 DIAGNOSIS — M20.12 VALGUS DEFORMITY OF BOTH GREAT TOES: ICD-10-CM

## 2018-12-20 DIAGNOSIS — M20.41 HAMMER TOES OF BOTH FEET: ICD-10-CM

## 2018-12-20 DIAGNOSIS — M20.42 HAMMER TOES OF BOTH FEET: ICD-10-CM

## 2018-12-20 DIAGNOSIS — E11.49 TYPE II DIABETES MELLITUS WITH NEUROLOGICAL MANIFESTATIONS: Primary | ICD-10-CM

## 2018-12-20 PROCEDURE — 99203 OFFICE O/P NEW LOW 30 MIN: CPT | Mod: S$PBB,,, | Performed by: PODIATRIST

## 2018-12-20 PROCEDURE — 99999 PR PBB SHADOW E&M-EST. PATIENT-LVL III: CPT | Mod: PBBFAC,,, | Performed by: PODIATRIST

## 2018-12-20 PROCEDURE — 99213 OFFICE O/P EST LOW 20 MIN: CPT | Mod: PBBFAC | Performed by: PODIATRIST

## 2018-12-20 RX ORDER — CICLOPIROX 80 MG/ML
SOLUTION TOPICAL NIGHTLY
Qty: 6.6 ML | Refills: 11 | Status: SHIPPED | OUTPATIENT
Start: 2018-12-20 | End: 2020-08-14

## 2018-12-20 NOTE — PROGRESS NOTES
Subjective:      Patient ID: Eliu Paz is a 78 y.o. female.    Chief Complaint: Diabetes Mellitus (dr page12/18/2018) and Diabetic Foot Exam    Eliu Steinberg is a 78 y.o. female who presents to the clinic upon referral from Dr. Page  for evaluation and treatment of diabetic feet. Eliu Steinberg has a past medical history of Allergy, Arthritis, Cataract, CKD (chronic kidney disease) stage 3, GFR 30-59 ml/min, Coronary artery disease, Diabetes mellitus type II (1981), Diabetic neuropathy, GERD (gastroesophageal reflux disease), Glaucoma, Hyperlipidemia, and Hypertension. Patient relates no major problem with feet. Only complaints today consist of Diabetes, increased risk amputation needing evaluation/management/optomization of foot care.    .    PCP: Ryan Page MD    Date Last Seen by PCP:   Chief Complaint   Patient presents with    Diabetes Mellitus     dr page12/18/2018    Diabetic Foot Exam        Current shoe gear: Casual shoes    Hemoglobin A1C   Date Value Ref Range Status   12/04/2018 7.9 (H) 4.0 - 5.6 % Final     Comment:     ADA Screening Guidelines:  5.7-6.4%  Consistent with prediabetes  >or=6.5%  Consistent with diabetes  High levels of fetal hemoglobin interfere with the HbA1C  assay. Heterozygous hemoglobin variants (HbS, HgC, etc)do  not significantly interfere with this assay.   However, presence of multiple variants may affect accuracy.     09/05/2018 8.5 (H) 4.0 - 5.6 % Final     Comment:     ADA Screening Guidelines:  5.7-6.4%  Consistent with prediabetes  >or=6.5%  Consistent with diabetes  High levels of fetal hemoglobin interfere with the HbA1C  assay. Heterozygous hemoglobin variants (HbS, HgC, etc)do  not significantly interfere with this assay.   However, presence of multiple variants may affect accuracy.     03/28/2018 8.4 (H) 4.0 - 5.6 % Final     Comment:     According to ADA guidelines, hemoglobin A1c <7.0% represents  optimal control in non-pregnant diabetic patients.  Different  metrics may apply to specific patient populations.   Standards of Medical Care in Diabetes-2016.  For the purpose of screening for the presence of diabetes:  <5.7%     Consistent with the absence of diabetes  5.7-6.4%  Consistent with increasing risk for diabetes   (prediabetes)  >or=6.5%  Consistent with diabetes  Currently, no consensus exists for use of hemoglobin A1c  for diagnosis of diabetes for children.  This Hemoglobin A1c assay has significant interference with fetal   hemoglobin   (HbF). The results are invalid for patients with abnormal amounts of   HbF,   including those with known Hereditary Persistence   of Fetal Hemoglobin. Heterozygous hemoglobin variants (HbAS, HbAC,   HbAD, HbAE, HbA2) do not significantly interfere with this assay;   however, presence of multiple variants in a sample may impact the %   interference.             Review of Systems   Constitution: Negative for chills, diaphoresis, fever, malaise/fatigue and night sweats.   Cardiovascular: Positive for leg swelling. Negative for claudication, cyanosis and syncope.   Skin: Positive for nail changes. Negative for color change, dry skin, rash, suspicious lesions and unusual hair distribution.   Musculoskeletal: Negative for falls, joint pain, joint swelling, muscle cramps, muscle weakness and stiffness.   Gastrointestinal: Negative for constipation, diarrhea, nausea and vomiting.   Neurological: Positive for sensory change. Negative for brief paralysis, disturbances in coordination, focal weakness, numbness, paresthesias and tremors.           Objective:      Physical Exam   Constitutional: She is oriented to person, place, and time. She appears well-developed and well-nourished. She is cooperative.   Oriented to time, place, and person.   Cardiovascular:   Pulses:       Dorsalis pedis pulses are 1+ on the right side, and 1+ on the left side.        Posterior tibial pulses are 1+ on the right side, and 1+ on the left side.    Capillary fill time 3-5 seconds.  All toes warm to touch.      <2+ non pitting lower extremity edema bilateral.    Negative elevational pallor and dependent rubor bilateral.     Musculoskeletal:   Normal angle, base, station of gait. Decreased stride length, early heel off, moderately propulsive toe off bilateral.    All ten toes without clubbing, cyanosis, or signs of ischemia.      Patient has hammertoes of digits 2-5 bilateral                  partially reducible without symptom today.    Bunions both feet without symptom.    No pain to palpation bilateral lower extremities.      Range of motion, stability, muscle strength, and muscle tone are age and health appropriate normal bilateral feet and legs.       Lymphadenopathy:   Negative lymphadenopathy bilateral popliteal fossa and tarsal tunnel.  Negative lymphangitic streaking bilateral foot/ankle bilateral.     Neurological: She is alert and oriented to person, place, and time. She has normal strength. She is not disoriented. She displays no atrophy and no tremor. A sensory deficit is present. She exhibits normal muscle tone.   Reflex Scores:       Patellar reflexes are 2+ on the right side and 2+ on the left side.       Achilles reflexes are 2+ on the right side and 2+ on the left side.  Decreased/absent vibratory sensation bilateral feet to 128Hz tuning fork.     Skin: Skin is warm, dry and intact. No abrasion, no bruising, no burn, no ecchymosis, no laceration, no lesion, no petechiae and no rash noted. She is not diaphoretic. No cyanosis or erythema. No pallor. Nails show no clubbing.   Skin thin, atrophic, with decreased density and distribution of pedal hair bilateral, but without hyperpigmentation, therese discoloration,  ulcers, masses, nodules or cords palpated bilateral feet and legs.      Toenails 1st, right 5th  left are hypertrophic thickened 2-3 mm, dystrophic, discolored tanish brown with tan, gray crumbly subungual debris.  Not tender to distal  nail plate pressure, without periungual skin abnormality of each.               Assessment:       Encounter Diagnoses   Name Primary?    Type II diabetes mellitus with neurological manifestations Yes    Onychomycosis due to dermatophyte     Hammer toes of both feet     Valgus deformity of both great toes          Plan:       Eliu Steinberg was seen today for diabetes mellitus and diabetic foot exam.    Diagnoses and all orders for this visit:    Type II diabetes mellitus with neurological manifestations  -     DIABETIC SHOES FOR HOME USE    Onychomycosis due to dermatophyte  -     DIABETIC SHOES FOR HOME USE    Hammer toes of both feet  -     DIABETIC SHOES FOR HOME USE    Valgus deformity of both great toes  -     DIABETIC SHOES FOR HOME USE    Other orders  -     ciclopirox (PENLAC) 8 % Soln; Apply topically nightly.      I counseled the patient on her conditions, their implications and medical management.        - Shoe inspection. Diabetic Foot Education. Patient reminded of the importance of good nutrition and blood sugar control to help prevent podiatric complications of diabetes. Patient instructed on proper foot hygeine. We discussed wearing proper shoe gear, daily foot inspections, never walking without protective shoe gear, never putting sharp instruments to feet, routine podiatric nail visits every 2-3 months.      Rx penlac    Discussed conservative treatment with shoes of adequate dimensions, material, and style to alleviate symptoms and delay or prevent surgical intervention.    Rx DM shoes, 3 pair heat molded inserts.        Follow-up in about 1 year (around 12/20/2019).

## 2018-12-20 NOTE — LETTER
December 20, 2018      Ryan Carlson MD  [address]  [place] [address]           [facility] - Podiatry  [address]  [place] [address]  Phone: [phone]          Patient: Eliu Paz   MR Number: [id number]   YOB: 1940   Date of Visit: 12/20/2018       Dear Dr. Ryan Carlson:    Thank you for referring Eliu Paz to me for evaluation. Attached you will find relevant portions of my assessment and plan of care.    If you have questions, please do not hesitate to call me. I look forward to following Eliu Paz along with you.    Sincerely,    Johnson Lorenzo, MARYSOL    Enclosure  CC:  No Recipients    If you would like to receive this communication electronically, please contact [email or web address] or [phone] to request more information on [employer] Link access.    For providers and/or their staff who would like to refer a patient to [facility], please contact us through our one-stop-shop provider referral line, [facility], at [phone].    If you feel you have received this communication in error or would no longer like to receive these types of communications, please e-mail [email or web address]

## 2018-12-25 ENCOUNTER — HOSPITAL ENCOUNTER (EMERGENCY)
Facility: HOSPITAL | Age: 78
Discharge: HOME | End: 2018-12-25
Attending: EMERGENCY MEDICINE
Payer: MEDICARE

## 2018-12-25 VITALS
HEIGHT: 62 IN | RESPIRATION RATE: 16 BRPM | SYSTOLIC BLOOD PRESSURE: 142 MMHG | WEIGHT: 180 LBS | DIASTOLIC BLOOD PRESSURE: 64 MMHG | OXYGEN SATURATION: 95 % | HEART RATE: 66 BPM | TEMPERATURE: 99 F | BODY MASS INDEX: 33.13 KG/M2

## 2018-12-25 DIAGNOSIS — R79.82 ELEVATED C-REACTIVE PROTEIN (CRP): ICD-10-CM

## 2018-12-25 DIAGNOSIS — S16.1XXA STRAIN OF NECK MUSCLE, INITIAL ENCOUNTER: Primary | ICD-10-CM

## 2018-12-25 DIAGNOSIS — R70.0 ELEVATED SED RATE: ICD-10-CM

## 2018-12-25 LAB
ALBUMIN SERPL BCP-MCNC: 3.4 G/DL
ALP SERPL-CCNC: 82 U/L
ALT SERPL W/O P-5'-P-CCNC: 8 U/L
ANION GAP SERPL CALC-SCNC: 10 MMOL/L
AST SERPL-CCNC: 18 U/L
BASOPHILS # BLD AUTO: 0.03 K/UL
BASOPHILS NFR BLD: 0.3 %
BILIRUB SERPL-MCNC: 0.7 MG/DL
BUN SERPL-MCNC: 15 MG/DL
CALCIUM SERPL-MCNC: 9.8 MG/DL
CHLORIDE SERPL-SCNC: 103 MMOL/L
CO2 SERPL-SCNC: 22 MMOL/L
CREAT SERPL-MCNC: 0.9 MG/DL
CRP SERPL-MCNC: 30.2 MG/L
DIFFERENTIAL METHOD: ABNORMAL
EOSINOPHIL # BLD AUTO: 0 K/UL
EOSINOPHIL NFR BLD: 0.2 %
ERYTHROCYTE [DISTWIDTH] IN BLOOD BY AUTOMATED COUNT: 15 %
ERYTHROCYTE [SEDIMENTATION RATE] IN BLOOD BY WESTERGREN METHOD: 78 MM/HR
EST. GFR  (AFRICAN AMERICAN): >60 ML/MIN/1.73 M^2
EST. GFR  (NON AFRICAN AMERICAN): >60 ML/MIN/1.73 M^2
GLUCOSE SERPL-MCNC: 146 MG/DL
HCT VFR BLD AUTO: 39.2 %
HGB BLD-MCNC: 12.4 G/DL
IMM GRANULOCYTES # BLD AUTO: 0.01 K/UL
IMM GRANULOCYTES NFR BLD AUTO: 0.1 %
LYMPHOCYTES # BLD AUTO: 1.1 K/UL
LYMPHOCYTES NFR BLD: 13.1 %
MCH RBC QN AUTO: 27.9 PG
MCHC RBC AUTO-ENTMCNC: 31.6 G/DL
MCV RBC AUTO: 88 FL
MONOCYTES # BLD AUTO: 0.8 K/UL
MONOCYTES NFR BLD: 9.4 %
NEUTROPHILS # BLD AUTO: 6.6 K/UL
NEUTROPHILS NFR BLD: 76.9 %
NRBC BLD-RTO: 0 /100 WBC
PLATELET # BLD AUTO: 171 K/UL
PMV BLD AUTO: 13.2 FL
POTASSIUM SERPL-SCNC: 4.4 MMOL/L
PROT SERPL-MCNC: 8.3 G/DL
RBC # BLD AUTO: 4.45 M/UL
SODIUM SERPL-SCNC: 135 MMOL/L
WBC # BLD AUTO: 8.62 K/UL

## 2018-12-25 PROCEDURE — 25500020 PHARM REV CODE 255: Performed by: EMERGENCY MEDICINE

## 2018-12-25 PROCEDURE — 80053 COMPREHEN METABOLIC PANEL: CPT

## 2018-12-25 PROCEDURE — 85652 RBC SED RATE AUTOMATED: CPT

## 2018-12-25 PROCEDURE — 99285 EMERGENCY DEPT VISIT HI MDM: CPT | Mod: 25

## 2018-12-25 PROCEDURE — 85025 COMPLETE CBC W/AUTO DIFF WBC: CPT

## 2018-12-25 PROCEDURE — 99284 EMERGENCY DEPT VISIT MOD MDM: CPT | Mod: ,,, | Performed by: NURSE PRACTITIONER

## 2018-12-25 PROCEDURE — 86140 C-REACTIVE PROTEIN: CPT

## 2018-12-25 PROCEDURE — 25000003 PHARM REV CODE 250: Performed by: NURSE PRACTITIONER

## 2018-12-25 RX ORDER — METHOCARBAMOL 500 MG/1
500 TABLET, FILM COATED ORAL
Status: COMPLETED | OUTPATIENT
Start: 2018-12-25 | End: 2018-12-25

## 2018-12-25 RX ORDER — METHOCARBAMOL 500 MG/1
500 TABLET, FILM COATED ORAL 3 TIMES DAILY
Qty: 30 TABLET | Refills: 0 | Status: SHIPPED | OUTPATIENT
Start: 2018-12-25 | End: 2018-12-30

## 2018-12-25 RX ADMIN — IOHEXOL 75 ML: 350 INJECTION, SOLUTION INTRAVENOUS at 02:12

## 2018-12-25 RX ADMIN — METHOCARBAMOL TABLETS 500 MG: 500 TABLET, COATED ORAL at 03:12

## 2018-12-25 NOTE — ED PROVIDER NOTES
Encounter Date: 12/25/2018       History     Chief Complaint   Patient presents with    Headache     Pt presented to the ED via pov. Pt c/o headache and neckpain x 1 week. Pt alert. Pt denies injuires    Neck Pain     78-year-old female with arthritis, chronic kidney disease, diabetes, CAD, hypertension and history of brain infection presents to the emergency room with 2 week history of neck pain that suddenly worsened yesterday.  Patient feels that she has lumps to the back of her head.  Patient states she is unable to turn her neck.  Patient denies fever or any other symptoms.          Review of patient's allergies indicates:   Allergen Reactions    Penicillins Swelling     Swelling (extremities)    Trulicity [dulaglutide] Nausea Only and Rash     Past Medical History:   Diagnosis Date    Allergy     Arthritis     Brain infection     Cataract     CKD (chronic kidney disease) stage 3, GFR 30-59 ml/min     Coma     Coronary artery disease     Diabetes mellitus type II 1981    Diabetic neuropathy     GERD (gastroesophageal reflux disease)     Glaucoma     Hyperlipidemia     Hypertension      Past Surgical History:   Procedure Laterality Date    CARDIAC CATHETERIZATION  03/2010    x 2    CARPAL TUNNEL RELEASE      L    Carpal Tunnel Release, Right hand Right 10/19/2016    Performed by [facility]arnie Sanders [facility] at [facility] OR 1ST FLR    COLONOSCOPY N/A 11/2/2015    Performed by Sajnu Clinton MD at [facility] ENDO (4TH FLR)    CORONARY ARTERY BYPASS GRAFT  08/13/1994    x 1    ESOPHAGOGASTRODUODENOSCOPY (EGD) N/A 4/18/2017    Performed by Hitesh Quiroga MD at [facility] ENDO (4TH FLR)    ESOPHAGOGASTRODUODENOSCOPY (EGD) N/A 4/27/2015    Performed by Hitesh [facility]ga [facility] at [facility] ENDO (4TH FLR)    MANOMETRY-ESOPHAGEAL-WITH IMPEDANCE N/A 6/13/2017    Performed by Kenneth Oconnell MD at [facility] ENDO (4TH FLR)    RELEASE-FINGER-TRIGGER / Index / Long Right 10/19/2016    [facility]     TUBAL LIGATION  1970s     Family History   Problem Relation Age of Onset    Diabetes Father     Diabetes Brother     Blindness Brother     Diabetes Maternal Grandmother     Diabetes Paternal Aunt     Diabetes Paternal Uncle     Amblyopia Neg Hx     Cancer Neg Hx     Cataracts Neg Hx     Glaucoma Neg Hx     Hypertension Neg Hx     Macular degeneration Neg Hx     Retinal detachment Neg Hx     Strabismus Neg Hx     Stroke Neg Hx     Thyroid disease Neg Hx     Colon cancer Neg Hx     Esophageal cancer Neg Hx     Stomach cancer Neg Hx     Rectal cancer Neg Hx     Ulcerative colitis Neg Hx     Crohn's disease Neg Hx     Irritable bowel syndrome Neg Hx     Celiac disease Neg Hx     Eczema Neg Hx     Lupus Neg Hx     Psoriasis Neg Hx     Melanoma Neg Hx      Social History     Tobacco Use    Smoking status: Former Smoker     Years: 2.00     Last attempt to quit: 3/25/1963     Years since quittin.7    Smokeless tobacco: Never Used   Substance Use Topics    Alcohol use: No    Drug use: No     Review of Systems   Constitutional: Negative for fever.   HENT: Negative for sore throat.    Respiratory: Negative for shortness of breath.    Cardiovascular: Negative for chest pain.   Gastrointestinal: Negative for nausea.   Genitourinary: Negative for dysuria.   Musculoskeletal: Positive for arthralgias and myalgias.   Skin: Negative for rash.   Neurological: Negative for weakness.   Hematological: Does not bruise/bleed easily.   All other systems reviewed and are negative.      Physical Exam     Initial Vitals [18 1222]   BP Pulse Resp Temp SpO2   (!) 149/67 75 17 98.2 °F (36.8 °C) 98 %      MAP       --         Physical Exam    Nursing note and vitals reviewed.  Constitutional: She appears well-developed and well-nourished.   Neck: Trachea normal. No thyroid mass and no thyromegaly present. Muscular tenderness present. No spinous process tenderness present. Decreased range of motion  present. No edema and no erythema present.   Patient has pain upon range of motion (for patient's sharp pain shoots down her neck with movement of her neck); base of posterior neck with scarring from previous cyst removal upon palpation of scar there is hardness consistent with scar tissue formation   Cardiovascular: Normal rate, regular rhythm, normal heart sounds and intact distal pulses. Exam reveals no gallop and no friction rub.    No murmur heard.  Pulmonary/Chest: Breath sounds normal. No respiratory distress. She has no wheezes. She has no rhonchi. She has no rales. She exhibits no tenderness.   Musculoskeletal: She exhibits tenderness. She exhibits no edema.   Lymphadenopathy:        Head (right side): No submental, no submandibular, no tonsillar, no preauricular, no posterior auricular and no occipital adenopathy present.        Head (left side): No submental, no submandibular, no tonsillar, no preauricular, no posterior auricular and no occipital adenopathy present.     She has no cervical adenopathy.        Right cervical: No superficial cervical, no deep cervical and no posterior cervical adenopathy present.       Left cervical: No superficial cervical and no posterior cervical adenopathy present.   Neurological: She is alert and oriented to person, place, and time. She has normal strength. GCS score is 15. GCS eye subscore is 4. GCS verbal subscore is 5. GCS motor subscore is 6.       ED Course   Procedures  Labs Reviewed   CBC W/ AUTO DIFFERENTIAL - Abnormal; Notable for the following components:       Result Value    MCHC 31.6 (*)     RDW 15.0 (*)     MPV 13.2 (*)     Gran% 76.9 (*)     Lymph% 13.1 (*)     All other components within normal limits   SEDIMENTATION RATE - Abnormal; Notable for the following components:    Sed Rate 78 (*)     All other components within normal limits   COMPREHENSIVE METABOLIC PANEL - Abnormal; Notable for the following components:    Sodium 135 (*)     CO2 22 (*)      Glucose 146 (*)     Albumin 3.4 (*)     ALT 8 (*)     All other components within normal limits   C-REACTIVE PROTEIN - Abnormal; Notable for the following components:    CRP 30.2 (*)     All other components within normal limits          Imaging Results          CT Soft Tissue Neck With Contrast (Final result)  Result time 12/25/18 14:32:05    Final result by Bruce Morrow MD (12/25/18 14:32:05)                 Impression:      No significant soft tissue abnormalities.  No fractures or abscess formation in the neck.    Extensive atherosclerotic disease of the neck vessels.  If there is clinical concern for vascular dissection of the neck, MRA of the neck may be obtained for further evaluation.    1.0 cm hypodense right thyroid lobe nodule.  Nonemergent evaluation with ultrasound is recommended.    Moderate degenerative changes of the cervical spine.    Additional findings as detailed above.    Electronically signed by resident: Darwin Villatoro  Date:    12/25/2018  Time:    14:12    Electronically signed by: Bruce Morrow MD  Date:    12/25/2018  Time:    14:32             Narrative:    EXAMINATION:  CT SOFT TISSUE NECK WITH CONTRAST    CLINICAL HISTORY:  severe neck pain-unable to range of motion neck;    TECHNIQUE:  Low dose axial images as well as sagittal and coronal reconstructions were performed from the skull base to the clavicles following the intravenous administration of 75 mL of Omnipaque 350.    COMPARISON:  MRA neck 02/28/2014.    FINDINGS:  Limited intracranial evaluation shows no significant abnormalities.  Moderate atherosclerotic calcifications of the V4 segment of the left vertebral artery noted.  Right vertebral artery appears hypoplastic.    Orbits and intraorbital contents appear within normal limits.  Mild mucosal thickening in the left sphenoid and left maxillary sinuses.  Paranasal sinuses otherwise appear clear.  Mastoid air cells are clear.    Pharynx and larynx appear within normal limits.   Epiglottis is unremarkable.  Base of the tongue is unremarkable.  Airway is patent.    Parotid glands show no significant abnormalities.  Submandibular glands are atrophied.  1.0 cm hypodense right thyroid lobe nodule noted.  No lymphadenopathy in the neck.  Mild atherosclerotic calcifications at the carotid bifurcations.    Soft tissue structures at the base of the neck appear within normal limits.  Left-sided aortic arch with 3 branch vessels identified.  Abundant atherosclerotic calcifications of the coronary arteries seen.  Lung bases show calcified granuloma in the superior segment of the left lower lobe.    Patient has a kyphotic posture.  There is mild reversal of the normal cervical lordosis.  Significant disc space loss seen at multiple levels in the cervical spine.  No osseous lytic or destructive process.  Multiple sternotomy wires visualized in the sternum.                               CT Head Without Contrast (Final result)  Result time 12/25/18 14:28:06   Procedure changed from CT Head With Contrast     Final result by Bruce Morrow MD (12/25/18 14:28:06)                 Impression:      No acute findings.  No evidence of hemorrhage or major vascular distribution infarct.  MRI may be obtained for further evaluation.    Stable chronic microvascular ischemic changes throughout the supratentorial white matter.    Electronically signed by resident: Oz Murphy  Date:    12/25/2018  Time:    14:04    Electronically signed by: Bruce Morrow MD  Date:    12/25/2018  Time:    14:28             Narrative:    EXAMINATION:  CT HEAD WITHOUT CONTRAST    CLINICAL HISTORY:  severe neck pain/base of skull pain;    TECHNIQUE:  Low dose axial images were obtained through the head.  Coronal and sagittal reformations were also performed. Contrast was not administered.    COMPARISON:  MRI brain and MRA brain/neck from 02/28/2014 and CT head without from 02/27/2014.    FINDINGS:  Ventricles are stable in size and  contour without evidence of hydrocephalus.    Brain parenchyma demonstrates stable chronic microvascular ischemic changes throughout the supratentorial white matter.  There are calcifications in the basal ganglia.  Prominent perivascular space re-identified in the left subinsular region.  No parenchymal mass, hemorrhage, edema or major vascular distribution infarct.  No extra-axial blood or fluid collections.    Atherosclerotic calcifications are seen throughout the partially visualized vertebral arteries.    The cranium appears intact. Mastoid air cells and paranasal sinuses are essentially clear.                                 Medical Decision Making:   Initial Assessment:   78-year-old female with presents with neck pain that began 2 weeks ago.  Patient denies trauma or injury. Patient states that the pain became worse yesterday and she wanted to get checked out.  Patient denies fever.  Differential Diagnosis:   Meningitis, neck abscess, muscle strain, lymphadenopathy, lymphadenitis, viral URI  Clinical Tests:   Lab Tests: Ordered and Reviewed  The following lab test(s) were unremarkable: CBC and CMP  Radiological Study: Ordered and Reviewed  ED Management:  Patient examined and has moderate to severe tenderness to the neck on palpation. Patient is able to complete range of motion but stops due to pain. No axial load pain on exam.  ESR and CRP elevated.  Patient has no signs of infection on exam.  Patient advised to follow up with primary care for repeat labs in 2 days.  Patient given Robaxin at discharge. Patient given strict return precautions and voiced understanding of all discharge instructions.  Patient stable at discharge.                   ED Course as of Dec 25 1546   Tue Dec 25, 2018   1258 BP: (!) 149/67 [AT]   1258 Temp: 98.2 °F (36.8 °C) [AT]   1258 Temp src: Oral [AT]   1258 Pulse: 75 [AT]   1258 Resp: 17 [AT]   1258 SpO2: 98 % [AT]   1359 BP: (!) 175/78 [AT]   1359 Temp: 98.9 °F (37.2 °C) [AT]    1359 Temp src: Oral [AT]   1359 Pulse: 68 [AT]   1359 Resp: 18 [AT]   1359 SpO2: 95 % [AT]   1427 WBC: 8.62 [AT]   1427 Sed Rate: (!) 78 [AT]   1533 Sodium: (!) 135 [AT]   1533 CRP: (!) 30.2 [AT]      ED Course User Index  [AT] AMISHA Hannah     Clinical Impression:   The primary encounter diagnosis was Strain of neck muscle, initial encounter. Diagnoses of Elevated C-reactive protein (CRP) and Elevated sed rate were also pertinent to this visit.                             AMISHA Hannah  12/25/18 [id number]

## 2018-12-25 NOTE — ED NOTES
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     is present.    Pain:  Rated 5/10. At rest; 10/10 with movement    Psychosocial:  Patient is calm and cooperative.  Patients insight and judgement are appropriate to situation.  Appears clean, well maintained, with clothing appropriate to environment.  No evidence of hallucinations, delusions, or psychosis.    Neuro:  Eyes open spontaneously.  Awake, alert.  Oriented x 4.  Speech clear and appropriate.  Tolerating saliva secretions well.  Able to follow commands, demonstrating ability to actively and appropriately communicate within context of current conversation.  Symmetrical facial muscles.  No evidence of impaired sensation.      Airway:  Bilateral chest rise and fall.  RR regular and non labored.  Air entry patent and clear x 5 lobes of the lungs.      Circulatory:  Skin warm, dry.  Apical and radial pulses strong and regular.     Abdomen:  Abdomen soft and non distended.      Urinary:  Patient reports urination with burning.

## 2018-12-26 ENCOUNTER — TELEPHONE (OUTPATIENT)
Dept: INTERNAL MEDICINE | Facility: CLINIC | Age: 78
End: 2018-12-26

## 2018-12-26 NOTE — TELEPHONE ENCOUNTER
Called and LM for pt to return my call.     Pt went to ED yesterday and was told to repeat labs in two days. Please review and place lab orders for pt and I will call to schedule lab appointment. Would you also like an ED f/u apt sched in 2 days with you or would you like f/u a few days after labs? Pls advise.Thank you

## 2018-12-26 NOTE — TELEPHONE ENCOUNTER
Pt returned my call and stated she does not want to sched a ED f/u at this time, she just wants to come in for lab work once orders are placed. She asked that if she does not answer that I just leave a VM with date and time of labs

## 2018-12-26 NOTE — TELEPHONE ENCOUNTER
----- Message from Dottie Donovan sent at 12/26/2018  7:39 AM CST -----  Contact: self/[phone]  Patient called in regards needing to talk with Dr Carlson about patient was told by the emergency doctor to ask PCP to reorder blood work for- today or tomorrow. Please call and advise. Thank you!!!

## 2018-12-27 ENCOUNTER — OFFICE VISIT (OUTPATIENT)
Dept: INTERNAL MEDICINE | Facility: CLINIC | Age: 78
End: 2018-12-27
Payer: MEDICARE

## 2018-12-27 ENCOUNTER — TELEPHONE (OUTPATIENT)
Dept: VASCULAR SURGERY | Facility: CLINIC | Age: 78
End: 2018-12-27

## 2018-12-27 ENCOUNTER — LAB VISIT (OUTPATIENT)
Dept: LAB | Facility: HOSPITAL | Age: 78
End: 2018-12-27
Attending: INTERNAL MEDICINE
Payer: MEDICARE

## 2018-12-27 VITALS
BODY MASS INDEX: 32.38 KG/M2 | HEART RATE: 67 BPM | DIASTOLIC BLOOD PRESSURE: 60 MMHG | OXYGEN SATURATION: 99 % | SYSTOLIC BLOOD PRESSURE: 108 MMHG | WEIGHT: 177 LBS

## 2018-12-27 DIAGNOSIS — I74.9 EMBOLISM AND THROMBOSIS OF ARTERY: ICD-10-CM

## 2018-12-27 DIAGNOSIS — M31.6 TEMPORAL ARTERITIS: Primary | ICD-10-CM

## 2018-12-27 DIAGNOSIS — M79.10 MYALGIA: ICD-10-CM

## 2018-12-27 DIAGNOSIS — M31.6 TEMPORAL ARTERITIS SYNDROME: ICD-10-CM

## 2018-12-27 DIAGNOSIS — I77.6 VASCULITIS: ICD-10-CM

## 2018-12-27 DIAGNOSIS — I77.6 VASCULITIS: Primary | ICD-10-CM

## 2018-12-27 DIAGNOSIS — I25.10 CORONARY ARTERY DISEASE INVOLVING NATIVE CORONARY ARTERY OF NATIVE HEART WITHOUT ANGINA PECTORIS: Chronic | ICD-10-CM

## 2018-12-27 LAB
CK SERPL-CCNC: 120 U/L
CRP SERPL-MCNC: 64.2 MG/L
ERYTHROCYTE [SEDIMENTATION RATE] IN BLOOD BY WESTERGREN METHOD: 87 MM/HR
T4 FREE SERPL-MCNC: 0.99 NG/DL
TSH SERPL DL<=0.005 MIU/L-ACNC: 4.78 UIU/ML

## 2018-12-27 PROCEDURE — 84439 ASSAY OF FREE THYROXINE: CPT

## 2018-12-27 PROCEDURE — 82550 ASSAY OF CK (CPK): CPT

## 2018-12-27 PROCEDURE — 86140 C-REACTIVE PROTEIN: CPT

## 2018-12-27 PROCEDURE — 99214 OFFICE O/P EST MOD 30 MIN: CPT | Mod: S$PBB,,, | Performed by: INTERNAL MEDICINE

## 2018-12-27 PROCEDURE — 85652 RBC SED RATE AUTOMATED: CPT

## 2018-12-27 PROCEDURE — 36415 COLL VENOUS BLD VENIPUNCTURE: CPT

## 2018-12-27 PROCEDURE — 99999 PR PBB SHADOW E&M-EST. PATIENT-LVL IV: CPT | Mod: PBBFAC,,, | Performed by: INTERNAL MEDICINE

## 2018-12-27 PROCEDURE — 84443 ASSAY THYROID STIM HORMONE: CPT

## 2018-12-27 PROCEDURE — 86255 FLUORESCENT ANTIBODY SCREEN: CPT | Mod: 91

## 2018-12-27 PROCEDURE — 99214 OFFICE O/P EST MOD 30 MIN: CPT | Mod: PBBFAC | Performed by: INTERNAL MEDICINE

## 2018-12-27 RX ORDER — NITROGLYCERIN 400 UG/1
SPRAY ORAL
Qty: 36 G | Refills: 0 | Status: SHIPPED | OUTPATIENT
Start: 2018-12-27 | End: 2019-01-23 | Stop reason: SDUPTHER

## 2018-12-27 NOTE — Clinical Note
Hi Dr Vallejo and staff,This patient needs a temporal artery biopsy due to my concern for temporal arteritis. I see that someone contacted Dr. Vallejo's office. Would Dr Vallejo perform this procedure or would my patient need to see some specific other surgeon. I will be away until 1/2/19, fyi.Thank you for the help,Ryan Carlson

## 2018-12-27 NOTE — TELEPHONE ENCOUNTER
----- Message from Delmy Rodriguez sent at 12/27/2018 11:33 AM CST -----  Contact: Dr. Carlson's office   Needs Advice    Reason for call: Refferal was placed for general surgery for vasculitis, informed the caller it would most likely be a vascular surgery case. Informed caller I would be sending message to set up a new patient consult         Communication Preference: [phone]    Additional Information: please contact the patient to consult her on this issue

## 2018-12-27 NOTE — PROGRESS NOTES
Subjective:       Patient ID: Eliu Paz is a 78 y.o. female.    Chief Complaint: Follow-up (loosing weight)    Here for ED f/u --  Headache, neck pains. Up much of noc 2/2 posterior neck pains/shoulder pains. Opening her mouth hurts, and posterior neck pains. Some jaw claudication. Current symptoms abrupt onset last 2 weeks. No clear fevers, but some low grade nikolas temps. Normal bowels, normal urination.  Does have whole body pain as well, lower neck, upper back.  In ED found to have nikolas CRP/Sed rate.    No new other symptoms.    No urinary changes, no sinusitis or coughing up blood.    No new skin rash.    No actual jts swollen or specific jt tenderness.        Review of Systems   Constitutional: Positive for fatigue and unexpected weight change. Negative for activity change and appetite change.   Eyes: Negative for photophobia, pain, discharge, redness, itching and visual disturbance.   Respiratory: Negative for chest tightness, shortness of breath and wheezing.    Cardiovascular: Positive for leg swelling (mild at ankles). Negative for chest pain and palpitations.   Gastrointestinal: Negative for abdominal distention and abdominal pain.   Genitourinary: Negative for pelvic pain.   Musculoskeletal: Positive for back pain and neck pain. Negative for arthralgias.   Skin: Negative for rash.   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 Negative for tremors, syncope, facial asymmetry and speech difficulty.   Hematological: Negative for adenopathy. Does not bruise/bleed easily.   Psychiatric/Behavioral: Negative for dysphoric mood. The patient is nervous/anxious.        Objective:      Physical Exam   Constitutional: She is oriented to person, place, and time. She appears well-developed and well-nourished. No distress.   Not toxic appearing   HENT:   Head: Normocephalic and atraumatic.   Mouth/Throat: No oropharyngeal exudate.   Tenderness bilat temple area, L>R,  no obvious temporal art beading.  Painful jaw opening, although able.   Eyes: Conjunctivae are normal. Pupils are equal, round, and reactive to light. No scleral icterus.   Neck: Normal range of motion. Neck supple. No thyromegaly present.   Cardiovascular: Normal rate, regular rhythm and normal heart sounds.   Pulmonary/Chest: Effort normal and breath sounds normal.   Abdominal: Soft. Bowel sounds are normal. She exhibits no distension. There is no tenderness.   Musculoskeletal: Normal range of motion. She exhibits edema (mild bilat ankles, not tender). She exhibits no tenderness.   No midline spine tenderness to deep palpation.  Difficulty rising to exam table. No focal lower leg weakness except to LLE extension at knee 2/2 quad rupture   Lymphadenopathy:     She has no cervical adenopathy.   Neurological: She is alert and oriented to person, place, and time. She displays normal reflexes. No cranial nerve deficit. She exhibits normal muscle tone. Coordination normal.   Mild ankle edema, not significant lower extrem, edema   Skin: No rash noted. She is not diaphoretic.   Thinning of hair on scalp, no focal areas of hair loss.  No seborrhea seen.  A few papules on scalp, some keratotic, some hyperpigmented, all benign appearing    Mild keloid formation scalp line posterior neck   Psychiatric: She has a normal mood and affect. Her behavior is normal.       Assessment:       1. Vasculitis    2. Temporal arteritis syndrome    3. Coronary artery disease involving native coronary artery of native heart without angina pectoris    4. Myalgia        Plan:       Eliu Steinberg was seen today for follow-up.    Diagnoses and all orders for this visit:    Vasculitis  -     Ambulatory Referral to Rheumatology  -     Ambulatory Referral to General Surgery  -     ANTI-NEUTROPHILIC CYTOPLASMIC ANTIBODY; Future  -     Urinalysis  -     TSH; Future  -     CK; Future  -     Sedimentation rate; Future  -     C-reactive protein;  Future  Temporal arteritis syndrome  -     Ambulatory Referral to Rheumatology  -     Ambulatory Referral to General Surgery  -     Sedimentation rate; Future  -     C-reactive protein; Future  Discussed that she may have GCA, offered prednisone for it, she prefers to have full w/u first and hopes not to take prednisone.  Discussed warning signs with vision.    Coronary artery disease involving native coronary artery of native heart without angina pectoris  -     nitroGLYCERIN 0.4 MG/DOSE TL SPRY (NITROLINGUAL) 400 mcg/spray spray; PLACE 2 SPRAYS UNDER THE TONGUE EVERY 5 MINUTES AS NEEDED FOR CHEST PAIN    Myalgia  -     TSH; Future        Health Maintenance       Date Due Completion Date    Influenza Vaccine 08/01/2018 2/7/2017 (Declined)    Override on 2/7/2017: Declined (per provider note)    Override on 10/15/2015: Not Clinically Appropriate (declines)    DEXA SCAN 11/05/2018 11/5/2015    Hemoglobin A1c 06/04/2019 12/4/2018    Eye Exam 09/18/2019 9/18/2018    Lipid Panel 12/04/2019 12/4/2018    Foot Exam 12/20/2019 12/20/2018 (Done)    Override on 12/20/2018: Done    Override on 9/5/2018: Done    Override on 9/1/2017: Done (podiatry)    Override on 12/2/2016: Done    Override on 8/24/2016: Done (by cardiologist)    Override on 7/14/2015: Done    Colonoscopy 11/02/2020 11/2/2015    TETANUS VACCINE 02/07/2027 2/7/2017 (Declined)    Override on 2/7/2017: Declined (per provider note)          Follow-up in about 2 months (around 2/27/2019).    Future Appointments   Date Time Provider Department Center   1/8/2019  9:00 AM [facility]   1/11/2019 10:00 AM Abiel Merrill MD [facility]   1/15/2019 10:30 AM Zuleika Francis MD [facility] OPHTHAL [address]   1/30/2019  9:30 AM Jeri Izquierdo NP [facility] GASTRO [address]   2/28/2019  9:20 AM Ryan Carlson MD [facility]

## 2018-12-27 NOTE — PATIENT INSTRUCTIONS
Temporal Arteritis  You have temporal arteritis (also called giant cell arteritis). This is an inflammation of the blood vessels that supply the head, neck, upper body, and arms. It can be diagnosed by examining a small piece of temporal artery, which is easily accessible. This artery is located in the scalp area just above each ear. When the arteries are inflamed, the vessel narrows, and blood flow slows down. A clot may also form inside the artery, stopping all blood flow. Reduced or blocked blood flow in the arteries that supply vital structures in the eye can cause blindness in the affected eye. In rare cases, stroke may occur.  The cause of temporal arteritis is not known. Symptoms of temporal arteritis include headache, tenderness of the temples or scalp, jaw pain when chewing, muscle aches, fatigue, fever, and unintentional weight loss.  Steroids, such as prednisone, are used to treat this condition. These reduce inflammation in the arteries. Most people begin to feel better a few days after treatment starts. Most people do recover from this condition, but it may require continued treatment for 1 or 2 years. Long-term steroid treatment can cause various serious side effects. These include osteoporosis, high blood pressure, and diabetes. Talk to your healthcare provider about side effects and ways to minimize them.  Home care  Take medicines as directed by your healthcare provider. The following suggestions will help reduce side effects from long-term steroid use.  Nutrition  · Eat plenty of fresh fruits, vegetables, and whole grains.  · Choose mostly lean sources of protein.  · Limit the use of salt, sugar, and alcohol.  · Be sure to get enough calcium and vitamin D. Low-fat dairy foods are an excellent source of these nutrients. If you are not able to eat dairy, you can choose lactose-free products instead. Here are some other foods that contain calcium:  ¨ Kale and broccoli  ¨ White beans, green beans,  and lima beans  ¨ Kemmerer  ¨ Soybeans or tofu  ¨ Fortified juices  ¨ You can also take daily calcium supplements. Ask your healthcare provider how much calcium you should take each day.  Exercise  Get regular aerobic exercise, up to 30 minutes on most days. Exercise can help prevent bone loss, heart disease, and diabetes. It also relieves stress and can improve your mood and your overall quality of life. If you dont already exercise regularly, ask your healthcare provider for help setting up a program.  Follow-up care  Follow up with your healthcare provider, or as advised.  For more information about temporal arteritis, see:  · National [place] of Arthritis and Musculoskeletal and Skin Diseases, www.niams.nih.gov  · The Arthritis Foundation, www.arthritis.org  When to seek medical advice  Call your healthcare provider right away if any of these occur:  · Worsening symptoms  · Numbness or weakness of the face, one arm, or one leg  · Slurred speech, confusion, or trouble speaking or walking  · Severe headache  · Fainting spell, dizziness, or seizure  · Pain in the chest, arm, neck, or upper back  · Sudden loss or change in vision  Date Last Reviewed: 6/14/2015  © 3615-4384 [employer]. [address] [place] [address]. All rights reserved. This information is not intended as a substitute for professional medical care. Always follow your healthcare professional's instructions.        Understanding Temporal Artery Biopsy    A biopsy is a procedure used to remove samples of tissue from a site in your body. During a temporal artery biopsy, the healthcare provider will remove a small sample of tissue from one or both of your temporal arteries. These arteries are located in the sides of your forehead (temples).  Why a temporal artery biopsy is done  A temporal artery biopsy is most often done to confirm a diagnosis of giant cell arteritis. This condition occurs when the lining of your arteries  become red, swollen, and irritated (inflamed). Giant cell arteritis most often involves the temporal arteries. For this reason, the condition is also called temporal arteritis. Prompt diagnosis and treatment of giant cell arteritis is important because the condition can lead to problems such as blindness and stroke.  How temporal artery biopsy is done  The procedure is usually done as an outpatient procedure. This means you go home the same day. The time it takes to do the procedure can vary. During the procedure:  · The skin over the biopsy site (left or right temple) is cleaned.  · The healthcare provider uses Doppler ultrasound imaging to locate the artery, if needed. He or she marks the site with a pen.  · Medicine may be put on the site to numb the skin. Local anesthetic is also injected into the area. This helps prevent pain during the procedure.  · When the area is numb, the provider makes an incision over the temporal artery.  · Once the artery is located, the provider clamps or ties off with stitches (sutures) the section to be used for the biopsy. He or she than cuts a small piece of the artery from this section and removes it. The provider sutures or cauterizes the remaining ends of the artery.  · The skin incision is closed with sutures. A pressure bandage may be placed over the site.  · If needed, the provider repeats the procedure in the other temple.  · The sample of tissue from the artery is sent to a lab to be checked for giant cell arteritis (or any other problems).  · Your healthcare provider will get in touch with you about the results when they are ready.  Risks of temporal artery biopsy  · Bleeding and collection of blood under the skin (hematoma)  · Infection  · Scarring or hair loss at the incision site  · Unable to get a diagnosis  Date Last Reviewed: 6/1/2016  © 4759-4618 [employer]. [address] [place] [address], [place] [address]. All rights reserved. This information is not  intended as a substitute for professional medical care. Always follow your healthcare professional's instructions.

## 2018-12-28 ENCOUNTER — TELEPHONE (OUTPATIENT)
Dept: INTERNAL MEDICINE | Facility: CLINIC | Age: 78
End: 2018-12-28

## 2018-12-28 ENCOUNTER — TELEPHONE (OUTPATIENT)
Dept: VASCULAR SURGERY | Facility: CLINIC | Age: 78
End: 2018-12-28

## 2018-12-28 ENCOUNTER — LAB VISIT (OUTPATIENT)
Dept: LAB | Facility: HOSPITAL | Age: 78
End: 2018-12-28
Attending: FAMILY MEDICINE
Payer: MEDICARE

## 2018-12-28 DIAGNOSIS — R82.90 ABNORMAL URINALYSIS: ICD-10-CM

## 2018-12-28 DIAGNOSIS — R82.90 ABNORMAL URINALYSIS: Primary | ICD-10-CM

## 2018-12-28 LAB
BILIRUB UR QL STRIP: NEGATIVE
CLARITY UR REFRACT.AUTO: CLEAR
COLOR UR AUTO: YELLOW
GLUCOSE UR QL STRIP: NEGATIVE
HGB UR QL STRIP: NEGATIVE
KETONES UR QL STRIP: NEGATIVE
LEUKOCYTE ESTERASE UR QL STRIP: NEGATIVE
MICROSCOPIC COMMENT: NORMAL
NITRITE UR QL STRIP: NEGATIVE
PH UR STRIP: 5 [PH] (ref 5–8)
PROT UR QL STRIP: NEGATIVE
SP GR UR STRIP: 1 (ref 1–1.03)
URN SPEC COLLECT METH UR: NORMAL

## 2018-12-28 PROCEDURE — 87086 URINE CULTURE/COLONY COUNT: CPT

## 2018-12-28 PROCEDURE — 87088 URINE BACTERIA CULTURE: CPT

## 2018-12-28 PROCEDURE — 87077 CULTURE AEROBIC IDENTIFY: CPT

## 2018-12-28 PROCEDURE — 87186 SC STD MICRODIL/AGAR DIL: CPT

## 2018-12-28 PROCEDURE — 81001 URINALYSIS AUTO W/SCOPE: CPT

## 2018-12-28 NOTE — TELEPHONE ENCOUNTER
Spoke with patient via phone Temporal Artery US appt rescheduled for earlier 12/31/2018  and with Dr. AL Arevalo in Vascular Surgery on Wednesday 1/2/2019 to go over results, patient verbalized understanding. STEPHANIEL/LPN

## 2018-12-28 NOTE — TELEPHONE ENCOUNTER
Patient came into the office today in pain, was seen here on yesterday 12/27.Stated that you suggested steroid pills for patient to take for pain but she refused due to not wanting to gain weight. She now wants the medication advised to her  is not here and won't be back until next Wednesday, suggested that she see another provider here today and possibly get prescribed something for the pain, she refused. Stated she will double her dose of tramadol instead.

## 2018-12-31 ENCOUNTER — TELEPHONE (OUTPATIENT)
Dept: INTERNAL MEDICINE | Facility: CLINIC | Age: 78
End: 2018-12-31

## 2018-12-31 LAB
ANCA AB TITR SER IF: NORMAL TITER
BACTERIA UR CULT: NORMAL
P-ANCA TITR SER IF: NORMAL TITER

## 2018-12-31 RX ORDER — SULFAMETHOXAZOLE AND TRIMETHOPRIM 800; 160 MG/1; MG/1
1 TABLET ORAL 2 TIMES DAILY
Qty: 14 TABLET | Refills: 0 | Status: SHIPPED | OUTPATIENT
Start: 2018-12-31 | End: 2019-01-07

## 2019-01-02 ENCOUNTER — TELEPHONE (OUTPATIENT)
Dept: SPINE | Facility: CLINIC | Age: 79
End: 2019-01-02

## 2019-01-02 ENCOUNTER — OFFICE VISIT (OUTPATIENT)
Dept: VASCULAR SURGERY | Facility: CLINIC | Age: 79
End: 2019-01-02
Attending: SURGERY
Payer: MEDICARE

## 2019-01-02 ENCOUNTER — HOSPITAL ENCOUNTER (OUTPATIENT)
Dept: RADIOLOGY | Facility: HOSPITAL | Age: 79
Discharge: HOME | End: 2019-01-02
Attending: SURGERY
Payer: MEDICARE

## 2019-01-02 VITALS
DIASTOLIC BLOOD PRESSURE: 69 MMHG | HEART RATE: 62 BPM | HEIGHT: 59 IN | SYSTOLIC BLOOD PRESSURE: 158 MMHG | WEIGHT: 178.56 LBS | BODY MASS INDEX: 36 KG/M2 | TEMPERATURE: 98 F

## 2019-01-02 DIAGNOSIS — M54.2 NECK PAIN: Primary | ICD-10-CM

## 2019-01-02 DIAGNOSIS — M31.6 TEMPORAL ARTERITIS SYNDROME: ICD-10-CM

## 2019-01-02 DIAGNOSIS — M31.6 TEMPORAL ARTERITIS: ICD-10-CM

## 2019-01-02 DIAGNOSIS — I74.9 EMBOLISM AND THROMBOSIS OF ARTERY: ICD-10-CM

## 2019-01-02 PROCEDURE — 93888 INTRACRANIAL LIMITED STUDY: CPT | Mod: TC

## 2019-01-02 PROCEDURE — 99999 PR PBB SHADOW E&M-EST. PATIENT-LVL IV: ICD-10-PCS | Mod: PBBFAC,,, | Performed by: SURGERY

## 2019-01-02 PROCEDURE — 93888 INTRACRANIAL LIMITED STUDY: CPT | Mod: 26,,, | Performed by: RADIOLOGY

## 2019-01-02 PROCEDURE — 99204 OFFICE O/P NEW MOD 45 MIN: CPT | Mod: S$PBB,,, | Performed by: SURGERY

## 2019-01-02 PROCEDURE — 99999 PR PBB SHADOW E&M-EST. PATIENT-LVL IV: CPT | Mod: PBBFAC,,, | Performed by: SURGERY

## 2019-01-02 PROCEDURE — 99214 OFFICE O/P EST MOD 30 MIN: CPT | Mod: PBBFAC,25 | Performed by: SURGERY

## 2019-01-02 PROCEDURE — 99204 PR OFFICE/OUTPT VISIT, NEW, LEVL IV, 45-59 MIN: ICD-10-PCS | Mod: S$PBB,,, | Performed by: SURGERY

## 2019-01-02 PROCEDURE — 93888 US TRANSCRAN DOPPLER INTRACRAN ARTR LTD: ICD-10-PCS | Mod: 26,,, | Performed by: RADIOLOGY

## 2019-01-02 NOTE — TELEPHONE ENCOUNTER
I called her, currently still much pain, especially neck and in to arms, no longer jaw sheryl symptoms. She did describe some dysuria, will finish abx, see vascular surgeon today, rheum next week and hold on prednisone for now since active UTI. No current visual complaints.  ?s answered.

## 2019-01-02 NOTE — PROGRESS NOTES
Subjective:      Patient ID: Eliu Paz is a 78 y.o. female.    Chief Complaint: Low-back Pain and Neck Pain    Ms Paz is a 77 yo female here for evaluation of back and leg pain and neck pain.  She went to ER with possible vasculitis and temporal arteritis on 12/25/2018.  She is scheduled to follow up with surgery and rheumatology.  She saw surgery yesterday and had US, no evidence of temporal arteritis.  Recommend follow with rheumatology.  She feels like the neck pain is what is bothering her the most.  The pain is from the head and down the shoulders.  She has had the pain for over a year.  She feels like the pain is getting worse.  She has pain turning to the side.  She does not have pain turning to the side.  She has pain lying down.  She burns all over.  She has pain all over that comes and goes and shocks her.  She will sometimes feel a hot streak going up the neck.  There is no pain radiating to the arms.  It stops at the shoulders.  Pain is 3/10 now, worst 6/10 sitting, best 3/10 standing.  She has been taking tramadol and methocarbomal.  She feels like the muscle relaxer is helping.  SHe has done PT for neck in past.  She has not had injections in neck    She has low back pain for 2-3 years.  She has pain with sweeping floor.  She has pain with vacuuming.  The pain is on the right side.  She does not feel like it moves.  She feels knot.  She has knee pain from torn ligaments, but does not feel like the pain radiates from the back.  The back pain is not constant.  Pain is 0/10 now, worst 4/10 sweeping, best 0/10.  She has not been very active this week.      CT soft tissue neck 12/2018  Patient has a kyphotic posture.  There is mild reversal of the normal cervical lordosis.  Significant disc space loss seen at multiple levels in the cervical spine.  No osseous lytic or destructive process.  Multiple sternotomy wires visualized in the sternum.    X-ray cervical spine 2013  AP and lateral views of  the cervical spine are compared to December 4, 2008.  Extensive degenerative changes are noted most marked at the C3-C4 level and C5-C6 level.  There is intervertebral joint space narrowing at these levels which is unchanged.    Vascular calcifications are present in the region of the carotid arteries in the soft tissues.    Impression        Degenerative changes similar to December 4, 20 08      X-ray lumbar 12/18/2018  DJD.  Grade 1 L4/L5 anterolisthesis.  The disc spaces are narrowed between L4 and S1 vertebral segments.  No fracture or dislocation.  No bone destruction identified.    Past Medical History:  No date: Allergy  No date: Arthritis  No date: Brain infection  No date: Cataract  No date: CKD (chronic kidney disease) stage 3, GFR 30-59 ml/min  No date: Coma  No date: Coronary artery disease  1981: Diabetes mellitus type II  No date: Diabetic neuropathy  No date: GERD (gastroesophageal reflux disease)  No date: Glaucoma  No date: Hyperlipidemia  No date: Hypertension    Past Surgical History:  03/2010: CARDIAC CATHETERIZATION      Comment:  x 2  No date: CARPAL TUNNEL RELEASE      Comment:  L  10/19/2016: Carpal Tunnel Release, Right hand; Right      Comment:  Performed by Marnie Sanders MD at [facility] OR 1ST FLR  11/2/2015: COLONOSCOPY; N/A      Comment:  Performed by Sanju Clinton MD at [facility] ENDO (4TH FLR)  08/13/1994: CORONARY ARTERY BYPASS GRAFT      Comment:  x 1  4/18/2017: ESOPHAGOGASTRODUODENOSCOPY (EGD); N/A      Comment:  Performed by Hitesh Quiroga MD at [facility] ENDO (4TH FLR)  4/27/2015: ESOPHAGOGASTRODUODENOSCOPY (EGD); N/A      Comment:  Performed by Hitesh Quiroga MD at [facility] ENDO (4TH FLR)  6/13/2017: MANOMETRY-ESOPHAGEAL-WITH IMPEDANCE; N/A      Comment:  Performed by Kenneth Oconnell MD at [facility] ENDO (4TH FLR)  10/19/2016: RELEASE-FINGER-TRIGGER / Index / Long; Right      Comment:  Performed by Marnie Sanders MD at [facility] OR 1ST FLR  1970s: TUBAL LIGATION    Review of patient's family  history indicates:  Problem: Diabetes      Relation: Father          Age of Onset: (Not Specified)  Problem: Diabetes      Relation: Brother          Age of Onset: (Not Specified)  Problem: Blindness      Relation: Brother          Age of Onset: (Not Specified)  Problem: Diabetes      Relation: Maternal Grandmother          Age of Onset: (Not Specified)  Problem: Diabetes      Relation: Paternal Aunt          Age of Onset: (Not Specified)  Problem: Diabetes      Relation: Paternal Uncle          Age of Onset: (Not Specified)  Problem: Amblyopia      Relation: Neg Hx          Age of Onset: (Not Specified)  Problem: Cancer      Relation: Neg Hx          Age of Onset: (Not Specified)  Problem: Cataracts      Relation: Neg Hx          Age of Onset: (Not Specified)  Problem: Glaucoma      Relation: Neg Hx          Age of Onset: (Not Specified)  Problem: Hypertension      Relation: Neg Hx          Age of Onset: (Not Specified)  Problem: Macular degeneration      Relation: Neg Hx          Age of Onset: (Not Specified)  Problem: Retinal detachment      Relation: Neg Hx          Age of Onset: (Not Specified)  Problem: Strabismus      Relation: Neg Hx          Age of Onset: (Not Specified)  Problem: Stroke      Relation: Neg Hx          Age of Onset: (Not Specified)  Problem: Thyroid disease      Relation: Neg Hx          Age of Onset: (Not Specified)  Problem: Colon cancer      Relation: Neg Hx          Age of Onset: (Not Specified)  Problem: Esophageal cancer      Relation: Neg Hx          Age of Onset: (Not Specified)  Problem: Stomach cancer      Relation: Neg Hx          Age of Onset: (Not Specified)  Problem: Rectal cancer      Relation: Neg Hx          Age of Onset: (Not Specified)  Problem: Ulcerative colitis      Relation: Neg Hx          Age of Onset: (Not Specified)  Problem: Crohn's disease      Relation: Neg Hx          Age of Onset: (Not Specified)  Problem: Irritable bowel syndrome      Relation: Neg Hx           Age of Onset: (Not Specified)  Problem: Celiac disease      Relation: Neg Hx          Age of Onset: (Not Specified)  Problem: Eczema      Relation: Neg Hx          Age of Onset: (Not Specified)  Problem: Lupus      Relation: Neg Hx          Age of Onset: (Not Specified)  Problem: Psoriasis      Relation: Neg Hx          Age of Onset: (Not Specified)  Problem: Melanoma      Relation: Neg Hx          Age of Onset: (Not Specified)      Social History    Socioeconomic History      Marital status:       Spouse name: Not on file      Number of children: Not on file      Years of education: Not on file      Highest education level: Not on file    Social Needs      Financial resource strain: Not on file      Food insecurity - worry: Not on file      Food insecurity - inability: Not on file      Transportation needs - medical: Not on file      Transportation needs - non-medical: Not on file    Occupational History      Not on file    Tobacco Use      Smoking status: Former Smoker        Years: 2.00        Quit date: 3/25/1963        Years since quittin.8      Smokeless tobacco: Never Used    Substance and Sexual Activity      Alcohol use: No      Drug use: No      Sexual activity: Not Currently        Partners: Male    Other Topics      Concerns:        Are you pregnant or think you may be?: Not Asked        Breast-feeding: Not Asked    Social History Narrative      ,  with heart issues, 4 kids, 2 .      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      Current Outpatient Medications:  amLODIPine (NORVASC) 10 MG tablet, TAKE 1 TABLET (10 MG TOTAL) BY MOUTH ONCE DAILY., Disp: 90 tablet, Rfl: 11  aspirin (ECOTRIN) 81 MG EC tablet, Take 1 tablet (81 mg total) by mouth once daily., Disp: , Rfl: 0  carvedilol (COREG) 25 MG tablet, TAKE 1 TABLET (25 MG TOTAL) BY MOUTH 2 (TWO) TIMES DAILY., Disp: 180 tablet, Rfl: 11  ciclopirox (PENLAC) 8 % Soln, Apply topically nightly., Disp: 6.6  "mL, Rfl: 11  clobetasol 0.05% (TEMOVATE) 0.05 % Oint, Apply small amount to vulva area twice a day for 4 weeks then once a day for 4 weeks then once a day on Mondays and Thursdays, Disp: 60 g, Rfl: 1  fluocinonide (LIDEX) 0.05 % external solution, Apply topically 2 (two) times daily., Disp: 60 mL, Rfl: 1  fluticasone (FLONASE) 50 mcg/actuation nasal spray, SPRAY TWICE IN EACH NOSTRIL EVERY DAY, Disp: 16 g, Rfl: 11  insulin asp prt-insulin aspart, NOVOLOG 70/30, (NOVOLOG MIX 70-30 U-100 INSULN) 100 unit/mL (70-30) Soln, Inject 16 units w/ breakfast and 20 units at dinner time., Disp: 50 mL, Rfl: 11  insulin syringe-needle U-100 (BD INSULIN SYRINGE ULTRA-FINE) 0.5 mL 31 gauge x 5/16" Syrg, USE TO INJECT TWICE DAILY WITH INSULIN INJECTIONS, Disp: 300 each, Rfl: 11  isosorbide mononitrate (IMDUR) 30 MG 24 hr tablet, TAKE 1 TABLET (30 MG TOTAL) BY MOUTH ONCE DAILY., Disp: 90 tablet, Rfl: 11  ketoconazole (NIZORAL) 2 % cream, Apply topically once daily., Disp: 30 g, Rfl: 1  lactulose (CHRONULAC) 10 gram/15 mL solution, TAKE 30 MLS (20 G TOTAL) BY MOUTH DAILY AS NEEDED (CONSTIPATION)., Disp: 946 mL, Rfl: 1  latanoprost 0.005 % ophthalmic solution, Place 1 drop into both eyes every evening., Disp: 3 Bottle, Rfl: 4  losartan (COZAAR) 100 MG tablet, TAKE 1 TABLET (100 MG TOTAL) BY MOUTH ONCE DAILY., Disp: 90 tablet, Rfl: 3  nitroGLYCERIN 0.4 MG/DOSE TL SPRY (NITROLINGUAL) 400 mcg/spray spray, PLACE 2 SPRAYS UNDER THE TONGUE EVERY 5 MINUTES AS NEEDED FOR CHEST PAIN, Disp: 36 g, Rfl: 0  ONETOUCH DELICA LANCETS 30 gauge Misc, 1 lancet by Misc.(Non-Drug; Combo Route) route 4 (four) times daily., Disp: 150 each, Rfl: 11  ONETOUCH VERIO Strp, USE TO TEST BLOOD SUGAR 4 TIMES A DAY, Disp: 150 strip, Rfl: 11  ranitidine (ZANTAC) 150 MG tablet, Take 1 tablet (150 mg total) by mouth 2 (two) times daily., Disp: 60 tablet, Rfl: 11  rosuvastatin (CRESTOR) 40 MG Tab, TAKE 1 TABLET BY MOUTH ONCE DAILY., Disp: 90 tablet, Rfl: " 3  spironolactone (ALDACTONE) 25 MG tablet, TAKE 2 TABLETS BY MOUTH EVERY DAY, Disp: 180 tablet, Rfl: 11  sulfamethoxazole-trimethoprim 800-160mg (BACTRIM DS) 800-160 mg Tab, Take 1 tablet by mouth 2 (two) times daily. for 7 days, Disp: 14 tablet, Rfl: 0  traMADol (ULTRAM) 50 mg tablet, TAKE 1 TABLET BY MOUTH TWICE DAILY AS NEEDED, Disp: 60 tablet, Rfl: 0  triamcinolone acetonide 0.1% (KENALOG) 0.1 % ointment, Apply topically 2 (two) times daily. Apply small amount to itchy areas on body BID PRN.  Do not use on face or in groin., Disp: 80 g, Rfl: 1    No current facility-administered medications for this visit.       Review of patient's allergies indicates:   -- Penicillins -- Swelling    --  Swelling (extremities)   -- Trulicity (dulaglutide) -- Nausea Only and Rash          Review of Systems   Constitution: Negative for weight gain and weight loss.   Cardiovascular: Negative for chest pain.   Respiratory: Negative for shortness of breath.    Musculoskeletal: Positive for back pain, joint pain, myalgias and neck pain. Negative for joint swelling.   Gastrointestinal: Negative for abdominal pain, bowel incontinence, nausea and vomiting.   Genitourinary: Negative for bladder incontinence.   Neurological: Positive for numbness (feet) and paresthesias (feet).         Objective:        General: Eliu Steinberg is well-developed, well-nourished, appears stated age, in no acute distress, alert and oriented to time, place and person.     General    Vitals reviewed.  Constitutional: She is oriented to person, place, and time. She appears well-developed and well-nourished.   HENT:   Head: Normocephalic and atraumatic.   Pulmonary/Chest: Effort normal.   Neurological: She is alert and oriented to person, place, and time.   Psychiatric: She has a normal mood and affect. Her behavior is normal. Judgment and thought content normal.     General Musculoskeletal Exam   Gait: abnormal (with quad cane)     Back (L-Spine & T-Spine) / Neck  (C-Spine) Exam     Tenderness Right paramedian tenderness of the Sacrum and Lower C-Spine. Left paramedian tenderness of the Lower C-Spine.     Back (L-Spine & T-Spine) Range of Motion   Extension: 10   Flexion: 80   Lateral bend right: 10   Lateral bend left: 10     Neck (C-Spine) Range of Motion   Flexion:     40  Extension: 30Right Lateral Bend: 10 Left Lateral Bend: 10 Right Rotation: 40 Left Rotation: 40     Spinal Sensation   Right Side Sensation  C-Spine Level: normal   L-Spine Level: normal  S-Spine Level: normal  Left Side Sensation  C-Spine Level: normal  L-Spine Level: normal  S-Spine Level: normal    Back (L-Spine & T-Spine) Tests   Right Side Tests  Straight leg raise:      Sitting SLR: > 70 degrees      Left Side Tests  Straight leg raise:     Sitting SLR: > 70 degrees          Other She has no scoliosis .  Spinal Kyphosis:  Absent      Muscle Strength   Right Upper Extremity   Biceps: 5/5/5   Deltoid:  5/5  Triceps:  5/5  Wrist extension: 5/5/5   Finger Flexors:  5/5  Left Upper Extremity  Biceps: 5/5/5   Deltoid:  5/5  Triceps:  5/5  Wrist extension: 5/5/5   Finger Flexors:  5/5  Right Lower Extremity   Hip Flexion: 4/5   Quadriceps:  5/5   Anterior tibial:  5/5/5  EHL:  5/5  Left Lower Extremity   Hip Flexion: 4/5   Quadriceps:  3/5   Anterior tibial:  5/5/5   EHL:  5/5    Reflexes     Left Side  Biceps:  2+  Triceps:  2+  Brachioradialis:  2+  Quadriceps:  2+  Achilles:  2+  Left Nguyen's Sign:  Absent  Babinski Sign:  present    Right Side   Biceps:  2+  Triceps:  2+  Brachioradialis:  2+  Quadriceps:  2+  Achilles:  2+  Right Nguyen's Sign:  absent  Babinski Sign:  absent    Vascular Exam     Right Pulses        Carotid:                  2+    Left Pulses        Carotid:                  2+              Assessment:       1. Chronic right-sided low back pain without sciatica    2. DDD (degenerative disc disease), lumbar    3. DDD (degenerative disc disease), cervical    4. Neck pain    5. Gait  instability    6. Muscle spasm           Plan:       Orders Placed This Encounter    MRI Lumbar Spine Without Contrast    MRI Cervical Spine Without Contrast    X-Ray Cervical Spine AP Lat with Flexion  Extension    Ambulatory Referral to Physical/Occupational Therapy    tiZANidine (ZANAFLEX) 4 MG tablet     More than 50% of the total time of 45 minutes was spent in counseling on diagnosis and treatment options. We discussed back and neck pain and the nature of back and neck pain.  We discussed that it is not one thing that causes the pain but an accumulation of multiple things that we do.  We discussed posture sitting and the importance of trying to sit better.  Her neck pain is what is bothering the most.  Seems like muscle spasmed over xavier, however with leg weakness and babinski and degenerative changes will look at MRI.  We discussed the benefits of therapy and exercise and continuing to move.  1.  She will follow with rheumatology on 1/11  2.  X-ray of the cervical spine  3.  PT Back and core strengthening, extension exercises, cervical retractions, [facility]  [facility].  MRI of the cervical and lumbar spine left leg weakness pos babinski  5.  Tizanidine 2-4mg po TID  6.  RTC 6-8 weeks    Follow-up: Follow-up in about 6 weeks (around 2/14/2019). If there are any questions prior to this, the patient was instructed to contact the office.

## 2019-01-02 NOTE — LETTER
January 3, 2019      Ryan Carlson MD  [address]  [facility] [address]           [facility]  [address]  [facility] [address]  Phone: [phone]  Fax: [phone]          Patient: Eliu Paz   MR Number: [id number]   YOB: 1940   Date of Visit: 1/2/2019       Dear Dr. Ryan Carlson:    Thank you for referring Eliu Paz to me for evaluation. Attached you will find relevant portions of my assessment and plan of care.    If you have questions, please do not hesitate to call me. I look forward to following Eliu Paz along with you.    Sincerely,    Ilan Arevalo MD    Enclosure  CC:  No Recipients    If you would like to receive this communication electronically, please contact [email or web address] or [phone] to request more information on [employer] Link access.    For providers and/or their staff who would like to refer a patient to [facility], please contact us through our one-stop-shop provider referral line, [facility], at [phone].    If you feel you have received this communication in error or would no longer like to receive these types of communications, please e-mail [email or web address]

## 2019-01-02 NOTE — PROGRESS NOTES
Eliu Paz  01/02/2019    HPI:  Patient is a 78 y.o. female with pmhx of HTN, HLD, glaucoma, GERD, T2DM, CAD, CKD stage 3, arthritis, and brain infection in 2008 who is here today for evaluation of  possible giant cell arteritis.     Last week, patient had an episode of bilateral jaw pain with limited ability to open her mouth. Some jaw claudication. This lasted about a day. Resolved without intervention. Denies vision changes, arm weakness, fevers, chills. Associated cervical neck pain that radiates down both arms over the past several years. She was seen by her PCP 12/27, who offered prednisone for GCA, but she declined due to desire for full work up first. She has an appointment with Rheumatology with /S 1/11/18. CRP and ESR mildly elevated since    No MI- 1 vessel CABG 1994  Denies stroke  Tobacco use: former smoker    Past Medical History:   Diagnosis Date    Allergy     Arthritis     Brain infection     Cataract     CKD (chronic kidney disease) stage 3, GFR 30-59 ml/min     Coma     Coronary artery disease     Diabetes mellitus type II 1981    Diabetic neuropathy     GERD (gastroesophageal reflux disease)     Glaucoma     Hyperlipidemia     Hypertension      Past Surgical History:   Procedure Laterality Date    CARDIAC CATHETERIZATION  03/2010    x 2    CARPAL TUNNEL RELEASE      L    Carpal Tunnel Release, Right hand Right 10/19/2016    Performed by [facility] at [facility] OR 1ST FLR    COLONOSCOPY N/A 11/2/2015    Performed by Sanju Clinton MD at [facility] ENDO (4TH FLR)    CORONARY ARTERY BYPASS GRAFT  08/13/1994    x 1    ESOPHAGOGASTRODUODENOSCOPY (EGD) N/A 4/18/2017    Performed by [facility]itesh [facility]iroga [facility] at [facility] ENDO (4TH FLR)    ESOPHAGOGASTRODUODENOSCOPY (EGD) N/A 4/27/2015    Per[facility] by [facility] at [facility] ENDO (4TH FLR)    MANOMETRY-ESOPHAGEAL-WITH IMPEDANCE N/A 6/13/2017    Performed by Kenneth Oconnell MD at [facility] ENDO (4TH FLR)    RELEASE-FINGER-TRIGGER / Index / Long  Right 10/19/2016    [facility] 1ST FLR    TUBAL LIGATION  1970s     Family History   Problem Relation Age of Onset    Diabetes Father     Diabetes Brother     Blindness Brother     Diabetes Maternal Grandmother     Diabetes Paternal Aunt     Diabetes Paternal Uncle     Amblyopia Neg Hx     Cancer Neg Hx     Cataracts Neg Hx     Glaucoma Neg Hx     Hypertension Neg Hx     Macular degeneration Neg Hx     Retinal detachment Neg Hx     Strabismus Neg Hx     Stroke Neg Hx     Thyroid disease Neg Hx     Colon cancer Neg Hx     Esophageal cancer Neg Hx     Stomach cancer Neg Hx     Rectal cancer Neg Hx     Ulcerative colitis Neg Hx     Crohn's disease Neg Hx     Irritable bowel syndrome Neg Hx     Celiac disease Neg Hx     Eczema Neg Hx     Lupus Neg Hx     Psoriasis Neg Hx     Melanoma Neg Hx      Social History     Socioeconomic History    Marital status:      Spouse name: Not on file    Number of children: Not on file    Years of education: Not on file    Highest education level: Not on file   Social Needs    Financial resource strain: Not on file    Food insecurity - worry: Not on file    Food insecurity - inability: Not on file    Transportation needs - medical: Not on file    Transportation needs - non-medical: Not on file   Occupational History    Not on file   Tobacco Use    Smoking status: Former Smoker     Years: 2.00     Last attempt to quit: 3/25/1963     Years since quittin.8    Smokeless tobacco: Never Used   Substance and Sexual Activity    Alcohol use: No    Drug use: No    Sexual activity: Not Currently     Partners: Male   Other Topics Concern    Are you pregnant or think you may be? Not Asked    Breast-feeding Not Asked   Social History Narrative    ,  with heart issues, 4 kids, 2 .    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     Current Outpatient Medications on File  "Prior to Visit   Medication Sig    amLODIPine (NORVASC) 10 MG tablet TAKE 1 TABLET (10 MG TOTAL) BY MOUTH ONCE DAILY.    carvedilol (COREG) 25 MG tablet TAKE 1 TABLET (25 MG TOTAL) BY MOUTH 2 (TWO) TIMES DAILY.    ciclopirox (PENLAC) 8 % Soln Apply topically nightly.    clobetasol 0.05% (TEMOVATE) 0.05 % Oint Apply small amount to vulva area twice a day for 4 weeks then once a day for 4 weeks then once a day on Mondays and Thursdays    fluocinonide (LIDEX) 0.05 % external solution Apply topically 2 (two) times daily.    fluticasone (FLONASE) 50 mcg/actuation nasal spray SPRAY TWICE IN EACH NOSTRIL EVERY DAY    insulin asp prt-insulin aspart, NOVOLOG 70/30, (NOVOLOG MIX 70-30 U-100 INSULN) 100 unit/mL (70-30) Soln Inject 16 units w/ breakfast and 20 units at dinner time.    insulin syringe-needle U-100 (BD INSULIN SYRINGE ULTRA-FINE) 0.5 mL 31 gauge x 5/16" Syrg USE TO INJECT TWICE DAILY WITH INSULIN INJECTIONS    isosorbide mononitrate (IMDUR) 30 MG 24 hr tablet TAKE 1 TABLET (30 MG TOTAL) BY MOUTH ONCE DAILY.    ketoconazole (NIZORAL) 2 % cream Apply topically once daily.    lactulose (CHRONULAC) 10 gram/15 mL solution TAKE 30 MLS (20 G TOTAL) BY MOUTH DAILY AS NEEDED (CONSTIPATION).    latanoprost 0.005 % ophthalmic solution Place 1 drop into both eyes every evening.    losartan (COZAAR) 100 MG tablet TAKE 1 TABLET (100 MG TOTAL) BY MOUTH ONCE DAILY.    nitroGLYCERIN 0.4 MG/DOSE TL SPRY (NITROLINGUAL) 400 mcg/spray spray PLACE 2 SPRAYS UNDER THE TONGUE EVERY 5 MINUTES AS NEEDED FOR CHEST PAIN    ONETOUCH VERIO Strp USE TO TEST BLOOD SUGAR 4 TIMES A DAY    ranitidine (ZANTAC) 150 MG tablet Take 1 tablet (150 mg total) by mouth 2 (two) times daily.    rosuvastatin (CRESTOR) 40 MG Tab TAKE 1 TABLET BY MOUTH ONCE DAILY.    spironolactone (ALDACTONE) 25 MG tablet TAKE 2 TABLETS BY MOUTH EVERY DAY    sulfamethoxazole-trimethoprim 800-160mg (BACTRIM DS) 800-160 mg Tab Take 1 tablet by mouth 2 (two) " times daily. for 7 days    traMADol (ULTRAM) 50 mg tablet TAKE 1 TABLET BY MOUTH TWICE DAILY AS NEEDED    triamcinolone acetonide 0.1% (KENALOG) 0.1 % ointment Apply topically 2 (two) times daily. Apply small amount to itchy areas on body BID PRN.  Do not use on face or in groin.    aspirin (ECOTRIN) 81 MG EC tablet Take 1 tablet (81 mg total) by mouth once daily.    ONETOUCH DELICA LANCETS 30 gauge Misc 1 lancet by Misc.(Non-Drug; Combo Route) route 4 (four) times daily.     No current facility-administered medications on file prior to visit.        REVIEW OF SYSTEMS:  General: negative; ENT: negative; Allergy and Immunology: negative; Hematological and Lymphatic: Negative; Endocrine: negative; Respiratory: no cough, shortness of breath, or wheezing; Cardiovascular: no chest pain or dyspnea on exertion; Gastrointestinal: (+) neck pain, change in bowel habits, or bloody stools; Genito-Urinary: no dysuria, trouble voiding, or hematuria; Musculoskeletal: negative  Neurological: no TIA or stroke symptoms    PHYSICAL EXAM:   Right Arm BP - Sittin/69 (19 1342)  Left Arm BP - Sittin/71 (19 1342)  Pulse: 62  Temp: 97.8 °F (36.6 °C)      General appearance:  Alert, well-appearing, in no distress.  Oriented to person, place, and time   Neurological:  Normal speech, no focal findings noted; CN II - XII grossly intact    HEENT: No obvious temporal artery bleeding or tenderness           Musculoskeletal: Digits/nail without cyanosis/clubbing.  Normal muscle strength/tone.                 Neck: Supple, no significant adenopathy; thyroid is not enlarged            Chest:  No use of accessory muscles             Cardiac: Normal rate and regular rhythm          Abdomen: Soft, nontender, nondistended      Extremities:   2+ radial      Mild pedal edema       LAB RESULTS:  Lab Results   Component Value Date    K 4.4 2018    K 4.4 2018    K 4.5 2017    CREATININE 0.9 2018     CREATININE 1.1 12/04/2018    CREATININE 1.1 11/30/2017     Lab Results   Component Value Date    WBC 8.62 12/25/2018    WBC 5.94 12/04/2018    WBC 7.60 10/20/2016    HCT 39.2 12/25/2018    HCT 36.9 (L) 12/04/2018    HCT 41.1 10/20/2016     12/25/2018     12/04/2018     10/20/2016     Lab Results   Component Value Date    HGBA1C 7.9 (H) 12/04/2018    HGBA1C 8.5 (H) 09/05/2018    HGBA1C 8.4 (H) 03/28/2018     IMAGING:    IMP/PLAN:  78 y.o. female with pmhx of HTN, HLD, glaucoma, GERD, T2DM, CAD s/p 1 vessel CABG, CKD stage 3, arthritis, and brain infection in 2008 who is here today for evaluation of possible giant cell arteritis.     - her history is not very convincing for giant cell arteritis  - keep scheduled U/S Transcranial Doppler to evaluate further  - with elevated ESR and CRP, agree with further work-up by rheumatology    Anneliese Talbot MD  General Surgery, PGY-III      STAFF ADDENDUM    I have reviewed the relevant data and the resident's assessment and agree with the findings and plan as outlined above.  Patient is a challenging historian, but her related history is not totally consistent with GCA.      1) transcranial doppler to eval temporal arteries  2) rheumatology appointment already scheduled    Ilan Arevalo MD  Vascular & Endovascular Surgery

## 2019-01-03 ENCOUNTER — OFFICE VISIT (OUTPATIENT)
Dept: SPINE | Facility: CLINIC | Age: 79
End: 2019-01-03
Attending: PHYSICAL MEDICINE & REHABILITATION
Payer: MEDICARE

## 2019-01-03 VITALS
HEART RATE: 58 BPM | BODY MASS INDEX: 35.88 KG/M2 | WEIGHT: 178 LBS | SYSTOLIC BLOOD PRESSURE: 132 MMHG | DIASTOLIC BLOOD PRESSURE: 70 MMHG | HEIGHT: 59 IN

## 2019-01-03 DIAGNOSIS — M50.30 DDD (DEGENERATIVE DISC DISEASE), CERVICAL: ICD-10-CM

## 2019-01-03 DIAGNOSIS — G89.29 CHRONIC RIGHT-SIDED LOW BACK PAIN WITHOUT SCIATICA: Primary | ICD-10-CM

## 2019-01-03 DIAGNOSIS — M62.838 MUSCLE SPASM: ICD-10-CM

## 2019-01-03 DIAGNOSIS — M54.2 NECK PAIN: ICD-10-CM

## 2019-01-03 DIAGNOSIS — M54.50 CHRONIC RIGHT-SIDED LOW BACK PAIN WITHOUT SCIATICA: Primary | ICD-10-CM

## 2019-01-03 DIAGNOSIS — M51.36 DDD (DEGENERATIVE DISC DISEASE), LUMBAR: ICD-10-CM

## 2019-01-03 DIAGNOSIS — R26.81 GAIT INSTABILITY: ICD-10-CM

## 2019-01-03 PROCEDURE — 99204 OFFICE O/P NEW MOD 45 MIN: CPT | Mod: S$PBB,,, | Performed by: PHYSICAL MEDICINE & REHABILITATION

## 2019-01-03 PROCEDURE — 99204 PR OFFICE/OUTPT VISIT, NEW, LEVL IV, 45-59 MIN: ICD-10-PCS | Mod: S$PBB,,, | Performed by: PHYSICAL MEDICINE & REHABILITATION

## 2019-01-03 PROCEDURE — 99999 PR PBB SHADOW E&M-EST. PATIENT-LVL III: CPT | Mod: PBBFAC,,, | Performed by: PHYSICAL MEDICINE & REHABILITATION

## 2019-01-03 PROCEDURE — 99213 OFFICE O/P EST LOW 20 MIN: CPT | Mod: PBBFAC | Performed by: PHYSICAL MEDICINE & REHABILITATION

## 2019-01-03 PROCEDURE — 99999 PR PBB SHADOW E&M-EST. PATIENT-LVL III: ICD-10-PCS | Mod: PBBFAC,,, | Performed by: PHYSICAL MEDICINE & REHABILITATION

## 2019-01-03 RX ORDER — TIZANIDINE 4 MG/1
2-4 TABLET ORAL EVERY 6 HOURS PRN
Qty: 90 TABLET | Refills: 2 | Status: SHIPPED | OUTPATIENT
Start: 2019-01-03 | End: 2019-01-11

## 2019-01-03 NOTE — LETTER
January 3, 2019      Ryan Carlson MD  [address]  [place] [address]           [facility]  [address], [address]  [facility] [address]  Phone: [phone]  Fax: [phone]          Patient: Eliu Paz   MR Number: [id number]   YOB: 1940   Date of Visit: 1/3/2019       Dear Dr. Ryan Carlson:    Thank you for referring Eliu Paz to me for evaluation. Attached you will find relevant portions of my assessment and plan of care.    If you have questions, please do not hesitate to call me. I look forward to following Eliu Paz along with you.    Sincerely,    Wendi Merida MD    Enclosure  CC:  No Recipients    If you would like to receive this communication electronically, please contact [email or web address] or [phone] to request more information on [employer] Link access.    For providers and/or their staff who would like to refer a patient to [facility], please contact us through our one-stop-shop provider referral line, [facility], at [phone].    If you feel you have received this communication in error or would no longer like to receive these types of communications, please e-mail [email or web address]

## 2019-01-11 ENCOUNTER — OFFICE VISIT (OUTPATIENT)
Dept: RHEUMATOLOGY | Facility: CLINIC | Age: 79
End: 2019-01-11
Payer: MEDICARE

## 2019-01-11 VITALS
HEART RATE: 59 BPM | DIASTOLIC BLOOD PRESSURE: 63 MMHG | WEIGHT: 175.69 LBS | HEIGHT: 60 IN | SYSTOLIC BLOOD PRESSURE: 139 MMHG | BODY MASS INDEX: 34.49 KG/M2

## 2019-01-11 DIAGNOSIS — G89.4 CHRONIC PAIN SYNDROME: Primary | ICD-10-CM

## 2019-01-11 PROCEDURE — 99213 OFFICE O/P EST LOW 20 MIN: CPT | Mod: PBBFAC | Performed by: INTERNAL MEDICINE

## 2019-01-11 PROCEDURE — 99999 PR PBB SHADOW E&M-EST. PATIENT-LVL III: ICD-10-PCS | Mod: PBBFAC,,, | Performed by: INTERNAL MEDICINE

## 2019-01-11 PROCEDURE — 99214 OFFICE O/P EST MOD 30 MIN: CPT | Mod: S$PBB,,, | Performed by: INTERNAL MEDICINE

## 2019-01-11 PROCEDURE — 99214 PR OFFICE/OUTPT VISIT, EST, LEVL IV, 30-39 MIN: ICD-10-PCS | Mod: S$PBB,,, | Performed by: INTERNAL MEDICINE

## 2019-01-11 PROCEDURE — 99999 PR PBB SHADOW E&M-EST. PATIENT-LVL III: CPT | Mod: PBBFAC,,, | Performed by: INTERNAL MEDICINE

## 2019-01-11 RX ORDER — TIZANIDINE 4 MG/1
2-4 TABLET ORAL EVERY 6 HOURS PRN
Qty: 90 TABLET | Refills: 2 | Status: SHIPPED | OUTPATIENT
Start: 2019-01-11 | End: 2019-01-21

## 2019-01-11 ASSESSMENT — ROUTINE ASSESSMENT OF PATIENT INDEX DATA (RAPID3)
FATIGUE SCORE: 2
PAIN SCORE: 8
TOTAL RAPID3 SCORE: 4.94
WHEN YOU AWAKENED IN THE MORNING OVER THE LAST WEEK, PLEASE INDICATE THE AMOUNT OF TIME IT TAKES UNTIL YOU ARE AS LIMBER AS YOU WILL BE FOR THE DAY: 15 MINUTES
MDHAQ FUNCTION SCORE: .7
PSYCHOLOGICAL DISTRESS SCORE: 3.3
AM STIFFNESS SCORE: 1, YES
PATIENT GLOBAL ASSESSMENT SCORE: 4.5

## 2019-01-11 NOTE — PROGRESS NOTES
Chief Complaint   Patient presents with    Follow-up     fibromyalgia follow up       Patient with chronic pain for a follow up    History of presenting illness    78 year old black female with chronic pain for 3 years   She has osteoarthritis and diabetic neuropathy    JOHANNE pos  No CTD    On baclofen 10 mg daily  She recently got a prescription for xanaflex 2 to 4 mg daily every 6 hours  She has not started it   She takes tylenol   Last time she was asked to take 20 mg daily   She has chronic pain since she did not make changes  She is very hard of hearing    She has overall body pain  Even the tips of her fingers hurt    Past history : DM,allergy,catarcts,CKD,CAD,GERD, HTN,HLD    Family history : none relevant     Social history : former smoker quit 1963      Review of Systems   Constitutional: Negative for activity change, appetite change, chills, diaphoresis, fatigue, fever and unexpected weight change.   HENT: Negative for congestion, dental problem, drooling, ear discharge, ear pain, facial swelling, hearing loss, mouth sores, nosebleeds, postnasal drip, rhinorrhea, sinus pressure, sinus pain, sneezing, sore throat, tinnitus, trouble swallowing and voice change.    Eyes: Negative for photophobia, pain, discharge, redness, itching and visual disturbance.   Respiratory: Negative for apnea, cough, choking, chest tightness, shortness of breath, wheezing and stridor.    Cardiovascular: Negative for chest pain, palpitations and leg swelling.   Gastrointestinal: Negative for abdominal distention, abdominal pain, anal bleeding, blood in stool, constipation, diarrhea, nausea, rectal pain and vomiting.   Endocrine: Negative for cold intolerance, heat intolerance, polydipsia, polyphagia and polyuria.   Genitourinary: Negative for decreased urine volume, difficulty urinating, dysuria, enuresis, flank pain, frequency, genital sores, hematuria and urgency.   Musculoskeletal: Positive for arthralgias. Negative for back pain,  gait problem, joint swelling, myalgias, neck pain and neck stiffness.   Skin: Negative for color change, pallor, rash and wound.   Allergic/Immunologic: Negative for environmental allergies, food allergies and immunocompromised state.   Neurological: Negative for dizziness, tremors, seizures, syncope, facial asymmetry, speech difficulty, weakness, light-headedness, numbness and headaches.   Hematological: Negative for adenopathy. Does not bruise/bleed easily.   Psychiatric/Behavioral: Negative for agitation, behavioral problems, confusion, decreased concentration, dysphoric mood, hallucinations, self-injury, sleep disturbance and suicidal ideas. The patient is not nervous/anxious and is not hyperactive.      Physical Exam     EARL-28 tender joint count: 0  EARL-28 swollen joint count: 0    Physical Exam   Constitutional: She is oriented to person, place, and time and well-developed, well-nourished, and in no distress. No distress.   HENT:   Head: Normocephalic.   Mouth/Throat: Oropharynx is clear and moist.   Eyes: Conjunctivae are normal. Pupils are equal, round, and reactive to light. Right eye exhibits no discharge. Left eye exhibits no discharge. No scleral icterus.   Neck: Normal range of motion. No thyromegaly present.   Cardiovascular: Normal rate, regular rhythm, normal heart sounds and intact distal pulses.    Pulmonary/Chest: Effort normal and breath sounds normal. No stridor.   Abdominal: Soft. Bowel sounds are normal.   Lymphadenopathy:     She has no cervical adenopathy.   Neurological: She is alert and oriented to person, place, and time.   Skin: Skin is warm. No rash noted. She is not diaphoretic.     Psychiatric: Affect and judgment normal.   Musculoskeletal: Normal range of motion.       Assessment     78 year old female with chronic pain and   DM,allergy,catarcts,CKD,CAD,GERD, HTN,HLD    She comes for a follow up    She has diabetic neuropathy and osteoarthritis     1. Chronic pain syndrome         She didn't think baclofen 10 mg daily was helping her   She got zanaflex from a different physician  She has not started it yet      F/u problem    Plan    D/c baclofen    Start the new prescription she has :   zanaflex 2-4 mg tablet : take 2 to 3 tablets daily     Tylenol can take : 5 to 6  tablets of 500 mg daily     Eliu Steinberg was seen today for follow-up.    Diagnoses and all orders for this visit:    Chronic pain syndrome    Other orders  -     tiZANidine (ZANAFLEX) 4 MG tablet; Take 0.5-1 tablets (2-4 mg total) by mouth every 6 (six) hours as needed.

## 2019-01-11 NOTE — PATIENT INSTRUCTIONS
D/c baclofen    Start the new prescription    zanaflex 4 mg tablet : take 2 to 3 tablets daily     Tylenol can take : 5 to 6  tablets of 500 mg daily

## 2019-01-11 NOTE — LETTER
January 11, 2019      Ryan Carlson MD  [address]  [place] [address]           [facility]  [address]  [place] [address]  Phone: [phone]  Fax: [phone]          Patient: Eliu Paz   MR Number: [id number]   YOB: 1940   Date of Visit: 1/11/2019       Dear Dr. Ryan Carlson:    Thank you for referring Eliu Paz to me for evaluation. Attached you will find relevant portions of my assessment and plan of care.    If you have questions, please do not hesitate to call me. I look forward to following Eliu Paz along with you.    Sincerely,    Abiel Merrill MD    Enclosure  CC:  No Recipients    If you would like to receive this communication electronically, please contact [email or web address] or [phone] to request more information on [employer] Link access.    For providers and/or their staff who would like to refer a patient to [facility], please contact us through our one-stop-shop provider referral line, [facility], at [phone].    If you feel you have received this communication in error or would no longer like to receive these types of communications, please e-mail [email or web address]

## 2019-01-15 ENCOUNTER — OFFICE VISIT (OUTPATIENT)
Dept: OPHTHALMOLOGY | Facility: CLINIC | Age: 79
End: 2019-01-15
Payer: MEDICARE

## 2019-01-15 DIAGNOSIS — H25.13 SENILE NUCLEAR SCLEROSIS, BILATERAL: ICD-10-CM

## 2019-01-15 DIAGNOSIS — H25.013 CORTICAL AGE-RELATED CATARACT, BILATERAL: ICD-10-CM

## 2019-01-15 DIAGNOSIS — H40.1133 PRIMARY OPEN-ANGLE GLAUCOMA, BILATERAL, SEVERE STAGE: Primary | ICD-10-CM

## 2019-01-15 DIAGNOSIS — H35.363 MACULAR DRUSEN, BILATERAL: ICD-10-CM

## 2019-01-15 DIAGNOSIS — H35.3132 NONEXUDATIVE AGE-RELATED MACULAR DEGENERATION, BILATERAL, INTERMEDIATE DRY STAGE: ICD-10-CM

## 2019-01-15 DIAGNOSIS — E11.9 TYPE 2 DIABETES MELLITUS WITHOUT OPHTHALMIC MANIFESTATIONS: ICD-10-CM

## 2019-01-15 DIAGNOSIS — H35.3130 BILATERAL NONEXUDATIVE AGE-RELATED MACULAR DEGENERATION, UNSPECIFIED STAGE: ICD-10-CM

## 2019-01-15 PROCEDURE — 99999 PR PBB SHADOW E&M-EST. PATIENT-LVL II: CPT | Mod: PBBFAC,,, | Performed by: OPHTHALMOLOGY

## 2019-01-15 PROCEDURE — 99212 OFFICE O/P EST SF 10 MIN: CPT | Mod: PBBFAC | Performed by: OPHTHALMOLOGY

## 2019-01-15 PROCEDURE — 92012 INTRM OPH EXAM EST PATIENT: CPT | Mod: S$PBB,,, | Performed by: OPHTHALMOLOGY

## 2019-01-15 PROCEDURE — 99999 PR PBB SHADOW E&M-EST. PATIENT-LVL II: ICD-10-PCS | Mod: PBBFAC,,, | Performed by: OPHTHALMOLOGY

## 2019-01-15 PROCEDURE — 92012 PR EYE EXAM, EST PATIENT,INTERMED: ICD-10-PCS | Mod: S$PBB,,, | Performed by: OPHTHALMOLOGY

## 2019-01-15 RX ORDER — LATANOPROST 50 UG/ML
1 SOLUTION/ DROPS OPHTHALMIC NIGHTLY
Qty: 3 BOTTLE | Refills: 3 | Status: SHIPPED | OUTPATIENT
Start: 2019-01-15 | End: 2020-07-31 | Stop reason: SDUPTHER

## 2019-01-15 RX ORDER — DORZOLAMIDE HYDROCHLORIDE AND TIMOLOL MALEATE 20; 5 MG/ML; MG/ML
1 SOLUTION/ DROPS OPHTHALMIC 2 TIMES DAILY
Qty: 3 BOTTLE | Refills: 3 | Status: SHIPPED | OUTPATIENT
Start: 2019-01-15 | End: 2020-07-31 | Stop reason: SDUPTHER

## 2019-01-15 NOTE — PROGRESS NOTES
HPI     Glaucoma      Additional comments: 4 month ck               Comments     DLS: 9/18/18    1. POAG OU  2. Type 2 DM no DR  3. NS OU  4. ARMD OU  5. Ptosis OS>OD    MEDS:  Latanoprost QHS OU          Last edited by Rand Vergara MA on 1/15/2019 10:18 AM. (History)            Assessment /Plan     For exam results, see Encounter Report.    Primary open-angle glaucoma, bilateral, severe stage    Senile nuclear sclerosis, bilateral    Cortical age-related cataract, bilateral    Nonexudative age-related macular degeneration, bilateral, intermediate dry stage    Type 2 diabetes mellitus without ophthalmic manifestations    Macular drusen, bilateral    Bilateral nonexudative age-related macular degeneration, unspecified stage      ***

## 2019-01-15 NOTE — PROGRESS NOTES
HPI     Glaucoma      Additional comments: 4 month ck               Comments     DLS: 9/18/18    1. POAG OU  2. Type 2 DM no DR  3. NS OU  4. ARMD OU  5. Ptosis OS>OD    MEDS:  Latanoprost QHS OU          Last edited by Rand Vergara MA on 1/15/2019 10:18 AM. (History)            Assessment /Plan     For exam results, see Encounter Report.    Primary open-angle glaucoma, bilateral, severe stage    Senile nuclear sclerosis, bilateral    Cortical age-related cataract, bilateral    Nonexudative age-related macular degeneration, bilateral, intermediate dry stage    Type 2 diabetes mellitus without ophthalmic manifestations    Macular drusen, bilateral    Bilateral nonexudative age-related macular degeneration, unspecified stage           Glaucoma (type and duration)    POAG severe   First HVF   2013   First photos   2017   Treatment / Drops started   Latanoprost, travatan           Family history    ?        Glaucoma meds    latanoprost        H/O adverse rxn to glaucoma drops    none        LASERS    none        GLAUCOMA SURGERIES    none        OTHER EYE SURGERIES    none        CDR    0.9/0.9        Tbase    16-20/16-20          Tmax    20/20            Ttarget    15/15           HVF    5 test 2013 to  2018 - SAD od // gen depression, SAD, IAD os (?prog os0         Gonio    3+ ou        CCT    544/525        OCT    5 test 2013 to 2018 - RNFL - decr thru out od // dec. Thru out  os        HRT   2 test 2017 to 2018 MR -  Dec. S od // dec. I, bord S/T os /// CDR 0.78 od // 0.760 os        Disc photos    2017    - Ttoday    16/16  - Test done today gonio / IOP    POAG severe  Severe RNFL thinning with progressive field loss   - on latanoprost qhs OU, continue  -IOP just above target - add cosopt ou bid  (1/2019)     NS cataract OU - hold on CE - pt is not having any problems with vision presently     Macular Drusen OU  AMD OU, seeing [facility]   AREDS 2 Vitamins  Home Amsler Grid Testing    Ptosis   Pt C/O droopy lid OS >  OD        F/u 4 months HVF/Fundus/DFE // IOP check with addition of cosopt ou bid         - seen by  retina - akshat rogers - saw [facility]evento 6/1/2018 - to F/U in 6 months     -  consider adding a second gtts if IOP increases - add cosopt ou bid 1/15/2019    - Hold off on CE for now - but may need to consider it soon

## 2019-01-21 ENCOUNTER — HOSPITAL ENCOUNTER (OUTPATIENT)
Dept: RADIOLOGY | Facility: HOSPITAL | Age: 79
Discharge: HOME | End: 2019-01-21
Attending: PHYSICAL MEDICINE & REHABILITATION
Payer: MEDICARE

## 2019-01-21 ENCOUNTER — TELEPHONE (OUTPATIENT)
Dept: PODIATRY | Facility: CLINIC | Age: 79
End: 2019-01-21

## 2019-01-21 DIAGNOSIS — M62.838 MUSCLE SPASM: ICD-10-CM

## 2019-01-21 DIAGNOSIS — M54.2 NECK PAIN: ICD-10-CM

## 2019-01-21 PROCEDURE — 72050 XR CERVICAL SPINE AP LAT WITH FLEX EXTEN: ICD-10-PCS | Mod: 26,,, | Performed by: RADIOLOGY

## 2019-01-21 PROCEDURE — 72050 X-RAY EXAM NECK SPINE 4/5VWS: CPT | Mod: 26,,, | Performed by: RADIOLOGY

## 2019-01-21 PROCEDURE — 72050 X-RAY EXAM NECK SPINE 4/5VWS: CPT | Mod: TC

## 2019-01-21 NOTE — TELEPHONE ENCOUNTER
----- Message from Debbie Torrez sent at 1/21/2019  2:04 PM CST -----  Contact: Self  Needs Advice    Reason for call:orders for shoes, pt is needing a call back  She has a few questions.        Communication Preference:@ [phone]     Additional Information:

## 2019-01-23 ENCOUNTER — HOSPITAL ENCOUNTER (OUTPATIENT)
Dept: RADIOLOGY | Facility: HOSPITAL | Age: 79
Discharge: HOME | End: 2019-01-23
Attending: PHYSICAL MEDICINE & REHABILITATION
Payer: MEDICARE

## 2019-01-23 DIAGNOSIS — R26.81 GAIT INSTABILITY: ICD-10-CM

## 2019-01-23 DIAGNOSIS — M51.36 DDD (DEGENERATIVE DISC DISEASE), LUMBAR: ICD-10-CM

## 2019-01-23 DIAGNOSIS — I25.10 CORONARY ARTERY DISEASE INVOLVING NATIVE CORONARY ARTERY OF NATIVE HEART WITHOUT ANGINA PECTORIS: Chronic | ICD-10-CM

## 2019-01-23 DIAGNOSIS — M50.30 DDD (DEGENERATIVE DISC DISEASE), CERVICAL: ICD-10-CM

## 2019-01-23 DIAGNOSIS — M54.50 CHRONIC RIGHT-SIDED LOW BACK PAIN WITHOUT SCIATICA: ICD-10-CM

## 2019-01-23 DIAGNOSIS — M54.2 NECK PAIN: ICD-10-CM

## 2019-01-23 DIAGNOSIS — G89.29 CHRONIC RIGHT-SIDED LOW BACK PAIN WITHOUT SCIATICA: ICD-10-CM

## 2019-01-23 PROCEDURE — 72148 MRI LUMBAR SPINE W/O DYE: CPT | Mod: 26,,, | Performed by: RADIOLOGY

## 2019-01-23 PROCEDURE — 72148 MRI LUMBAR SPINE W/O DYE: CPT | Mod: TC

## 2019-01-23 PROCEDURE — 72141 MRI NECK SPINE W/O DYE: CPT | Mod: TC

## 2019-01-23 PROCEDURE — 72141 MRI NECK SPINE W/O DYE: CPT | Mod: 26,,, | Performed by: RADIOLOGY

## 2019-01-23 PROCEDURE — 72141 MRI CERVICAL SPINE WITHOUT CONTRAST: ICD-10-PCS | Mod: 26,,, | Performed by: RADIOLOGY

## 2019-01-23 PROCEDURE — 72148 MRI LUMBAR SPINE WITHOUT CONTRAST: ICD-10-PCS | Mod: 26,,, | Performed by: RADIOLOGY

## 2019-01-23 NOTE — PROGRESS NOTES
MRI of the cervical spine showed some microvascular ischemic changes in juan.  It also showed abnormal flow in right vertebral artery.  Wanted to let you now in case needed more studies

## 2019-01-24 RX ORDER — NITROGLYCERIN 400 UG/1
SPRAY ORAL
Qty: 36 G | Refills: 0 | Status: SHIPPED | OUTPATIENT
Start: 2019-01-24 | End: 2020-02-17 | Stop reason: SDUPTHER

## 2019-01-30 ENCOUNTER — TELEPHONE (OUTPATIENT)
Dept: ENDOSCOPY | Facility: HOSPITAL | Age: 79
End: 2019-01-30

## 2019-01-30 ENCOUNTER — OFFICE VISIT (OUTPATIENT)
Dept: GASTROENTEROLOGY | Facility: CLINIC | Age: 79
End: 2019-01-30
Payer: MEDICARE

## 2019-01-30 VITALS
HEART RATE: 54 BPM | BODY MASS INDEX: 34.97 KG/M2 | DIASTOLIC BLOOD PRESSURE: 60 MMHG | SYSTOLIC BLOOD PRESSURE: 104 MMHG | WEIGHT: 178.13 LBS | HEIGHT: 60 IN

## 2019-01-30 DIAGNOSIS — K31.84 GASTROPARESIS: Primary | ICD-10-CM

## 2019-01-30 DIAGNOSIS — K21.9 GASTROESOPHAGEAL REFLUX DISEASE WITHOUT ESOPHAGITIS: ICD-10-CM

## 2019-01-30 DIAGNOSIS — K59.00 CONSTIPATION, UNSPECIFIED CONSTIPATION TYPE: ICD-10-CM

## 2019-01-30 DIAGNOSIS — Z12.11 SCREEN FOR COLON CANCER: ICD-10-CM

## 2019-01-30 DIAGNOSIS — Z12.11 SPECIAL SCREENING FOR MALIGNANT NEOPLASMS, COLON: Primary | ICD-10-CM

## 2019-01-30 PROCEDURE — 99214 PR OFFICE/OUTPT VISIT, EST, LEVL IV, 30-39 MIN: ICD-10-PCS | Mod: S$PBB,,, | Performed by: NURSE PRACTITIONER

## 2019-01-30 PROCEDURE — 99999 PR PBB SHADOW E&M-EST. PATIENT-LVL V: ICD-10-PCS | Mod: PBBFAC,,, | Performed by: NURSE PRACTITIONER

## 2019-01-30 PROCEDURE — 99215 OFFICE O/P EST HI 40 MIN: CPT | Mod: PBBFAC | Performed by: NURSE PRACTITIONER

## 2019-01-30 PROCEDURE — 99214 OFFICE O/P EST MOD 30 MIN: CPT | Mod: S$PBB,,, | Performed by: NURSE PRACTITIONER

## 2019-01-30 PROCEDURE — 99999 PR PBB SHADOW E&M-EST. PATIENT-LVL V: CPT | Mod: PBBFAC,,, | Performed by: NURSE PRACTITIONER

## 2019-01-30 RX ORDER — SODIUM, POTASSIUM,MAG SULFATES 17.5-3.13G
1 SOLUTION, RECONSTITUTED, ORAL ORAL ONCE
Qty: 1 BOTTLE | Refills: 0 | Status: SHIPPED | OUTPATIENT
Start: 2019-01-30 | End: 2019-01-30

## 2019-01-30 NOTE — PROGRESS NOTES
[facility] Established Patient Visit    Reason for Visit:  The primary encounter diagnosis was Gastroparesis. Diagnoses of Gastroesophageal reflux disease without esophagitis, Constipation, unspecified constipation type, and Screen for colon cancer were also pertinent to this visit.    PCP: Ryan Carlson    HPI:    This is a 78 y.o. female here for f/u of GERD,constipation, gastroparesis.  Ms. Paz's last GI visit was with me in 10/2018. previous GES revealed delayed gastric emptying. 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  Previously treated with acitigall for GB stones.      She is treating her acid reflux with zantac 150 mg 2 times daily with improvement in pyrosis. She does have a hx of LA grade A esophagitis w/ documented healing. She had been on daily PPIs in the past, but opted to switch to H2RAs in stead d/t potential s/e of long term PPI use.     Nausea twice monthly, vomiting twice monthly, and early satiety if eats regular sized meal, so eats small meals. Two small meals and a small snack daily. Not on smoothies or shakes.  Has seen GI dietitian for GP diet. Has DM. H/o elevated hba1c. BS running 100s. Last A1c 7.9 (improvement). On insulin.   Was on trulicity, but d/c d/t s/e. Has not tried elis for nausea.     abd pain-none  Bowel habits - h/o constipation.Tried miralax once daily , but feels it takes too long to work. Cannot recall how long she tried it for. Still taking lactulose PRN- it does cause gas. Having 1 bristol type 2, 5 BM few days per week (2-3). Did not take miralax twice daily as previously advised.    GI bleeding - denies hematochezia, hematemesis, melena, BRBPR, black/tarry stools, and coffee ground emesis  NSAID usage - ASA 81 daily.     ROS:  Constitutional: No fevers, no chills, No unintentional weight loss, no fatigue,   ENT: No allergies  CV: No chest pain, no palpitations, + perif. edema, no sob on exertion  Pulm: No cough, No  shortness of breath, no wheezes, no sputum  Ophtho: No vision changes  GI: see HPI;   Derm: No rash  Heme: No lymphadenopathy, No bruising  MSK: No arthritis, + muscle pain/back, no muscle weakness  : No dysuria, No hematuria  Endo: No hot or cold intolerance  Neuro: No syncope, No seizure,     PMHX:  has a past medical history of Allergy, Arthritis, Brain infection, Cataract, CKD (chronic kidney disease) stage 3, GFR 30-59 ml/min, Coma, Coronary artery disease, Diabetes mellitus type II (1981), Diabetic neuropathy, GERD (gastroesophageal reflux disease), Glaucoma, Hyperlipidemia, and Hypertension.    PSHX:  has a past surgical history that includes Carpal tunnel release; Tubal ligation (1970s); Colonoscopy (N/A, 11/2/2015); Cardiac catheterization (03/2010); and Coronary artery bypass graft (08/13/1994).    The patient's social and family histories were reviewed by me and updated in the appropriate section of the electronic medical record.    Review of patient's allergies indicates:   Allergen Reactions    Pcn [penicillins] Swelling     Swelling (extremities)^, Swelling (extremities)^  Swelling (extremities)^, Swelling (extremities)^  Swelling (extremities)^, Swelling (extremities)^    Trulicity [dulaglutide] Nausea Only and Rash       Current Outpatient Medications   Medication Sig    amLODIPine (NORVASC) 10 MG tablet TAKE 1 TABLET (10 MG TOTAL) BY MOUTH ONCE DAILY.    aspirin (ECOTRIN) 81 MG EC tablet Take 1 tablet (81 mg total) by mouth once daily.    carvedilol (COREG) 25 MG tablet TAKE 1 TABLET (25 MG TOTAL) BY MOUTH 2 (TWO) TIMES DAILY.    ciclopirox (PENLAC) 8 % Soln Apply topically nightly.    clobetasol 0.05% (TEMOVATE) 0.05 % Oint Apply small amount to vulva area twice a day for 4 weeks then once a day for 4 weeks then once a day on Mondays and Thursdays    dorzolamide-timolol 2-0.5% (COSOPT) 22.3-6.8 mg/mL ophthalmic solution Place 1 drop into both eyes 2 (two) times daily.    fluocinonide  "(LIDEX) 0.05 % external solution Apply topically 2 (two) times daily.    fluticasone (FLONASE) 50 mcg/actuation nasal spray SPRAY TWICE IN EACH NOSTRIL EVERY DAY    insulin asp prt-insulin aspart, NOVOLOG 70/30, (NOVOLOG MIX 70-30 U-100 INSULN) 100 unit/mL (70-30) Soln Inject 16 units w/ breakfast and 20 units at dinner time.    insulin syringe-needle U-100 (BD INSULIN SYRINGE ULTRA-FINE) 0.5 mL 31 gauge x 5/16" Syrg USE TO INJECT TWICE DAILY WITH INSULIN INJECTIONS    isosorbide mononitrate (IMDUR) 30 MG 24 hr tablet TAKE 1 TABLET (30 MG TOTAL) BY MOUTH ONCE DAILY.    ketoconazole (NIZORAL) 2 % cream Apply topically once daily.    lactulose (CHRONULAC) 10 gram/15 mL solution TAKE 30 MLS (20 G TOTAL) BY MOUTH DAILY AS NEEDED (CONSTIPATION).    latanoprost 0.005 % ophthalmic solution Place 1 drop into both eyes every evening.    losartan (COZAAR) 100 MG tablet TAKE 1 TABLET (100 MG TOTAL) BY MOUTH ONCE DAILY.    ONETOUCH DELICA LANCETS 30 gauge Misc 1 lancet by Misc.(Non-Drug; Combo Route) route 4 (four) times daily.    ONETOUCH VERIO Strp USE TO TEST BLOOD SUGAR 4 TIMES A DAY    ranitidine (ZANTAC) 150 MG tablet Take 1 tablet (150 mg total) by mouth 2 (two) times daily.    rosuvastatin (CRESTOR) 40 MG Tab TAKE 1 TABLET BY MOUTH ONCE DAILY.    spironolactone (ALDACTONE) 25 MG tablet TAKE 2 TABLETS BY MOUTH EVERY DAY    traMADol (ULTRAM) 50 mg tablet TAKE 1 TABLET BY MOUTH TWICE DAILY AS NEEDED    nitroGLYCERIN 0.4 MG/DOSE TL SPRY (NITROLINGUAL) 400 mcg/spray spray PLACE 2 SPRAYS UNDER THE TONGUE EVERY 5 MINUTES AS NEEDED FOR CHEST PAIN    triamcinolone acetonide 0.1% (KENALOG) 0.1 % ointment Apply topically 2 (two) times daily. Apply small amount to itchy areas on body BID PRN.  Do not use on face or in groin.     No current facility-administered medications for this visit.          Objective Findings:    Vital Signs:  /60   Pulse (!) 54   Ht 5' (1.524 m)   Wt 80.8 kg (178 lb 2.1 oz)   BMI " 34.79 kg/m²  Body mass index is 34.79 kg/m².    Physical Exam:  General Appearance: Well appearing in no acute distress  Head:   Normocephalic, without obvious abnormality  Eyes:    No scleral icterus, EOMI  Throat: Lips, mucosa, and tongue normal; teeth and gums normal  Lungs: CTA bilaterally in anterior and posterior fields, no wheezes, no crackles.  Heart:  Regular rate and rhythm, S1, S2 normal, no murmurs heard  Abdomen: Soft, non tender, non distended with positive bowel sounds in all four quadrants. No hepatosplenomegaly, ascites, or mass  Extremities:    2+ radial pulses, no clubbing, or cyanosis + 2 edema to lower extremities bilaterally  Skin: No rash to exposed areas  Neurologic: A&Ox4      Labs:  Lab Results   Component Value Date    WBC 8.62 12/25/2018    HGB 12.4 12/25/2018    HCT 39.2 12/25/2018     12/25/2018    CHOL 113 (L) 12/04/2018    TRIG 86 12/04/2018    HDL 50 12/04/2018    ALT 8 (L) 12/25/2018    AST 18 12/25/2018     (L) 12/25/2018    K 4.4 12/25/2018     12/25/2018    CREATININE 0.9 12/25/2018    BUN 15 12/25/2018    CO2 22 (L) 12/25/2018    TSH 4.775 (H) 12/27/2018    INR 1.0 03/17/2015    HGBA1C 7.9 (H) 12/04/2018       Endoscopy:   6/13/2017 esophageal manometry Dr. Miguelangel otero    4/18/2017 EGD  esophagus. Gastric erythema. bx-focal IM no dysplasia. Repeat in 3 years.     11/2015 Colonoscopy Dr. Clinton-perianal skin tags. 10 mm transverse colon polyp. Pan tics. Path- adenovillious Repeat in 3 years (2018). colonoscopy is scheduled  for 11/2018.     4/2015 EGD Dr. Miguelangel MARSH grade A reflux esophagitis. Small HH. Gastric erythema. Path- intestinal metaplasia. Repeat in 2-3 years (8028-4722).    Assessment:    1. Gastroparesis    2. Gastroesophageal reflux disease without esophagitis    3. Constipation, unspecified constipation type    4. Screen for colon cancer          Recommendations:  1. Gastroparesis -again discussed gastroparesis diet, provided handout.   Try proNourish and/or Glucerna shakes TID. Referral to GI dietitian for f/u. Try elis and FDgard for nausea.  Pt prefers natural treatment.   2. GERD- continue Zantac 150 mg BID.   3. Constipation- miralax BID as previously advised.  4.  Screen colon cancer- h/o advanced polyp. overdue for screening colonoscopy. Order placed.    Visit time: 30 minutes with greater than 50% of the time spent in face to face pt  discussing the aforementioned diagnoses, recommendations, test results, and answering pt questions.    F/u in 3 months.    Order summary:  Orders Placed This Encounter    Case request GI: COLONOSCOPY         Thank you for allowing me to participate in the care of Eliu Izquierdo, APRN, FNP-C

## 2019-01-30 NOTE — PATIENT INSTRUCTIONS
Take miralax 1 cap full twice daily for constipation.  Take over the counter FDgard or elis for nausea.  Continue Zantac 150 mg twice daily for reflux.   Start the gastroparesis diet (6 small meals daily; mostly liquid and soft). Try shakes and smoothies (like Glucerna or ProNourish)

## 2019-02-05 ENCOUNTER — ANESTHESIA (OUTPATIENT)
Dept: ENDOSCOPY | Facility: HOSPITAL | Age: 79
End: 2019-02-05
Payer: MEDICARE

## 2019-02-05 ENCOUNTER — ANESTHESIA EVENT (OUTPATIENT)
Dept: ENDOSCOPY | Facility: HOSPITAL | Age: 79
End: 2019-02-05
Payer: MEDICARE

## 2019-02-05 ENCOUNTER — HOSPITAL ENCOUNTER (OUTPATIENT)
Facility: HOSPITAL | Age: 79
Discharge: HOME | End: 2019-02-05
Attending: INTERNAL MEDICINE | Admitting: INTERNAL MEDICINE
Payer: MEDICARE

## 2019-02-05 VITALS
RESPIRATION RATE: 16 BRPM | SYSTOLIC BLOOD PRESSURE: 123 MMHG | OXYGEN SATURATION: 100 % | WEIGHT: 177 LBS | HEART RATE: 60 BPM | TEMPERATURE: 98 F | DIASTOLIC BLOOD PRESSURE: 68 MMHG | HEIGHT: 61 IN | BODY MASS INDEX: 33.42 KG/M2

## 2019-02-05 DIAGNOSIS — Z12.11 COLON CANCER SCREENING: Primary | ICD-10-CM

## 2019-02-05 DIAGNOSIS — D36.9 TUBULOVILLOUS ADENOMA: ICD-10-CM

## 2019-02-05 LAB — POCT GLUCOSE: 120 MG/DL (ref 70–110)

## 2019-02-05 PROCEDURE — E9220 PRA ENDO ANESTHESIA: HCPCS | Mod: PT,,, | Performed by: NURSE ANESTHETIST, CERTIFIED REGISTERED

## 2019-02-05 PROCEDURE — 45385 PR COLONOSCOPY,REMV LESN,SNARE: ICD-10-PCS | Mod: PT,,, | Performed by: INTERNAL MEDICINE

## 2019-02-05 PROCEDURE — 82962 GLUCOSE BLOOD TEST: CPT | Performed by: INTERNAL MEDICINE

## 2019-02-05 PROCEDURE — 63600175 PHARM REV CODE 636 W HCPCS: Performed by: NURSE ANESTHETIST, CERTIFIED REGISTERED

## 2019-02-05 PROCEDURE — 45385 COLONOSCOPY W/LESION REMOVAL: CPT | Performed by: INTERNAL MEDICINE

## 2019-02-05 PROCEDURE — E9220 PRA ENDO ANESTHESIA: ICD-10-PCS | Mod: PT,,, | Performed by: NURSE ANESTHETIST, CERTIFIED REGISTERED

## 2019-02-05 PROCEDURE — 37000008 HC ANESTHESIA 1ST 15 MINUTES: Performed by: INTERNAL MEDICINE

## 2019-02-05 PROCEDURE — 45385 COLONOSCOPY W/LESION REMOVAL: CPT | Mod: PT,,, | Performed by: INTERNAL MEDICINE

## 2019-02-05 PROCEDURE — 25000003 PHARM REV CODE 250: Performed by: INTERNAL MEDICINE

## 2019-02-05 PROCEDURE — 88305 TISSUE EXAM BY PATHOLOGIST: CPT | Performed by: PATHOLOGY

## 2019-02-05 PROCEDURE — 27201089 HC SNARE, DISP (ANY): Performed by: INTERNAL MEDICINE

## 2019-02-05 PROCEDURE — 88305 TISSUE EXAM BY PATHOLOGIST: CPT | Mod: 26,,, | Performed by: PATHOLOGY

## 2019-02-05 PROCEDURE — 88305 TISSUE SPECIMEN TO PATHOLOGY - SURGERY: ICD-10-PCS | Mod: 26,,, | Performed by: PATHOLOGY

## 2019-02-05 PROCEDURE — 37000009 HC ANESTHESIA EA ADD 15 MINS: Performed by: INTERNAL MEDICINE

## 2019-02-05 RX ORDER — SODIUM CHLORIDE 9 MG/ML
INJECTION, SOLUTION INTRAVENOUS CONTINUOUS
Status: DISCONTINUED | OUTPATIENT
Start: 2019-02-05 | End: 2019-02-05 | Stop reason: HOSPADM

## 2019-02-05 RX ORDER — LIDOCAINE HCL/PF 100 MG/5ML
SYRINGE (ML) INTRAVENOUS
Status: DISCONTINUED | OUTPATIENT
Start: 2019-02-05 | End: 2019-02-05

## 2019-02-05 RX ORDER — SODIUM CHLORIDE 0.9 % (FLUSH) 0.9 %
3 SYRINGE (ML) INJECTION
Status: DISCONTINUED | OUTPATIENT
Start: 2019-02-05 | End: 2019-02-05 | Stop reason: HOSPADM

## 2019-02-05 RX ORDER — PROPOFOL 10 MG/ML
VIAL (ML) INTRAVENOUS
Status: DISCONTINUED | OUTPATIENT
Start: 2019-02-05 | End: 2019-02-05

## 2019-02-05 RX ADMIN — PROPOFOL 50 MG: 10 INJECTION, EMULSION INTRAVENOUS at 11:02

## 2019-02-05 RX ADMIN — LIDOCAINE HYDROCHLORIDE 100 MG: 20 INJECTION, SOLUTION INTRAVENOUS at 11:02

## 2019-02-05 RX ADMIN — SODIUM CHLORIDE: 0.9 INJECTION, SOLUTION INTRAVENOUS at 10:02

## 2019-02-05 RX ADMIN — PROPOFOL 100 MG: 10 INJECTION, EMULSION INTRAVENOUS at 11:02

## 2019-02-05 NOTE — ANESTHESIA PREPROCEDURE EVALUATION
02/05/2019  Eliu Paz is a 78 y.o., female.    Anesthesia Evaluation    I have reviewed the Patient Summary Reports.     I have reviewed the Medications.     Review of Systems  Anesthesia Hx:  Neg history of prior surgery. Denies Family Hx of Anesthesia complications.   Denies Personal Hx of Anesthesia complications.   Social:  Non-Smoker    Hematology/Oncology:  Hematology Normal   Oncology Normal     EENT/Dental:EENT/Dental Normal   Cardiovascular:   Exercise tolerance: good Hypertension, well controlled CAD  CABG/stent  Angina, with exertion ECG has been reviewed.    Pulmonary:   Shortness of breath    Renal/:  Renal/ Normal     Hepatic/GI:   GERD, well controlled    Musculoskeletal:  Musculoskeletal Normal    Neurological:   TIA, Neuromuscular Disease,    Endocrine:   Diabetes    Dermatological:  Skin Normal    Psych:  Psychiatric Normal           Physical Exam  General:  Well nourished    Airway/Jaw/Neck:  Airway Findings: Mouth Opening: Normal Tongue: Normal  General Airway Assessment: Adult  Mallampati: III  Improves to II with phonation.  TM Distance: Normal, at least 6 cm     Eyes/Ears/Nose:  EYES/EARS/NOSE FINDINGS: Normal   Dental:  Dental Findings: Upper Dentures   Chest/Lungs:  Chest/Lungs Findings: Clear to auscultation, Normal Respiratory Rate     Heart/Vascular:  Heart Findings: Rate: Normal  Rhythm: Regular Rhythm  Sounds: Normal  Heart murmur: negative       Mental Status:  Mental Status Findings:  Cooperative, Alert and Oriented         Anesthesia Plan  Type of Anesthesia, risks & benefits discussed:  Anesthesia Type:  general  Patient's Preference: General  Intra-op Monitoring Plan: standard ASA monitors  Intra-op Monitoring Plan Comments:   Post Op Pain Control Plan:   Post Op Pain Control Plan Comments:   Induction:   IV  Beta Blocker:  Patient is on a Beta-Blocker and has  received one dose within the past 24 hours (No further documentation required).       Informed Consent: Patient understands risks and agrees with Anesthesia plan.  Questions answered. Anesthesia consent signed with patient.  ASA Score: 3     Day of Surgery Review of History & Physical:    H&P update referred to the surgeon.         Ready For Surgery From Anesthesia Perspective.

## 2019-02-05 NOTE — TRANSFER OF CARE
"Anesthesia Transfer of Care Note    Patient: Eliu Paz    Procedure(s) Performed: Procedure(s) (LRB):  COLONOSCOPY (N/A)    Patient location: GI    Anesthesia Type: general    Transport from OR: Transported from OR on room air with adequate spontaneous ventilation    Post pain: adequate analgesia    Post assessment: no apparent anesthetic complications and tolerated procedure well    Post vital signs: stable    Level of consciousness: awake, alert and oriented    Nausea/Vomiting: no nausea/vomiting    Complications: none    Transfer of care protocol was followed      Last vitals:   Visit Vitals  /62 (BP Location: Left arm, Patient Position: Lying)   Pulse (!) 59   Temp 36.5 °C (97.7 °F) (Temporal)   Resp 14   Ht 5' 1" (1.549 m)   Wt 80.3 kg (177 lb)   SpO2 99%   Breastfeeding? No   BMI 33.44 kg/m²     "

## 2019-02-05 NOTE — [employer] PATIENT INSTRUCTIONS
Discharge Summary/Instructions after an Endoscopic Procedure  Patient Name: Eliu Paz  Patient MRN: [id number]  Patient YOB: 1940  Tuesday, February 05, 2019  Kenneth Oconnell MD  RESTRICTIONS:  During your procedure today, you received medications for sedation.  These   medications may affect your judgment, balance and coordination.  Therefore,   for 24 hours, you have the following restrictions:   - DO NOT drive a car, operate machinery, make legal/financial decisions,   sign important papers or drink alcohol.    ACTIVITY:  Today: no heavy lifting, straining or running due to procedural   sedation/anesthesia.  The following day: return to full activity including work.  DIET:  Eat and drink normally unless instructed otherwise.     TREATMENT FOR COMMON SIDE EFFECTS:  - Mild abdominal pain, nausea, belching, bloating or excessive gas:  rest,   eat lightly and use a heating pad.  - Sore Throat: treat with throat lozenges and/or gargle with warm salt   water.  - Because air was used during the procedure, expelling large amounts of air   from your rectum or belching is normal.  - If a bowel prep was taken, you may not have a bowel movement for 1-3 days.    This is normal.  SYMPTOMS TO WATCH FOR AND REPORT TO YOUR PHYSICIAN:  1. Abdominal pain or bloating, other than gas cramps.  2. Chest pain.  3. Back pain.  4. Signs of infection such as: chills or fever occurring within 24 hours   after the procedure.  5. Rectal bleeding, which would show as bright red, maroon, or black stools.   (A tablespoon of blood from the rectum is not serious, especially if   hemorrhoids are present.)  6. Vomiting.  7. Weakness or dizziness.  GO DIRECTLY TO THE NEAREST EMERGENCY ROOM IF YOU HAVE ANY OF THE FOLLOWING:      Difficulty breathing              Chills and/or fever over 101 F   Persistent vomiting and/or vomiting blood   Severe abdominal pain   Severe chest pain   Black, tarry stools   Bleeding- more than one  tablespoon   Any other symptom or condition that you feel may need urgent attention  Your doctor recommends these additional instructions:  If any biopsies were taken, your doctors clinic will contact you in 1 to 2   weeks with any results.  - Patient has a contact number available for emergencies.  The signs and   symptoms of potential delayed complications were discussed with the   patient.  Return to normal activities tomorrow.  Written discharge   instructions were provided to the patient.   - Discharge patient to home (ambulatory).   - Resume previous diet.   - Continue present medications.   - Await pathology results.   - Repeat colonoscopy in 5 years for surveillance.  For questions, problems or results please call your physician - Kenneth Oconnell MD at Work:  [phone].  [facility], EMERGENCY ROOM PHONE NUMBER: [phone]  IF A COMPLICATION OR EMERGENCY SITUATION ARISES AND YOU ARE UNABLE TO REACH   YOUR PHYSICIAN - GO DIRECTLY TO THE EMERGENCY ROOM.  Kenneth Oconnell MD  2/5/2019 11:53:07 AM  This report has been verified and signed electronically.  [employer]

## 2019-02-05 NOTE — H&P
Short Stay Endoscopy History and Physical    PCP - Ryan Carlson MD    Procedure - Colonoscopy  ASA - per anesthesia  Mallampati - per anesthesia  History of Anesthesia problems - no  Family history Anesthesia problems - no   Plan of anesthesia - General    HPI:  This is a 78 y.o. female here for surveillance. Last colonoscopy three years ago found an adenovillous polyp (10mm).      ROS:  Constitutional: No fevers, chills, No weight loss  CV: No chest pain  Pulm: No cough, No shortness of breath  GI: see HPI  Derm: No rash    Medical History:  has a past medical history of Allergy, Arthritis, Brain infection, Cataract, CKD (chronic kidney disease) stage 3, GFR 30-59 ml/min, Coma, Coronary artery disease, Diabetes mellitus type II (1981), Diabetic neuropathy, GERD (gastroesophageal reflux disease), Glaucoma, Hyperlipidemia, and Hypertension.    Surgical History:  has a past surgical history that includes Carpal tunnel release; Tubal ligation (1970s); Colonoscopy (N/A, 11/2/2015); Cardiac catheterization (03/2010); Coronary artery bypass graft (08/13/1994); and Cardiac surgery (1994).    Family History: family history includes Blindness in her brother; Diabetes in her brother, father, maternal grandmother, paternal aunt, and paternal uncle.. Otherwise no colon cancer, inflammatory bowel disease, or GI malignancies.    Social History:  reports that she quit smoking about 55 years ago. She quit after 2.00 years of use. she has never used smokeless tobacco. She reports that she does not drink alcohol or use drugs.    Review of patient's allergies indicates:   Allergen Reactions    Penicillins Swelling     Swelling (extremities)    Trulicity [dulaglutide] Nausea Only and Rash       Medications:   Medications Prior to Admission   Medication Sig Dispense Refill Last Dose    amLODIPine (NORVASC) 10 MG tablet TAKE 1 TABLET (10 MG TOTAL) BY MOUTH ONCE DAILY. 90 tablet 11 Past Week at Unknown time    carvedilol (COREG) 25  MG tablet TAKE 1 TABLET (25 MG TOTAL) BY MOUTH 2 (TWO) TIMES DAILY. 180 tablet 11 2/4/2019 at Unknown time    ciclopirox (PENLAC) 8 % Soln Apply topically nightly. 6.6 mL 11 Past Week at Unknown time    fluticasone (FLONASE) 50 mcg/actuation nasal spray SPRAY TWICE IN EACH NOSTRIL EVERY DAY 16 g 11 Past Week at Unknown time    insulin asp prt-insulin aspart, NOVOLOG 70/30, (NOVOLOG MIX 70-30 U-100 INSULN) 100 unit/mL (70-30) Soln Inject 16 units w/ breakfast and 20 units at dinner time. 50 mL 11 2/4/2019 at Unknown time    isosorbide mononitrate (IMDUR) 30 MG 24 hr tablet TAKE 1 TABLET (30 MG TOTAL) BY MOUTH ONCE DAILY. 90 tablet 11 2/4/2019 at Unknown time    lactulose (CHRONULAC) 10 gram/15 mL solution TAKE 30 MLS (20 G TOTAL) BY MOUTH DAILY AS NEEDED (CONSTIPATION). 946 mL 1 Past Week at Unknown time    latanoprost 0.005 % ophthalmic solution Place 1 drop into both eyes every evening. 3 Bottle 3 2/4/2019 at Unknown time    losartan (COZAAR) 100 MG tablet TAKE 1 TABLET (100 MG TOTAL) BY MOUTH ONCE DAILY. 90 tablet 3 2/4/2019 at Unknown time    nitroGLYCERIN 0.4 MG/DOSE TL SPRY (NITROLINGUAL) 400 mcg/spray spray PLACE 2 SPRAYS UNDER THE TONGUE EVERY 5 MINUTES AS NEEDED FOR CHEST PAIN 36 g 0 Past Month at Unknown time    ranitidine (ZANTAC) 150 MG tablet Take 1 tablet (150 mg total) by mouth 2 (two) times daily. 60 tablet 11 Past Week at Unknown time    rosuvastatin (CRESTOR) 40 MG Tab TAKE 1 TABLET BY MOUTH ONCE DAILY. 90 tablet 3 2/4/2019 at Unknown time    spironolactone (ALDACTONE) 25 MG tablet TAKE 2 TABLETS BY MOUTH EVERY  tablet 11 Past Week at Unknown time    traMADol (ULTRAM) 50 mg tablet TAKE 1 TABLET BY MOUTH TWICE DAILY AS NEEDED 60 tablet 0 Past Month at Unknown time    aspirin (ECOTRIN) 81 MG EC tablet Take 1 tablet (81 mg total) by mouth once daily.  0 Taking    clobetasol 0.05% (TEMOVATE) 0.05 % Oint Apply small amount to vulva area twice a day for 4 weeks then once a day for 4  "weeks then once a day on Mondays and Thursdays 60 g 1 Unknown at Unknown time    dorzolamide-timolol 2-0.5% (COSOPT) 22.3-6.8 mg/mL ophthalmic solution Place 1 drop into both eyes 2 (two) times daily. 3 Bottle 3 Taking    fluocinonide (LIDEX) 0.05 % external solution Apply topically 2 (two) times daily. 60 mL 1 Unknown at Unknown time    insulin syringe-needle U-100 (BD INSULIN SYRINGE ULTRA-FINE) 0.5 mL 31 gauge x 5/16" Syrg USE TO INJECT TWICE DAILY WITH INSULIN INJECTIONS 300 each 11 Taking    ketoconazole (NIZORAL) 2 % cream Apply topically once daily. 30 g 1 Taking    ONETOUCH DELICA LANCETS 30 gauge Misc 1 lancet by Misc.(Non-Drug; Combo Route) route 4 (four) times daily. 150 each 11 Taking    ONETOUCH VERIO Strp USE TO TEST BLOOD SUGAR 4 TIMES A  strip 11 Taking    triamcinolone acetonide 0.1% (KENALOG) 0.1 % ointment Apply topically 2 (two) times daily. Apply small amount to itchy areas on body BID PRN.  Do not use on face or in groin. 80 g 1 Not Taking         Physical Exam:    Vital Signs:   Vitals:    02/05/19 1037   BP: 127/62   Pulse: (!) 59   Resp: 14   Temp: 97.7 °F (36.5 °C)       General Appearance: Well appearing in no acute distress  Eyes:    No scleral icterus  ENT: Neck supple, Lips, mucosa, and tongue normal; teeth and gums normal  Lungs: CTA bilaterally  Heart:  S1, S2 normal, no murmurs heard  Abdomen: Soft, non tender, non distended with positive bowel sounds. No hepatosplenomegaly, ascites, or mass.  Extremities: 2+ pulses, no clubbing, cyanosis or edema  Skin: No rash      Labs:  Lab Results   Component Value Date    WBC 8.62 12/25/2018    HGB 12.4 12/25/2018    HCT 39.2 12/25/2018     12/25/2018    CHOL 113 (L) 12/04/2018    TRIG 86 12/04/2018    HDL 50 12/04/2018    ALT 8 (L) 12/25/2018    AST 18 12/25/2018     (L) 12/25/2018    K 4.4 12/25/2018     12/25/2018    CREATININE 0.9 12/25/2018    BUN 15 12/25/2018    CO2 22 (L) 12/25/2018    TSH 4.775 (H) " 12/27/2018    INR 1.0 03/17/2015    HGBA1C 7.9 (H) 12/04/2018       I have explained the risks and benefits of endoscopy procedures to the patient including but not limited to bleeding, perforation, infection, and death.      Isrrael Hung MD PGY-V  Gastroenterology Fellow  [facility]  [phone]

## 2019-02-05 NOTE — ANESTHESIA POSTPROCEDURE EVALUATION
"Anesthesia Post Evaluation    Patient: Eliu Paz    Procedure(s) Performed: Procedure(s) (LRB):  COLONOSCOPY (N/A)    Final Anesthesia Type: general  Patient location during evaluation: GI PACU  Patient participation: Yes- Able to Participate  Level of consciousness: awake and alert and oriented  Post-procedure vital signs: reviewed and stable  Pain management: adequate  Airway patency: patent  PONV status at discharge: No PONV  Anesthetic complications: no      Cardiovascular status: blood pressure returned to baseline, hemodynamically stable and bradycardic  Respiratory status: unassisted, spontaneous ventilation and room air  Hydration status: euvolemic  Follow-up not needed.        Visit Vitals  BP (!) 106/52 (BP Location: Left arm, Patient Position: Lying)   Pulse (!) 57   Temp 36.4 °C (97.5 °F) (Skin)   Resp 16   Ht 5' 1" (1.549 m)   Wt 80.3 kg (177 lb)   SpO2 100%   Breastfeeding? No   BMI 33.44 kg/m²       Pain/Linda Score: Linda Score: 8 (2/5/2019 12:00 PM)        "

## 2019-02-11 ENCOUNTER — TELEPHONE (OUTPATIENT)
Dept: INTERNAL MEDICINE | Facility: CLINIC | Age: 79
End: 2019-02-11

## 2019-02-11 NOTE — TELEPHONE ENCOUNTER
----- Message from Concepcion Liriano sent at 2/11/2019  8:08 AM CST -----  2nd Request    I received and printed paperwork to your printer from [employer]. Originally received on 01/23/2019. Paperwork that needs to be completed, signed and faxed back.    Please check your printer and note the chart with the status of this paperwork.    If you do not receive the paperwork, please call me at [phone].    Thank You

## 2019-02-12 ENCOUNTER — TELEPHONE (OUTPATIENT)
Dept: ENDOSCOPY | Facility: HOSPITAL | Age: 79
End: 2019-02-12

## 2019-02-12 ENCOUNTER — TELEPHONE (OUTPATIENT)
Dept: GASTROENTEROLOGY | Facility: CLINIC | Age: 79
End: 2019-02-12

## 2019-02-12 ENCOUNTER — NUTRITION (OUTPATIENT)
Dept: GASTROENTEROLOGY | Facility: CLINIC | Age: 79
End: 2019-02-12

## 2019-02-12 VITALS — HEIGHT: 61 IN | WEIGHT: 177.94 LBS | BODY MASS INDEX: 33.59 KG/M2

## 2019-02-12 DIAGNOSIS — N18.30 CKD (CHRONIC KIDNEY DISEASE) STAGE 3, GFR 30-59 ML/MIN: ICD-10-CM

## 2019-02-12 DIAGNOSIS — E11.42 DM TYPE 2 WITH DIABETIC PERIPHERAL NEUROPATHY: ICD-10-CM

## 2019-02-12 DIAGNOSIS — K31.84 GASTROPARESIS: Primary | ICD-10-CM

## 2019-02-12 DIAGNOSIS — K80.20 CALCULUS OF GALLBLADDER WITHOUT CHOLECYSTITIS WITHOUT OBSTRUCTION: ICD-10-CM

## 2019-02-12 PROCEDURE — 99999 PR PBB SHADOW E&M-EST. PATIENT-LVL I: ICD-10-PCS | Mod: PBBFAC,,,

## 2019-02-12 PROCEDURE — 99999 PR PBB SHADOW E&M-EST. PATIENT-LVL I: CPT | Mod: PBBFAC,,,

## 2019-02-12 NOTE — PROGRESS NOTES
Referring Physician:  Jeri Torres NP                                        Reason for Visit:   Diabetes Mellitus (Type II dx age 42 years on insulin ; pattern of lows due to skipping meals and overtreating with cho ); CKD III (fluid intake 40 oz per day ; ); Gastroparesis (avoids salads; has tried canned soups but notes increase in BG ); and Hearing and memory challenges (impact retention of discussion ; single sheet of paper with simple instructions works best )    : 1940  Age: 78 y.o.    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    Previous Medical History:  Past Medical History:   Diagnosis Date    Allergy     Arthritis     Brain infection     Cataract     CKD (chronic kidney disease) stage 3, GFR 30-59 ml/min     Coma     Coronary artery disease     Diabetes mellitus type II     Diabetic neuropathy     GERD (gastroesophageal reflux disease)     Glaucoma     Hyperlipidemia     Hypertension        Previous Surgical History:  Past Surgical History:   Procedure Laterality Date    CARDIAC CATHETERIZATION  03/2010    x 2    CARDIAC SURGERY      CARPAL TUNNEL RELEASE      L    Carpal Tunnel Release, Right hand Right 10/19/2016    Performed by [facility]arnie Sanders [facility] at [facility] OR 1ST FLR    COLONOSCOPY N/A 2019    Performed by Kenneth Oconnell MD at [facility] ENDO (4TH FLR)    COLONOSCOPY N/A 2015    Performed by Sanju Clinton MD at [facility] ENDO (4TH FLR)    CORONARY ARTERY BYPASS GRAFT  08/13/1994    x 1    ESOPHAGOGASTRODUODENOSCOPY (EGD) N/A 2017    Performed by Hitesh Quiroga MD at [facility] ENDO (4TH FLR)    ESOPHAGOGASTRODUODENOSCOPY (EGD) N/A 2015    Performed by Hitesh Quiroga MD at [facility] ENDO (4TH FLR)     "MANOMETRY-ESOPHAGEAL-WITH IMPEDANCE N/A 6/13/2017    Performed by Kenneth Oconnell MD at [facility] ENDO (4TH FLR)    RELEASE-FINGER-TRIGGER / Index / Long Right 10/19/2016    Performed by Marnie Sanders MD at [facility] OR 1ST FLR    TUBAL LIGATION  1970s       Medication:reports adverse response to Trulicity ( feeling food getting stuck in her throat ) which resolved with discontinuation of drug   Checks BG twice per day and when she feels "not right" lows - shakiness and spaciness ; highs - urination and fatigue   She will often awaken during the early morning with lows for which she consumes regular sweetened peanut butter which she finds will overcorrect and result in 's . Will often reduce morning or evening dose of insulin if BG rises to high 100's .    Current Outpatient Medications:     amLODIPine (NORVASC) 10 MG tablet, TAKE 1 TABLET (10 MG TOTAL) BY MOUTH ONCE DAILY., Disp: 90 tablet, Rfl: 11    aspirin (ECOTRIN) 81 MG EC tablet, Take 1 tablet (81 mg total) by mouth once daily., Disp: , Rfl: 0    carvedilol (COREG) 25 MG tablet, TAKE 1 TABLET (25 MG TOTAL) BY MOUTH 2 (TWO) TIMES DAILY., Disp: 180 tablet, Rfl: 11    ciclopirox (PENLAC) 8 % Soln, Apply topically nightly., Disp: 6.6 mL, Rfl: 11    clobetasol 0.05% (TEMOVATE) 0.05 % Oint, Apply small amount to vulva area twice a day for 4 weeks then once a day for 4 weeks then once a day on Mondays and Thursdays, Disp: 60 g, Rfl: 1    dorzolamide-timolol 2-0.5% (COSOPT) 22.3-6.8 mg/mL ophthalmic solution, Place 1 drop into both eyes 2 (two) times daily., Disp: 3 Bottle, Rfl: 3    fluocinonide (LIDEX) 0.05 % external solution, Apply topically 2 (two) times daily., Disp: 60 mL, Rfl: 1    fluticasone (FLONASE) 50 mcg/actuation nasal spray, SPRAY TWICE IN EACH NOSTRIL EVERY DAY, Disp: 16 g, Rfl: 11    insulin asp prt-insulin aspart, NOVOLOG 70/30, (NOVOLOG MIX 70-30 U-100 INSULN) 100 unit/mL (70-30) Soln, Inject 16 units w/ breakfast and 20 units at " "dinner time., Disp: 50 mL, Rfl: 11    insulin syringe-needle U-100 (BD INSULIN SYRINGE ULTRA-FINE) 0.5 mL 31 gauge x 5/16" Syrg, USE TO INJECT TWICE DAILY WITH INSULIN INJECTIONS, Disp: 300 each, Rfl: 11    isosorbide mononitrate (IMDUR) 30 MG 24 hr tablet, TAKE 1 TABLET (30 MG TOTAL) BY MOUTH ONCE DAILY., Disp: 90 tablet, Rfl: 11    ketoconazole (NIZORAL) 2 % cream, Apply topically once daily., Disp: 30 g, Rfl: 1    lactulose (CHRONULAC) 10 gram/15 mL solution, TAKE 30 MLS (20 G TOTAL) BY MOUTH DAILY AS NEEDED (CONSTIPATION)., Disp: 946 mL, Rfl: 1    latanoprost 0.005 % ophthalmic solution, Place 1 drop into both eyes every evening., Disp: 3 Bottle, Rfl: 3    losartan (COZAAR) 100 MG tablet, TAKE 1 TABLET (100 MG TOTAL) BY MOUTH ONCE DAILY., Disp: 90 tablet, Rfl: 3    nitroGLYCERIN 0.4 MG/DOSE TL SPRY (NITROLINGUAL) 400 mcg/spray spray, PLACE 2 SPRAYS UNDER THE TONGUE EVERY 5 MINUTES AS NEEDED FOR CHEST PAIN, Disp: 36 g, Rfl: 0    ONETOUCH DELICA LANCETS 30 gauge Misc, 1 lancet by Misc.(Non-Drug; Combo Route) route 4 (four) times daily., Disp: 150 each, Rfl: 11    ONETOUCH VERIO Strp, USE TO TEST BLOOD SUGAR 4 TIMES A DAY, Disp: 150 strip, Rfl: 11    ranitidine (ZANTAC) 150 MG tablet, Take 1 tablet (150 mg total) by mouth 2 (two) times daily., Disp: 60 tablet, Rfl: 11    rosuvastatin (CRESTOR) 40 MG Tab, TAKE 1 TABLET BY MOUTH ONCE DAILY., Disp: 90 tablet, Rfl: 3    spironolactone (ALDACTONE) 25 MG tablet, TAKE 2 TABLETS BY MOUTH EVERY DAY, Disp: 180 tablet, Rfl: 11    traMADol (ULTRAM) 50 mg tablet, TAKE 1 TABLET BY MOUTH TWICE DAILY AS NEEDED, Disp: 60 tablet, Rfl: 0    triamcinolone acetonide 0.1% (KENALOG) 0.1 % ointment, Apply topically 2 (two) times daily. Apply small amount to itchy areas on body BID PRN.  Do not use on face or in groin., Disp: 80 g, Rfl: 1      Vitamin/Supplements/Herbs: Vit D , fish oil   Potential Food drug Interaction:    Allergies:  Review of patient's allergies indicates: " "  Allergen Reactions    Penicillins Swelling     Swelling (extremities)    Trulicity [dulaglutide] Nausea Only and Rash       Labs:Patient is quite diligent about checking her BG and reports frustration with results which do not reflect her efforts to manage her diabetes.   CMP:   Lab Results   Component Value Date/Time     (H) 12/25/2018 02:56 PM    CALCIUM 9.8 12/25/2018 02:56 PM    ALBUMIN 3.4 (L) 12/25/2018 02:56 PM    PROT 8.3 12/25/2018 02:56 PM     (L) 12/25/2018 02:56 PM    K 4.4 12/25/2018 02:56 PM    CO2 22 (L) 12/25/2018 02:56 PM     12/25/2018 02:56 PM    BUN 15 12/25/2018 02:56 PM    CREATININE 0.9 12/25/2018 02:56 PM    ALKPHOS 82 12/25/2018 02:56 PM    ALT 8 (L) 12/25/2018 02:56 PM    AST 18 12/25/2018 02:56 PM    BILITOT 0.7 12/25/2018 02:56 PM     Assessment of Lab Values: skips meals or consumes cho source without fat and protein > increased BG > increased urination> dehydration       Anthropometrics    Usual Weight :   Has lost 6# in 6 months   Estimated body mass index is 33.62 kg/m² as calculated from the following:    Height as of this encounter: 5' 1" (1.549 m).    Weight as of this encounter: 80.7 kg (177 lb 14.6 oz).    For Adults 20 Years and Older:    BMI Weight Status   Below 18.5 Underweight   18.6-24.9 Normal/Healthy   25.0-29.9 Overweight   30.0 & Above Obese     Ideal Weight Range for Your Height: 5'1" = 100 - 131 lbs    Weight History:  Wt Readings from Last 12 Encounters:   02/12/19 80.7 kg (177 lb 14.6 oz)   02/05/19 80.3 kg (177 lb)   01/30/19 80.8 kg (178 lb 2.1 oz)   01/11/19 79.7 kg (175 lb 11.3 oz)   01/03/19 80.7 kg (178 lb)   01/02/19 81 kg (178 lb 9.2 oz)   12/27/18 80.3 kg (177 lb 0.5 oz)   12/25/18 81.6 kg (180 lb)   12/20/18 81.6 kg (180 lb)   12/18/18 82 kg (180 lb 12.4 oz)   12/04/18 83.3 kg (183 lb 10.3 oz)   11/06/18 83 kg (182 lb 15.7 oz)   ]    Estimated Nutrition Needs:   Energy Needs:  (MSJ x  PAL)1400 calorie ( 1.1)  Protein Needs: ( gm " "Protein/kg)70 grams   Fluid Needs:   (1 mL/calorie)1800cc       Nutrition Focused Physical Findings:   Well Nourished. No Malnutrition Focus Physical findings present on exam.  Fluid Accumulation:ankles     Current Diet : Patient admits she is confused by BG readings using dietary guidelines which have been a guide for her for years . High fiber ( salads, whole grains) are what she has been taught are the best choices . She notes that BG are very unpredictable using this template for eating ( bg may drop following meal of high fiber foods only to result in rapid increase in BG when bolus finally empties .     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   Dinner:Salad , beans and rice   Snacks:nuts, chocolate   Beverage Intake: has switched to diet sprite ( lower in phosphorous ) , lactose free milk and coffee       Motivation to Change:erratic BG    Anticipated Barriers to implementation:   knowledge  Nutrition Diagnosis  Nutrition Problem  Altered GI Function    Related to (etiology): Gastroparesis     Signs and Symptoms (as evidenced by):   Erratic BG , nausea, early satiety     Nutrition Diagnosis Status:   Continues    Educational Need  Other simplified message for ease of comprehension   Recommendations/Interventions/Goals:  Discussed withpatient    cognitive and memory   Intervention  Patient taught nutrition information regarding:   Gastric emptying ;  Provided guide for eating :  Column" A "cho sources ; column "B" protein sources as well as liquid meal replacements, peanut butter crackers   Outcome: Needs review and reinforcement   Monitoring : BG trends , symptoms of gastroparesis   Contact information provided. Will add myself as care team member for communication with " patient and communicate with care team as needed   Routed to     Consultation Time:60 minutes.

## 2019-02-15 ENCOUNTER — TELEPHONE (OUTPATIENT)
Dept: INTERNAL MEDICINE | Facility: CLINIC | Age: 79
End: 2019-02-15

## 2019-02-15 NOTE — TELEPHONE ENCOUNTER
----- Message from Juan Campos sent at 2/15/2019 11:13 AM CST -----  Contact: Patient [phone]  She said to send her dx. Medication list and the most recent A1C results records to [employer] for her diabetic shoes. Fax to [phone].    Thank you

## 2019-02-18 ENCOUNTER — CLINICAL SUPPORT (OUTPATIENT)
Dept: REHABILITATION | Facility: HOSPITAL | Age: 79
End: 2019-02-18
Attending: PHYSICAL MEDICINE & REHABILITATION
Payer: MEDICARE

## 2019-02-18 DIAGNOSIS — G89.29 CHRONIC MIDLINE LOW BACK PAIN WITHOUT SCIATICA: ICD-10-CM

## 2019-02-18 DIAGNOSIS — M54.50 CHRONIC MIDLINE LOW BACK PAIN WITHOUT SCIATICA: ICD-10-CM

## 2019-02-18 DIAGNOSIS — M54.2 NECK PAIN: ICD-10-CM

## 2019-02-18 PROCEDURE — 97110 THERAPEUTIC EXERCISES: CPT | Mod: PO

## 2019-02-18 PROCEDURE — 97161 PT EVAL LOW COMPLEX 20 MIN: CPT | Mod: PO
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
## 2019-02-25 ENCOUNTER — CLINICAL SUPPORT (OUTPATIENT)
Dept: REHABILITATION | Facility: HOSPITAL | Age: 79
End: 2019-02-25
Attending: PHYSICAL MEDICINE & REHABILITATION
Payer: MEDICARE

## 2019-02-25 DIAGNOSIS — M54.50 CHRONIC MIDLINE LOW BACK PAIN WITHOUT SCIATICA: ICD-10-CM

## 2019-02-25 DIAGNOSIS — G89.29 CHRONIC MIDLINE LOW BACK PAIN WITHOUT SCIATICA: ICD-10-CM

## 2019-02-25 DIAGNOSIS — M54.2 NECK PAIN: ICD-10-CM

## 2019-02-25 PROCEDURE — 97110 THERAPEUTIC EXERCISES: CPT | Mod: PO

## 2019-02-25 NOTE — PROGRESS NOTES
"Name: Eliu MACARIO [facility] Number: [id number]  Date of Treatment: 02/25/2019   Diagnosis:   Encounter Diagnoses   Name Primary?    Chronic midline low back pain without sciatica     Neck pain      Physician: Wendi Merida, *    Time in: 11:00  Time Out: 11:35  Total Treatment Time: 35  Date of eval: 2/18/19  Visit #: 2/20  Auth expiration: 12/31/19  POC expiration: 5/11/19    Precautions: Standard, diabetes and fall    Subjective     Eliu Steinberg reports she missed her last appointment because she saw a different name on her appointment reminder than evaluating therapist. Pt reports she has increased pain with her shoulder exercises. Pt reports she continues to feel unsteady on her feet and she has to pay attention to the way she is stepping so she does not fall.  Patient reports their pain to be 7/10 on a 0-10 scale with 0 being no pain and 10 being the worst pain imaginable.    Objective     Gait: Pt amb w/ SBQC in R UE, difficulty getting all 4 legs on the ground. Pt walks with knee hyperextension on her L knee.    Hip Range of Motion:   Right Passive Left Passive   Flexion 100 P! In hip 120   Abduction 30 30   Extension 0 0   Ext. Rotation 45 P! In hip 50 P! In low back   Int. Rotation 60 P! In hip 50     Special Tests:    SUYAPA: (+) bilat  FADIR: (+) bilat  Hip Scour (+) bilat  SLR (+) on L>R  Slump (+) bilat    Pt can perform 10 ASLR on R, pt unable to perform ASLR on L, limited by quad weakness    Lower Extremity Strength  Right LE  Left LE    Knee extension: 4+/5 Knee extension: 2-/5   Knee flexion: 4/5 Knee flexion: 4-/5     Eliu Steinberg received therapeutic exercises to develop strength, endurance, ROM, flexibility, posture and core stabilization for 35 minutes including:     Scap retraction/depression 3 sec holds 2x10  OTB "I" 2x15  OTB brueggars 2x10  Lacrosse ball/ theracane to upper traps 5 min    Patient participated in dynamic functional therapeutic activities to improve functional performance for 1 " minutes including:    Log roll technique    Written Home Exercises Provided: Bridge, quad set, seated knee extension,     Pt educated on gait mechanics, importance of developing quad strength, fall prevention strategies. Pt demo fair understanding of the education provided. Eliu Steinberg demonstrated fair return demonstration of activities.     Assessment   Eliu Steinberg presents with significant quad weakness on her L. Pt does not remember when her quad started to get weak but has noticed over time that she had to place foot a certain way to avoid falling (knee buckling). Pt has PMH of quadriceps tear and was treated over the course of 15 visits in 2014, pt has poor memory of this.  Pt demonstrates knee hyperextension during gait and in standing to compensate for quad weakness. Pt was educated on importance of strengthening quadriceps to reduce risk of falls/ further injury. Will continue to progress HEP as tolerated    Pt will continue to benefit from skilled PT intervention. Medical Necessity is demonstrated by:  Fall Risk, Unable to participate in daily activities, Continued inability to participate in vocational pursuits, Pain limits function of effected part for some activities, Unable to participate fully in daily activities, Requires skilled supervision to complete and progress HEP and Weakness.    Patient is making good progress towards established goals.    Goals:  Short Term Goals: 3 weeks   1. Pt will be independent with HEP and report compliance at least 4 days/week  2. Pt will be able to walk for 20 minutes on a level surface reporting less than 2/10 pain  3. Pt will be able to read/use ipad for 20 minutes (taking stretch breaks between bouts) reporting less than 3/10 pain     Long Term Goals: 10 weeks   1. Pt will be able to walk for 40 minutes on level surface and ascend/descend 4 steps reporting less than 3/10 pain in order to perform Mandaen responsibilities   2. Pt will be able to dance jitterbug  (making modifications when necessary) for 30 minutes reporting less than 3/10 pain   3. Pt will be able to tolerate lumbar and cervical AROM testing reporting less than 2/10 pain at end range    New/Revised goals: none at this time    1. Pt will improve quad strength to at least 4/5 on the L to reduce risk of falls  2. Pt will be able to perform 10 SLR on L without extension lag to demonstrate improved quad strength       Plan     Next: clamshells, SL hip abduction, AAROM SLR in side lying, seated shoulder exercsies    Continue with established Plan of Care towards PT goals.

## 2019-02-26 ENCOUNTER — TELEPHONE (OUTPATIENT)
Dept: INTERNAL MEDICINE | Facility: CLINIC | Age: 79
End: 2019-02-26

## 2019-02-26 NOTE — TELEPHONE ENCOUNTER
Faxed over last chart notes which included in them diabetic treatment, labs and medication list was faxed as well. Called company and left a voicemail for them to return a call if any additional information is missing. They should now have everything needed.

## 2019-02-26 NOTE — TELEPHONE ENCOUNTER
----- Message from Concepcion Liriano sent at 2/26/2019 10:35 AM CST -----  4th Request    I received and printed paperwork to your printer from [employer]. Originally received on 01/23/2019, 01/25/2019, 02/11/2019 & 02/19/2019. Paperwork that needs to be completed, signed and faxed back.    Please check your printer and note the chart with the status of this paperwork.    If this has been completed, please contact the company so they will stop sending the request.     If you do not receive the paperwork, please call me at [phone].    Thank You

## 2019-02-27 ENCOUNTER — CLINICAL SUPPORT (OUTPATIENT)
Dept: REHABILITATION | Facility: HOSPITAL | Age: 79
End: 2019-02-27
Attending: PHYSICAL MEDICINE & REHABILITATION
Payer: MEDICARE

## 2019-02-27 DIAGNOSIS — G89.29 CHRONIC MIDLINE LOW BACK PAIN WITHOUT SCIATICA: ICD-10-CM

## 2019-02-27 DIAGNOSIS — M54.50 CHRONIC MIDLINE LOW BACK PAIN WITHOUT SCIATICA: ICD-10-CM

## 2019-02-27 DIAGNOSIS — M54.2 NECK PAIN: ICD-10-CM

## 2019-02-27 PROCEDURE — 97110 THERAPEUTIC EXERCISES: CPT | Mod: PO

## 2019-02-27 NOTE — PROGRESS NOTES
"Name: Eliu MACARIO [facility] Number: [id number]  Date of Treatment: 02/27/2019   Diagnosis:   Encounter Diagnoses   Name Primary?    Chronic midline low back pain without sciatica     Neck pain      Physician: Wendi Merida, *    Time in: 11:00  Time Out: 12:00  Total Treatment Time: 60  Date of eval: 2/18/19  Visit #: 3/20  Auth expiration: 12/31/19  POC expiration: 5/11/19    Precautions: Standard, diabetes and fall    Subjective     Eliu Steinberg reports "I live with pain constantly". Reports pain goes from her neck down into her right arm. Reports pain is worse when she wakes up in the morning. Patient reports their pain to be 5-6/10 on a 0-10 scale with 0 being no pain and 10 being the worst pain imaginable.    Objective       Eliu Steinberg received therapeutic exercises to develop strength, endurance, ROM, flexibility, posture and core stabilization for 50 minutes including:     Recumbent Bike x 5 minutes, L2    Quad Sets 20 x 5"  Seated Marches x 20 each  SAQ x 20, 3 sec hold each (L AAROM)  Standing HS curls x 20 each    Scap retraction/depression 3 sec holds 2x10  OTB "I" 2x15  OTB brueggars 2x10  Lacrosse ball/ theracane to upper traps 5 min    Patient participated in dynamic functional therapeutic activities to improve functional performance for 1 minutes including:    Log roll technique    Written Home Exercises Provided: Bridge, quad set, seated knee extension,     Pt educated on gait mechanics, importance of developing quad strength, fall prevention strategies. Pt demo fair understanding of the education provided. Eliu Steinberg demonstrated fair return demonstration of activities.     Assessment   Eliu Steinberg tolerated session with today with no adverse effects. Patient tolerated additional exercises bolded above well. Patient with difficulty recruiting quad muscle during quad sets and requires tactile and verbal cueing. Patient requires assistance to perform SAQ exercise with the L LE. Patient reports pain down " "into her R bicep with brueggers and OTB "I" exercise. Will continue to progress HEP as tolerated    Pt will continue to benefit from skilled PT intervention. Medical Necessity is demonstrated by:  Fall Risk, Unable to participate in daily activities, Continued inability to participate in vocational pursuits, Pain limits function of effected part for some activities, Unable to participate fully in daily activities, Requires skilled supervision to complete and progress HEP and Weakness.    Patient is making good progress towards established goals.    Goals:  Short Term Goals: 3 weeks   1. Pt will be independent with HEP and report compliance at least 4 days/week  2. Pt will be able to walk for 20 minutes on a level surface reporting less than 2/10 pain  3. Pt will be able to read/use ipad for 20 minutes (taking stretch breaks between bouts) reporting less than 3/10 pain     Long Term Goals: 10 weeks   1. Pt will be able to walk for 40 minutes on level surface and ascend/descend 4 steps reporting less than 3/10 pain in order to perform [employer] responsibilities   2. Pt will be able to dance jitterbug (making modifications when necessary) for 30 minutes reporting less than 3/10 pain   3. Pt will be able to tolerate lumbar and cervical AROM testing reporting less than 2/10 pain at end range    New/Revised goals: none at this time    1. Pt will improve quad strength to at least 4/5 on the L to reduce risk of falls  2. Pt will be able to perform 10 SLR on L without extension lag to demonstrate improved quad strength       Plan     Next: clamshells, SL hip abduction, AAROM SLR in side lying, seated shoulder exercsies    Continue with established Plan of Care towards PT goals.       "

## 2019-02-28 ENCOUNTER — TELEPHONE (OUTPATIENT)
Dept: INTERNAL MEDICINE | Facility: CLINIC | Age: 79
End: 2019-02-28

## 2019-02-28 ENCOUNTER — OFFICE VISIT (OUTPATIENT)
Dept: INTERNAL MEDICINE | Facility: CLINIC | Age: 79
End: 2019-02-28
Payer: MEDICARE

## 2019-02-28 VITALS
OXYGEN SATURATION: 99 % | HEIGHT: 60 IN | BODY MASS INDEX: 35.05 KG/M2 | DIASTOLIC BLOOD PRESSURE: 48 MMHG | SYSTOLIC BLOOD PRESSURE: 108 MMHG | WEIGHT: 178.56 LBS | HEART RATE: 60 BPM

## 2019-02-28 DIAGNOSIS — L21.9 SEBORRHEA: Primary | ICD-10-CM

## 2019-02-28 DIAGNOSIS — Z79.4 TYPE 2 DIABETES MELLITUS WITH HYPERGLYCEMIA, WITH LONG-TERM CURRENT USE OF INSULIN: Chronic | ICD-10-CM

## 2019-02-28 DIAGNOSIS — Z79.4 TYPE 2 DIABETES MELLITUS WITH STAGE 3 CHRONIC KIDNEY DISEASE, WITH LONG-TERM CURRENT USE OF INSULIN: Chronic | ICD-10-CM

## 2019-02-28 DIAGNOSIS — E11.42 DM TYPE 2 WITH DIABETIC PERIPHERAL NEUROPATHY: ICD-10-CM

## 2019-02-28 DIAGNOSIS — S76.112S RUPTURE OF LEFT QUADRICEPS TENDON, SEQUELA: ICD-10-CM

## 2019-02-28 DIAGNOSIS — N18.30 CKD (CHRONIC KIDNEY DISEASE) STAGE 3, GFR 30-59 ML/MIN: Chronic | ICD-10-CM

## 2019-02-28 DIAGNOSIS — N18.30 TYPE 2 DIABETES MELLITUS WITH STAGE 3 CHRONIC KIDNEY DISEASE, WITH LONG-TERM CURRENT USE OF INSULIN: Chronic | ICD-10-CM

## 2019-02-28 DIAGNOSIS — I10 ESSENTIAL HYPERTENSION: Chronic | ICD-10-CM

## 2019-02-28 DIAGNOSIS — E11.22 TYPE 2 DIABETES MELLITUS WITH STAGE 3 CHRONIC KIDNEY DISEASE, WITH LONG-TERM CURRENT USE OF INSULIN: Chronic | ICD-10-CM

## 2019-02-28 DIAGNOSIS — E11.42 TYPE 2 DIABETES MELLITUS WITH DIABETIC POLYNEUROPATHY, WITH LONG-TERM CURRENT USE OF INSULIN: Chronic | ICD-10-CM

## 2019-02-28 DIAGNOSIS — Z79.4 TYPE 2 DIABETES MELLITUS WITH DIABETIC POLYNEUROPATHY, WITH LONG-TERM CURRENT USE OF INSULIN: Chronic | ICD-10-CM

## 2019-02-28 DIAGNOSIS — E11.65 TYPE 2 DIABETES MELLITUS WITH HYPERGLYCEMIA, WITH LONG-TERM CURRENT USE OF INSULIN: Chronic | ICD-10-CM

## 2019-02-28 DIAGNOSIS — E66.01 MORBID OBESITY: ICD-10-CM

## 2019-02-28 PROCEDURE — 99213 OFFICE O/P EST LOW 20 MIN: CPT | Mod: PBBFAC | Performed by: INTERNAL MEDICINE

## 2019-02-28 PROCEDURE — 99214 PR OFFICE/OUTPT VISIT, EST, LEVL IV, 30-39 MIN: ICD-10-PCS | Mod: S$PBB,,, | Performed by: INTERNAL MEDICINE

## 2019-02-28 PROCEDURE — 99214 OFFICE O/P EST MOD 30 MIN: CPT | Mod: S$PBB,,, | Performed by: INTERNAL MEDICINE

## 2019-02-28 PROCEDURE — 99999 PR PBB SHADOW E&M-EST. PATIENT-LVL III: ICD-10-PCS | Mod: PBBFAC,,, | Performed by: INTERNAL MEDICINE

## 2019-02-28 PROCEDURE — 99999 PR PBB SHADOW E&M-EST. PATIENT-LVL III: CPT | Mod: PBBFAC,,, | Performed by: INTERNAL MEDICINE

## 2019-02-28 RX ORDER — KETOCONAZOLE 20 MG/ML
SHAMPOO, SUSPENSION TOPICAL EVERY OTHER DAY
Qty: 120 ML | Refills: 5 | Status: SHIPPED | OUTPATIENT
Start: 2019-02-28 | End: 2020-09-14

## 2019-02-28 NOTE — PROGRESS NOTES
"Subjective:       Patient ID: Eliu Paz is a 78 y.o. female.    Chief Complaint: Follow-up (2 month )    Patient is here for followup for chronic conditions.    "I just hurt all the time". She blames "trulicity" for hurting all over.    Attending PT for body pain.    Her jaw pains has eased and it is now more clear that her symptoms that were concerned for GCA are more c/w with myofascial pain syndrome.    DM2:  good med adherence  no changed Diet  164 this am, 140 AM sugars  83 yest, Post-prandial sugars  stable Numbness in feet  no sores in feet  no Visual changes  no Polyuria/polydipsia.    Worried re R big toe swelling. Still working on diabetic shoe rx.      Review of Systems   Constitutional: Negative for activity change, appetite change, fatigue and unexpected weight change.   Eyes: Negative for photophobia, pain, discharge, redness, itching and visual disturbance.   Respiratory: Negative for chest tightness, shortness of breath and wheezing.    Cardiovascular: Negative for chest pain, palpitations and leg swelling (mild at ankles).   Gastrointestinal: Negative for abdominal distention and abdominal pain.        Mild gastroparesis symptoms with mild early satiety, working on smaller meals and more frequ   Genitourinary: Negative for pelvic pain.   Musculoskeletal: Positive for arthralgias, back pain (stable) and neck pain (stable). Negative for joint swelling.   Skin: Negative for rash.   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 Negative for tremors, syncope, facial asymmetry and speech difficulty.   Hematological: Negative for adenopathy. Does not bruise/bleed easily.   Psychiatric/Behavioral: Negative for dysphoric mood. The patient is nervous/anxious.        Objective:      Physical Exam   Constitutional: She is oriented to person, place, and time. She appears well-developed and well-nourished. No distress.   HENT:   Head: Normocephalic and " atraumatic.   Mouth/Throat: No oropharyngeal exudate.   No jaw or temple tenderness to deep palpation today   Eyes: Conjunctivae are normal. Pupils are equal, round, and reactive to light. No scleral icterus.   Neck: Normal range of motion. Neck supple. No thyromegaly present.   Cardiovascular: Normal rate, regular rhythm and normal heart sounds.   Pulmonary/Chest: Effort normal and breath sounds normal.   Abdominal: Soft. Bowel sounds are normal. She exhibits no distension. There is no tenderness.   No suprapubic tenderness to deep palpation     Musculoskeletal: Normal range of motion. She exhibits edema (mild bilat ankles, not tender). She exhibits no tenderness.   No midline spine tenderness to deep palpation.  Difficulty rising to exam table. No focal lower leg weakness except to LLE extension at knee 2/2 quad rupture   Lymphadenopathy:     She has no cervical adenopathy.   Neurological: She is alert and oriented to person, place, and time. She displays normal reflexes. No cranial nerve deficit. She exhibits normal muscle tone. Coordination normal.   Mild ankle edema, not significant lower extrem, edema    Protective Sensation (w/ 10 gram monofilament):  Right: Intact  Left: Intact    Visual Inspection:  Normal -  Bilateral    Pedal Pulses:   Right: Present  Left: Present    Posterior tibialis:   Right:Present  Left: Present      Skin: No rash noted. She is not diaphoretic.   Thinning of hair on scalp, no focal areas of hair loss.  No seborrhea seen.  A few papules on scalp, some keratotic, some hyperpigmented, all benign appearing    Mild keloid formation scalp line posterior neck   Psychiatric: She has a normal mood and affect. Her behavior is normal.       Assessment:       1. Seborrhea    2. Body mass index (BMI) of 35.0 to 35.9 with comorbidity    3. CKD (chronic kidney disease) stage 3, GFR 30-59 ml/min    4. DM type 2 with diabetic peripheral neuropathy    5. Essential hypertension    6. Morbid obesity     7. Type 2 diabetes mellitus with diabetic polyneuropathy, with long-term current use of insulin    8. Type 2 diabetes mellitus with hyperglycemia, with long-term current use of insulin    9. Type 2 diabetes mellitus with stage 3 chronic kidney disease, with long-term current use of insulin    10. Rupture of left quadriceps tendon, sequela        Plan:       Eliu Steinberg was seen today for follow-up.    Diagnoses and all orders for this visit:    Myofascial pain syndrome -- attending PT, has PMR f./u as well. She does not have GCA, reassured,    Gastroparesis, mild symptoms. Working on small meals, she chks sugars regularly. She has GI f/u.    DM2  utd eye doctor  n/a urine microalbumin  on statin therapy  Discuss next time aspirin therapy  Has good BP control, denies orthostatic symptoms  Working on weight control  She does a good job chking sugars and will continue to chk at leats bid since on insulin.  Hopefully she will qualify for dm shoes    Seborrhea  -     ketoconazole (NIZORAL) 2 % shampoo; Apply topically every other day.      Body mass index (BMI) of 35.0 to 35.9 with comorbidity  Has been working on with less caloric intake    CKD (chronic kidney disease) stage 3, GFR 30-59 ml/min  Repeat next time    DM type 2 with diabetic peripheral neuropathy  stable    Essential hypertension  As above    Morbid obesity  As above    Type 2 diabetes mellitus with diabetic polyneuropathy, with long-term current use of insulin  As above  Type 2 diabetes mellitus with hyperglycemia, with long-term current use of insulin  As above  Type 2 diabetes mellitus with stage 3 chronic kidney disease, with long-term current use of insulin  As above        Health Maintenance       Date Due Completion Date    Aspirin/Antiplatelet Therapy 03/17/1958 ---    Influenza Vaccine 08/01/2018 2/7/2017 (Declined)    Override on 2/7/2017: Declined (per provider note)    Override on 10/15/2015: Not Clinically Appropriate (declines)    DEXA SCAN  11/05/2018 11/5/2015    Hemoglobin A1c 06/04/2019 12/4/2018    Lipid Panel 12/04/2019 12/4/2018    Foot Exam 12/20/2019 12/20/2018 (Done)    Override on 12/20/2018: Done    Override on 9/5/2018: Done    Override on 9/1/2017: Done (podiatry)    Override on 12/2/2016: Done    Override on 8/24/2016: Done (by cardiologist)    Override on 7/14/2015: Done    Eye Exam 01/15/2020 1/15/2019    Colonoscopy 02/05/2024 2/5/2019    TETANUS VACCINE 02/07/2027 2/7/2017 (Declined)    Override on 2/7/2017: Declined (per provider note)          Follow-up in about 3 months (around 5/28/2019).    Future Appointments   Date Time Provider Department Center   3/4/2019  9:30 AM DIGESTIVE DISEASES DIETITIAN [facility] GASTRO [facility]   3/4/2019 11:00 AM Jessica Miller PTA [facility]   3/6/2019 11:00 AM Jessica Miller PTA [facility]   3/11/2019 11:00 AM Pepito Mercado, PT [facility]   3/13/2019 11:00 AM Pepito Mercado, PT [facility]   4/23/2019 10:30 AM Wendi Merida MD [facility]   4/30/2019  3:20 PM Jeri Izquierdo NP [facility] GASTRO [facility]   5/21/2019  9:45 AM PERIMETRY, HUMPH [facility] OPHTHAL [facility]   5/21/2019 10:15 AM Zuleika Francis MD [facility] OPHTHAL [facility]   5/30/2019  9:20 AM Ryan Carlson MD [facility] PCW

## 2019-02-28 NOTE — TELEPHONE ENCOUNTER
Spoke with Vahe at [facility] who stated patient has an appointment with them already and no addl info. Is needed.

## 2019-03-04 ENCOUNTER — NUTRITION (OUTPATIENT)
Dept: GASTROENTEROLOGY | Facility: CLINIC | Age: 79
End: 2019-03-04

## 2019-03-04 ENCOUNTER — TELEPHONE (OUTPATIENT)
Dept: INTERNAL MEDICINE | Facility: CLINIC | Age: 79
End: 2019-03-04

## 2019-03-04 VITALS — HEIGHT: 60 IN | BODY MASS INDEX: 34.85 KG/M2 | WEIGHT: 177.5 LBS

## 2019-03-04 DIAGNOSIS — N18.30 CKD (CHRONIC KIDNEY DISEASE) STAGE 3, GFR 30-59 ML/MIN: ICD-10-CM

## 2019-03-04 DIAGNOSIS — K31.84 GASTROPARESIS: ICD-10-CM

## 2019-03-04 DIAGNOSIS — R13.10 DYSPHAGIA, UNSPECIFIED TYPE: Primary | ICD-10-CM

## 2019-03-04 DIAGNOSIS — E66.01 MORBID OBESITY: ICD-10-CM

## 2019-03-04 DIAGNOSIS — E11.42 DM TYPE 2 WITH DIABETIC PERIPHERAL NEUROPATHY: ICD-10-CM

## 2019-03-04 PROCEDURE — 99999 PR PBB SHADOW E&M-EST. PATIENT-LVL I: ICD-10-PCS | Mod: PBBFAC,,,

## 2019-03-04 PROCEDURE — 99999 PR PBB SHADOW E&M-EST. PATIENT-LVL I: CPT | Mod: PBBFAC,,,

## 2019-03-04 NOTE — PROGRESS NOTES
NUTRITIONAL ASSESSMENT    Referring Provider: Jeri Izquierdo NP    Reason for Visit: Follow up to previous sessions for management of Gastroparesis symptoms of fullness and bloating ( smaller meals and more liquid meals in evenings) ; erratic BG which has improved with small meals (small  protein 1 oz and cho at meals ; reduced use of high protein foods, whole grains and high phosphorus foods ( nuts, cheese, meat, whole grains) to better manage CKD     Age: 78 y.o.  Sex: female    Patient Active Problem List   Diagnosis    Coronary artery disease involving native coronary artery of native heart without angina pectoris    Type 2 diabetes mellitus with diabetic polyneuropathy, with long-term current use of insulin    Shortness of breath on exertion    Refusal of blood transfusions as patient is Worship    TIA (transient ischemic attack)    Intracranial vascular stenosis    Vaginitis and vulvovaginitis    Quadriceps tendon rupture    Cerebral microvascular disease    Cerebral arteriosclerosis    Tinnitus    GERD (gastroesophageal reflux disease)    Constipation    Essential hypertension    Colon cancer screening    CKD (chronic kidney disease) stage 3, GFR 30-59 ml/min    Type 2 diabetes mellitus with stage 3 chronic kidney disease, with long-term current use of insulin    Obesity, Class II, BMI 35-39.9    Right carpal tunnel syndrome    S/P CABG (coronary artery bypass graft)    Post PTCA    Hyperlipidemia    Dysphagia    Multiple nevi    DM type 2 with diabetic peripheral neuropathy    Calculus of gallbladder without cholecystitis without obstruction    Tubulovillous adenoma    Myalgia    Body mass index (BMI) of 35.0 to 35.9 with comorbidity    Morbid obesity    Cervical strain    Elevated C-reactive protein (CRP)    Elevated sed rate    Low back pain    Neck pain     Past Medical History:   Diagnosis Date    Allergy     Arteriosclerosis of arteries of extremities  8/25/2016    Arthritis     Brain infection     Cataract     CKD (chronic kidney disease) stage 3, GFR 30-59 ml/min     Coma     Coronary artery disease     Diabetes mellitus type II 1981    Diabetic neuropathy     GERD (gastroesophageal reflux disease)     Glaucoma     Hyperlipidemia     Hypertension     Type 2 diabetes mellitus with diabetic peripheral angiopathy without gangrene, with long-term current use of insulin 8/21/2016     Lab Results   Component Value Date     (H) 12/25/2018    K 4.4 12/25/2018    PHOS 3.1 06/24/2015    MG 2.0 06/24/2015    CHOL 113 (L) 12/04/2018    HDL 50 12/04/2018    TRIG 86 12/04/2018    ALBUMIN 3.4 (L) 12/25/2018    HGBA1C 7.9 (H) 12/04/2018    CALCIUM 9.8 12/25/2018 12/25/2018  3:29 PM - Char[facility], Soft Lab Interface     Component Value Flag Ref Range Units Status   Sodium 135 Abnormally low   L  136 - 145 mmol/L Final   Potassium 4.4   3.5 - 5.1 mmol/L Final   Chloride 103   95 - 110 mmol/L Final   CO2 22 Abnormally low   L  23 - 29 mmol/L Final   Glucose 146 Abnormally high   H  70 - 110 mg/dL Final   BUN, Bld 15   8 - 23 mg/dL Final   Creatinine 0.9   0.5 - 1.4 mg/dL Final   Calcium 9.8   8.7 - 10.5 mg/dL Final   Total Protein 8.3   6.0 - 8.4 g/dL Final   Albumin 3.4 Abnormally low   L  3.5 - 5.2 g/dL Final   Total Bilirubin 0.7   0.1 - 1.0 mg/dL Final   Comment:   For infants and newborns, interpretation of results should be based   on gestational age, weight and in agreement with clinical   observations.   Premature Infant recommended reference ranges:   Up to 24 hours.............<8.0 mg/dL   Up to 48 hours............<12.0 mg/dL   3-5 days..................<15.0 mg/dL   6-29 days.................<15.0 mg/dL    Alkaline Phosphatase 82   55 - 135 U/L Final   AST 18   10 - 40 U/L Final   Comment:   *Result may be interfered by visible hemolysis   ALT 8 Abnormally low   L  10 - 44 U/L Final   Anion Gap 10   8 - 16 mmol/L Final   eGFR if  >60.0    ">60 mL/min/1.73 m^2 Final   eGFR if non African American >60.0   >60 mL/min/1.73 m^2 Final   Comment:   Calculation used to obtain the estimated glomerular filtration   rate (eGFR) is the CKD-EPI equation.        Other Pertinent Labs: Patient reports improvement in BG -- noted pre-meal BG 112mg/dl in late afternoon related to lunch meal containing Protein only ; post evening meal  mg/dl     Current Outpatient Medications   Medication Sig    amLODIPine (NORVASC) 10 MG tablet TAKE 1 TABLET (10 MG TOTAL) BY MOUTH ONCE DAILY.    aspirin (ECOTRIN) 81 MG EC tablet Take 1 tablet (81 mg total) by mouth once daily.    carvedilol (COREG) 25 MG tablet TAKE 1 TABLET (25 MG TOTAL) BY MOUTH 2 (TWO) TIMES DAILY.    ciclopirox (PENLAC) 8 % Soln Apply topically nightly.    clobetasol 0.05% (TEMOVATE) 0.05 % Oint Apply small amount to vulva area twice a day for 4 weeks then once a day for 4 weeks then once a day on Mondays and Thursdays    dorzolamide-timolol 2-0.5% (COSOPT) 22.3-6.8 mg/mL ophthalmic solution Place 1 drop into both eyes 2 (two) times daily.    fluocinonide (LIDEX) 0.05 % external solution Apply topically 2 (two) times daily.    fluticasone (FLONASE) 50 mcg/actuation nasal spray SPRAY TWICE IN EACH NOSTRIL EVERY DAY    insulin asp prt-insulin aspart, NOVOLOG 70/30, (NOVOLOG MIX 70-30 U-100 INSULN) 100 unit/mL (70-30) Soln Inject 16 units w/ breakfast and 20 units at dinner time.    insulin syringe-needle U-100 (BD INSULIN SYRINGE ULTRA-FINE) 0.5 mL 31 gauge x 5/16" Syrg USE TO INJECT TWICE DAILY WITH INSULIN INJECTIONS    isosorbide mononitrate (IMDUR) 30 MG 24 hr tablet TAKE 1 TABLET (30 MG TOTAL) BY MOUTH ONCE DAILY.    ketoconazole (NIZORAL) 2 % cream Apply topically once daily.    ketoconazole (NIZORAL) 2 % shampoo Apply topically every other day.    lactulose (CHRONULAC) 10 gram/15 mL solution TAKE 30 MLS (20 G TOTAL) BY MOUTH DAILY AS NEEDED (CONSTIPATION).    latanoprost 0.005 % ophthalmic " "solution Place 1 drop into both eyes every evening.    losartan (COZAAR) 100 MG tablet TAKE 1 TABLET (100 MG TOTAL) BY MOUTH ONCE DAILY.    nitroGLYCERIN 0.4 MG/DOSE TL SPRY (NITROLINGUAL) 400 mcg/spray spray PLACE 2 SPRAYS UNDER THE TONGUE EVERY 5 MINUTES AS NEEDED FOR CHEST PAIN    ONETOUCH DELICA LANCETS 30 gauge Misc 1 lancet by Misc.(Non-Drug; Combo Route) route 4 (four) times daily.    ONETOUCH VERIO Strp USE TO TEST BLOOD SUGAR 4 TIMES A DAY    ranitidine (ZANTAC) 150 MG tablet Take 1 tablet (150 mg total) by mouth 2 (two) times daily.    rosuvastatin (CRESTOR) 40 MG Tab TAKE 1 TABLET BY MOUTH ONCE DAILY.    spironolactone (ALDACTONE) 25 MG tablet TAKE 2 TABLETS BY MOUTH EVERY DAY    traMADol (ULTRAM) 50 mg tablet TAKE 1 TABLET BY MOUTH TWICE DAILY AS NEEDED    triamcinolone acetonide 0.1% (KENALOG) 0.1 % ointment Apply topically 2 (two) times daily. Apply small amount to itchy areas on body BID PRN.  Do not use on face or in groin.     No current facility-administered medications for this visit.      Allergies: Penicillins and Trulicity [dulaglutide]    Ht Readings from Last 1 Encounters:   03/04/19 5' (1.524 m)     Wt Readings from Last 1 Encounters:   03/04/19 80.5 kg (177 lb 7.5 oz)      BMI: Body mass index is 34.66 kg/m².  Vitals - 1 value per visit 9/5/2018 9/10/2018 9/18/2018 10/15/2018   SYSTOLIC 110 132     DIASTOLIC 56 62     PULSE 55 55     TEMPERATURE       RESPIRATIONS       SPO2 98      Weight (lb) 184.53 185.63  182.32   Weight (kg) 83.7 84.2  82.7   HEIGHT 5' 0" 5' 0"  5' 0"   BODY MASS INDEX 36.04 36.25  35.61   VISIT REPORT       Pain Score  0 4 0 0     Vitals - 1 value per visit 10/25/2018 11/6/2018 12/4/2018 12/18/2018   SYSTOLIC 135  118 126   DIASTOLIC 65  60 60   PULSE 63  64 63   TEMPERATURE       RESPIRATIONS       SPO2   98 98   Weight (lb) 183.86 182.98 183.64 180.78   Weight (kg) 83.4 83 83.3 82   HEIGHT 5' 0" 5' 0" 5' 0" 5' 2"   BODY MASS INDEX 35.91 35.74 35.87 33.06 " "  VISIT REPORT       Pain Score          Vitals - 1 value per visit 12/20/2018 12/25/2018 12/27/2018 1/2/2019   SYSTOLIC 101 142 108 158   DIASTOLIC 54 64 60 69   PULSE 64 66 67 62   TEMPERATURE  98.5  97.8   RESPIRATIONS  16     SPO2  95 99    Weight (lb) 180 180 177.03 178.57   Weight (kg) 81.647 81.647 80.3 81   HEIGHT 5' 2.4" 5' 2"  4' 11"   BODY MASS INDEX 32.5 32.92 32.38 36.07   VISIT REPORT       Pain Score  0   3     Vitals - 1 value per visit 1/3/2019 1/11/2019 1/15/2019 1/30/2019 2/5/2019   SYSTOLIC 132 139  104 123   DIASTOLIC 70 63  60 68   PULSE 58 59  54 60   TEMPERATURE     97.5   RESPIRATIONS     16   SPO2     100   Weight (lb) 178 175.71  178.13 177   Weight (kg) 80.74 79.7  80.8 80.287   HEIGHT 4' 11" 5' 0"  5' 0" 5' 1"   BODY MASS INDEX 35.95 34.32  34.79 33.44   VISIT REPORT        Pain Score  3 8 0       Vitals - 1 value per visit 2/12/2019 2/28/2019 3/4/2019   SYSTOLIC  108    DIASTOLIC  48    PULSE  60    TEMPERATURE      RESPIRATIONS      SPO2  99    Weight (lb) 177.91 178.57 177.47   Weight (kg) 80.7 81 80.5   HEIGHT 5' 1" 5' 0"    BODY MASS INDEX 33.62 34.88 34.66   VISIT REPORT      Pain Score         Changes since last visit :   · Including small protein portion with cho at meals and snacks   · Adequate free water   · Reduction in portions of cho at meals with resultant improvement in gastric emptying   · Reduction in consumption of raw vegetables and fruit and whole grains   Nutritional Diagnoses  Problem: Altered GI function   Etiology: related to Gastroparesis and long term DM   Symptoms: as evidenced by symptoms of early satiety     Problem: Erratic BG   Etiology : related to consumption of raw vegetables and fruits with delayed gastric emptying and overtreatment of low BG and limited understanding of Gastroparesis treatment   Symptoms : as evidenced by BG ranging from 70's to 200's     Problem: Declining Renal function   Etiology : related to long term poorly controlled BG and " limited free water intake   As evidenced by : renal labs    Educational Need? yes  Barriers: cognitive memory and retention of principles   Discussed with: patient  Interventions:   Patient taught nutrition information regarding need for avoidance of raw fruits and vegetables, whole grains  and use of cooked or canned fruit without added sugar.  Discussed need to include small portion of Protein with starch or fruit at meals and snacks      Goals/Recommendations: small frequent meals and snacks  Actions Taken: instruct/provide written information on snack pairings and simple meal pairings ; sample evening meal choices ( yogurt, soup, etc)   Patient and/or family comprehend instructions: yes  Outcome: Verbalizes understanding and improving BG serves to reinforce new skills   Monitoring: Renal labs; BG trends     Counseling Time: 30 minutes

## 2019-03-06 ENCOUNTER — CLINICAL SUPPORT (OUTPATIENT)
Dept: REHABILITATION | Facility: HOSPITAL | Age: 79
End: 2019-03-06
Attending: PHYSICAL MEDICINE & REHABILITATION
Payer: MEDICARE

## 2019-03-06 DIAGNOSIS — G89.29 CHRONIC MIDLINE LOW BACK PAIN WITHOUT SCIATICA: ICD-10-CM

## 2019-03-06 DIAGNOSIS — M54.2 NECK PAIN: ICD-10-CM

## 2019-03-06 DIAGNOSIS — M54.50 CHRONIC MIDLINE LOW BACK PAIN WITHOUT SCIATICA: ICD-10-CM

## 2019-03-06 PROCEDURE — 97110 THERAPEUTIC EXERCISES: CPT | Mod: PO

## 2019-03-06 NOTE — PROGRESS NOTES
"Name: Eliu MACARIO [facility] Number: [id number]  Date of Treatment: 03/06/2019   Diagnosis:   Encounter Diagnoses   Name Primary?    Chronic midline low back pain without sciatica     Neck pain      Physician: Wendi Merida, *    Time in: 11:00 AM  Time Out: 11:55 AM  Total Treatment Time: 60  Date of eval: 2/18/19  Visit #: 4/20  Auth expiration: 12/31/19  POC expiration: 5/11/19    Precautions: Standard, diabetes and fall    Subjective     Eliu Steinberg reports her neck is hurting her today. Patient reports the right side of her low back has been bothering her when she sweeps, mops or vacuums.  Patient reports their pain to be 5-6/10 on a 0-10 scale with 0 being no pain and 10 being the worst pain imaginable.    Objective       Eliu Steinberg received therapeutic exercises to develop strength, endurance, ROM, flexibility, posture and core stabilization for 55 minutes including:     Recumbent Bike x 6 minutes, L2    Quad Sets 20 x 5"  Seated Marches x 20 each  SAQ x 20, 3 sec hold each (L AAROM)  Standing HS curls x 20 each  hooklying clams with OTB x 20   SL clamshells x 20 each  SL hip abduction 2 x 15 each   Standing marches 2 x 10  Heel raises 2 x 10     Scap retraction/depression 5 sec holds 2x10  OTB rows 2x15  OTB brueggars 2x10  Lacrosse ball/ theracane to upper traps 5 min    Patient participated in dynamic functional therapeutic activities to improve functional performance for 0 minutes including: - NP    Log roll technique    Written Home Exercises Provided: no change     Pt educated on gait mechanics, importance of developing quad strength, fall prevention strategies. Pt demo fair understanding of the education provided. Eliu Steinberg demonstrated fair return demonstration of activities.     Assessment   Eliu Steinberg tolerated session with today with no adverse effects. Patient tolerated additional standing exercises well today. Patient continues to fatigue with exercise, require assistance with SAQs and require " verbal and tactile cueing for SL hip abduction.  Will continue to progress HEP as tolerated    Pt will continue to benefit from skilled PT intervention. Medical Necessity is demonstrated by:  Fall Risk, Unable to participate in daily activities, Continued inability to participate in vocational pursuits, Pain limits function of effected part for some activities, Unable to participate fully in daily activities, Requires skilled supervision to complete and progress HEP and Weakness.    Patient is making good progress towards established goals.    Goals:  Short Term Goals: 3 weeks   1. Pt will be independent with HEP and report compliance at least 4 days/week  2. Pt will be able to walk for 20 minutes on a level surface reporting less than 2/10 pain  3. Pt will be able to read/use ipad for 20 minutes (taking stretch breaks between bouts) reporting less than 3/10 pain     Long Term Goals: 10 weeks   1. Pt will be able to walk for 40 minutes on level surface and ascend/descend 4 steps reporting less than 3/10 pain in order to perform [employer] responsibilities   2. Pt will be able to dance jitterbug (making modifications when necessary) for 30 minutes reporting less than 3/10 pain   3. Pt will be able to tolerate lumbar and cervical AROM testing reporting less than 2/10 pain at end range    New/Revised goals: none at this time    1. Pt will improve quad strength to at least 4/5 on the L to reduce risk of falls  2. Pt will be able to perform 10 SLR on L without extension lag to demonstrate improved quad strength       Plan   PT/PTA face to face completed to discuss patient's progress and POC.     Next: AAROM SLR in side lying, seated shoulder exercsies    Continue with established Plan of Care towards PT goals.

## 2019-03-11 ENCOUNTER — CLINICAL SUPPORT (OUTPATIENT)
Dept: REHABILITATION | Facility: HOSPITAL | Age: 79
End: 2019-03-11
Attending: PHYSICAL MEDICINE & REHABILITATION
Payer: MEDICARE

## 2019-03-11 DIAGNOSIS — M54.2 NECK PAIN: ICD-10-CM

## 2019-03-11 DIAGNOSIS — M54.50 CHRONIC MIDLINE LOW BACK PAIN WITHOUT SCIATICA: ICD-10-CM

## 2019-03-11 DIAGNOSIS — G89.29 CHRONIC MIDLINE LOW BACK PAIN WITHOUT SCIATICA: ICD-10-CM

## 2019-03-11 PROCEDURE — 97110 THERAPEUTIC EXERCISES: CPT | Mod: PO

## 2019-03-11 NOTE — PROGRESS NOTES
Re-assessment    Name: Eliu MACARIO [facility] [address] Number: [id number]  Date of Treatment: 03/11/2019   Diagnosis:   Encounter Diagnoses   Name Primary?    Chronic midline low back pain without sciatica     Neck pain      Physician: Wendi Merida, *    Time in: 11:00 AM  Time Out: 12:00 AM  Total Treatment Time: 60  Date of eval: 2/18/19  Visit #: 5/20  Auth expiration: 12/31/19  POC expiration: 5/11/19    Precautions: Standard, diabetes and fall (d/t L knee buckling)    Subjective     Eliu Steinberg since initial evaluation she has been feeling about the same. Pt reports she continues to have low back and neck pain. Pt has been more aware of her LE weakness and how it contributes to her fall risk (pt needs to hyperextend knee in stance phase d/t quad weakness). Pt reports she tore her quad tendon but refused surgery d/t not wanting to accept tendon graft from [facility] (pt received PT in 2015 but was only able to get quad strength back to 3+/5 at the completion of therapy). Pt reports she continues to experience back pain when sweeping/vacuuming and it feels like there is a knot in her R lower back. Pt reports she has persistent neck pain as well, pt reports she feels she is allergic to technology. Pt reports increased LE pain/symptoms while using her ipad. Pt would like to get her low back and neck pain better under control      Patient reports their pain to be 5-6/10 on a 0-10 scale with 0 being no pain and 10 being the worst pain imaginable.    Objective     Objective measures taken today to assess progress    BP taken in L UE in sitting 132/72     Gait: Pt amb w/ SBQC in R UE, difficulty getting all 4 legs on the ground. Pt walks with knee hyperextension on her L knee.     Hip Range of Motion:    Right Passive Left Passive   Flexion 100 P! In hip 120   Abduction 30 30   Extension 0 0   Ext. Rotation 45 P! In hip 50 P! In low back   Int. Rotation 60 P! In hip 50      Special Tests:     SUYAPA: (+) bilat  FADIR: (+)  "bilat  Hip Scour (+) bilat  SLR (+) on L>R  Slump (+) bilat     Pt can perform 10 ASLR on R, pt unable to perform ASLR on L, limited by quad weakness     Lower Extremity Strength  Right LE   Left LE     Knee extension: 4+/5 Knee extension: 2+/5   Knee flexion: 4/5 Knee flexion: 4-/5        LUMBAR SPINE AROM:   Flexion: WNL   Extension: 10% "pulling down bilat LE's"   Left Sidebend: 50%   Right Sidebend: 25% P!   Left Rotation: 50% LBP   Right Rotation: 75%       CERVICAL SPINE AROM:   Flexion: 50%   Extension: 25% P!   Left Sidebend:  50% P!   Right Sidebend:  50% P!   Left Rotation: 75% P!   Right Rotation: 75% P!     Eliu Steinberg received therapeutic exercises to develop strength, endurance, ROM, flexibility, posture and core stabilization for 55 minutes including:     Recumbent Bike x 6 minutes, L2    Quad Sets 20 x 5"  Seated Marches x 20 each  SAQ x 20, 3 sec hold each (L AAROM)  Standing HS curls x 20 each  hooklying clams with OTB x 20   SL clamshells x 20 each  SL hip abduction 2 x 15 each   Standing marches 2 x 10  Heel raises 2 x 10     Scap retraction/depression 5 sec holds x20  OTB rows 2x15  OTB brueggars 2x10  Supine chin tucks 3 sec holds x20  Theracane to upper traps 5 min    Patient participated in dynamic functional therapeutic activities to improve functional performance for 0 minutes including: - NP    Log roll technique    Written Home Exercises Provided: no change     Pt educated on gait mechanics, importance of developing quad strength, fall prevention strategies. Pt demo fair understanding of the education provided. Eliu Steinberg demonstrated fair return demonstration of activities.     Assessment   Eliu Steinberg presents with minimal quad strength gains over the past few weeks. Pt was educated that d/t length of time since initial injury and limited strength following PT when injury initially occurred, it would be difficult to develop the required strength to stand on her L LE without utilizing knee " hyperextension however pt reports she is going to get a knee brace. Pt educated that brace could help but will not guarantee her knee will be free from buckling. Pt reports good understanding and would like to focus the remainder of therapy on her neck a low back pain. Pt demonstrates increased R shoulder elevation while walking with SBQC. PT lowered cane height to attempt to decrease scapular elevation. Pt educated on posture and activity modification, and how altered gait mechanics can effect her low back pain. Will continue treatment with focus on core/ hip strengthening and postural endurance.    Pt will continue to benefit from skilled PT intervention. Medical Necessity is demonstrated by:  Fall Risk, Unable to participate in daily activities, Continued inability to participate in vocational pursuits, Pain limits function of effected part for some activities, Unable to participate fully in daily activities, Requires skilled supervision to complete and progress HEP and Weakness.    Patient is making good progress towards established goals.    Goals:  Short Term Goals: 3 weeks   1. Pt will be independent with HEP and report compliance at least 4 days/week  2. Pt will be able to walk for 20 minutes on a level surface reporting less than 2/10 pain  3. Pt will be able to read/use ipad for 20 minutes (taking stretch breaks between bouts) reporting less than 3/10 pain     Long Term Goals: 10 weeks   1. Pt will be able to walk for 40 minutes on level surface and ascend/descend 4 steps reporting less than 3/10 pain in order to perform [employer] responsibilities   2. Pt will be able to dance jitterbug (making modifications when necessary) for 30 minutes reporting less than 3/10 pain   3. Pt will be able to tolerate lumbar and cervical AROM testing reporting less than 2/10 pain at end range    New/Revised goals: none at this time    1. Pt will improve quad strength to at least 4/5 on the L to reduce risk of  falls  2. Pt will be able to perform 10 SLR on L without extension lag to demonstrate improved quad strength       Plan   PT/PTA face to face completed to discuss patient's progress and POC.     Next: seated shoulder exercsies    Continue with established Plan of Care towards PT goals.

## 2019-03-13 ENCOUNTER — CLINICAL SUPPORT (OUTPATIENT)
Dept: REHABILITATION | Facility: HOSPITAL | Age: 79
End: 2019-03-13
Attending: PHYSICAL MEDICINE & REHABILITATION
Payer: MEDICARE

## 2019-03-13 DIAGNOSIS — M54.2 NECK PAIN: ICD-10-CM

## 2019-03-13 DIAGNOSIS — M54.50 CHRONIC MIDLINE LOW BACK PAIN WITHOUT SCIATICA: ICD-10-CM

## 2019-03-13 DIAGNOSIS — G89.29 CHRONIC MIDLINE LOW BACK PAIN WITHOUT SCIATICA: ICD-10-CM

## 2019-03-13 PROCEDURE — 97140 MANUAL THERAPY 1/> REGIONS: CPT | Mod: PO

## 2019-03-13 PROCEDURE — 97110 THERAPEUTIC EXERCISES: CPT | Mod: PO

## 2019-03-13 NOTE — PROGRESS NOTES
"Name: Eliu MACARIO [facility] Number: [id number]  Date of Treatment: 03/13/2019   Diagnosis:   Encounter Diagnoses   Name Primary?    Chronic midline low back pain without sciatica     Neck pain      Physician: Wendi Merida, *    Time in: 11:00 AM  Time Out: 12:00 AM  Total Treatment Time: 60 min  Total 1:1 billable time 30 min  Date of eval: 2/18/19  Visit #: 6/20  Auth expiration: 12/31/19  POC expiration: 5/11/19    Precautions: Standard, diabetes and fall (d/t L knee buckling)    Subjective     Eliu Steinberg her back and neck continue to hurt especially with sweeping or walking for extended periods   Patient reports their pain to be 5-6/10 on a 0-10 scale with 0 being no pain and 10 being the worst pain imaginable.    Objective     MHP applied to lumbar spine/ hips for pain modulation and tissue extensibility - 7 min    Eliu Steinberg received therapeutic exercises to develop strength, endurance, ROM, flexibility, posture and core stabilization for 45 minutes including:     Recumbent Bike x 6 minutes, L2 - NP    Supine    Pelvic tilt 2x20  Bridge 2x10  DKTC on orange SB w/ GTB pull down 2x10  Quad Sets 20 x 5"  Seated Marches x 20 each  SAQ x 20, 3 sec hold each (L AAROM)  Standing HS curls x 20 each  hooklying clams with OTB x 20   SL clamshells x 20 each  SL hip abduction 2 x 15 each   Standing marches 2 x 10  Heel raises 2 x 10     Upper Quarter    Scap retraction/depression 5 sec holds x20  OTB rows 2x15  OTB brueggars 2x10  Supine chin tucks 3 sec holds x20  Theracane to upper traps 5 min    Eliu Steinberg received the following manual therapy techniques: Joint mobilizations, Manual traction, Myofacial release and Soft tissue mobilization were applied to the: L lumbar spine for 15 minutes, including:    STM to lumbar paraspinals, QL, mutlifidi  R sidelying L SB gapping (QL stretch)  Long axis L hip traction    Patient participated in dynamic functional therapeutic activities to improve functional performance for 0 " minutes including: - NP    Log roll technique    Written Home Exercises Provided: pelvic tilts     Pt educated on gait mechanics, importance of developing quad strength, fall prevention strategies. Pt demo fair understanding of the education provided. Eliu Steinberg demonstrated fair return demonstration of activities.     Assessment   Eliu Steinberg presents with increased tissue density to L lumbar paraspinals, QL and mutlifidi. Pt benefits from heat prior to STM to reduce tone and improve tolerance. Pt reports relief in sx with SB gapping and long axis hip traction. Pt requires cuing initially however able to perform pelvic tilt with good core activation, will continue to progress core/ hip strengthening to pt tolerance.    Pt will continue to benefit from skilled PT intervention. Medical Necessity is demonstrated by:  Fall Risk, Unable to participate in daily activities, Continued inability to participate in vocational pursuits, Pain limits function of effected part for some activities, Unable to participate fully in daily activities, Requires skilled supervision to complete and progress HEP and Weakness.    Patient is making good progress towards established goals.    Goals:  Short Term Goals: 3 weeks   1. Pt will be independent with HEP and report compliance at least 4 days/week  2. Pt will be able to walk for 20 minutes on a level surface reporting less than 2/10 pain  3. Pt will be able to read/use ipad for 20 minutes (taking stretch breaks between bouts) reporting less than 3/10 pain     Long Term Goals: 10 weeks   1. Pt will be able to walk for 40 minutes on level surface and ascend/descend 4 steps reporting less than 3/10 pain in order to perform Shinto responsibilities   2. Pt will be able to dance jitterbug (making modifications when necessary) for 30 minutes reporting less than 3/10 pain   3. Pt will be able to tolerate lumbar and cervical AROM testing reporting less than 2/10 pain at end  range    New/Revised goals: none at this time    1. Pt will improve quad strength to at least 4/5 on the L to reduce risk of falls      Plan   PT/PTA face to face completed to discuss patient's progress and POC.     Next: seated shoulder exercises, step ups guarding L knee, shuttle press    Continue with established Plan of Care towards PT goals.

## 2019-03-18 ENCOUNTER — CLINICAL SUPPORT (OUTPATIENT)
Dept: REHABILITATION | Facility: HOSPITAL | Age: 79
End: 2019-03-18
Attending: PHYSICAL MEDICINE & REHABILITATION
Payer: MEDICARE

## 2019-03-18 DIAGNOSIS — M54.2 NECK PAIN: ICD-10-CM

## 2019-03-18 DIAGNOSIS — M54.50 CHRONIC MIDLINE LOW BACK PAIN WITHOUT SCIATICA: ICD-10-CM

## 2019-03-18 DIAGNOSIS — G89.29 CHRONIC MIDLINE LOW BACK PAIN WITHOUT SCIATICA: ICD-10-CM

## 2019-03-18 PROCEDURE — 97110 THERAPEUTIC EXERCISES: CPT | Mod: PO

## 2019-03-18 PROCEDURE — 97140 MANUAL THERAPY 1/> REGIONS: CPT | Mod: PO

## 2019-03-18 NOTE — PROGRESS NOTES
"Name: Eliu MACARIO [facility] Number: [id number]  Date of Treatment: 03/18/2019   Diagnosis:   Encounter Diagnoses   Name Primary?    Chronic midline low back pain without sciatica     Neck pain      Physician: Wendi Merida, *    Time in: 11:00 AM  Time Out: 12:05 PM  Total Treatment Time: 60 min  Total 1:1 billable time: 30 min  Date of eval: 2/18/19  Visit #: 7/20  Auth expiration: 12/31/19  POC expiration: 5/11/19    Precautions: Standard, diabetes and fall (d/t L knee buckling)    Subjective     Eliu Steinberg today the R side of her neck and her R knee are hurting. Patient reports pain that starts in the back of her knee that goes up and down the leg and feels like muscle spasm. Patient reports she notices improvement when she is mopping and sweeping and that the pain isn't as bad and when she does get pain it is R sided low back.   Patient reports their pain to be 5/10 on a 0-10 scale with 0 being no pain and 10 being the worst pain imaginable.    Objective     Eliu Steinberg received therapeutic exercises to develop strength, endurance, ROM, flexibility, posture and core stabilization for 40 minutes including:     Recumbent Bike x 6 minutes, L2 - NP    Supine    HS stretch - therapist assisted   Pelvic tilt 2x20  Bridge 2x10  DKTC on orange SB w/ GTB pull down 2x10  Quad Sets 20 x 5"  Seated Marches x 20 each  SAQ x 20, 3 sec hold each (L AAROM)  Standing HS curls x 20 each  hooklying clams with OTB x 20   SL clamshells x 20 each  SL hip abduction 2 x 15 each   Standing marches 2 x 10  Heel raises 2 x 10     Upper Quarter    Scap retraction/depression 5 sec holds x20  OTB rows 2x15  OTB brueggars 2x10  Supine chin tucks 3 sec holds x20  Theracane to upper traps 5 min    Eliu Steinebrg received the following manual therapy techniques: Soft tissue mobilization were applied to the: L lumbar spine for 10 minutes, including:    STM to lumbar paraspinals, QL, mutlifidi  L sidelying R SB gapping (QL stretch)  Long axis L hip " traction - NP    Written Home Exercises Provided: no change     Pt educated on gait mechanics, importance of developing quad strength, fall prevention strategies. Pt demo fair understanding of the education provided. Eliu Steinberg demonstrated fair return demonstration of activities.     Assessment   Eliu Steinberg with good tolerance to exercises performed today. Patient arrived with knee brace today and assisted patient with adjusting it. Patient with complaints of R sided back pain today and notes relief post STM. Patient required cueing for proper chin tuck technique. Patient continues to with pain in the R bicep reported during brueggers exercise.Will continue to progress core/ hip strengthening to pt tolerance.    Pt will continue to benefit from skilled PT intervention. Medical Necessity is demonstrated by:  Fall Risk, Unable to participate in daily activities, Continued inability to participate in vocational pursuits, Pain limits function of effected part for some activities, Unable to participate fully in daily activities, Requires skilled supervision to complete and progress HEP and Weakness.    Patient is making good progress towards established goals.    Goals:  Short Term Goals: 3 weeks   1. Pt will be independent with HEP and report compliance at least 4 days/week  2. Pt will be able to walk for 20 minutes on a level surface reporting less than 2/10 pain  3. Pt will be able to read/use ipad for 20 minutes (taking stretch breaks between bouts) reporting less than 3/10 pain     Long Term Goals: 10 weeks   1. Pt will be able to walk for 40 minutes on level surface and ascend/descend 4 steps reporting less than 3/10 pain in order to perform Quaker responsibilities   2. Pt will be able to dance jitterbug (making modifications when necessary) for 30 minutes reporting less than 3/10 pain   3. Pt will be able to tolerate lumbar and cervical AROM testing reporting less than 2/10 pain at end range    New/Revised  goals: none at this time    1. Pt will improve quad strength to at least 4/5 on the L to reduce risk of falls      Plan       Next: seated shoulder exercises, step ups guarding L knee, shuttle press    Continue with established Plan of Care towards PT goals.

## 2019-03-20 ENCOUNTER — CLINICAL SUPPORT (OUTPATIENT)
Dept: REHABILITATION | Facility: HOSPITAL | Age: 79
End: 2019-03-20
Attending: PHYSICAL MEDICINE & REHABILITATION
Payer: MEDICARE

## 2019-03-20 DIAGNOSIS — G89.29 CHRONIC MIDLINE LOW BACK PAIN WITHOUT SCIATICA: ICD-10-CM

## 2019-03-20 DIAGNOSIS — M54.50 CHRONIC MIDLINE LOW BACK PAIN WITHOUT SCIATICA: ICD-10-CM

## 2019-03-20 DIAGNOSIS — M54.2 NECK PAIN: ICD-10-CM

## 2019-03-20 PROCEDURE — 97110 THERAPEUTIC EXERCISES: CPT | Mod: PO

## 2019-03-20 NOTE — PROGRESS NOTES
"Name: Eliu MACARIO [facility] Number: [id number]  Date of Treatment: 03/20/2019   Diagnosis:   Encounter Diagnoses   Name Primary?    Chronic midline low back pain without sciatica     Neck pain      Physician: Wendi Merida, *    Time in: 11:00 AM  Time Out: 11:55 AM  Total Treatment Time: 55 min  Total 1:1 billable time: 55 min  Date of eval: 2/18/19  Visit #: 8/20  Auth expiration: 12/31/19  POC expiration: 5/11/19    Precautions: Standard, diabetes and fall (d/t L knee buckling)    Subjective     Eliu Steinberg reports increased pain in the L knee today that she reports is around the superior border of the patella. Patient reports the back is feeling better today. Patient reports she walks better with her knee brace but reports she didn't wear it today d/t being in increased pain and she didn't think it would go under her pants leg.    Patient reports their pain to be 4/10 on a 0-10 scale with 0 being no pain and 10 being the worst pain imaginable.    Objective     Eliu Steinberg received therapeutic exercises to develop strength, endurance, ROM, flexibility, posture and core stabilization for 55 minutes including:     Recumbent Bike x 6 minutes, L2 - NP    Supine    HS stretch - therapist assisted   Pelvic tilt 2x20 - NP d/t patient unable to maintain proper form.   Bridge 2x10  DKTC on orange SB w/ GTB pull down x 30   Quad Sets 20 x 5"  Seated Marches x 20 each  SAQ x 20, 3 sec hold each (L AAROM)  Standing HS curls x 20 each  hooklying clams with OTB x 30   SL clamshells x 30 each  SL hip abduction 2 x 15 each   Standing marches 2 x 10  Heel raises 2 x 10     Upper Quarter    Scap retraction/depression 5 sec holds x20  OTB rows 2x15  OTB brueggars 2x10  Supine chin tucks 3 sec holds x20  Theracane to upper traps 5 min    Eliu Steinberg received the following manual therapy techniques: Soft tissue mobilization were applied to the: L lumbar spine for 0 minutes, including: - NP    STM to lumbar paraspinals, QL, mutlifidi  L " sidelying R SB gapping (QL stretch)  Long axis L hip traction - NP    Written Home Exercises Provided: no change     Pt educated on gait mechanics, importance of developing quad strength, fall prevention strategies. Pt demo fair understanding of the education provided. Eliu Steinberg demonstrated fair return demonstration of activities.     Assessment   Eliu Steinberg presents with increased L knee pain today.  Patient continues with reports of pain behind the R knee as well. Patient with good tolerance to exercises performed today. Patient continues to fatigue with LE strengthening exercises.  Patient unable to maintain proper form with Pelvic tilt exercise today. Will continue to progress core/ hip strengthening to pt tolerance.    Pt will continue to benefit from skilled PT intervention. Medical Necessity is demonstrated by:  Fall Risk, Unable to participate in daily activities, Continued inability to participate in vocational pursuits, Pain limits function of effected part for some activities, Unable to participate fully in daily activities, Requires skilled supervision to complete and progress HEP and Weakness.    Patient is making good progress towards established goals.    Goals:  Short Term Goals: 3 weeks   1. Pt will be independent with HEP and report compliance at least 4 days/week  2. Pt will be able to walk for 20 minutes on a level surface reporting less than 2/10 pain  3. Pt will be able to read/use ipad for 20 minutes (taking stretch breaks between bouts) reporting less than 3/10 pain     Long Term Goals: 10 weeks   1. Pt will be able to walk for 40 minutes on level surface and ascend/descend 4 steps reporting less than 3/10 pain in order to perform Rastafarian responsibilities   2. Pt will be able to dance jitterbug (making modifications when necessary) for 30 minutes reporting less than 3/10 pain   3. Pt will be able to tolerate lumbar and cervical AROM testing reporting less than 2/10 pain at end  range    New/Revised goals: none at this time    1. Pt will improve quad strength to at least 4/5 on the L to reduce risk of falls      Plan       Next: seated shoulder exercises, step ups guarding L knee, shuttle press    Continue with established Plan of Care towards PT goals.

## 2019-03-25 ENCOUNTER — CLINICAL SUPPORT (OUTPATIENT)
Dept: REHABILITATION | Facility: HOSPITAL | Age: 79
End: 2019-03-25
Attending: PHYSICAL MEDICINE & REHABILITATION
Payer: MEDICARE

## 2019-03-25 DIAGNOSIS — M54.50 CHRONIC MIDLINE LOW BACK PAIN WITHOUT SCIATICA: ICD-10-CM

## 2019-03-25 DIAGNOSIS — G89.29 CHRONIC MIDLINE LOW BACK PAIN WITHOUT SCIATICA: ICD-10-CM

## 2019-03-25 DIAGNOSIS — M54.2 NECK PAIN: ICD-10-CM

## 2019-03-25 PROCEDURE — 97110 THERAPEUTIC EXERCISES: CPT | Mod: PO

## 2019-03-25 NOTE — PROGRESS NOTES
Name: Eliu MACARIO [facility] Number: [id number]  Date of Treatment: 03/25/2019   Diagnosis:   Encounter Diagnoses   Name Primary?    Chronic midline low back pain without sciatica     Neck pain      Physician: Wendi Merida, *    Time in: 11:05 AM  Time Out: 12:00 PM  Total Treatment Time: 55 min  Total 1:1 billable time: 55 min  Date of eval: 2/18/19  Visit #: 9/20  Auth expiration: 12/31/19  POC expiration: 5/11/19    Precautions: Standard, diabetes and fall (d/t L knee buckling)    Subjective     Eliu Steinberg reports she felt good after her last PT session. Pt states that her back pain is getting better and a lot of her daily mobility is getting easier. Pt reports that she finds she walks better when she uses the L knee brace. Pt endorses increased neck pain since last night. Pt also reports she gets some pain from her R hand, arm, shoulder and neck.   Patient reports their pain to be annoying in her R side of her neck    Objective     Eliu Steinberg received therapeutic exercises to develop strength, endurance, ROM, flexibility, posture and core stabilization for 50 minutes including:         Supine    HS stretch - therapist assisted, 2 x 1 min/LE, pt performs ankle pumps intermittently for sciatic nerve   Pelvic tilt 2x20 reps, 3s holds  LTR, 10 reps B  Bridge 2x10 reps, 3s holds  R SAQ, 20 reps, 3s holds  L quad set, 20 reps, 3s holds  Marching in hook-lying, 15 reps B  DKTC on orange SB w/ GTB pull down x 20 reps  Gastroc stretch on incline board, 1 min  Chin tucks, 20 reps, 3s holds            Eliu Steinberg received the following manual therapy techniques: Soft tissue mobilization were applied to the B LE's for 5 minutes    Theraroller to B gluteals, piriformis, hamstrings, quads, and ITB- pt reports that this feels good      Written Home Exercises Provided: no change     Pt educated on purpose of stretches and therarolelr. Pt demo fair understanding of the education provided. Eliu Steinberg demonstrated fair return  demonstration of activities.     Assessment   Eliu Steinberg presents with reduced back pain today. Pt reports intermittent cramping in B hamstrings. Pt reports that use of theraroller feels good/relieves some symptoms. Pt needs cues for technique with pelvic tilt, quad sets, and gastroc stretch, but pt with good carryover with cues today. Will continue to progress core/ hip strengthening to pt tolerance to improve pt's walking, balance, strength, and activity tolerance.    Pt will continue to benefit from skilled PT intervention. Medical Necessity is demonstrated by:  Fall Risk, Unable to participate in daily activities, Continued inability to participate in vocational pursuits, Pain limits function of effected part for some activities, Unable to participate fully in daily activities, Requires skilled supervision to complete and progress HEP and Weakness.    Patient is making good progress towards established goals.    Goals:  Short Term Goals: 3 weeks   1. Pt will be independent with HEP and report compliance at least 4 days/week  2. Pt will be able to walk for 20 minutes on a level surface reporting less than 2/10 pain  3. Pt will be able to read/use ipad for 20 minutes (taking stretch breaks between bouts) reporting less than 3/10 pain     Long Term Goals: 10 weeks   1. Pt will be able to walk for 40 minutes on level surface and ascend/descend 4 steps reporting less than 3/10 pain in order to perform Alevism responsibilities   2. Pt will be able to dance jitterbug (making modifications when necessary) for 30 minutes reporting less than 3/10 pain   3. Pt will be able to tolerate lumbar and cervical AROM testing reporting less than 2/10 pain at end range    New/Revised goals: none at this time    1. Pt will improve quad strength to at least 4/5 on the L to reduce risk of falls      Plan       Next: seated shoulder exercises, step ups guarding L knee, shuttle press    Continue with established Plan of Care  towards PT goals.

## 2019-03-27 ENCOUNTER — CLINICAL SUPPORT (OUTPATIENT)
Dept: REHABILITATION | Facility: HOSPITAL | Age: 79
End: 2019-03-27
Attending: PHYSICAL MEDICINE & REHABILITATION
Payer: MEDICARE

## 2019-03-27 DIAGNOSIS — M54.2 NECK PAIN: ICD-10-CM

## 2019-03-27 DIAGNOSIS — M54.50 CHRONIC MIDLINE LOW BACK PAIN WITHOUT SCIATICA: ICD-10-CM

## 2019-03-27 DIAGNOSIS — G89.29 CHRONIC MIDLINE LOW BACK PAIN WITHOUT SCIATICA: ICD-10-CM

## 2019-03-27 PROCEDURE — 97110 THERAPEUTIC EXERCISES: CPT | Mod: PO

## 2019-03-27 PROCEDURE — 97140 MANUAL THERAPY 1/> REGIONS: CPT | Mod: PO

## 2019-03-27 NOTE — PROGRESS NOTES
Name: Eliu MACARIO [facility] Number: [id number]  Date of Treatment: 03/27/2019   Diagnosis:   Encounter Diagnoses   Name Primary?    Chronic midline low back pain without sciatica     Neck pain      Physician: Wendi Merida, *    Time in: 11:05 AM  Time Out: 12:00 PM  Total Treatment Time: 55 min  Total 1:1 billable time: 30 min  Date of eval: 2/18/19  Visit #: 9/20  Auth expiration: 12/31/19  POC expiration: 5/11/19    Precautions: Standard, diabetes and fall (d/t L knee buckling)    Subjective     Eliu Steinberg reports her neck has been hurting the past few days. Pt feels the knee brace has been helping her feel more steady on her feet   Patient reports their pain to be annoying in her R side of her neck    Objective     Eliu Steinberg received therapeutic exercises to develop strength, endurance, ROM, flexibility, posture and core stabilization for 50 minutes including:     Lower Quarter - NP    Supine    HS stretch - therapist assisted, 2 x 1 min/LE, pt performs ankle pumps intermittently for sciatic nerve   Pelvic tilt 2x20 reps, 3s holds  LTR, 10 reps B  Bridge 2x10 reps, 3s holds  R SAQ, 20 reps, 3s holds  L quad set, 20 reps, 3s holds  Marching in hook-lying, 15 reps B  DKTC on orange SB w/ GTB pull down x 20 reps  Gastroc stretch on incline board, 1 min    Upper quarter     Supine    Chin tucks, 20 reps, 3s holds  Chin tuck against yellow w/ rotation x20  OTB horizontal abduction 2x15  GTB bilateral 2x15    Seated    OTB rows 2x15  OTB I's 2x10  Theracane to upper traps 5 min  Scapular retraction/depression 3 sec x10      Eliu Steinberg received the following manual therapy techniques: Soft tissue mobilization were applied to the B LE's for 15 minutes    Theraroller to B gluteals, piriformis, hamstrings, quads, and ITB- pt reports that this feels good    STM to upper traps, levator, scalenes, subocciptals bilat  Cervical traction    Written Home Exercises Provided: scapular retraction    Pt educated on purpose of  stretches and therarolelr. Pt demo fair understanding of the education provided. Eliu Steinberg demonstrated fair return demonstration of activities.     Assessment   Eliu Steinberg presents with increased tissue density to upper traps R>L. Pt educated that d/t walking with heavier SBQC, she is using her R upper trap. Pt also demonstrates difficulty achieving all 4 legs of SBQC on ground at a time while ambulating. Pt educated that single point cane would be ideal to decrease upper trap recruitment and improve stability while walking. Pt responds well to cervical traction and STM. Will continue to progress postural endurance as tolerated.    Pt will continue to benefit from skilled PT intervention. Medical Necessity is demonstrated by:  Fall Risk, Unable to participate in daily activities, Continued inability to participate in vocational pursuits, Pain limits function of effected part for some activities, Unable to participate fully in daily activities, Requires skilled supervision to complete and progress HEP and Weakness.    Patient is making good progress towards established goals.    Goals:  Short Term Goals: 3 weeks   1. Pt will be independent with HEP and report compliance at least 4 days/week  2. Pt will be able to walk for 20 minutes on a level surface reporting less than 2/10 pain  3. Pt will be able to read/use ipad for 20 minutes (taking stretch breaks between bouts) reporting less than 3/10 pain     Long Term Goals: 10 weeks   1. Pt will be able to walk for 40 minutes on level surface and ascend/descend 4 steps reporting less than 3/10 pain in order to perform Sikh responsibilities   2. Pt will be able to dance jitterbug (making modifications when necessary) for 30 minutes reporting less than 3/10 pain   3. Pt will be able to tolerate lumbar and cervical AROM testing reporting less than 2/10 pain at end range    New/Revised goals: none at this time    1. Pt will improve quad strength to at least 4/5 on  the L to reduce risk of falls      Plan       Next: seated shoulder exercises, step ups guarding L knee, shuttle press    Continue with established Plan of Care towards PT goals.

## 2019-04-01 ENCOUNTER — CLINICAL SUPPORT (OUTPATIENT)
Dept: REHABILITATION | Facility: HOSPITAL | Age: 79
End: 2019-04-01
Attending: PHYSICAL MEDICINE & REHABILITATION
Payer: MEDICARE

## 2019-04-01 DIAGNOSIS — M54.2 NECK PAIN: ICD-10-CM

## 2019-04-01 DIAGNOSIS — G89.29 CHRONIC MIDLINE LOW BACK PAIN WITHOUT SCIATICA: ICD-10-CM

## 2019-04-01 DIAGNOSIS — M54.50 CHRONIC MIDLINE LOW BACK PAIN WITHOUT SCIATICA: ICD-10-CM

## 2019-04-01 PROCEDURE — 97110 THERAPEUTIC EXERCISES: CPT | Mod: PO

## 2019-04-01 NOTE — PROGRESS NOTES
Name: Eliu MACARIO [facility] Number: [id number]  Date of Treatment: 04/01/2019   Diagnosis:   Encounter Diagnoses   Name Primary?    Chronic midline low back pain without sciatica     Neck pain      Physician: Wendi Merida, *    Time in: 11:00 AM  Time Out: 12:00 PM  Total Treatment Time: 60 min  Total 1:1 billable time: 30 min  Date of eval: 2/18/19  Visit #: 11/20  Auth expiration: 12/31/19  POC expiration: 5/11/19    Precautions: Standard, diabetes and fall (d/t L knee buckling)    Subjective     Eliu Steinberg reports upper body exercises have been going well however she has some increased upper trap pain while performing rows on R. Pt reports she continues to wear knee brace and feels it is helpful for ambulation    Objective     Eliu Steinberg received therapeutic exercises to develop strength, endurance, ROM, flexibility, posture and core stabilization for 50 minutes including:     Lower Quarter - NP    Supine    HS stretch - therapist assisted, 2 x 1 min/LE, pt performs ankle pumps intermittently for sciatic nerve   Pelvic tilt 2x20 reps, 3s holds  LTR, 10 reps B  Bridge 2x10 reps, 3s holds  R SAQ, 20 reps, 3s holds  L quad set, 20 reps, 3s holds  Marching in hook-lying, 15 reps B  DKTC on orange SB w/ GTB pull down x 20 reps  Gastroc stretch on incline board, 1 min    Upper quarter     Supine    Chin tucks, 20 reps, 3s holds  Chin tuck against yellow w/ rotation x20  OTB horizontal abduction 2x15  GTB bilateral 2x15    Seated    OTB rows 2x15  OTB I's 2x10  OTB ER 2x10  17# bilateral cable overhead pull down in chair 3x10  Theracane to upper traps 5 min  Scapular retraction/depression 3 sec x10      Eliu Steinberg received the following manual therapy techniques: Soft tissue mobilization were applied to the B LE's for 10 minutes    Theraroller to B gluteals, piriformis, hamstrings, quads, and ITB- pt reports that this feels good    STM to upper traps, levator, scalenes, subocciptals bilat  Cervical traction    Written  Home Exercises Provided: scapular retraction    Pt educated on purpose of stretches and therarolelr. Pt demo fair understanding of the education provided. Eliu Steinberg demonstrated fair return demonstration of activities.     Assessment   Eliu Steinberg presents y with single point cane. Pt educated that although the base does not have as many legs, walking with SBQC with only two legs of base on the ground will not provide sufficient stability to help if her knee lynette. Pt continues to demonstrate limited understanding of quad strength deficits and continues to report she needs to be aware of how to step in order to maintain balance. Pt presents with knee brace donned backwards. Pt educated that d/t hinges locking out in full extension, wearing the brace backwards could actually help knee stability as long as it is comfortable to walk. Pt educated that knee brace can not prevent all falls but will help. Pt continues to demonstrate increased tissue density to R upper trap. Pt educated to avoid scapular elevation while walking with cane and continue performing low trap strengthening. Pt responds well to cervical traction.      Pt will continue to benefit from skilled PT intervention. Medical Necessity is demonstrated by:  Fall Risk, Unable to participate in daily activities, Continued inability to participate in vocational pursuits, Pain limits function of effected part for some activities, Unable to participate fully in daily activities, Requires skilled supervision to complete and progress HEP and Weakness.    Patient is making good progress towards established goals.    Goals:  Short Term Goals: 3 weeks   1. Pt will be independent with HEP and report compliance at least 4 days/week  2. Pt will be able to walk for 20 minutes on a level surface reporting less than 2/10 pain  3. Pt will be able to read/use ipad for 20 minutes (taking stretch breaks between bouts) reporting less than 3/10 pain     Long Term Goals: 10 weeks   1.  Pt will be able to walk for 40 minutes on level surface and ascend/descend 4 steps reporting less than 3/10 pain in order to perform Yazdanism responsibilities   2. Pt will be able to dance jitterbug (making modifications when necessary) for 30 minutes reporting less than 3/10 pain   3. Pt will be able to tolerate lumbar and cervical AROM testing reporting less than 2/10 pain at end range    New/Revised goals: none at this time    1. Pt will improve quad strength to at least 4/5 on the L to reduce risk of falls      Plan       Next: seated shoulder exercises, step ups guarding L knee, shuttle press    Continue with established Plan of Care towards PT goals.

## 2019-04-03 ENCOUNTER — CLINICAL SUPPORT (OUTPATIENT)
Dept: REHABILITATION | Facility: HOSPITAL | Age: 79
End: 2019-04-03
Attending: PHYSICAL MEDICINE & REHABILITATION
Payer: MEDICARE

## 2019-04-03 DIAGNOSIS — G89.29 CHRONIC MIDLINE LOW BACK PAIN WITHOUT SCIATICA: ICD-10-CM

## 2019-04-03 DIAGNOSIS — M54.50 CHRONIC MIDLINE LOW BACK PAIN WITHOUT SCIATICA: ICD-10-CM

## 2019-04-03 DIAGNOSIS — M54.2 NECK PAIN: ICD-10-CM

## 2019-04-03 PROCEDURE — 97110 THERAPEUTIC EXERCISES: CPT | Mod: PO

## 2019-04-03 NOTE — PROGRESS NOTES
"Re-assessment     Physical Therapy Daily Treatment Note     Name: Eliu Paz  Clinic Number: [id number]    Therapy Diagnosis:   Encounter Diagnoses   Name Primary?    Chronic midline low back pain without sciatica     Neck pain      Physician: Wendi Merida, *    Visit Date: 4/3/2019    Physician Orders: PT Eval and Treat neck and low back pain  Medical Diagnosis from Referral: Chronic R sided low back pain without sciatica, DDD lumbar, DDD cervical, neck pain, gait instability   Evaluation Date: 2/18/2019  Authorization Period Expiration: 12/31/19  Plan of Care Expiration: 5/11/19  Visit # / Visits authorized: 12/ 20     Time In: 11:00  Time Out: 12:00  Total Billable Time: 45 minutes    Precautions: Standard and Fall (d/t L knee buckling)    Subjective     Pt reports: since initial evaluation she feels that the L knee has gotten stronger, her shoulders are hurting less and her back has been hurting less. Pt reports she "hates going to sleep because everything is painful/ stiff upon walking and it takes time to get going". Pt reports she also notices increased pain/stiffness when sitting for extended periods. Pt would like to be able to tolerate longer periods of walking and decrease stiffness throughout her low back and hips    She was compliant with home exercise program.  Response to previous treatment/Functional change: can lift her leg with her knee brace on    Pain: 3/10  Location: bilateral back , knee , lower legs and shoulder      Objective     Objective measures taken today to assess progress     BP taken in L UE in sitting 132/72     Gait: Pt amb w/ single point cane in R UE, difficulty getting all 4 legs on the ground. Pt walks with knee hyperextension on her L knee.    Pt can perform 10 ASLR on R, pt unable to perform ASLR on L, limited by quad weakness     Lower Extremity Strength  Right LE   Left LE     Knee extension: 4+/5 Knee extension: 2+/5   Knee flexion: 4/5 Knee flexion: 4-/5 "      LUMBAR SPINE AROM:   Flexion: WNL   Extension: 10% P!   Left Sidebend: 50%   Right Sidebend: 50% P!   Left Rotation: 50%    Right Rotation: 75%       CERVICAL SPINE AROM:   Flexion: 50%   Extension: 25% P!   Left Sidebend:  50% P!   Right Sidebend:  50% P!   Left Rotation: 75% P!   Right Rotation: 75% P!      Eliu Steinberg received therapeutic exercises to develop strength, endurance, ROM, flexibility, posture and core stabilization for 50 minutes including:      Lower Quarter      Supine     HS stretch - therapist assisted, 2 x 1 min/LE, pt performs ankle pumps intermittently for sciatic nerve   Pelvic tilt 2x20 reps, 3s holds  LTR, 10 reps B  Bridge 2x10 reps, 3s holds  Alternating clamshell hooklying w/ OTB 2x10  R SAQ, 20 reps, 3s holds  L quad set, 20 reps, 3s holds  Marching in hook-lying, 15 reps B  DKTC on orange SB w/ GTB pull down x 20 reps  Gastroc stretch on incline board, 1 min    Sit<>stand w/ OTB at knees and minimal UE use (guarding L knee) 2x10    Upper quarter      Supine     Chin tucks, 20 reps, 3s holds  Chin tuck against yellow w/ rotation x20  OTB horizontal abduction 2x15  GTB bilateral 2x15     Seated     OTB rows 2x15  OTB I's 2x10  OTB ER 2x10  17# bilateral cable overhead pull down in chair 3x10  Theracane to upper traps 5 min  Scapular retraction/depression 3 sec x10     Eliu Steinberg received the following manual therapy techniques: Soft tissue mobilization were applied to the B LE's for 0 minutes     Theraroller to B gluteals, piriformis, hamstrings, quads, and ITB- pt reports that this feels good     STM to upper traps, levator, scalenes, subocciptals bilat  Cervical traction    Eliu Steinberg received hot pack for tissue extensibility / pain modulation to the cervical spine for 10 minutes.    Home Exercises Provided and Patient Education Provided     Education provided:   - impairments to the L knee, activity modification, roll of PT  -Progress toward goals     Written Home Exercises Provided:  Patient instructed to cont prior HEP.  Exercises were reviewed and Eliu Steinberg was able to demonstrate them prior to the end of the session.  Eliu Steinberg demonstrated fair  understanding of the education provided.     See EMR under Patient Instructions for exercises provided 4/3/2019.    Assessment   Eliu Steinberg has made modest gains since last re-assessment. Pt continues to require education on fall prevention strategies and understanding that hyper extension his how she currently substitutes lack of quad strength. Pt has found the knee brace worn backwards (effectively locking her knee into extension) has helped her feel more steady on her feet. Discussed wit pt the importance of continued movement and how arthritic pain does well with heat and pain free activity. Discussed with patient realistic goals we can work on in therapy, pt reports good understanding.    Pt prognosis is Good.     Pt will continue to benefit from skilled outpatient physical therapy to address the deficits listed in the problem list box on initial evaluation, provide pt/family education and to maximize pt's level of independence in the home and community environment.     Pt's spiritual, cultural and educational needs considered and pt agreeable to plan of care and goals.     Anticipated barriers to physical therapy: understanding of current impairments, memory of HEP exercise form    Goals:  Short Term Goals: 3 weeks   1. Pt will be independent with HEP and report compliance at least 4 days/week - Met  2. Pt will be able to walk for 20 minutes on a level surface reporting less than 2/10 pain -----progressing not met 4/3/2019  3. Pt will be able to read/use ipad for 20 minutes (taking stretch breaks between bouts) reporting less than 3/10 pain - met     Long Term Goals: 10 weeks   1. Pt will be able to walk for 40 minutes on level surface and ascend/descend 4 steps reporting less than 3/10 pain in order to perform Anabaptism responsibilities -  -----progressing not met 4/3/2019  2. Pt will be able to dance jitterbug (making modifications when necessary) for 30 minutes reporting less than 3/10 pain -----progressing not met 4/3/2019  3. Pt will be able to tolerate lumbar and cervical AROM testing reporting less than 2/10 pain at end range -----progressing not met 4/3/2019    New/Revised Goals    1. Pt will be independent with alternate callisthenic/ light resistance exercise in her home in order to be able to perform exercise on days the weather doesn't allow out door ambuation.     Plan       Continue with established Plan of Care towards PT goals.

## 2019-04-11 ENCOUNTER — CLINICAL SUPPORT (OUTPATIENT)
Dept: REHABILITATION | Facility: HOSPITAL | Age: 79
End: 2019-04-11
Attending: PHYSICAL MEDICINE & REHABILITATION
Payer: MEDICARE

## 2019-04-11 DIAGNOSIS — M54.50 CHRONIC MIDLINE LOW BACK PAIN WITHOUT SCIATICA: ICD-10-CM

## 2019-04-11 DIAGNOSIS — M54.2 NECK PAIN: ICD-10-CM

## 2019-04-11 DIAGNOSIS — G89.29 CHRONIC MIDLINE LOW BACK PAIN WITHOUT SCIATICA: ICD-10-CM

## 2019-04-11 PROCEDURE — 97140 MANUAL THERAPY 1/> REGIONS: CPT | Mod: PO

## 2019-04-11 PROCEDURE — 97110 THERAPEUTIC EXERCISES: CPT | Mod: PO

## 2019-04-11 NOTE — PROGRESS NOTES
Re-assessment     Physical Therapy Daily Treatment Note     Name: Eliu MACARIO [place]  Clinic Number: [id number]    Therapy Diagnosis:   Encounter Diagnoses   Name Primary?    Chronic midline low back pain without sciatica     Neck pain      Physician: Wendi Merida, *    Visit Date: 2019    Physician Orders: PT Eval and Treat neck and low back pain  Medical Diagnosis from Referral: Chronic R sided low back pain without sciatica, DDD lumbar, DDD cervical, neck pain, gait instability   Evaluation Date: 2019  Authorization Period Expiration: 19  Plan of Care Expiration: 19  Visit # / Visits authorized:      Time In: 11:00  Time Out: 12:00  Total Billable Time: 60 minutes    Precautions: Standard and Fall (d/t L knee buckling)    Subjective     Pt reports: no pain currently. Patient reports her neck has been popping. Patient felt like her sugar was low and reports she didn't eat enough this morning and asked for a mint or something sweet upon arrival. Patient reports she feels like she is walking straighter with her knee brace.     She was compliant with home exercise program.  Response to previous treatment/Functional change: can lift her leg with her knee brace on    Pain: 0/10  Location: bilateral back , knee , lower legs and shoulder      Objective     BP taken in L UE in sittin/80      Eliu Steinberg received therapeutic exercises to develop strength, endurance, ROM, flexibility, posture and core stabilization for 50 minutes including:      Lower Quarter      Supine     HS stretch - therapist assisted, 2 x 1 min/LE, pt performs ankle pumps intermittently for sciatic nerve   Pelvic tilt 2x20 reps, 3s holds  LTR, 10 reps B  Bridge 2x10 reps, 3s holds  Alternating clamshell hooklying w/ OTB 2x10  R SAQ, 20 reps, 3s holds  L quad set, 20 reps, 3s holds  Marching in hook-lying, 15 reps B  DKTC on orange SB w/ GTB pull down x 20 reps  Gastroc stretch on incline board, 1 min    Sit<>stand w/  OTB at knees and minimal UE use (guarding L knee) 2x10 - with brace     Upper quarter      Supine     Chin tucks, 20 reps, 3s holds  Chin tuck against yellow w/ rotation x20  OTB horizontal abduction 2x15  GTB bilateral 2x15     Seated     OTB rows 2x15  OTB I's 3x10  OTB ER 2x10  Brueggers OTB 2 x 10  17# bilateral cable overhead pull down in chair 3x10  Theracane to upper traps 5 min  Scapular retraction/depression 3 sec x10     Eliu Steinberg received the following manual therapy techniques: Soft tissue mobilization were applied to the B LE's for 10 minutes     STM to upper traps, levator, scalenes, subocciptals bilat    Eliu Steinberg received hot pack for tissue extensibility / pain modulation to the cervical spine for 10 minutes.    Home Exercises Provided and Patient Education Provided     Education provided:   - impairments to the L knee, activity modification, roll of PT  -Progress toward goals     Written Home Exercises Provided: Patient instructed to cont prior HEP.  Exercises were reviewed and Eliu Steinberg was able to demonstrate them prior to the end of the session.  Eliu Steinberg demonstrated fair  understanding of the education provided.     See EMR under Patient Instructions for exercises provided 4/3/2019.    Assessment   Eliu Steinberg tolerated treatment well today. Patient with improved tolerance to sit to stand activity with knee brace on today. Patient with difficulty properly performing pelvic tilt exercise. Patient reports she feels like her R arm is getting weaker.     Pt prognosis is Good.     Pt will continue to benefit from skilled outpatient physical therapy to address the deficits listed in the problem list box on initial evaluation, provide pt/family education and to maximize pt's level of independence in the home and community environment.     Pt's spiritual, cultural and educational needs considered and pt agreeable to plan of care and goals.     Anticipated barriers to physical therapy: understanding of current  impairments, memory of HEP exercise form    Goals:  Short Term Goals: 3 weeks   1. Pt will be independent with HEP and report compliance at least 4 days/week - Met  2. Pt will be able to walk for 20 minutes on a level surface reporting less than 2/10 pain -----progressing not met 4/11/2019  3. Pt will be able to read/use ipad for 20 minutes (taking stretch breaks between bouts) reporting less than 3/10 pain - met     Long Term Goals: 10 weeks   1. Pt will be able to walk for 40 minutes on level surface and ascend/descend 4 steps reporting less than 3/10 pain in order to perform Sikh responsibilities - -----progressing not met 4/11/2019  2. Pt will be able to dance jitterbug (making modifications when necessary) for 30 minutes reporting less than 3/10 pain -----progressing not met 4/11/2019  3. Pt will be able to tolerate lumbar and cervical AROM testing reporting less than 2/10 pain at end range -----progressing not met 4/11/2019    New/Revised Goals    1. Pt will be independent with alternate callisthenic/ light resistance exercise in her home in order to be able to perform exercise on days the weather doesn't allow out door ambuation.     Plan       Continue with established Plan of Care towards PT goals.

## 2019-04-17 ENCOUNTER — DOCUMENTATION ONLY (OUTPATIENT)
Dept: REHABILITATION | Facility: HOSPITAL | Age: 79
End: 2019-04-17

## 2019-04-17 ENCOUNTER — CLINICAL SUPPORT (OUTPATIENT)
Dept: REHABILITATION | Facility: HOSPITAL | Age: 79
End: 2019-04-17
Attending: PHYSICAL MEDICINE & REHABILITATION
Payer: MEDICARE

## 2019-04-17 DIAGNOSIS — G89.29 CHRONIC MIDLINE LOW BACK PAIN WITHOUT SCIATICA: ICD-10-CM

## 2019-04-17 DIAGNOSIS — M54.2 NECK PAIN: ICD-10-CM

## 2019-04-17 DIAGNOSIS — M54.50 CHRONIC MIDLINE LOW BACK PAIN WITHOUT SCIATICA: ICD-10-CM

## 2019-04-17 PROCEDURE — 97110 THERAPEUTIC EXERCISES: CPT | Mod: PO

## 2019-04-17 NOTE — PROGRESS NOTES
PT/PTA met face to face to discuss pt's treatment plan and progress towards established goals. Pt will be seen by a physical therapist minimally every 6th visit or every 30 days.      Jessica Miller PTA

## 2019-04-17 NOTE — PROGRESS NOTES
Physical Therapy Daily Treatment Note     Name: Eliu MACARIO [place]  Clinic Number: [id number]    Therapy Diagnosis:   Encounter Diagnoses   Name Primary?    Chronic midline low back pain without sciatica     Neck pain      Physician: Wendi Merida, *    Visit Date: 2019    Physician Orders: PT Eval and Treat neck and low back pain  Medical Diagnosis from Referral: Chronic R sided low back pain without sciatica, DDD lumbar, DDD cervical, neck pain, gait instability   Evaluation Date: 2019  Authorization Period Expiration: 19  Plan of Care Expiration: 19  Visit # / Visits authorized:      Time In: 11:00 AM  Time Out: 11:55 AM  Total Billable Time: 55 minutes    Precautions: Standard and Fall (d/t L knee buckling)    Subjective     Pt reports: she fell on Saturday. Patient reports she was just standing, did not have her brace on and her L knee gave out and she fell backwards onto her R hip and hit her head. Patient reports she is not sure why she fell.     She was compliant with home exercise program.  Response to previous treatment/Functional change: can lift her leg with her knee brace on    Pain: 5/10 neck   Location: neck (with mvmnt)     Objective     BP taken in L UE in sittin/72     Eliu Steinberg received therapeutic exercises to develop strength, endurance, ROM, flexibility, posture and core stabilization for 45 minutes including:      Lower Quarter      Supine     HS stretch - therapist assisted, 2 x 1 min/LE, pt performs ankle pumps intermittently for sciatic nerve   Pelvic tilt 2x20 reps, 3s holds  LTR, 20 reps B  Bridge 2x20 reps, 3s holds  Alternating clamshell hooklying w/ OTB 2x10  R SAQ, 20 reps, 3s holds  L quad set, 20 reps, 3s holds  Marching in hook-lying, 15 reps B  DKTC on orange SB w/ GTB pull down x 20 reps  Gastroc stretch on incline board, 1 min    Sit<>stand w/ OTB at knees and minimal UE use (guarding L knee) 2x10 - with brace     Upper quarter       Supine     Chin tucks, 20 reps, 3s holds  Chin tuck against yellow w/ rotation x20  OTB horizontal abduction 2x15  GTB bilateral 2x15     Seated     OTB rows 2x15  OTB I's 3x10  OTB ER 2x10  Brueggers OTB 2 x 10  17# bilateral cable overhead pull down in chair 3x10  Theracane to upper traps 5 min  Scapular retraction/depression 3 sec x10     Eliu Steinberg received the following manual therapy techniques: Soft tissue mobilization were applied to the B LE's for 8 minutes     STM to upper traps, levator, scalenes, subocciptals bilat    Eliu Steinberg received hot pack for tissue extensibility / pain modulation to the cervical spine for 10 minutes.    Home Exercises Provided and Patient Education Provided     Education provided:   - impairments to the L knee, activity modification, roll of PT  -Progress toward goals     Written Home Exercises Provided: yes. Patient reports she misplaced her previously given HEP therefore exercises were reissued to patient.   Exercises were reviewed and Eliu Steinberg was able to demonstrate them prior to the end of the session.  Eliu Steinberg demonstrated fair  understanding of the education provided.     See EMR under Patient Instructions for exercises provided 4/17/19.    Assessment   Eliu Steinberg tolerated treatment well today despite having a fall over the weekend. D/t patient's report of falling Neno Campbell DPT screened patient, please see his note for details. Post treatment patient reports she feels better than when she first arrived.     Pt prognosis is Good.     Pt will continue to benefit from skilled outpatient physical therapy to address the deficits listed in the problem list box on initial evaluation, provide pt/family education and to maximize pt's level of independence in the home and community environment.     Pt's spiritual, cultural and educational needs considered and pt agreeable to plan of care and goals.     Anticipated barriers to physical therapy: understanding of current impairments,  memory of HEP exercise form    Goals:  Short Term Goals: 3 weeks   1. Pt will be independent with HEP and report compliance at least 4 days/week - Met  2. Pt will be able to walk for 20 minutes on a level surface reporting less than 2/10 pain -----progressing not met 4/17/2019  3. Pt will be able to read/use ipad for 20 minutes (taking stretch breaks between bouts) reporting less than 3/10 pain - met     Long Term Goals: 10 weeks   1. Pt will be able to walk for 40 minutes on level surface and ascend/descend 4 steps reporting less than 3/10 pain in order to perform Tenriism responsibilities - -----progressing not met 4/17/2019  2. Pt will be able to dance jitterbug (making modifications when necessary) for 30 minutes reporting less than 3/10 pain -----progressing not met 4/17/2019  3. Pt will be able to tolerate lumbar and cervical AROM testing reporting less than 2/10 pain at end range -----progressing not met 4/17/2019    New/Revised Goals    1. Pt will be independent with alternate callisthenic/ light resistance exercise in her home in order to be able to perform exercise on days the weather doesn't allow out door ambuation.     Plan       Continue with established Plan of Care towards PT goals.

## 2019-04-22 NOTE — PROGRESS NOTES
Subjective:      Patient ID: Eliu Paz is a 79 y.o. female.    Chief Complaint: No chief complaint on file.    Ms Paz is a 78 yo female here for follow up of back and leg pain and neck pain.  She was last seen by me on and we did a MRI cervical and lumbar spine and sent her to PT and started tizanidine.  She has had pain for over a year.  Today, she feels like PT helped.  She had to stop PT due to cost.  She is still having neck and back pain.  She feels like when she wakes up in the morning it is hard to move.  She feels like it takes an hour to loosen up.  The pain is more in the back and the legs when she tries to get up.  She feels like an achy pain in the back, right side is worse than the left.  She had a fall where she collapsed.  She feels like pain is 2/10.  She does not want cortisone shot.  She has pain in the right neck and goes to the right arm.  She does have degenerative changes in the neck and back.  She does feel like she is better.  3/10 now, worst 7/10 in the morning.  She is taking muscle relaxer as needed.  She takes pain pill rarely.  She stiffens up when she lies down.  She stopped taking tylenol.  She feels like neck pain is the worst and worst looking down    MRI lumbar 1/23/2019  Lumbar spine alignment demonstrates grade 1 anterolisthesis of L4 on L5, likely related to facet degenerative disease.  No spondylolysis.  Vertebral body heights are well maintained without evidence for fracture.  No marrow replacement process.  Chronic degenerative vertebral endplate changes noted at T11-T12 and L4-L5.    Distal spinal cord demonstrates normal contour and signal intensity.  Cauda equina is normal without findings to suggest arachnoiditis.  Conus medullaris terminates at L2.    Limited evaluation of the retroperitoneal organs demonstrates no significant abnormalities.  Suspected colonic diverticuli.  Aorta tapers normally.  SI joints are symmetric.    T12-L1: No spinal canal stenosis or  neural foraminal narrowing.    L1-L2: No spinal canal stenosis or neural foraminal narrowing.    L2-L3: Small circumferential disc bulge, mild left-sided facet hypertrophy and mild bilateral ligamentum flavum buckling contribute to mild spinal canal stenosis.  No neural foraminal narrowing.    L3-L4: Moderate bilateral facet hypertrophy and mild bilateral ligamentum flavum buckling contribute to mild spinal canal stenosis.  No neural foraminal narrowing.    L4-L5: Grade 1 anterolisthesis of L4 on L5 moderate circumferential disc bulge, severe bilateral facet hypertrophy and moderate bilateral ligamentum flavum buckling contribute to severe spinal canal stenosis and severe left and moderate right neural foraminal narrowing.    L5-S1: Mild bilateral facet hypertrophy contributing to mild right-sided neural foraminal narrowing.  No spinal canal stenosis.    Impression      1. Multilevel lumbar degenerative changes most pronounced at L4-L5 noting grade 1 anterolisthesis of L4 on L5, circumferential disc bulge, facet hypertrophy and ligamentum flavum buckling contributing to severe spinal canal stenosis and severe left and moderate right neural foraminal narrowing.      MRI cervical 1/23/2019  There is grade 1 retrolisthesis of C3 on C4. No fracture. No marrow signal abnormality suspicious for an infiltrative process.    The cervical cord is normal in caliber and signal characteristics.  There is STIR signal hyperintensity within the juan, presumably related to chronic microvascular ischemic change.  Cerebellar tonsils are in their expected location.  The adjacent soft tissue structures show no significant abnormalities.    The right vertebral artery flow void is not seen, presumably related to known hypoplastic nature.  Left vertebral artery flow void is present.  Prevertebral soft tissues are normal.  Visualized parotid glands are within normal limits.  Paraspinal musculature demonstrates normal bulk and signal  intensity.    C2-C3:  Small broad-based posterior disc osteophyte complex and moderate left and mild right facet hypertrophy contribute to mild spinal canal stenosis and moderate left and mild right neural foraminal narrowing.    C3-C4: Grade 1 retrolisthesis of C3 on C4, mild right facet hypertrophy and moderate bilateral uncovertebral joint spurring contribute to moderate spinal canal stenosis and moderate right and mild left neural foraminal narrowing.    C4-C5:  Small posterior disc osteophyte complex, moderate left and mild right facet hypertrophy and moderate right uncovertebral joint spurring contribute to moderate right and mild left neural foraminal narrowing.  No spinal canal stenosis.    C5-C6:  Small broad-based posterior disc osteophyte complex and moderate bilateral uncovertebral joint spurring contribute to moderate spinal canal stenosis and moderate bilateral neural foraminal narrowing.    C6-C7:  Small broad-based posterior disc osteophyte complex and moderate bilateral uncovertebral joint spurring contribute to moderate spinal canal stenosis and moderate left and mild right neural foraminal narrowing.    C7-T1:   Small broad-based posterior disc osteophyte complex and moderate right and mild left uncovertebral joint spurring contribute to moderate right and mild left neural foraminal narrowing.  No spinal canal stenosis.    T1-T2: Posterior disc osteophyte complex, bilateral uncovertebral spurring and ligamentum flavum buckling results in moderate spinal canal stenosis and severe right and moderate left neural foraminal narrowing.    T2-T3: Posterior disc osteophyte complex results in moderate spinal canal stenosis and moderate right and mild left neural foraminal narrowing.    Impression      1. Multilevel cervical degenerative changes contributing to mild to moderate spinal canal stenosis and moderate to severe neural foraminal narrowing from C2-C3 through T2-T3.  2. Nonvisualization of the right  vertebral artery flow, presumably related to its hypoplastic nature but not well evaluated.      CT soft tissue neck 12/2018  Patient has a kyphotic posture.  There is mild reversal of the normal cervical lordosis.  Significant disc space loss seen at multiple levels in the cervical spine.  No osseous lytic or destructive process.  Multiple sternotomy wires visualized in the sternum.    X-ray cervical spine 2013  AP and lateral views of the cervical spine are compared to December 4, 2008.  Extensive degenerative changes are noted most marked at the C3-C4 level and C5-C6 level.  There is intervertebral joint space narrowing at these levels which is unchanged.    Vascular calcifications are present in the region of the carotid arteries in the soft tissues.    Impression        Degenerative changes similar to December 4, 20 08      X-ray lumbar 12/18/2018  DJD.  Grade 1 L4/L5 anterolisthesis.  The disc spaces are narrowed between L4 and S1 vertebral segments.  No fracture or dislocation.  No bone destruction identified.    Past Medical History:  No date: Allergy  No date: Arthritis  No date: Brain infection  No date: Cataract  No date: CKD (chronic kidney disease) stage 3, GFR 30-59 ml/min  No date: Coma  No date: Coronary artery disease  1981: Diabetes mellitus type II  No date: Diabetic neuropathy  No date: GERD (gastroesophageal reflux disease)  No date: Glaucoma  No date: Hyperlipidemia  No date: Hypertension    Past Surgical History:  03/2010: CARDIAC CATHETERIZATION      Comment:  x 2  No date: CARPAL TUNNEL RELEASE      Comment:  L  10/19/2016: Carpal Tunnel Release, Right hand; Right      Comment:  Performed by Marnie Sanders MD at [facility] OR 1ST FLR  11/2/2015: COLONOSCOPY; N/A      Comment:  Performed by Sanju Clinton MD at [facility] ENDO (4TH FLR)  08/13/1994: CORONARY ARTERY BYPASS GRAFT      Comment:  x 1  4/18/2017: ESOPHAGOGASTRODUODENOSCOPY (EGD); N/A      Comment:  Performed by Hitesh Quiroga MD at [facility]  ENDO (4TH FLR)  4/27/2015: ESOPHAGOGASTRODUODENOSCOPY (EGD); N/A      Comment:  Performed by Hitesh Quiroga MD at [facility] ENDO (4TH FLR)  6/13/2017: MANOMETRY-ESOPHAGEAL-WITH IMPEDANCE; N/A      Comment:  Performed by Kenneth Oconnell MD at [facility] ENDO (4TH FLR)  10/19/2016: RELEASE-FINGER-TRIGGER / Index / Long; Right      Comment:  Performed by Marnie Sanders MD at [facility] OR 1ST FLR  1970s: TUBAL LIGATION    Review of patient's family history indicates:  Problem: Diabetes      Relation: Father          Age of Onset: (Not Specified)  Problem: Diabetes      Relation: Brother          Age of Onset: (Not Specified)  Problem: Blindness      Relation: Brother          Age of Onset: (Not Specified)  Problem: Diabetes      Relation: Maternal Grandmother          Age of Onset: (Not Specified)  Problem: Diabetes      Relation: Paternal Aunt          Age of Onset: (Not Specified)  Problem: Diabetes      Relation: Paternal Uncle          Age of Onset: (Not Specified)  Problem: Amblyopia      Relation: Neg Hx          Age of Onset: (Not Specified)  Problem: Cancer      Relation: Neg Hx          Age of Onset: (Not Specified)  Problem: Cataracts      Relation: Neg Hx          Age of Onset: (Not Specified)  Problem: Glaucoma      Relation: Neg Hx          Age of Onset: (Not Specified)  Problem: Hypertension      Relation: Neg Hx          Age of Onset: (Not Specified)  Problem: Macular degeneration      Relation: Neg Hx          Age of Onset: (Not Specified)  Problem: Retinal detachment      Relation: Neg Hx          Age of Onset: (Not Specified)  Problem: Strabismus      Relation: Neg Hx          Age of Onset: (Not Specified)  Problem: Stroke      Relation: Neg Hx          Age of Onset: (Not Specified)  Problem: Thyroid disease      Relation: Neg Hx          Age of Onset: (Not Specified)  Problem: Colon cancer      Relation: Neg Hx          Age of Onset: (Not Specified)  Problem: Esophageal cancer      Relation: Neg Hx          Age of  Onset: (Not Specified)  Problem: Stomach cancer      Relation: Neg Hx          Age of Onset: (Not Specified)  Problem: Rectal cancer      Relation: Neg Hx          Age of Onset: (Not Specified)  Problem: Ulcerative colitis      Relation: Neg Hx          Age of Onset: (Not Specified)  Problem: Crohn's disease      Relation: Neg Hx          Age of Onset: (Not Specified)  Problem: Irritable bowel syndrome      Relation: Neg Hx          Age of Onset: (Not Specified)  Problem: Celiac disease      Relation: Neg Hx          Age of Onset: (Not Specified)  Problem: Eczema      Relation: Neg Hx          Age of Onset: (Not Specified)  Problem: Lupus      Relation: Neg Hx          Age of Onset: (Not Specified)  Problem: Psoriasis      Relation: Neg Hx          Age of Onset: (Not Specified)  Problem: Melanoma      Relation: Neg Hx          Age of Onset: (Not Specified)      Social History    Socioeconomic History      Marital status:       Spouse name: Not on file      Number of children: Not on file      Years of education: Not on file      Highest education level: Not on file    Social Needs      Financial resource strain: Not on file      Food insecurity - worry: Not on file      Food insecurity - inability: Not on file      Transportation needs - medical: Not on file      Transportation needs - non-medical: Not on file    Occupational History      Not on file    Tobacco Use      Smoking status: Former Smoker        Years: 2.00        Quit date: 3/25/1963        Years since quittin.8      Smokeless tobacco: Never Used    Substance and Sexual Activity      Alcohol use: No      Drug use: No      Sexual activity: Not Currently        Partners: Male    Other Topics      Concerns:        Are you pregnant or think you may be?: Not Asked        Breast-feeding: Not Asked    Social History Narrative      ,  with heart issues, 4 kids, 2 .      Home body. Previous was  for school  "system.      Current Outpatient Medications:  amLODIPine (NORVASC) 10 MG tablet, TAKE 1 TABLET (10 MG TOTAL) BY MOUTH ONCE DAILY., Disp: 90 tablet, Rfl: 11  aspirin (ECOTRIN) 81 MG EC tablet, Take 1 tablet (81 mg total) by mouth once daily., Disp: , Rfl: 0  carvedilol (COREG) 25 MG tablet, TAKE 1 TABLET (25 MG TOTAL) BY MOUTH 2 (TWO) TIMES DAILY., Disp: 180 tablet, Rfl: 11  ciclopirox (PENLAC) 8 % Soln, Apply topically nightly., Disp: 6.6 mL, Rfl: 11  clobetasol 0.05% (TEMOVATE) 0.05 % Oint, Apply small amount to vulva area twice a day for 4 weeks then once a day for 4 weeks then once a day on Mondays and Thursdays, Disp: 60 g, Rfl: 1  fluocinonide (LIDEX) 0.05 % external solution, Apply topically 2 (two) times daily., Disp: 60 mL, Rfl: 1  fluticasone (FLONASE) 50 mcg/actuation nasal spray, SPRAY TWICE IN EACH NOSTRIL EVERY DAY, Disp: 16 g, Rfl: 11  insulin asp prt-insulin aspart, NOVOLOG 70/30, (NOVOLOG MIX 70-30 U-100 INSULN) 100 unit/mL (70-30) Soln, Inject 16 units w/ breakfast and 20 units at dinner time., Disp: 50 mL, Rfl: 11  insulin syringe-needle U-100 (BD INSULIN SYRINGE ULTRA-FINE) 0.5 mL 31 gauge x 5/16" Syrg, USE TO INJECT TWICE DAILY WITH INSULIN INJECTIONS, Disp: 300 each, Rfl: 11  isosorbide mononitrate (IMDUR) 30 MG 24 hr tablet, TAKE 1 TABLET (30 MG TOTAL) BY MOUTH ONCE DAILY., Disp: 90 tablet, Rfl: 11  ketoconazole (NIZORAL) 2 % cream, Apply topically once daily., Disp: 30 g, Rfl: 1  lactulose (CHRONULAC) 10 gram/15 mL solution, TAKE 30 MLS (20 G TOTAL) BY MOUTH DAILY AS NEEDED (CONSTIPATION)., Disp: 946 mL, Rfl: 1  latanoprost 0.005 % ophthalmic solution, Place 1 drop into both eyes every evening., Disp: 3 Bottle, Rfl: 4  losartan (COZAAR) 100 MG tablet, TAKE 1 TABLET (100 MG TOTAL) BY MOUTH ONCE DAILY., Disp: 90 tablet, Rfl: 3  nitroGLYCERIN 0.4 MG/DOSE TL SPRY (NITROLINGUAL) 400 mcg/spray spray, PLACE 2 SPRAYS UNDER THE TONGUE EVERY 5 MINUTES AS NEEDED FOR CHEST PAIN, Disp: 36 g, Rfl: " 0  ONETOUCH DELICA LANCETS 30 gauge Misc, 1 lancet by Misc.(Non-Drug; Combo Route) route 4 (four) times daily., Disp: 150 each, Rfl: 11  ONETOUCH VERIO Strp, USE TO TEST BLOOD SUGAR 4 TIMES A DAY, Disp: 150 strip, Rfl: 11  ranitidine (ZANTAC) 150 MG tablet, Take 1 tablet (150 mg total) by mouth 2 (two) times daily., Disp: 60 tablet, Rfl: 11  rosuvastatin (CRESTOR) 40 MG Tab, TAKE 1 TABLET BY MOUTH ONCE DAILY., Disp: 90 tablet, Rfl: 3  spironolactone (ALDACTONE) 25 MG tablet, TAKE 2 TABLETS BY MOUTH EVERY DAY, Disp: 180 tablet, Rfl: 11  sulfamethoxazole-trimethoprim 800-160mg (BACTRIM DS) 800-160 mg Tab, Take 1 tablet by mouth 2 (two) times daily. for 7 days, Disp: 14 tablet, Rfl: 0  traMADol (ULTRAM) 50 mg tablet, TAKE 1 TABLET BY MOUTH TWICE DAILY AS NEEDED, Disp: 60 tablet, Rfl: 0  triamcinolone acetonide 0.1% (KENALOG) 0.1 % ointment, Apply topically 2 (two) times daily. Apply small amount to itchy areas on body BID PRN.  Do not use on face or in groin., Disp: 80 g, Rfl: 1    No current facility-administered medications for this visit.       Review of patient's allergies indicates:   -- Penicillins -- Swelling    --  Swelling (extremities)   -- Trulicity (dulaglutide) -- Nausea Only and Rash        Review of Systems   Constitution: Negative for weight gain and weight loss.   Cardiovascular: Negative for chest pain.   Respiratory: Negative for shortness of breath.    Musculoskeletal: Positive for back pain, joint pain, myalgias and neck pain. Negative for joint swelling.   Gastrointestinal: Negative for abdominal pain, bowel incontinence, nausea and vomiting.   Genitourinary: Negative for bladder incontinence.   Neurological: Positive for numbness (feet) and paresthesias (feet).         Objective:        General: Eliu Steinberg is well-developed, well-nourished, appears stated age, in no acute distress, alert and oriented to time, place and person.     General    Vitals reviewed.  Constitutional: She is oriented to  person, place, and time. She appears well-developed and well-nourished.   HENT:   Head: Normocephalic and atraumatic.   Pulmonary/Chest: Effort normal.   Neurological: She is alert and oriented to person, place, and time.   Psychiatric: She has a normal mood and affect. Her behavior is normal. Judgment and thought content normal.     General Musculoskeletal Exam   Gait: abnormal (slow but without cane)     Back (L-Spine & T-Spine) / Neck (C-Spine) Exam     Tenderness Right paramedian tenderness of the Sacrum and Lower C-Spine. Left paramedian tenderness of the Lower C-Spine.     Back (L-Spine & T-Spine) Range of Motion   Extension: 10   Flexion: 80   Lateral bend right: 10   Lateral bend left: 10     Neck (C-Spine) Range of Motion   Flexion:     40  Extension: 30Right Lateral Bend: 10 Left Lateral Bend: 10 Right Rotation: 40 Left Rotation: 40     Spinal Sensation   Right Side Sensation  C-Spine Level: normal   L-Spine Level: normal  S-Spine Level: normal  Left Side Sensation  C-Spine Level: normal  L-Spine Level: normal  S-Spine Level: normal    Back (L-Spine & T-Spine) Tests   Right Side Tests  Straight leg raise:      Sitting SLR: > 70 degrees      Left Side Tests  Straight leg raise:     Sitting SLR: > 70 degrees          Other She has no scoliosis .  Spinal Kyphosis:  Absent      Muscle Strength   Right Upper Extremity   Biceps: 5/5/5   Deltoid:  5/5  Triceps:  5/5  Wrist extension: 5/5/5   Finger Flexors:  5/5  Left Upper Extremity  Biceps: 5/5/5   Deltoid:  5/5  Triceps:  5/5  Wrist extension: 5/5/5   Finger Flexors:  5/5  Right Lower Extremity   Hip Flexion: 4/5   Quadriceps:  5/5   Anterior tibial:  5/5/5  EHL:  5/5  Left Lower Extremity   Hip Flexion: 4/5   Quadriceps:  3/5   Anterior tibial:  5/5/5   EHL:  5/5    Reflexes     Left Side  Biceps:  2+  Triceps:  2+  Brachioradialis:  2+  Quadriceps:  2+  Achilles:  2+  Left Nguyen's Sign:  Absent    Right Side   Biceps:  2+  Triceps:  2+  Brachioradialis:   2+  Quadriceps:  2+  Achilles:  2+  Right Nguyen's Sign:  absent    Vascular Exam     Right Pulses        Carotid:                  2+    Left Pulses        Carotid:                  2+              Assessment:       1. Neck pain    2. Cervical stenosis of spinal canal    3. Osteoarthritis of spine with radiculopathy, cervical region    4. Spondylosis of lumbar region without myelopathy or radiculopathy    5. DDD (degenerative disc disease), cervical    6. Chronic right-sided low back pain without sciatica           Plan:            1.  We reviewed the MRI, we discussed the degenerative changes in the neck and the lower back.  We can do a cervical C7-T1 intralaminar FARA.  We could also try facet injections in the lower back.  She does not want to try now.  Currently her neck pain is worse than her back  2.  Tramadol as needed, she does not take often  3.  PT continue HEP, we discussed staying active.  She got a bill from PT, so stopped  4.   Restart tylenol.  You can take 4 extra strength a day (500-650mg).  Take 2 pills twice a day.  5.  Continue tizanidine may take it 3 times a day, or take a second pill at night  6.  RTC 3 months    Follow-up: Follow up in about 3 months (around 7/23/2019). If there are any questions prior to this, the patient was instructed to contact the office.

## 2019-04-23 ENCOUNTER — OFFICE VISIT (OUTPATIENT)
Dept: SPINE | Facility: CLINIC | Age: 79
End: 2019-04-23
Attending: PHYSICAL MEDICINE & REHABILITATION
Payer: MEDICARE

## 2019-04-23 VITALS
DIASTOLIC BLOOD PRESSURE: 68 MMHG | HEART RATE: 59 BPM | WEIGHT: 177 LBS | SYSTOLIC BLOOD PRESSURE: 122 MMHG | BODY MASS INDEX: 34.75 KG/M2 | HEIGHT: 60 IN

## 2019-04-23 DIAGNOSIS — G89.29 CHRONIC RIGHT-SIDED LOW BACK PAIN WITHOUT SCIATICA: ICD-10-CM

## 2019-04-23 DIAGNOSIS — M54.2 NECK PAIN: Primary | ICD-10-CM

## 2019-04-23 DIAGNOSIS — M47.816 SPONDYLOSIS OF LUMBAR REGION WITHOUT MYELOPATHY OR RADICULOPATHY: ICD-10-CM

## 2019-04-23 DIAGNOSIS — M47.22 OSTEOARTHRITIS OF SPINE WITH RADICULOPATHY, CERVICAL REGION: ICD-10-CM

## 2019-04-23 DIAGNOSIS — M48.02 CERVICAL STENOSIS OF SPINAL CANAL: ICD-10-CM

## 2019-04-23 DIAGNOSIS — M50.30 DDD (DEGENERATIVE DISC DISEASE), CERVICAL: ICD-10-CM

## 2019-04-23 DIAGNOSIS — M54.50 CHRONIC RIGHT-SIDED LOW BACK PAIN WITHOUT SCIATICA: ICD-10-CM

## 2019-04-23 PROCEDURE — 99999 PR PBB SHADOW E&M-EST. PATIENT-LVL III: CPT | Mod: PBBFAC,,, | Performed by: PHYSICAL MEDICINE & REHABILITATION

## 2019-04-23 PROCEDURE — 99213 OFFICE O/P EST LOW 20 MIN: CPT | Mod: PBBFAC | Performed by: PHYSICAL MEDICINE & REHABILITATION

## 2019-04-23 PROCEDURE — 99999 PR PBB SHADOW E&M-EST. PATIENT-LVL III: ICD-10-PCS | Mod: PBBFAC,,, | Performed by: PHYSICAL MEDICINE & REHABILITATION

## 2019-04-23 PROCEDURE — 99214 OFFICE O/P EST MOD 30 MIN: CPT | Mod: S$PBB,,, | Performed by: PHYSICAL MEDICINE & REHABILITATION

## 2019-04-23 PROCEDURE — 99214 PR OFFICE/OUTPT VISIT, EST, LEVL IV, 30-39 MIN: ICD-10-PCS | Mod: S$PBB,,, | Performed by: PHYSICAL MEDICINE & REHABILITATION

## 2019-04-30 ENCOUNTER — NUTRITION (OUTPATIENT)
Dept: GASTROENTEROLOGY | Facility: CLINIC | Age: 79
End: 2019-04-30
Payer: MEDICARE

## 2019-04-30 ENCOUNTER — OFFICE VISIT (OUTPATIENT)
Dept: GASTROENTEROLOGY | Facility: CLINIC | Age: 79
End: 2019-04-30
Payer: MEDICARE

## 2019-04-30 VITALS
HEART RATE: 56 BPM | BODY MASS INDEX: 35.14 KG/M2 | WEIGHT: 179 LBS | DIASTOLIC BLOOD PRESSURE: 72 MMHG | HEIGHT: 60 IN | SYSTOLIC BLOOD PRESSURE: 136 MMHG

## 2019-04-30 VITALS — BODY MASS INDEX: 35.23 KG/M2 | HEIGHT: 60 IN | WEIGHT: 179.44 LBS

## 2019-04-30 DIAGNOSIS — Z79.4 TYPE 2 DIABETES MELLITUS WITH STAGE 3 CHRONIC KIDNEY DISEASE, WITH LONG-TERM CURRENT USE OF INSULIN: ICD-10-CM

## 2019-04-30 DIAGNOSIS — E11.22 TYPE 2 DIABETES MELLITUS WITH STAGE 3 CHRONIC KIDNEY DISEASE, WITH LONG-TERM CURRENT USE OF INSULIN: ICD-10-CM

## 2019-04-30 DIAGNOSIS — N18.30 CKD (CHRONIC KIDNEY DISEASE) STAGE 3, GFR 30-59 ML/MIN: Primary | ICD-10-CM

## 2019-04-30 DIAGNOSIS — R13.10 DYSPHAGIA, UNSPECIFIED TYPE: ICD-10-CM

## 2019-04-30 DIAGNOSIS — K21.9 GASTROESOPHAGEAL REFLUX DISEASE, ESOPHAGITIS PRESENCE NOT SPECIFIED: Primary | ICD-10-CM

## 2019-04-30 DIAGNOSIS — K31.84 GASTROPARESIS: ICD-10-CM

## 2019-04-30 DIAGNOSIS — N18.30 TYPE 2 DIABETES MELLITUS WITH STAGE 3 CHRONIC KIDNEY DISEASE, WITH LONG-TERM CURRENT USE OF INSULIN: ICD-10-CM

## 2019-04-30 DIAGNOSIS — K59.00 CONSTIPATION, UNSPECIFIED CONSTIPATION TYPE: ICD-10-CM

## 2019-04-30 PROCEDURE — 99999 PR PBB SHADOW E&M-EST. PATIENT-LVL I: CPT | Mod: PBBFAC,,,

## 2019-04-30 PROCEDURE — 99214 PR OFFICE/OUTPT VISIT, EST, LEVL IV, 30-39 MIN: ICD-10-PCS | Mod: S$PBB,,, | Performed by: NURSE PRACTITIONER

## 2019-04-30 PROCEDURE — 99214 OFFICE O/P EST MOD 30 MIN: CPT | Mod: S$PBB,,, | Performed by: NURSE PRACTITIONER

## 2019-04-30 PROCEDURE — 99999 PR PBB SHADOW E&M-EST. PATIENT-LVL V: CPT | Mod: PBBFAC,,, | Performed by: NURSE PRACTITIONER

## 2019-04-30 PROCEDURE — 99211 OFF/OP EST MAY X REQ PHY/QHP: CPT | Mod: PBBFAC

## 2019-04-30 PROCEDURE — 99999 PR PBB SHADOW E&M-EST. PATIENT-LVL V: ICD-10-PCS | Mod: PBBFAC,,, | Performed by: NURSE PRACTITIONER

## 2019-04-30 PROCEDURE — 99215 OFFICE O/P EST HI 40 MIN: CPT | Mod: PBBFAC | Performed by: NURSE PRACTITIONER

## 2019-04-30 PROCEDURE — 99999 PR PBB SHADOW E&M-EST. PATIENT-LVL I: ICD-10-PCS | Mod: PBBFAC,,,

## 2019-04-30 NOTE — PATIENT INSTRUCTIONS
Take miralax 1 cap full  daily for constipation.  Take over the counter FDgard or elis for nausea.  Continue Zantac 150 mg twice daily for reflux.   Continue the gastroparesis diet (6 small meals daily; mostly liquid and soft). Try shakes and smoothies (like Glucerna)

## 2019-04-30 NOTE — PROGRESS NOTES
NUTRITIONAL ASSESSMENT  Follow up appt   Referring Provider: Jeri Izquierdo,     Reason for Visit: Gastroparesis / Nocturnal BG lows / Itching ( New problem) - CKD associated pruritis    Age: 79 y.o.  Sex: female    Patient Active Problem List   Diagnosis    Coronary artery disease involving native coronary artery of native heart without angina pectoris    Type 2 diabetes mellitus with diabetic polyneuropathy, with long-term current use of insulin    Shortness of breath on exertion    Refusal of blood transfusions as patient is Moravian    TIA (transient ischemic attack)    Intracranial vascular stenosis    Vaginitis and vulvovaginitis    Quadriceps tendon rupture    Cerebral microvascular disease    Cerebral arteriosclerosis    Tinnitus    GERD (gastroesophageal reflux disease)    Constipation    Essential hypertension    Colon cancer screening    CKD (chronic kidney disease) stage 3, GFR 30-59 ml/min    Type 2 diabetes mellitus with stage 3 chronic kidney disease, with long-term current use of insulin    Obesity, Class II, BMI 35-39.9    Right carpal tunnel syndrome    S/P CABG (coronary artery bypass graft)    Post PTCA    Hyperlipidemia    Dysphagia    Multiple nevi    DM type 2 with diabetic peripheral neuropathy    Calculus of gallbladder without cholecystitis without obstruction    Tubulovillous adenoma    Myalgia    Body mass index (BMI) of 35.0 to 35.9 with comorbidity    Morbid obesity    Cervical strain    Elevated C-reactive protein (CRP)    Elevated sed rate    Low back pain    Neck pain     Past Medical History:   Diagnosis Date    Allergy     Arteriosclerosis of arteries of extremities 8/25/2016    Arthritis     Brain infection     Cataract     CKD (chronic kidney disease) stage 3, GFR 30-59 ml/min     Coma     Coronary artery disease     Diabetes mellitus type II 1981    Diabetic neuropathy     GERD (gastroesophageal reflux disease)      "Glaucoma     Hyperlipidemia     Hypertension     Type 2 diabetes mellitus with diabetic peripheral angiopathy without gangrene, with long-term current use of insulin 8/21/2016     Lab Results   Component Value Date     (H) 12/25/2018    K 4.4 12/25/2018    PHOS 3.1 06/24/2015    MG 2.0 06/24/2015    CHOL 113 (L) 12/04/2018    HDL 50 12/04/2018    TRIG 86 12/04/2018    ALBUMIN 3.4 (L) 12/25/2018    HGBA1C 7.9 (H) 12/04/2018    CALCIUM 9.8 12/25/2018     Other Pertinent Labs: No new labs but experiencing nocturnal lows requiring treatment with peppermints 1-2 to correct     Current Outpatient Medications   Medication Sig    amLODIPine (NORVASC) 10 MG tablet TAKE 1 TABLET (10 MG TOTAL) BY MOUTH ONCE DAILY.    aspirin (ECOTRIN) 81 MG EC tablet Take 1 tablet (81 mg total) by mouth once daily.    carvedilol (COREG) 25 MG tablet TAKE 1 TABLET (25 MG TOTAL) BY MOUTH 2 (TWO) TIMES DAILY.    ciclopirox (PENLAC) 8 % Soln Apply topically nightly.    clobetasol 0.05% (TEMOVATE) 0.05 % Oint Apply small amount to vulva area twice a day for 4 weeks then once a day for 4 weeks then once a day on Mondays and Thursdays    dorzolamide-timolol 2-0.5% (COSOPT) 22.3-6.8 mg/mL ophthalmic solution Place 1 drop into both eyes 2 (two) times daily.    fluocinonide (LIDEX) 0.05 % external solution Apply topically 2 (two) times daily.    fluticasone (FLONASE) 50 mcg/actuation nasal spray SPRAY TWICE IN EACH NOSTRIL EVERY DAY    insulin asp prt-insulin aspart, NOVOLOG 70/30, (NOVOLOG MIX 70-30 U-100 INSULN) 100 unit/mL (70-30) Soln Inject 16 units w/ breakfast and 20 units at dinner time.    insulin syringe-needle U-100 (BD INSULIN SYRINGE ULTRA-FINE) 0.5 mL 31 gauge x 5/16" Syrg USE TO INJECT TWICE DAILY WITH INSULIN INJECTIONS    isosorbide mononitrate (IMDUR) 30 MG 24 hr tablet TAKE 1 TABLET (30 MG TOTAL) BY MOUTH ONCE DAILY.    ketoconazole (NIZORAL) 2 % cream Apply topically once daily.    ketoconazole (NIZORAL) 2 % " "shampoo Apply topically every other day.    lactulose (CHRONULAC) 10 gram/15 mL solution TAKE 30 MLS (20 G TOTAL) BY MOUTH DAILY AS NEEDED (CONSTIPATION).    latanoprost 0.005 % ophthalmic solution Place 1 drop into both eyes every evening.    losartan (COZAAR) 100 MG tablet TAKE 1 TABLET (100 MG TOTAL) BY MOUTH ONCE DAILY.    nitroGLYCERIN 0.4 MG/DOSE TL SPRY (NITROLINGUAL) 400 mcg/spray spray PLACE 2 SPRAYS UNDER THE TONGUE EVERY 5 MINUTES AS NEEDED FOR CHEST PAIN    ONETOUCH DELICA LANCETS 30 gauge Misc 1 lancet by Misc.(Non-Drug; Combo Route) route 4 (four) times daily.    ONETOUCH VERIO Strp USE TO TEST BLOOD SUGAR 4 TIMES A DAY    ranitidine (ZANTAC) 150 MG tablet Take 1 tablet (150 mg total) by mouth 2 (two) times daily.    rosuvastatin (CRESTOR) 40 MG Tab TAKE 1 TABLET BY MOUTH ONCE DAILY.    spironolactone (ALDACTONE) 25 MG tablet TAKE 2 TABLETS BY MOUTH EVERY DAY    traMADol (ULTRAM) 50 mg tablet TAKE 1 TABLET BY MOUTH TWICE DAILY AS NEEDED    triamcinolone acetonide 0.1% (KENALOG) 0.1 % ointment Apply topically 2 (two) times daily. Apply small amount to itchy areas on body BID PRN.  Do not use on face or in groin.     No current facility-administered medications for this visit.      Allergies: Penicillins and Trulicity [dulaglutide]    Ht Readings from Last 1 Encounters:   04/30/19 5' (1.524 m)     Wt Readings from Last 1 Encounters:   04/30/19 81.4 kg (179 lb 7.3 oz)      BMI: Body mass index is 35.05 kg/m².  Vitals - 1 value per visit 3/4/2019 4/23/2019 4/30/2019   SYSTOLIC  122    DIASTOLIC  68    PULSE  59    TEMPERATURE      RESPIRATIONS      SPO2      Weight (lb) 177.47 177 179.45   Weight (kg) 80.5 80.287 81.4   HEIGHT 5' 0" 5' 0" 5' 0"   BODY MASS INDEX 34.66 34.57 35.05   VISIT REPORT      Pain Score   2      Weight Change/Time: increase in weight due to increased appetite and better food tolerance   Current Diet: Diabetic with low fiber ( avoiding raw fruits and vegetables; largest " meal midday ; evening liquid meal  Appetite/Current Intake: good  in am ; consuming soup for supper with improvement in bloating and heartburn but is not consuming bedtime snack and therefore experiencing low BG   Exercise/Physical Activity: 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   Nutritional/Herbal Supplements: none   Chewing/Swallowing Problems: consuming fewer dry foods ( toast , crackers) and consuming more high moisture foods to facilitate swallowing ( grits , soup)   Symptoms: heartburn with foods such as cooked greens ; fried catfish or cooked okra     Problem: Altered GI function   Etiology: related to Gastroparesis and long term DM   Symptoms: as evidenced by symptoms of early satiety      Problem: Erratic BG   Etiology : improved with avoiding raw fruits and vegetables but nocturnal lows   Symptoms : as evidenced by decreasing BG in night requiring intervention      Problem: Declining Renal function   Etiology : related to long term poorly controlled BG and limited free water intake   As evidenced by : renal labs    Problem : Itching /Pruritis   Etiology: related to uremia /end stage renal disease?   As evidenced by : patient report       Educational Need? yes due to memory deficits   Barriers: hearing ; short term memory   Discussed with: patient  Interventions: HS snack options to prevent nocturnal lows ( glucerna hunger smart; yogurt; sf pudding )   Goals/Recommendations: above   Actions Taken: Provided list of appropriate gastroparesis snacks for HS  Patient and/or family comprehend instructions: yes  Outcome: Verbalizes understanding  Monitoring: BG / weight/Renal Labs     Counseling Time: 60 minutes

## 2019-05-01 ENCOUNTER — CLINICAL SUPPORT (OUTPATIENT)
Dept: REHABILITATION | Facility: HOSPITAL | Age: 79
End: 2019-05-01
Attending: PHYSICAL MEDICINE & REHABILITATION
Payer: MEDICARE

## 2019-05-01 DIAGNOSIS — G89.29 CHRONIC MIDLINE LOW BACK PAIN WITHOUT SCIATICA: ICD-10-CM

## 2019-05-01 DIAGNOSIS — M54.2 NECK PAIN: ICD-10-CM

## 2019-05-01 DIAGNOSIS — M54.50 CHRONIC MIDLINE LOW BACK PAIN WITHOUT SCIATICA: ICD-10-CM

## 2019-05-01 PROCEDURE — 97110 THERAPEUTIC EXERCISES: CPT | Mod: PO

## 2019-05-01 NOTE — PROGRESS NOTES
Re-assessment    Physical Therapy Daily Treatment Note     Name: Eliu MACARIO [place]  Clinic Number: [id number]    Therapy Diagnosis:   Encounter Diagnoses   Name Primary?    Chronic midline low back pain without sciatica     Neck pain      Physician: Wendi Merida, *    Visit Date: 5/1/2019    Physician Orders: PT Eval and Treat neck and low back pain  Medical Diagnosis from Referral: Chronic R sided low back pain without sciatica, DDD lumbar, DDD cervical, neck pain, gait instability   Evaluation Date: 2/18/2019  Authorization Period Expiration: 12/31/19  Plan of Care Expiration: Extend to 6/14/19  Visit # / Visits authorized: 14/ 20     Time In: 11:00 AM  Time Out: 11:45 AM  Total Billable Time: 55 minutes    Precautions: Standard and Fall (d/t L knee buckling)    Subjective     Pt reports: she still feels lousy. Pt reports she has been trying to do exercises but has lost her paper with HEP on it     She was partially compliant with home exercise program.  Response to previous treatment/Functional change: none    Pain: 5/10 neck   Location: neck (with mvmnt)     Objective      Gait: Pt amb w/ single point cane in R UE, difficulty getting all 4 legs on the ground. Pt walks with knee hyperextension on her L knee.     Pt can perform 10 ASLR on R, pt unable to perform ASLR on L, limited by quad weakness     Lower Extremity Strength  Right LE   Left LE     Knee extension: 4+/5 Knee extension: 2+/5   Knee flexion: 4/5 Knee flexion: 4-/5      LUMBAR SPINE AROM:   Flexion: WNL   Extension: 10% P!   Left Sidebend: 50%   Right Sidebend: 50% P!   Left Rotation: 50%    Right Rotation: 75%       CERVICAL SPINE AROM:   Flexion: 50%   Extension: 25% P!   Left Sidebend:  50% P!   Right Sidebend:  50% P!   Left Rotation: 75% P!   Right Rotation: 75% P!     Eliu Steinberg received therapeutic exercises to develop strength, endurance, ROM, flexibility, posture and core stabilization for 45 minutes including:      Lower Quarter       Supine     HS stretch - therapist assisted, 2 x 1 min/LE, pt performs ankle pumps intermittently for sciatic nerve   Pelvic tilt 2x20 reps, 3s holds  LTR, 20 reps B  Bridge 2x20 reps, 3s holds  Alternating clamshell hooklying w/ OTB 2x10  SLR (1# on R, 0# on L) 2x15  R SAQ, 20 reps, 3s holds  L quad set, 20 reps, 3s holds  Marching in hook-lying, 15 reps B  Clamshell 2 x10  Chin tuck 2x10  DKTC on orange SB w/ GTB pull down x 20 reps  Gastroc stretch on incline board, 1 min    Sit<>stand w/ OTB at knees and minimal UE use (guarding L knee) 2x10 - with brace     Upper quarter      Supine     Chin tucks, 20 reps, 3s holds  OTB horizontal abduction 2x15  GTB bilateral I 2x15      Seated     OTB rows 2x15  OTB I's 3x10  OTB ER 2x10  Brueggers OTB 2 x 10  17# bilateral cable overhead pull down in chair 3x10  Theracane to upper traps 5 min  Scapular retraction/depression 3 sec x10     Eliu Steinberg received the following manual therapy techniques: Soft tissue mobilization were applied to the B LE's for 0 minutes     STM to upper traps, levator, scalenes, subocciptals bilat    Eliu Steinberg received hot pack for tissue extensibility / pain modulation to the cervical spine for 10 minutes.    Home Exercises Provided and Patient Education Provided     Education provided:   - impairments to the L knee, activity modification, roll of PT  -Progress toward goals     Written Home Exercises Provided: yes. Patient reports she misplaced her previously given HEP therefore exercises were reissued to patient.   Exercises were reviewed and Eliu Steinberg was able to demonstrate them prior to the end of the session.  Eliu Steinberg demonstrated fair  understanding of the education provided.     See EMR under Patient Instructions for exercises provided 4/17/19.    Assessment   Eliu Steinberg presents with fair subjective/objective gains since last re-assessment. Pt was educated on importance of performing HEP in order to reduce pain and improve function. Pt reports  good understanding. Discussed with pt 2x/week for 3 weeks to see if any measurable progress could be made. Will continue to progress strengthening as tolerated.   Pt prognosis is Good.     Pt will continue to benefit from skilled outpatient physical therapy to address the deficits listed in the problem list box on initial evaluation, provide pt/family education and to maximize pt's level of independence in the home and community environment.     Pt's spiritual, cultural and educational needs considered and pt agreeable to plan of care and goals.     Anticipated barriers to physical therapy: understanding of current impairments, memory of HEP exercise form    Goals:  Short Term Goals: 3 weeks   1. Pt will be independent with HEP and report compliance at least 4 days/week - Met  2. Pt will be able to walk for 20 minutes on a level surface reporting less than 2/10 pain -----progressing not met 5/1/2019  3. Pt will be able to read/use ipad for 20 minutes (taking stretch breaks between bouts) reporting less than 3/10 pain - met     Long Term Goals: 10 weeks   1. Pt will be able to walk for 40 minutes on level surface and ascend/descend 4 steps reporting less than 3/10 pain in order to perform Zoroastrian responsibilities - -----progressing not met 5/1/2019  2. Pt will be able to dance jitterbug (making modifications when necessary) for 30 minutes reporting less than 3/10 pain -----progressing not met 5/1/2019  3. Pt will be able to tolerate lumbar and cervical AROM testing reporting less than 2/10 pain at end range -----progressing not met 5/1/2019    New/Revised Goals    1. Pt will be independent with alternate callisthenic/ light resistance exercise in her home in order to be able to perform exercise on days the weather doesn't allow out door ambuation.     Plan     Continue with established Plan of Care towards PT goals.

## 2019-05-06 ENCOUNTER — OFFICE VISIT (OUTPATIENT)
Dept: INTERNAL MEDICINE | Facility: CLINIC | Age: 79
End: 2019-05-06
Payer: MEDICARE

## 2019-05-06 VITALS
SYSTOLIC BLOOD PRESSURE: 130 MMHG | BODY MASS INDEX: 34.66 KG/M2 | HEIGHT: 60 IN | DIASTOLIC BLOOD PRESSURE: 60 MMHG | WEIGHT: 176.56 LBS | HEART RATE: 62 BPM | OXYGEN SATURATION: 98 %

## 2019-05-06 DIAGNOSIS — E11.42 TYPE 2 DIABETES MELLITUS WITH DIABETIC POLYNEUROPATHY, WITH LONG-TERM CURRENT USE OF INSULIN: Primary | ICD-10-CM

## 2019-05-06 DIAGNOSIS — Z79.4 TYPE 2 DIABETES MELLITUS WITH DIABETIC POLYNEUROPATHY, WITH LONG-TERM CURRENT USE OF INSULIN: Primary | ICD-10-CM

## 2019-05-06 PROCEDURE — 99213 OFFICE O/P EST LOW 20 MIN: CPT | Mod: PBBFAC | Performed by: INTERNAL MEDICINE

## 2019-05-06 PROCEDURE — 99214 OFFICE O/P EST MOD 30 MIN: CPT | Mod: S$PBB,,, | Performed by: INTERNAL MEDICINE

## 2019-05-06 PROCEDURE — 99999 PR PBB SHADOW E&M-EST. PATIENT-LVL III: CPT | Mod: PBBFAC,,, | Performed by: INTERNAL MEDICINE

## 2019-05-06 PROCEDURE — 99214 PR OFFICE/OUTPT VISIT, EST, LEVL IV, 30-39 MIN: ICD-10-PCS | Mod: S$PBB,,, | Performed by: INTERNAL MEDICINE

## 2019-05-06 PROCEDURE — 99999 PR PBB SHADOW E&M-EST. PATIENT-LVL III: ICD-10-PCS | Mod: PBBFAC,,, | Performed by: INTERNAL MEDICINE

## 2019-05-06 RX ORDER — ASPIRIN 81 MG/1
81 TABLET ORAL DAILY
Refills: 0 | COMMUNITY
Start: 2019-05-06 | End: 2019-08-06

## 2019-05-06 NOTE — PROGRESS NOTES
Subjective:       Patient ID: Eliu Paz is a 79 y.o. female.    Chief Complaint: Follow-up (rash all over body, right ankle swollen)    Patient is here for followup for chronic conditions.    Has itchy rash all over her body. Worried it may be related to recall BP med.    Otherwise feeling abt the same, no new other symptoms.    Review of Systems   Constitutional: Negative for activity change, appetite change, fatigue and unexpected weight change.   Eyes: Negative for photophobia, pain, discharge, redness, itching and visual disturbance.   Respiratory: Negative for chest tightness, shortness of breath and wheezing.    Cardiovascular: Negative for chest pain, palpitations and leg swelling (mild at ankles).   Gastrointestinal: Negative for abdominal distention and abdominal pain.        Mild gastroparesis symptoms with mild early satiety, working on smaller meals and more frequ   Genitourinary: Negative for pelvic pain.   Musculoskeletal: Positive for arthralgias, back pain (stable) and neck pain (stable). Negative for joint swelling.   Skin: Negative for rash.   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 Negative for tremors, syncope, facial asymmetry and speech difficulty.   Hematological: Negative for adenopathy. Does not bruise/bleed easily.   Psychiatric/Behavioral: Negative for dysphoric mood. The patient is nervous/anxious.        Objective:      Physical Exam   Constitutional: She is oriented to person, place, and time. She appears well-developed and well-nourished. No distress.   HENT:   Head: Normocephalic and atraumatic.   Mouth/Throat: No oropharyngeal exudate.   Eyes: Pupils are equal, round, and reactive to light. Conjunctivae are normal. No scleral icterus.   Neck: Normal range of motion. Neck supple. No thyromegaly present.   Cardiovascular: Normal rate, regular rhythm and normal heart sounds.   Pulmonary/Chest: Effort normal and breath sounds  normal.   Abdominal: Soft. Bowel sounds are normal. She exhibits no distension. There is no tenderness.        Musculoskeletal: Normal range of motion. She exhibits edema (mild bilat ankles, not tender). She exhibits no tenderness.   No midline spine tenderness to deep palpation.  Difficulty rising to exam table. No focal lower leg weakness except to LLE extension at knee 2/2 quad rupture  Mild ankle edema, not significant lower extrem, edema   Lymphadenopathy:     She has no cervical adenopathy.   Neurological: She is alert and oriented to person, place, and time. She displays normal reflexes. No cranial nerve deficit. She exhibits normal muscle tone. Coordination normal.   Skin: No rash noted. She is not diaphoretic.   Dry skin and areas of excoriation   Psychiatric: She has a normal mood and affect. Her behavior is normal.       Assessment:       1. Type 2 diabetes mellitus with diabetic polyneuropathy, with long-term current use of insulin        Plan:       Eliu Steinberg was seen today for follow-up.    Diagnoses and all orders for this visit:    Type 2 diabetes mellitus with diabetic polyneuropathy, with long-term current use of insulin  -     Hemoglobin A1c; Future    Other orders  -     aspirin (ECOTRIN) 81 MG EC tablet; Take 1 tablet (81 mg total) by mouth once daily.    Itchy skin probably from dry skin -- discussed moisturizer.      Health Maintenance       Date Due Completion Date    Shingles Vaccine (1 of 2) 03/17/1992 ---    Hemoglobin A1c 06/04/2019 12/4/2018    Influenza Vaccine 08/01/2019 2/7/2017 (Declined)    Override on 2/7/2017: Declined (per provider note)    Override on 10/15/2015: Not Clinically Appropriate (declines)    Lipid Panel 12/04/2019 12/4/2018    Eye Exam 01/15/2020 1/15/2019    Foot Exam 02/28/2020 2/28/2019    Override on 12/20/2018: Done    Override on 9/5/2018: Done    Override on 9/1/2017: Done (podiatry)    Override on 12/2/2016: Done    Override on 8/24/2016: Done (by cardiologist)     Override on 7/14/2015: Done    Aspirin/Antiplatelet Therapy 05/06/2020 5/6/2019    Colonoscopy 02/05/2024 2/5/2019    DEXA SCAN 11/05/2025 11/5/2015    TETANUS VACCINE 02/07/2027 2/7/2017 (Declined)    Override on 2/7/2017: Declined (per provider note)          Follow up in about 3 months (around 8/6/2019) for Blood work 1 week prior to next appt.    Future Appointments   Date Time Provider Department Center   5/21/2019  9:45 AM PERIMETRY, [facility] [address]   5/21/2019 10:15 AM Zuleika Francis MD [facility] [address]   7/30/2019 10:30 AM [facility]ida [facility]   8/1/2019  9:00 AM LAB, APPOINTMENT [facility] LAB IM [address] [facility]   8/6/2019  9:40 AM Ryan Carlson MD [facility] IM [address] [facility]   10/31/2019  2:00 PM Jeri Izquierdo NP [facility] GASTRO [address]

## 2019-05-08 ENCOUNTER — CLINICAL SUPPORT (OUTPATIENT)
Dept: REHABILITATION | Facility: HOSPITAL | Age: 79
End: 2019-05-08
Attending: PHYSICAL MEDICINE & REHABILITATION
Payer: MEDICARE

## 2019-05-08 DIAGNOSIS — M54.50 CHRONIC MIDLINE LOW BACK PAIN WITHOUT SCIATICA: ICD-10-CM

## 2019-05-08 DIAGNOSIS — G89.29 CHRONIC MIDLINE LOW BACK PAIN WITHOUT SCIATICA: ICD-10-CM

## 2019-05-08 DIAGNOSIS — M54.2 NECK PAIN: ICD-10-CM

## 2019-05-08 PROCEDURE — 97110 THERAPEUTIC EXERCISES: CPT | Mod: PO

## 2019-05-08 NOTE — PROGRESS NOTES
Re-assessment    Physical Therapy Daily Treatment Note     Name: Eliu MACARIO [place]  Clinic Number: [id number]    Therapy Diagnosis:   Encounter Diagnoses   Name Primary?    Chronic midline low back pain without sciatica     Neck pain      Physician: Wendi Meirda, *    Visit Date: 5/8/2019    Physician Orders: PT Eval and Treat neck and low back pain  Medical Diagnosis from Referral: Chronic R sided low back pain without sciatica, DDD lumbar, DDD cervical, neck pain, gait instability   Evaluation Date: 2/18/2019  Authorization Period Expiration: 12/31/19  Plan of Care Expiration: Extend to 6/14/19  Visit # / Visits authorized: 15/ 20     Time In: 11:00 AM  Time Out: 11:45 AM  Total Billable Time: 45 minutes    Precautions: Standard and Fall (d/t L knee buckling)    Subjective     Pt reports: she feels like she is getting a little stronger since starting therapy.  She was partially compliant with home exercise program.  Response to previous treatment/Functional change: none    Pain: 5/10 neck   Location: neck (with mvmnt)     Objective      Eliu Steinberg received therapeutic exercises to develop strength, endurance, ROM, flexibility, posture and core stabilization for 45 minutes including:      Lower Quarter      Supine     HS stretch - therapist assisted, 2 x 1 min/LE, pt performs ankle pumps intermittently for sciatic nerve   Pelvic tilt 2x20 reps, 3s holds  LTR, 20 reps B  Bridge 2x20 reps, 3s holds  Alternating clamshell hooklying w/ OTB 2x10  SLR (1# on R, 0# on L) 2x15  R SAQ, 20 reps, 3s holds  L quad set, 20 reps, 3s holds  Marching in hook-lying, 15 reps B  Clamshell 2 x10  DKTC on orange SB w/ GTB pull down x 20 reps  Gastroc stretch on incline board, 1 min    Sit<>stand w/ OTB at knees and minimal UE use (guarding L knee) 2x10 - with brace     Upper quarter      Supine     Chin tucks, 20 reps, 3s holds  OTB horizontal abduction 2x15  GTB bilateral I 2x15      Seated     GTB rows 2x15  OTB I's 3x10  OTB ER  2x10  Brueggers OTB 2 x 10  17# bilateral cable overhead pull down in chair 3x10  Theracane to upper traps 5 min  Scapular retraction/depression 3 sec x10     Eliu Steinberg received the following manual therapy techniques: Soft tissue mobilization were applied to the B LE's for 0 minutes     STM to upper traps, levator, scalenes, subocciptals bilat    Eliu Steinberg received hot pack for tissue extensibility / pain modulation to the cervical spine for 10 minutes.    Home Exercises Provided and Patient Education Provided     Education provided:   - impairments to the L knee, activity modification, roll of PT  -Progress toward goals     Written Home Exercises Provided: yes. Patient reports she misplaced her previously given HEP therefore exercises were reissued to patient.   Exercises were reviewed and Eliu Steinberg was able to demonstrate them prior to the end of the session.  Eliu Steinberg demonstrated fair  understanding of the education provided.     See EMR under Patient Instructions for exercises provided 4/17/19.    Assessment   Eliu Steinberg presents with fair tolerance to tx session today. Assistance needed for L SLR due to decreased quad recruitment and strength. Occasional therapist cueing required throughout session for proper technique and posture with therex. Pt did not wear her knee brace today, and she was encouraged to wear her brace to therapy at each session due to frequent falls from her L buckling.  Pt prognosis is Good.     Pt will continue to benefit from skilled outpatient physical therapy to address the deficits listed in the problem list box on initial evaluation, provide pt/family education and to maximize pt's level of independence in the home and community environment.     Pt's spiritual, cultural and educational needs considered and pt agreeable to plan of care and goals.     Anticipated barriers to physical therapy: understanding of current impairments, memory of HEP exercise form    Goals:  Short Term Goals: 3 weeks    1. Pt will be independent with HEP and report compliance at least 4 days/week - Met  2. Pt will be able to walk for 20 minutes on a level surface reporting less than 2/10 pain -----progressing not met 5/8/2019  3. Pt will be able to read/use ipad for 20 minutes (taking stretch breaks between bouts) reporting less than 3/10 pain - met     Long Term Goals: 10 weeks   1. Pt will be able to walk for 40 minutes on level surface and ascend/descend 4 steps reporting less than 3/10 pain in order to perform Moravian responsibilities - -----progressing not met 5/8/2019  2. Pt will be able to dance jitterbug (making modifications when necessary) for 30 minutes reporting less than 3/10 pain -----progressing not met 5/8/2019  3. Pt will be able to tolerate lumbar and cervical AROM testing reporting less than 2/10 pain at end range -----progressing not met 5/8/2019    New/Revised Goals    1. Pt will be independent with alternate callisthenic/ light resistance exercise in her home in order to be able to perform exercise on days the weather doesn't allow out door ambuation.     Plan     Continue with established Plan of Care towards PT goals.

## 2019-05-08 NOTE — PROGRESS NOTES
Re-assessment    Physical Therapy Daily Treatment Note     Name: Eliu MACARIO [place]  Clinic Number: [id number]    Therapy Diagnosis:   Encounter Diagnoses   Name Primary?    Chronic midline low back pain without sciatica     Neck pain      Physician: Wendi Merida, *    Visit Date: 5/8/2019    Physician Orders: PT Eval and Treat neck and low back pain  Medical Diagnosis from Referral: Chronic R sided low back pain without sciatica, DDD lumbar, DDD cervical, neck pain, gait instability   Evaluation Date: 2/18/2019  Authorization Period Expiration: 12/31/19  Plan of Care Expiration: Extend to 6/14/19  Visit # / Visits authorized: 14/ 20     Time In: 11:00 AM  Time Out: 11:45 AM  Total Billable Time: 55 minutes    Precautions: Standard and Fall (d/t L knee buckling)    Subjective     Pt reports: she still feels lousy. Pt reports she has been trying to do exercises but has lost her paper with HEP on it     She was partially compliant with home exercise program.  Response to previous treatment/Functional change: none    Pain: 5/10 neck   Location: neck (with mvmnt)     Objective      Eliu Steinberg received therapeutic exercises to develop strength, endurance, ROM, flexibility, posture and core stabilization for 45 minutes including:      Lower Quarter      Supine     HS stretch - therapist assisted, 2 x 1 min/LE, pt performs ankle pumps intermittently for sciatic nerve   Pelvic tilt 2x20 reps, 3s holds  LTR, 20 reps B  Bridge 2x20 reps, 3s holds  Alternating clamshell hooklying w/ OTB 2x10  SLR (1# on R, 0# on L) 2x15  R SAQ, 20 reps, 3s holds  L quad set, 20 reps, 3s holds  Marching in hook-lying, 15 reps B  Clamshell 2 x10  Chin tuck 2x10  DKTC on orange SB w/ GTB pull down x 20 reps  Gastroc stretch on incline board, 1 min    Sit<>stand w/ OTB at knees and minimal UE use (guarding L knee) 2x10 - with brace     Upper quarter      Supine     Chin tucks, 20 reps, 3s holds  OTB horizontal abduction 2x15  GTB bilateral I  2x15      Seated     OTB rows 2x15  OTB I's 3x10  OTB ER 2x10  Brueggers OTB 2 x 10  17# bilateral cable overhead pull down in chair 3x10  Theracane to upper traps 5 min  Scapular retraction/depression 3 sec x10     Eliu Steinberg received the following manual therapy techniques: Soft tissue mobilization were applied to the B LE's for 0 minutes     STM to upper traps, levator, scalenes, subocciptals bilat    Eliu Steinberg received hot pack for tissue extensibility / pain modulation to the cervical spine for 10 minutes.    Home Exercises Provided and Patient Education Provided     Education provided:   - impairments to the L knee, activity modification, roll of PT  -Progress toward goals     Written Home Exercises Provided: yes. Patient reports she misplaced her previously given HEP therefore exercises were reissued to patient.   Exercises were reviewed and Eliu Steinberg was able to demonstrate them prior to the end of the session.  Eliu Steinberg demonstrated fair  understanding of the education provided.     See EMR under Patient Instructions for exercises provided 4/17/19.    Assessment   Eliu Steinberg presents with fair subjective/objective gains since last re-assessment. Pt was educated on importance of performing HEP in order to reduce pain and improve function. Pt reports good understanding. Discussed with pt 2x/week for 3 weeks to see if any measurable progress could be made. Will continue to progress strengthening as tolerated.   Pt prognosis is Good.     Pt will continue to benefit from skilled outpatient physical therapy to address the deficits listed in the problem list box on initial evaluation, provide pt/family education and to maximize pt's level of independence in the home and community environment.     Pt's spiritual, cultural and educational needs considered and pt agreeable to plan of care and goals.     Anticipated barriers to physical therapy: understanding of current impairments, memory of HEP exercise form    Goals:  Short  Term Goals: 3 weeks   1. Pt will be independent with HEP and report compliance at least 4 days/week - Met  2. Pt will be able to walk for 20 minutes on a level surface reporting less than 2/10 pain -----progressing not met 5/8/2019  3. Pt will be able to read/use ipad for 20 minutes (taking stretch breaks between bouts) reporting less than 3/10 pain - met     Long Term Goals: 10 weeks   1. Pt will be able to walk for 40 minutes on level surface and ascend/descend 4 steps reporting less than 3/10 pain in order to perform Advent responsibilities - -----progressing not met 5/8/2019  2. Pt will be able to dance jitterbug (making modifications when necessary) for 30 minutes reporting less than 3/10 pain -----progressing not met 5/8/2019  3. Pt will be able to tolerate lumbar and cervical AROM testing reporting less than 2/10 pain at end range -----progressing not met 5/8/2019    New/Revised Goals    1. Pt will be independent with alternate callisthenic/ light resistance exercise in her home in order to be able to perform exercise on days the weather doesn't allow out door ambuation.     Plan     Continue with established Plan of Care towards PT goals.

## 2019-05-10 ENCOUNTER — CLINICAL SUPPORT (OUTPATIENT)
Dept: REHABILITATION | Facility: HOSPITAL | Age: 79
End: 2019-05-10
Attending: PHYSICAL MEDICINE & REHABILITATION
Payer: MEDICARE

## 2019-05-10 DIAGNOSIS — G89.29 CHRONIC MIDLINE LOW BACK PAIN WITHOUT SCIATICA: ICD-10-CM

## 2019-05-10 DIAGNOSIS — M54.50 CHRONIC MIDLINE LOW BACK PAIN WITHOUT SCIATICA: ICD-10-CM

## 2019-05-10 DIAGNOSIS — M54.2 NECK PAIN: ICD-10-CM

## 2019-05-10 PROCEDURE — 97110 THERAPEUTIC EXERCISES: CPT | Mod: PO

## 2019-05-10 NOTE — PROGRESS NOTES
Re-assessment    Physical Therapy Daily Treatment Note     Name: Eliu MACARIO [place]  Clinic Number: [id number]    Therapy Diagnosis:   Encounter Diagnoses   Name Primary?    Chronic midline low back pain without sciatica     Neck pain      Physician: Wendi Merida, *    Visit Date: 5/10/2019    Physician Orders: PT Eval and Treat neck and low back pain  Medical Diagnosis from Referral: Chronic R sided low back pain without sciatica, DDD lumbar, DDD cervical, neck pain, gait instability   Evaluation Date: 2/18/2019  Authorization Period Expiration: 12/31/19  Plan of Care Expiration: Extend to 6/14/19  Visit # / Visits authorized: 16/20     Time In: 10:55 AM  Time Out: 11:45 AM  Total Billable Time: 45 minutes    Precautions: Standard and Fall (d/t L knee buckling)    Subjective     Pt reports: her back is feeling better and that her neck pain comes and goes.   She was partially compliant with home exercise program.  Response to previous treatment/Functional change: none    Pain: 5/10 neck   Location: neck (with mvmnt)     Objective      Eliu Steinberg received therapeutic exercises to develop strength, endurance, ROM, flexibility, posture and core stabilization for 45 minutes including:      Lower Quarter      Supine     HS stretch - therapist assisted, 2 x 1 min/LE, pt performs ankle pumps intermittently for sciatic nerve   Pelvic tilt x20 reps, 3s holds  LTR, 20 reps B  Bridge x20 reps, 3s holds  Alternating clamshell hooklying w/ OTB 2x10  SLR (1# on R, 0# on L) 2x10  R SAQ, 20 reps, 3s holds  L quad set, 20 reps, 3s holds  Marching in hook-lying, 15 reps B  Clamshell 2 x10  DKTC on orange SB w/ GTB pull down x 20 reps  Gastroc stretch on incline board, 1 min    Sit<>stand w/ OTB at knees and minimal UE use (guarding L knee) 2x10 - with brace     Upper quarter      Supine     Chin tucks, 20 reps, 3s holds  OTB horizontal abduction 2x15  GTB bilateral I 2x15      Seated     GTB rows 2x15  OTB I's 3x10  OTB ER  2x10  Brueggers OTB 2 x 10  17# bilateral cable overhead pull down in chair 3x10  Theracane to upper traps 5 min  Scapular retraction/depression 3 sec x10    Home Exercises Provided and Patient Education Provided     Education provided:   - impairments to the L knee, activity modification, roll of PT  -Progress toward goals     Written Home Exercises Provided: Patient instructed to cont prior HEP. Patient reports she misplaced her previously given HEP therefore exercises were reissued to patient.   Exercises were reviewed and Eliu Steinberg was able to demonstrate them prior to the end of the session.  Eliu Steinberg demonstrated fair  understanding of the education provided.     See EMR under Patient Instructions for exercises provided 4/17/19.    Assessment   Eliu Steinberg tolerated treatment well today with no onset of adverse effects. Patient requires occasional cueing throughout exercises and continues to experience muscle fatigue with exercises. Patient encouraged to perform HEP consistently.   Pt prognosis is Good.     Pt will continue to benefit from skilled outpatient physical therapy to address the deficits listed in the problem list box on initial evaluation, provide pt/family education and to maximize pt's level of independence in the home and community environment.     Pt's spiritual, cultural and educational needs considered and pt agreeable to plan of care and goals.     Anticipated barriers to physical therapy: understanding of current impairments, memory of HEP exercise form    Goals:  Short Term Goals: 3 weeks   1. Pt will be independent with HEP and report compliance at least 4 days/week - Met  2. Pt will be able to walk for 20 minutes on a level surface reporting less than 2/10 pain -----progressing not met 5/10/2019  3. Pt will be able to read/use ipad for 20 minutes (taking stretch breaks between bouts) reporting less than 3/10 pain - met     Long Term Goals: 10 weeks   1. Pt will be able to walk for 40 minutes on  level surface and ascend/descend 4 steps reporting less than 3/10 pain in order to perform Mandaen responsibilities - -----progressing not met 5/10/2019  2. Pt will be able to dance jitterbug (making modifications when necessary) for 30 minutes reporting less than 3/10 pain -----progressing not met 5/10/2019  3. Pt will be able to tolerate lumbar and cervical AROM testing reporting less than 2/10 pain at end range -----progressing not met 5/10/2019    New/Revised Goals    1. Pt will be independent with alternate callisthenic/ light resistance exercise in her home in order to be able to perform exercise on days the weather doesn't allow out door ambuation.     Plan     Continue with established Plan of Care towards PT goals.

## 2019-05-13 ENCOUNTER — TELEPHONE (OUTPATIENT)
Dept: INTERNAL MEDICINE | Facility: CLINIC | Age: 79
End: 2019-05-13

## 2019-05-13 DIAGNOSIS — N30.00 ACUTE CYSTITIS WITHOUT HEMATURIA: Primary | ICD-10-CM

## 2019-05-13 NOTE — TELEPHONE ENCOUNTER
----- Message from Cammy Ruvalcaba sent at 5/13/2019  3:00 PM CDT -----  Contact: Patient [phone]  Patient is returning a phone call.  Who left a message for the patient: Jennifer CHIU LPN  Does patient know what this is regarding:  RX  Comments:     Please call and advise  Thank you

## 2019-05-13 NOTE — TELEPHONE ENCOUNTER
----- Message from Tim Dickson sent at 5/13/2019  8:25 AM CDT -----  Contact: Patient [phone]  RX request - refill or new RX.  Is this a refill or new RX:  New/Refill  RX name and strength: UTI Rx     Pharmacy name and phone # [id number] [address] [facility] [phone] (Phone) [phone] (Fax    Comments:  Patient is requesting Rx for an UTI, stated started a few days ago, would like Rx sent to local pharmacy.  Patient is requesting to speak with the PCP regarding   Patient canceled all the Physical Therapy     Please call an advise  Thank you

## 2019-05-13 NOTE — TELEPHONE ENCOUNTER
----- Message from Shawanda Slater sent at 5/13/2019  4:30 PM CDT -----  Contact: Patient [phone]      ----- Message -----  From: Tim Dickson  Sent: 5/13/2019   9:08 AM  To: [facility] Staff    Patient is returning a phone call.  Who left a message for the patient: Dr office  Does patient know what this is regarding:  Previous message  Comments: requesting when calling patient to call right back takes a minute for the patient to retrieve to call please.     Please call an advise  Thank you

## 2019-05-14 RX ORDER — NITROFURANTOIN 25; 75 MG/1; MG/1
100 CAPSULE ORAL 2 TIMES DAILY
Qty: 6 CAPSULE | Refills: 0 | Status: SHIPPED | OUTPATIENT
Start: 2019-05-14 | End: 2019-05-17

## 2019-05-14 RX ORDER — TIZANIDINE 4 MG/1
TABLET ORAL
Qty: 270 TABLET | Refills: 2 | Status: SHIPPED | OUTPATIENT
Start: 2019-05-14 | End: 2020-09-15

## 2019-05-14 NOTE — TELEPHONE ENCOUNTER
Spoke with pt, she stated that she is experiencing burning, itching and odor when urinating. Pt also states that she had these symptoms during her recent visit on 05/06/2019 with you but forgot to bring it up. Pt is requesting a prescription be sent to [facility] local [facility] [address].    Refill Request.    Please advise,  Thank You.

## 2019-05-14 NOTE — TELEPHONE ENCOUNTER
Hi,  I have sent in for 3 days:  Orders Placed This Encounter    nitrofurantoin, macrocrystal-monohydrate, (MACROBID) 100 MG capsule     Twice per day.    I want to hear from her if this does not help.    Let me know if patient has any questions.  Thank you, Ryan Carlson

## 2019-05-21 ENCOUNTER — OFFICE VISIT (OUTPATIENT)
Dept: OPHTHALMOLOGY | Facility: CLINIC | Age: 79
End: 2019-05-21
Payer: MEDICARE

## 2019-05-21 ENCOUNTER — CLINICAL SUPPORT (OUTPATIENT)
Dept: OPHTHALMOLOGY | Facility: CLINIC | Age: 79
End: 2019-05-21
Payer: MEDICARE

## 2019-05-21 DIAGNOSIS — H40.1133 PRIMARY OPEN-ANGLE GLAUCOMA, BILATERAL, SEVERE STAGE: Primary | ICD-10-CM

## 2019-05-21 DIAGNOSIS — H40.1133 PRIMARY OPEN-ANGLE GLAUCOMA, BILATERAL, SEVERE STAGE: ICD-10-CM

## 2019-05-21 DIAGNOSIS — H25.013 CORTICAL AGE-RELATED CATARACT, BILATERAL: ICD-10-CM

## 2019-05-21 DIAGNOSIS — E11.9 TYPE 2 DIABETES MELLITUS WITHOUT OPHTHALMIC MANIFESTATIONS: ICD-10-CM

## 2019-05-21 DIAGNOSIS — H25.13 SENILE NUCLEAR SCLEROSIS, BILATERAL: ICD-10-CM

## 2019-05-21 DIAGNOSIS — H35.3132 NONEXUDATIVE AGE-RELATED MACULAR DEGENERATION, BILATERAL, INTERMEDIATE DRY STAGE: ICD-10-CM

## 2019-05-21 PROCEDURE — 92083 HUMPHREY VISUAL FIELD - OU - BOTH EYES: ICD-10-PCS | Mod: 26,S$PBB,, | Performed by: OPHTHALMOLOGY

## 2019-05-21 PROCEDURE — 92250 FUNDUS PHOTOGRAPHY W/I&R: CPT | Mod: PBBFAC | Performed by: OPHTHALMOLOGY

## 2019-05-21 PROCEDURE — 92083 EXTENDED VISUAL FIELD XM: CPT | Mod: 26,S$PBB,, | Performed by: OPHTHALMOLOGY

## 2019-05-21 PROCEDURE — 92014 COMPRE OPH EXAM EST PT 1/>: CPT | Mod: S$PBB,,, | Performed by: OPHTHALMOLOGY

## 2019-05-21 PROCEDURE — 92250 COLOR FUNDUS PHOTOGRAPHY - OU - BOTH EYES: ICD-10-PCS | Mod: 26,S$PBB,, | Performed by: OPHTHALMOLOGY

## 2019-05-21 PROCEDURE — 99212 OFFICE O/P EST SF 10 MIN: CPT | Mod: PBBFAC,25 | Performed by: OPHTHALMOLOGY

## 2019-05-21 PROCEDURE — 99999 PR PBB SHADOW E&M-EST. PATIENT-LVL II: ICD-10-PCS | Mod: PBBFAC,,, | Performed by: OPHTHALMOLOGY

## 2019-05-21 PROCEDURE — 92014 PR EYE EXAM, EST PATIENT,COMPREHESV: ICD-10-PCS | Mod: S$PBB,,, | Performed by: OPHTHALMOLOGY

## 2019-05-21 PROCEDURE — 92083 EXTENDED VISUAL FIELD XM: CPT | Mod: PBBFAC

## 2019-05-21 PROCEDURE — 99999 PR PBB SHADOW E&M-EST. PATIENT-LVL II: CPT | Mod: PBBFAC,,, | Performed by: OPHTHALMOLOGY

## 2019-05-21 RX ORDER — BRIMONIDINE TARTRATE 2 MG/ML
1 SOLUTION/ DROPS OPHTHALMIC 3 TIMES DAILY
Qty: 45 ML | Refills: 3 | Status: SHIPPED | OUTPATIENT
Start: 2019-05-21 | End: 2020-07-31 | Stop reason: SDUPTHER

## 2019-05-21 NOTE — PROGRESS NOTES
HPI     Glaucoma      Additional comments: HVF review today and pt states no complaints               Comments     DLS: 1/15/19    1. POAG OU  2. Type 2 DM no DR  3. NS OU  4. ARMD OU  5. Ptosis OS>OD    MEDS:  Cosopt BID OU  Latanoprost QHS OU          Last edited by Rand Vergara MA on 5/21/2019 10:34 AM. (History)              Assessment /Plan     For exam results, see Encounter Report.    Primary open-angle glaucoma, bilateral, severe stage    Senile nuclear sclerosis, bilateral    Cortical age-related cataract, bilateral    Nonexudative age-related macular degeneration, bilateral, intermediate dry stage    Type 2 diabetes mellitus without ophthalmic manifestations           Glaucoma (type and duration)    POAG severe   First HVF   2013   First photos   2017   Treatment / Drops started   Latanoprost, travatan           Family history    ?        Glaucoma meds    Latanoprost / cosopt (added - 1//15/2019)         H/O adverse rxn to glaucoma drops    none        LASERS    none        GLAUCOMA SURGERIES    none        OTHER EYE SURGERIES    none        CDR    0.75 w/ sup thinning /0.9 - sup and inf thinning         Tbase    16-20/16-20          Tmax    20/20            Ttarget    15/15           HVF    6 test 2013 to  2019 - SAD od // gen depression, SAD, IAD os (?prog os0         Gonio    3+ ou        CCT    544/525        OCT    5 test 2013 to 2018 - RNFL - decr thru out od // dec. Thru out  os        HRT   2 test 2017 to 2018 MR -  Dec. S od // dec. I, bord S/T os /// CDR 0.78 od // 0.760 os        Disc photos    2017, 2019     - Ttoday    19/18 (above target of 15 ou)   - Test done today  - HVF / DFE / photos     POAG severe  Severe RNFL thinning with progressive field loss   - on latanoprost qhs OU, continue  -IOP just above target - addedccosopt ou bid  (1/2019)     NS cataract OU - hold on CE - pt is not having any problems with vision presently     Macular Drusen OU  AMD OU, seeing [facility]   AREDS 2  Vitamins  Home Amsler Grid Testing   - seen by  retina - akshat rogers - saw [facility] 6/1/2018 - to F/U in 6 months    Ptosis   Pt C/O droopy lid OS > OD       GLAUCOMA  STILL ABOVE TARGET   ADD BRIMONIDINE OU     IF IOP STILL ABOVE TARGET NEXT VISIT - CAN TRY SLTS     F/u 4 months HRT // IOP check / gonio - ? slt candidate       - Hold off on CE for now - but may need to consider it soon

## 2019-05-25 DIAGNOSIS — N18.30 TYPE 2 DIABETES MELLITUS WITH STAGE 3 CHRONIC KIDNEY DISEASE, WITH LONG-TERM CURRENT USE OF INSULIN: Chronic | ICD-10-CM

## 2019-05-25 DIAGNOSIS — E11.22 TYPE 2 DIABETES MELLITUS WITH STAGE 3 CHRONIC KIDNEY DISEASE, WITH LONG-TERM CURRENT USE OF INSULIN: Chronic | ICD-10-CM

## 2019-05-25 DIAGNOSIS — Z79.4 TYPE 2 DIABETES MELLITUS WITH STAGE 3 CHRONIC KIDNEY DISEASE, WITH LONG-TERM CURRENT USE OF INSULIN: Chronic | ICD-10-CM

## 2019-05-27 RX ORDER — INSULIN ASPART 100 [IU]/ML
INJECTION, SUSPENSION SUBCUTANEOUS
Qty: 50 ML | Refills: 0 | Status: SHIPPED | OUTPATIENT
Start: 2019-05-27 | End: 2019-08-25 | Stop reason: SDUPTHER

## 2019-06-13 ENCOUNTER — TELEPHONE (OUTPATIENT)
Dept: INTERNAL MEDICINE | Facility: CLINIC | Age: 79
End: 2019-06-13

## 2019-06-17 ENCOUNTER — OFFICE VISIT (OUTPATIENT)
Dept: OPHTHALMOLOGY | Facility: CLINIC | Age: 79
End: 2019-06-17
Payer: MEDICARE

## 2019-06-17 VITALS — DIASTOLIC BLOOD PRESSURE: 64 MMHG | HEART RATE: 57 BPM | SYSTOLIC BLOOD PRESSURE: 128 MMHG

## 2019-06-17 DIAGNOSIS — E11.9 TYPE 2 DIABETES MELLITUS WITHOUT OPHTHALMIC MANIFESTATIONS: ICD-10-CM

## 2019-06-17 DIAGNOSIS — H25.13 SENILE NUCLEAR SCLEROSIS, BILATERAL: ICD-10-CM

## 2019-06-17 DIAGNOSIS — H35.3131 EARLY DRY STAGE NONEXUDATIVE AGE-RELATED MACULAR DEGENERATION OF BOTH EYES: Primary | ICD-10-CM

## 2019-06-17 DIAGNOSIS — H40.1133 PRIMARY OPEN-ANGLE GLAUCOMA, BILATERAL, SEVERE STAGE: ICD-10-CM

## 2019-06-17 PROCEDURE — 99999 PR PBB SHADOW E&M-EST. PATIENT-LVL III: CPT | Mod: PBBFAC,,, | Performed by: OPHTHALMOLOGY

## 2019-06-17 PROCEDURE — 92134 POSTERIOR SEGMENT OCT RETINA (OCULAR COHERENCE TOMOGRAPHY)-BOTH EYES: ICD-10-PCS | Mod: 26,S$PBB,, | Performed by: OPHTHALMOLOGY

## 2019-06-17 PROCEDURE — 92012 PR EYE EXAM, EST PATIENT,INTERMED: ICD-10-PCS | Mod: S$PBB,,, | Performed by: OPHTHALMOLOGY

## 2019-06-17 PROCEDURE — 92226 PR SPECIAL EYE EXAM, SUBSEQUENT: CPT | Mod: PBBFAC | Performed by: OPHTHALMOLOGY

## 2019-06-17 PROCEDURE — 92134 CPTRZ OPH DX IMG PST SGM RTA: CPT | Mod: PBBFAC | Performed by: OPHTHALMOLOGY

## 2019-06-17 PROCEDURE — 92226 PR SPECIAL EYE EXAM, SUBSEQUENT: CPT | Mod: 50,S$PBB,, | Performed by: OPHTHALMOLOGY

## 2019-06-17 PROCEDURE — 99213 OFFICE O/P EST LOW 20 MIN: CPT | Mod: PBBFAC | Performed by: OPHTHALMOLOGY

## 2019-06-17 PROCEDURE — 92226 PR SPECIAL EYE EXAM, SUBSEQUENT: ICD-10-PCS | Mod: 50,S$PBB,, | Performed by: OPHTHALMOLOGY

## 2019-06-17 PROCEDURE — 92012 INTRM OPH EXAM EST PATIENT: CPT | Mod: S$PBB,,, | Performed by: OPHTHALMOLOGY

## 2019-06-17 PROCEDURE — 99999 PR PBB SHADOW E&M-EST. PATIENT-LVL III: ICD-10-PCS | Mod: PBBFAC,,, | Performed by: OPHTHALMOLOGY

## 2019-06-17 NOTE — PROGRESS NOTES
Subjective:       Patient ID: Eliu Paz is a 79 y.o. female      Chief Complaint   Patient presents with    Concerns About Ocular Health     History of Present Illness  HPI     Here today for overdue retina ck for ARMD    Pt states that vision has been stable.  Has no complaints today.  No   pains,flashes,floaters, or double vision.     Eye Med(s)- Cosopt BID OU  Latanoprost QHS OU    LBS - 125 x this morning      1. POAG OU  2. Type 2 DM no DR  3. NS OU  4. ARMD OU  5. Ptosis OS>OD        Last edited by Ramos Cloud MD on 6/18/2019  5:42 PM. (History)        Imaging:    See report    Assessment/Plan:     1. Early dry stage nonexudative age-related macular degeneration of both eyes  AMD OU- has opthlamoscopic appearance of dom drusen but look like reticular pseudodrusen on OCT  Discussed Dry and Wet AMD in detail  Recommend AREDS 2 Vitamins  Home Amsler Grid Testing  - Posterior Segment OCT Retina-Both eyes    2. Type 2 diabetes mellitus without ophthalmic manifestations  BP/BS/Chol control    3. Senile nuclear sclerosis, bilateral  Observe.  Happy with [facility]    4. Primary open-angle glaucoma, bilateral, severe stage  gtts and f/u per Dr Francis    Seeing Dr Francis in August    Follow up in about 1 year (around 6/17/2020), or if symptoms worsen or fail to improve, for OCT Mac, Comprehensive Examination.

## 2019-07-08 ENCOUNTER — OFFICE VISIT (OUTPATIENT)
Dept: CARDIOLOGY | Facility: CLINIC | Age: 79
End: 2019-07-08
Payer: MEDICARE

## 2019-07-08 VITALS
DIASTOLIC BLOOD PRESSURE: 60 MMHG | WEIGHT: 176.81 LBS | HEIGHT: 60 IN | HEART RATE: 72 BPM | BODY MASS INDEX: 34.71 KG/M2 | SYSTOLIC BLOOD PRESSURE: 127 MMHG | OXYGEN SATURATION: 99 %

## 2019-07-08 DIAGNOSIS — Z95.1 S/P CABG (CORONARY ARTERY BYPASS GRAFT): Chronic | ICD-10-CM

## 2019-07-08 DIAGNOSIS — Z79.4 TYPE 2 DIABETES MELLITUS WITH DIABETIC POLYNEUROPATHY, WITH LONG-TERM CURRENT USE OF INSULIN: Chronic | ICD-10-CM

## 2019-07-08 DIAGNOSIS — I10 ESSENTIAL HYPERTENSION: Chronic | ICD-10-CM

## 2019-07-08 DIAGNOSIS — E66.9 OBESITY (BMI 30.0-34.9): ICD-10-CM

## 2019-07-08 DIAGNOSIS — Z98.61 POST PTCA: Chronic | ICD-10-CM

## 2019-07-08 DIAGNOSIS — E78.5 HYPERLIPIDEMIA, UNSPECIFIED HYPERLIPIDEMIA TYPE: ICD-10-CM

## 2019-07-08 DIAGNOSIS — R07.89 CHEST DISCOMFORT: ICD-10-CM

## 2019-07-08 DIAGNOSIS — N18.30 CKD (CHRONIC KIDNEY DISEASE) STAGE 3, GFR 30-59 ML/MIN: Chronic | ICD-10-CM

## 2019-07-08 DIAGNOSIS — I25.10 CORONARY ARTERY DISEASE INVOLVING NATIVE CORONARY ARTERY OF NATIVE HEART WITHOUT ANGINA PECTORIS: Primary | Chronic | ICD-10-CM

## 2019-07-08 DIAGNOSIS — E11.42 TYPE 2 DIABETES MELLITUS WITH DIABETIC POLYNEUROPATHY, WITH LONG-TERM CURRENT USE OF INSULIN: Chronic | ICD-10-CM

## 2019-07-08 PROBLEM — E66.01 MORBID OBESITY: Status: RESOLVED | Noted: 2018-09-05 | Resolved: 2019-07-08

## 2019-07-08 PROCEDURE — 99999 PR PBB SHADOW E&M-EST. PATIENT-LVL III: ICD-10-PCS | Mod: PBBFAC,,, | Performed by: NURSE PRACTITIONER

## 2019-07-08 PROCEDURE — 99214 PR OFFICE/OUTPT VISIT, EST, LEVL IV, 30-39 MIN: ICD-10-PCS | Mod: S$PBB,,, | Performed by: NURSE PRACTITIONER

## 2019-07-08 PROCEDURE — 93010 EKG 12-LEAD: ICD-10-PCS | Mod: S$PBB,,, | Performed by: INTERNAL MEDICINE

## 2019-07-08 PROCEDURE — 93005 ELECTROCARDIOGRAM TRACING: CPT | Mod: PBBFAC | Performed by: INTERNAL MEDICINE

## 2019-07-08 PROCEDURE — 99214 OFFICE O/P EST MOD 30 MIN: CPT | Mod: S$PBB,,, | Performed by: NURSE PRACTITIONER

## 2019-07-08 PROCEDURE — 99999 PR PBB SHADOW E&M-EST. PATIENT-LVL III: CPT | Mod: PBBFAC,,, | Performed by: NURSE PRACTITIONER

## 2019-07-08 PROCEDURE — 99213 OFFICE O/P EST LOW 20 MIN: CPT | Mod: PBBFAC | Performed by: NURSE PRACTITIONER

## 2019-07-08 PROCEDURE — 93010 ELECTROCARDIOGRAM REPORT: CPT | Mod: S$PBB,,, | Performed by: INTERNAL MEDICINE

## 2019-07-08 RX ORDER — CANDESARTAN 4 MG/1
4 TABLET ORAL DAILY
Qty: 90 TABLET | Refills: 3 | Status: SHIPPED | OUTPATIENT
Start: 2019-07-08 | End: 2020-04-20

## 2019-07-08 NOTE — PROGRESS NOTES
Ms. Paz is a patient of Dr. Aguilar and was last seen in [facility] Cardiology Visit 9/10/18    Subjective:   Patient ID:  Eliu Paz is a 79 y.o. female who presents for follow-up of Coronary Artery Disease (10 month f/u ); Back Pain; and Chest Pain (pressure)    Problems:  CAD s/p CABG x1 in 1994 at [facility]  -s/p PCI at [facility] 3/10/10  Bilateral carotid artery disease, 20-39%  HLD  HTN  DM type II  Smoked in college  Obesity      HPI    Ms. Paz is in clinic today for routine follow up.  Reports 3 episodes of left scapular pain while up cleaning the house.  The episode lasted for 3 minutes.  She took a NTG and the pain went away each time.  She had associated chest pressure.  No associated SOB, sweating, or nausea.  States that she felt better after belching.  She took vinegar and soda to help her belch. She does not recall the sx that she had prior to her stent and her bypass. Her clinic bp is running 120-130s.  She does not check her bp at home.  She's not been taking her losartan because of the recall.     Review of Systems   Constitution: Negative for decreased appetite, diaphoresis, malaise/fatigue, weight gain and weight loss.   HENT: Positive for hearing loss.    Eyes: Negative for visual disturbance.   Cardiovascular: Positive for chest pain (atypical). Negative for claudication, dyspnea on exertion, irregular heartbeat, leg swelling, near-syncope, orthopnea, palpitations, paroxysmal nocturnal dyspnea and syncope.        Denies chest pressure   Respiratory: Negative for cough, hemoptysis, shortness of breath, sleep disturbances due to breathing and snoring.    Endocrine: Negative for cold intolerance and heat intolerance.   Hematologic/Lymphatic: Negative for bleeding problem. Does not bruise/bleed easily.   Musculoskeletal: Negative for myalgias.   Gastrointestinal: Negative for bloating, abdominal pain, anorexia, change in bowel habit, constipation, diarrhea, nausea and vomiting.   Neurological: Negative  "for difficulty with concentration, disturbances in coordination, excessive daytime sleepiness, dizziness, headaches, light-headedness, loss of balance, numbness and weakness.   Psychiatric/Behavioral: The patient does not have insomnia.        Allergies and current medications updated and reviewed:  Review of patient's allergies indicates:   Allergen Reactions    Penicillins Swelling     Swelling (extremities)    Trulicity [dulaglutide] Nausea Only and Rash     Current Outpatient Medications   Medication Sig    amLODIPine (NORVASC) 10 MG tablet TAKE 1 TABLET (10 MG TOTAL) BY MOUTH ONCE DAILY.    aspirin (ECOTRIN) 81 MG EC tablet Take 1 tablet (81 mg total) by mouth once daily.    blood sugar diagnostic (ONETOUCH VERIO) Strp Inject 1 each into the skin 3 (three) times daily with meals. Use to test blood sugar four times a day.    brimonidine 0.2% (ALPHAGAN) 0.2 % Drop Place 1 drop into both eyes 3 (three) times daily.    carvedilol (COREG) 25 MG tablet TAKE 1 TABLET (25 MG TOTAL) BY MOUTH 2 (TWO) TIMES DAILY.    ciclopirox (PENLAC) 8 % Soln Apply topically nightly.    clobetasol 0.05% (TEMOVATE) 0.05 % Oint Apply small amount to vulva area twice a day for 4 weeks then once a day for 4 weeks then once a day on Mondays and Thursdays    dorzolamide-timolol 2-0.5% (COSOPT) 22.3-6.8 mg/mL ophthalmic solution Place 1 drop into both eyes 2 (two) times daily.    fluocinonide (LIDEX) 0.05 % external solution Apply topically 2 (two) times daily.    insulin asp prt-insulin aspart, NOVOLOG 70/30, (NOVOLOG MIX 70-30 U-100 INSULN) 100 unit/mL (70-30) Soln INJECT 18 UNITS INTO THE SKIN 2 (TWO) TIMES DAILY BEFORE MEALS.    insulin syringe-needle U-100 (BD INSULIN SYRINGE ULTRA-FINE) 0.5 mL 31 gauge x 5/16" Syrg USE TO INJECT TWICE DAILY WITH INSULIN INJECTIONS    isosorbide mononitrate (IMDUR) 30 MG 24 hr tablet TAKE 1 TABLET (30 MG TOTAL) BY MOUTH ONCE DAILY.    ketoconazole (NIZORAL) 2 % cream Apply topically once " daily.    ketoconazole (NIZORAL) 2 % shampoo Apply topically every other day.    lactulose (CHRONULAC) 10 gram/15 mL solution TAKE 30 MLS (20 G TOTAL) BY MOUTH DAILY AS NEEDED (CONSTIPATION).    latanoprost 0.005 % ophthalmic solution Place 1 drop into both eyes every evening.    losartan (COZAAR) 100 MG tablet TAKE 1 TABLET (100 MG TOTAL) BY MOUTH ONCE DAILY.    nitroGLYCERIN 0.4 MG/DOSE TL SPRY (NITROLINGUAL) 400 mcg/spray spray PLACE 2 SPRAYS UNDER THE TONGUE EVERY 5 MINUTES AS NEEDED FOR CHEST PAIN    ranitidine (ZANTAC) 150 MG tablet Take 1 tablet (150 mg total) by mouth 2 (two) times daily.    rosuvastatin (CRESTOR) 40 MG Tab TAKE 1 TABLET BY MOUTH ONCE DAILY.    spironolactone (ALDACTONE) 25 MG tablet TAKE 2 TABLETS BY MOUTH EVERY DAY    tiZANidine (ZANAFLEX) 4 MG tablet 1/2 to 1 tab every 6 hours as needed    traMADol (ULTRAM) 50 mg tablet TAKE 1 TABLET BY MOUTH TWICE DAILY AS NEEDED    triamcinolone acetonide 0.1% (KENALOG) 0.1 % ointment Apply topically 2 (two) times daily. Apply small amount to itchy areas on body BID PRN.  Do not use on face or in groin.     No current facility-administered medications for this visit.        Objective:     Right Arm BP - Sittin/58 (19 1008)  Left Arm BP - Sittin/60 (19 1008)    /60 (BP Location: Left arm, Patient Position: Sitting, BP Method: Medium (Automatic))   Pulse 72   Ht 5' (1.524 m)   Wt 80.2 kg (176 lb 12.9 oz)   SpO2 99%   BMI 34.53 kg/m²       Physical Exam   Constitutional: She is oriented to person, place, and time. Vital signs are normal. She appears well-developed and well-nourished. She is active. No distress.   HENT:   Head: Normocephalic and atraumatic.   Eyes: Conjunctivae and lids are normal. No scleral icterus.   Neck: Neck supple. Normal carotid pulses, no hepatojugular reflux and no JVD present. Carotid bruit is not present.   Cardiovascular: Normal rate, regular rhythm, S1 normal, S2 normal and intact  distal pulses. PMI is not displaced. Exam reveals no gallop and no friction rub.   No murmur heard.  Pulses:       Carotid pulses are 2+ on the right side, and 2+ on the left side.       Radial pulses are 2+ on the right side, and 2+ on the left side.        Dorsalis pedis pulses are 2+ on the right side, and 2+ on the left side.        Posterior tibial pulses are 1+ on the right side, and 1+ on the left side.   Pulmonary/Chest: Effort normal and breath sounds normal. No respiratory distress. She has no decreased breath sounds. She has no wheezes. She has no rhonchi. She has no rales. She exhibits no tenderness.   Abdominal: Soft. Normal appearance and bowel sounds are normal. She exhibits no distension, no fluid wave, no abdominal bruit, no ascites and no pulsatile midline mass. There is no hepatosplenomegaly. There is no tenderness.   Musculoskeletal: She exhibits edema (BLE +1 in ankles and feet).   Neurological: She is alert and oriented to person, place, and time. Gait normal.   Skin: Skin is warm, dry and intact. No rash noted. She is not diaphoretic. Nails show no clubbing.   Psychiatric: She has a normal mood and affect. Her speech is normal and behavior is normal. Judgment and thought content normal. Cognition and memory are normal.   Nursing note and vitals reviewed.      Chemistry        Component Value Date/Time     (L) 12/25/2018 1456    K 4.4 12/25/2018 1456     12/25/2018 1456    CO2 22 (L) 12/25/2018 1456    BUN 15 12/25/2018 1456    CREATININE 0.9 12/25/2018 1456     (H) 12/25/2018 1456        Component Value Date/Time    CALCIUM 9.8 12/25/2018 1456    ALKPHOS 82 12/25/2018 1456    AST 18 12/25/2018 1456    ALT 8 (L) 12/25/2018 1456    BILITOT 0.7 12/25/2018 1456    ESTGFRAFRICA >60.0 12/25/2018 1456    EGFRNONAA >60.0 12/25/2018 1456        Lab Results   Component Value Date    HGBA1C 7.9 (H) 12/04/2018     Recent Labs   Lab 10/20/16  0938  12/04/18  1052 12/25/18  7857  12/27/18  1140   WBC  --   --  5.94 8.62  --    Hemoglobin  --   --  11.3 L 12.4  --    Hematocrit  --   --  36.9 L 39.2  --    Mean Corpuscular Volume  --   --  88 88  --    Platelets  --   --  156 171  --    BNP 89  --   --   --   --    TSH  --    < >  --   --  4.775 H   Cholesterol  --    < > 113 L  --   --    HDL  --    < > 50  --   --    LDL Cholesterol  --    < > 45.8 L  --   --    Triglycerides  --    < > 86  --   --    Hdl/Cholesterol Ratio  --    < > 44.2  --   --     < > = values in this interval not displayed.              Test(s) Reviewed  I have reviewed the following in detail:  [] Stress test   [] Angiography   [x] Echocardiogram   [x] Labs   [] Other:         Assessment/Plan:   1. Coronary artery disease involving native coronary artery of native heart without angina pectoris  Atypical chest pain relieved with belching.  No ECG changes noted on today's ecg.  She can not recall sx prior to stent or bypass.  Instructed to call with increased frequency, duration or severity.  Suspect GERD.     - EKG 12-lead  - Comprehensive metabolic panel; Future  - Lipid panel; Future  - Magnesium; Future      2. S/P CABG (coronary artery bypass graft)      3. Post PTCA      4. Chest discomfort        5. Hyperlipidemia, unspecified hyperlipidemia type  LDL at goal <70. No changes      6. Essential hypertension  BP at goal <130/80. Continue current regimen.      7. CKD (chronic kidney disease) stage 3, GFR 30-59 ml/min  Not taking losartan.  Unclear when she stopped.  BP within normal limits.  Will start low dose candesartan.     - candesartan (ATACAND) 4 MG tablet; Take 1 tablet (4 mg total) by mouth once daily.  Dispense: 90 tablet; Refill: 3    8. Obesity (BMI 30.0-34.9)  BMI 34.5 Encouraged increased CV exercise to 30 minutes a day for 5 days a week.     9. Type 2 diabetes mellitus with diabetic polyneuropathy, with long-term current use of insulin  A1C not at goal <7. F/U with PCP as planned    - candesartan  (ATACAND) 4 MG tablet; Take 1 tablet (4 mg total) by mouth once daily.  Dispense: 90 tablet; Refill: 3    The patient was discussed and examined by Dr. Olivas    Follow up in about 6 months (around 1/8/2020).

## 2019-07-17 DIAGNOSIS — I10 ESSENTIAL HYPERTENSION: Chronic | ICD-10-CM

## 2019-07-18 RX ORDER — AMLODIPINE BESYLATE 10 MG/1
10 TABLET ORAL DAILY
Qty: 90 TABLET | Refills: 11 | Status: SHIPPED | OUTPATIENT
Start: 2019-07-18 | End: 2020-09-15 | Stop reason: SDUPTHER

## 2019-07-23 ENCOUNTER — OFFICE VISIT (OUTPATIENT)
Dept: INTERNAL MEDICINE | Facility: CLINIC | Age: 79
End: 2019-07-23
Payer: MEDICARE

## 2019-07-23 VITALS
SYSTOLIC BLOOD PRESSURE: 132 MMHG | DIASTOLIC BLOOD PRESSURE: 60 MMHG | WEIGHT: 176.56 LBS | OXYGEN SATURATION: 99 % | HEART RATE: 64 BPM | BODY MASS INDEX: 34.66 KG/M2 | HEIGHT: 60 IN

## 2019-07-23 DIAGNOSIS — G56.01 CARPAL TUNNEL SYNDROME, RIGHT: ICD-10-CM

## 2019-07-23 DIAGNOSIS — K59.00 CONSTIPATION, UNSPECIFIED CONSTIPATION TYPE: ICD-10-CM

## 2019-07-23 DIAGNOSIS — K92.1 GASTROINTESTINAL HEMORRHAGE WITH MELENA: Primary | ICD-10-CM

## 2019-07-23 DIAGNOSIS — I25.10 CORONARY ARTERY DISEASE INVOLVING NATIVE CORONARY ARTERY OF NATIVE HEART WITHOUT ANGINA PECTORIS: Chronic | ICD-10-CM

## 2019-07-23 PROCEDURE — 99213 PR OFFICE/OUTPT VISIT, EST, LEVL III, 20-29 MIN: ICD-10-PCS | Mod: S$PBB,GC,, | Performed by: STUDENT IN AN ORGANIZED HEALTH CARE EDUCATION/TRAINING PROGRAM

## 2019-07-23 PROCEDURE — 99999 PR PBB SHADOW E&M-EST. PATIENT-LVL III: ICD-10-PCS | Mod: PBBFAC,GC,, | Performed by: STUDENT IN AN ORGANIZED HEALTH CARE EDUCATION/TRAINING PROGRAM

## 2019-07-23 PROCEDURE — 99213 OFFICE O/P EST LOW 20 MIN: CPT | Mod: PBBFAC | Performed by: STUDENT IN AN ORGANIZED HEALTH CARE EDUCATION/TRAINING PROGRAM

## 2019-07-23 PROCEDURE — 99213 OFFICE O/P EST LOW 20 MIN: CPT | Mod: S$PBB,GC,, | Performed by: STUDENT IN AN ORGANIZED HEALTH CARE EDUCATION/TRAINING PROGRAM

## 2019-07-23 PROCEDURE — 99999 PR PBB SHADOW E&M-EST. PATIENT-LVL III: CPT | Mod: PBBFAC,GC,, | Performed by: STUDENT IN AN ORGANIZED HEALTH CARE EDUCATION/TRAINING PROGRAM

## 2019-07-23 RX ORDER — OMEPRAZOLE 40 MG/1
40 CAPSULE, DELAYED RELEASE ORAL EVERY MORNING
Qty: 30 CAPSULE | Refills: 2 | Status: SHIPPED | OUTPATIENT
Start: 2019-07-23 | End: 2019-08-06

## 2019-07-23 RX ORDER — POLYETHYLENE GLYCOL 3350 17 G/17G
17 POWDER, FOR SOLUTION ORAL DAILY
Qty: 30 EACH | Refills: 3 | Status: SHIPPED | OUTPATIENT
Start: 2019-07-23 | End: 2019-07-26

## 2019-07-23 NOTE — PROGRESS NOTES
I have discussed A/P with DR Winn and agree with plan of action.Subjective:      Patient ID: Eliu Paz is a 79 y.o. female.    Chief Complaint: Constipation and Melena    79 years old female patient with medical history of CAD s/p CABG x1 in 1994 at [facility] s/p PCI at [facility] 3/10/10 ,bilateral carotid artery disease, HLD, HTN, GERD and DM type II , patient is [employer], came in today with black stool.  Patient noticed that her stool turned black almost 1 month ago, she has chronic constipation and takes lactulose once every week to have a bowel movement, last bowel movement was today and it was medium amount black loose stool, she reported feeling fatigued but otherwise no abdominal/rectal pain, shortness of breath, chest pain, palpitation and dizziness. She is taking Asprin since her CABG surgery in 1994, no other blood thinner. Patient taking ranitidine for heart burn twice a day, she had EDG done 2017 and was normal, had colonoscopy done 1/2019 and showed diverticulosis and 1 polyp. Patient not taking iron supplement. Last Hgb 7 months ago was 12.4.       Review of Systems   Constitutional: Positive for fatigue. Negative for activity change and fever.   HENT: Negative for congestion and sore throat.    Eyes: Negative for photophobia and visual disturbance.   Respiratory: Negative for shortness of breath.    Cardiovascular: Negative for chest pain and palpitations.   Gastrointestinal: Positive for blood in stool and constipation. Negative for abdominal pain.   Genitourinary: Negative for decreased urine volume and flank pain.   Skin: Negative for rash and wound.   Neurological: Negative for dizziness and facial asymmetry.   Psychiatric/Behavioral: Negative for agitation.     Objective:     Vitals:    07/23/19 1601   BP: 132/60   Pulse: 64     Physical Exam   Constitutional: She is oriented to person, place, and time. She appears well-developed and well-nourished. No distress.   HENT:   Head:  Normocephalic and atraumatic.   Mouth/Throat: No oropharyngeal exudate.   Eyes: No scleral icterus.   Cardiovascular: Normal rate and regular rhythm.   Pulmonary/Chest: Effort normal and breath sounds normal. No respiratory distress.   Abdominal: Soft. Bowel sounds are normal. She exhibits distension. There is no tenderness.   Genitourinary:   Genitourinary Comments: Rectal tone normal.no external hemorrhoid, has dark brown stool, heme test was positive.    Musculoskeletal: She exhibits no edema or tenderness.   Neurological: She is alert and oriented to person, place, and time.   Skin: No rash noted. She is not diaphoretic.   Psychiatric: She has a normal mood and affect. Her behavior is normal.   Vitals reviewed.    Assessment and Plan :      1. Gastrointestinal hemorrhage with melena    2. Constipation, unspecified constipation type    3. Coronary artery disease involving native coronary artery of native heart without angina pectoris        Eliu Steinberg was seen today for constipation and melena.    Diagnoses and all orders for this visit:    Gastrointestinal hemorrhage with melena  Likely UGI chronic bleed secondary to gastric ulcer, patient has been taking Asprin and had GERD on ranitidine.  Will switch to PPI.  -     CBC auto differential; stat   -     Ambulatory Referral to Gastroenterology     Constipation, unspecified constipation type  -     Daily stool softener     Coronary artery disease involving native coronary artery of native heart without angina pectoris  Will continue Asprin for now, likely benefit overweight the risk .  Start PPI     Other orders  -     omeprazole (PRILOSEC) 40 MG capsule; Take 1 capsule (40 mg total) by mouth every morning. Take 30 minutes before eating on an empty stomach  -     polyethylene glycol (MIRALAX) 17 gram PwPk; Take 17 g by mouth once daily. for 3 days             Memo Winn  Internal Medicine, PGY 3  [phone]    I have discussed A/P with Dr Winn and agree with plan of  action.  Angie Dias

## 2019-07-24 ENCOUNTER — TELEPHONE (OUTPATIENT)
Dept: INTERNAL MEDICINE | Facility: CLINIC | Age: 79
End: 2019-07-24

## 2019-07-24 DIAGNOSIS — K92.2 UGI BLEED: Primary | ICD-10-CM

## 2019-07-24 RX ORDER — TRAMADOL HYDROCHLORIDE 50 MG/1
TABLET ORAL
Qty: 40 TABLET | Refills: 1 | Status: SHIPPED | OUTPATIENT
Start: 2019-07-24 | End: 2020-01-28

## 2019-07-24 NOTE — TELEPHONE ENCOUNTER
Called patient in regard of her CBC result patient has a Hgb drop from 12.4 to 7.9 over 7 month, currently asymptomatic, GI appointment scheduled on 10/31, will order urgent EGD for now, Advised patient to go to ED if developed any chest pain, shortness of breath, palpitation, dizziness or sever fatigue.   Will follow up

## 2019-07-26 ENCOUNTER — PATIENT OUTREACH (OUTPATIENT)
Dept: ADMINISTRATIVE | Facility: OTHER | Age: 79
End: 2019-07-26

## 2019-07-29 ENCOUNTER — TELEPHONE (OUTPATIENT)
Dept: INTERNAL MEDICINE | Facility: CLINIC | Age: 79
End: 2019-07-29

## 2019-07-29 DIAGNOSIS — K92.2 UGI BLEED: Primary | ICD-10-CM

## 2019-07-29 DIAGNOSIS — K92.2 GASTROINTESTINAL HEMORRHAGE, UNSPECIFIED GASTROINTESTINAL HEMORRHAGE TYPE: Primary | ICD-10-CM

## 2019-07-29 NOTE — TELEPHONE ENCOUNTER
Dr aP Davis saw your patient last week with melena and heme+ stools.  H/H had dropped to 7/29% from 11/36%, but she was clinically stable. She has GI appt in October but Dr Winn ordered an EGD ASAP; I can't tell if she had the EGD or not. I am adding H/H to your lab ordered for tomorrow to ensure no worsening in anemia. If so, she probably should be sent to ER or admitted.'Wanted to keep you in the loop.  Angie

## 2019-07-31 DIAGNOSIS — I25.10 CORONARY ARTERY DISEASE DUE TO LIPID RICH PLAQUE: ICD-10-CM

## 2019-07-31 DIAGNOSIS — I25.83 CORONARY ARTERY DISEASE DUE TO LIPID RICH PLAQUE: ICD-10-CM

## 2019-07-31 RX ORDER — ISOSORBIDE MONONITRATE 30 MG/1
30 TABLET, EXTENDED RELEASE ORAL DAILY
Qty: 90 TABLET | Refills: 11 | Status: SHIPPED | OUTPATIENT
Start: 2019-07-31 | End: 2020-09-15 | Stop reason: SDUPTHER

## 2019-08-01 ENCOUNTER — HOSPITAL ENCOUNTER (INPATIENT)
Facility: HOSPITAL | Age: 79
LOS: 2 days | Discharge: HOME | DRG: 378 | End: 2019-08-04
Attending: EMERGENCY MEDICINE | Admitting: INTERNAL MEDICINE
Payer: MEDICARE

## 2019-08-01 DIAGNOSIS — K92.2 GASTROINTESTINAL HEMORRHAGE, UNSPECIFIED GASTROINTESTINAL HEMORRHAGE TYPE: Primary | ICD-10-CM

## 2019-08-01 DIAGNOSIS — D64.9 ANEMIA: ICD-10-CM

## 2019-08-01 LAB
ABO + RH BLD: NORMAL
ALBUMIN SERPL BCP-MCNC: 3.7 G/DL (ref 3.5–5.2)
ALP SERPL-CCNC: 71 U/L (ref 55–135)
ALT SERPL W/O P-5'-P-CCNC: 7 U/L (ref 10–44)
ANION GAP SERPL CALC-SCNC: 11 MMOL/L (ref 8–16)
AST SERPL-CCNC: 20 U/L (ref 10–40)
BASOPHILS # BLD AUTO: 0.03 K/UL (ref 0–0.2)
BASOPHILS NFR BLD: 0.5 % (ref 0–1.9)
BILIRUB SERPL-MCNC: 0.4 MG/DL (ref 0.1–1)
BLD GP AB SCN CELLS X3 SERPL QL: NORMAL
BUN SERPL-MCNC: 18 MG/DL (ref 8–23)
CALCIUM SERPL-MCNC: 9.7 MG/DL (ref 8.7–10.5)
CHLORIDE SERPL-SCNC: 105 MMOL/L (ref 95–110)
CO2 SERPL-SCNC: 24 MMOL/L (ref 23–29)
CREAT SERPL-MCNC: 1.3 MG/DL (ref 0.5–1.4)
DIFFERENTIAL METHOD: ABNORMAL
EOSINOPHIL # BLD AUTO: 0 K/UL (ref 0–0.5)
EOSINOPHIL NFR BLD: 0.5 % (ref 0–8)
ERYTHROCYTE [DISTWIDTH] IN BLOOD BY AUTOMATED COUNT: 18.7 % (ref 11.5–14.5)
EST. GFR  (AFRICAN AMERICAN): 45.7 ML/MIN/1.73 M^2
EST. GFR  (NON AFRICAN AMERICAN): 39.7 ML/MIN/1.73 M^2
FERRITIN SERPL-MCNC: 69 NG/ML (ref 20–300)
FOLATE SERPL-MCNC: 17.2 NG/ML (ref 4–24)
GLUCOSE SERPL-MCNC: 171 MG/DL (ref 70–110)
HCT VFR BLD AUTO: 25.1 % (ref 37–48.5)
HCT VFR BLD AUTO: 26.4 % (ref 37–48.5)
HGB BLD-MCNC: 7.4 G/DL (ref 12–16)
HGB BLD-MCNC: 7.5 G/DL (ref 12–16)
IMM GRANULOCYTES # BLD AUTO: 0.02 K/UL (ref 0–0.04)
IMM GRANULOCYTES NFR BLD AUTO: 0.3 % (ref 0–0.5)
INR PPP: 1 (ref 0.8–1.2)
IRON SERPL-MCNC: 68 UG/DL (ref 30–160)
LYMPHOCYTES # BLD AUTO: 1.6 K/UL (ref 1–4.8)
LYMPHOCYTES NFR BLD: 28.4 % (ref 18–48)
MCH RBC QN AUTO: 28.2 PG (ref 27–31)
MCHC RBC AUTO-ENTMCNC: 28.4 G/DL (ref 32–36)
MCV RBC AUTO: 99 FL (ref 82–98)
MONOCYTES # BLD AUTO: 0.5 K/UL (ref 0.3–1)
MONOCYTES NFR BLD: 7.8 % (ref 4–15)
NEUTROPHILS # BLD AUTO: 3.6 K/UL (ref 1.8–7.7)
NEUTROPHILS NFR BLD: 62.5 % (ref 38–73)
NRBC BLD-RTO: 0 /100 WBC
PLATELET # BLD AUTO: 234 K/UL (ref 150–350)
PMV BLD AUTO: 11.1 FL (ref 9.2–12.9)
POCT GLUCOSE: 129 MG/DL (ref 70–110)
POTASSIUM SERPL-SCNC: 4.8 MMOL/L (ref 3.5–5.1)
PROT SERPL-MCNC: 7.5 G/DL (ref 6–8.4)
PROTHROMBIN TIME: 10.7 SEC (ref 9–12.5)
RBC # BLD AUTO: 2.66 M/UL (ref 4–5.4)
SATURATED IRON: 20 % (ref 20–50)
SODIUM SERPL-SCNC: 140 MMOL/L (ref 136–145)
TOTAL IRON BINDING CAPACITY: 342 UG/DL (ref 250–450)
TRANSFERRIN SERPL-MCNC: 231 MG/DL (ref 200–375)
VIT B12 SERPL-MCNC: >2000 PG/ML (ref 210–950)
WBC # BLD AUTO: 5.77 K/UL (ref 3.9–12.7)

## 2019-08-01 PROCEDURE — 99285 EMERGENCY DEPT VISIT HI MDM: CPT | Mod: 25

## 2019-08-01 PROCEDURE — 85018 HEMOGLOBIN: CPT

## 2019-08-01 PROCEDURE — 80053 COMPREHEN METABOLIC PANEL: CPT

## 2019-08-01 PROCEDURE — G0378 HOSPITAL OBSERVATION PER HR: HCPCS

## 2019-08-01 PROCEDURE — 82607 VITAMIN B-12: CPT

## 2019-08-01 PROCEDURE — C9113 INJ PANTOPRAZOLE SODIUM, VIA: HCPCS | Performed by: INTERNAL MEDICINE

## 2019-08-01 PROCEDURE — 63600175 PHARM REV CODE 636 W HCPCS: Performed by: INTERNAL MEDICINE

## 2019-08-01 PROCEDURE — 85014 HEMATOCRIT: CPT

## 2019-08-01 PROCEDURE — 93010 EKG 12-LEAD: ICD-10-PCS | Mod: ,,, | Performed by: INTERNAL MEDICINE

## 2019-08-01 PROCEDURE — 82746 ASSAY OF FOLIC ACID SERUM: CPT

## 2019-08-01 PROCEDURE — 93005 ELECTROCARDIOGRAM TRACING: CPT

## 2019-08-01 PROCEDURE — 99284 EMERGENCY DEPT VISIT MOD MDM: CPT | Mod: ,,, | Performed by: EMERGENCY MEDICINE

## 2019-08-01 PROCEDURE — 85025 COMPLETE CBC W/AUTO DIFF WBC: CPT

## 2019-08-01 PROCEDURE — 25000003 PHARM REV CODE 250: Performed by: INTERNAL MEDICINE

## 2019-08-01 PROCEDURE — 36415 COLL VENOUS BLD VENIPUNCTURE: CPT

## 2019-08-01 PROCEDURE — 82728 ASSAY OF FERRITIN: CPT

## 2019-08-01 PROCEDURE — 83540 ASSAY OF IRON: CPT

## 2019-08-01 PROCEDURE — 99284 PR EMERGENCY DEPT VISIT,LEVEL IV: ICD-10-PCS | Mod: ,,, | Performed by: EMERGENCY MEDICINE

## 2019-08-01 PROCEDURE — 93010 ELECTROCARDIOGRAM REPORT: CPT | Mod: ,,, | Performed by: INTERNAL MEDICINE

## 2019-08-01 PROCEDURE — 86850 RBC ANTIBODY SCREEN: CPT

## 2019-08-01 PROCEDURE — 85610 PROTHROMBIN TIME: CPT

## 2019-08-01 RX ORDER — SODIUM CHLORIDE 9 MG/ML
INJECTION, SOLUTION INTRAVENOUS CONTINUOUS
Status: DISCONTINUED | OUTPATIENT
Start: 2019-08-01 | End: 2019-08-04 | Stop reason: HOSPADM

## 2019-08-01 RX ORDER — IRON POLYSACCHARIDE COMPLEX 150 MG
150 CAPSULE ORAL DAILY
Status: DISCONTINUED | OUTPATIENT
Start: 2019-08-01 | End: 2019-08-04 | Stop reason: HOSPADM

## 2019-08-01 RX ORDER — GLUCAGON 1 MG
1 KIT INJECTION
Status: DISCONTINUED | OUTPATIENT
Start: 2019-08-01 | End: 2019-08-04 | Stop reason: HOSPADM

## 2019-08-01 RX ORDER — SPIRONOLACTONE 25 MG/1
50 TABLET ORAL DAILY
Status: DISCONTINUED | OUTPATIENT
Start: 2019-08-02 | End: 2019-08-02

## 2019-08-01 RX ORDER — DORZOLAMIDE HYDROCHLORIDE AND TIMOLOL MALEATE 20; 5 MG/ML; MG/ML
1 SOLUTION/ DROPS OPHTHALMIC 2 TIMES DAILY
Status: DISCONTINUED | OUTPATIENT
Start: 2019-08-01 | End: 2019-08-01

## 2019-08-01 RX ORDER — BRIMONIDINE TARTRATE 2 MG/ML
1 SOLUTION/ DROPS OPHTHALMIC 3 TIMES DAILY
Status: DISCONTINUED | OUTPATIENT
Start: 2019-08-01 | End: 2019-08-04 | Stop reason: HOSPADM

## 2019-08-01 RX ORDER — ACETAMINOPHEN 325 MG/1
650 TABLET ORAL EVERY 4 HOURS PRN
Status: DISCONTINUED | OUTPATIENT
Start: 2019-08-01 | End: 2019-08-04 | Stop reason: HOSPADM

## 2019-08-01 RX ORDER — CANDESARTAN 4 MG/1
4 TABLET ORAL DAILY
Status: DISCONTINUED | OUTPATIENT
Start: 2019-08-02 | End: 2019-08-02

## 2019-08-01 RX ORDER — NITROGLYCERIN 0.4 MG/1
0.4 TABLET SUBLINGUAL EVERY 5 MIN PRN
Status: DISCONTINUED | OUTPATIENT
Start: 2019-08-01 | End: 2019-08-04 | Stop reason: HOSPADM

## 2019-08-01 RX ORDER — CYANOCOBALAMIN 1000 UG/ML
1000 INJECTION, SOLUTION INTRAMUSCULAR; SUBCUTANEOUS ONCE
Status: COMPLETED | OUTPATIENT
Start: 2019-08-01 | End: 2019-08-01

## 2019-08-01 RX ORDER — TIMOLOL MALEATE 5 MG/ML
1 SOLUTION/ DROPS OPHTHALMIC 2 TIMES DAILY
Status: DISCONTINUED | OUTPATIENT
Start: 2019-08-01 | End: 2019-08-04 | Stop reason: HOSPADM

## 2019-08-01 RX ORDER — CARVEDILOL 25 MG/1
25 TABLET ORAL 2 TIMES DAILY
Status: DISCONTINUED | OUTPATIENT
Start: 2019-08-01 | End: 2019-08-02

## 2019-08-01 RX ORDER — AMLODIPINE BESYLATE 10 MG/1
10 TABLET ORAL DAILY
Status: DISCONTINUED | OUTPATIENT
Start: 2019-08-02 | End: 2019-08-02

## 2019-08-01 RX ORDER — AMOXICILLIN 250 MG
1 CAPSULE ORAL 2 TIMES DAILY PRN
Status: DISCONTINUED | OUTPATIENT
Start: 2019-08-01 | End: 2019-08-04 | Stop reason: HOSPADM

## 2019-08-01 RX ORDER — LATANOPROST 50 UG/ML
1 SOLUTION/ DROPS OPHTHALMIC NIGHTLY
Status: DISCONTINUED | OUTPATIENT
Start: 2019-08-01 | End: 2019-08-04 | Stop reason: HOSPADM

## 2019-08-01 RX ORDER — ISOSORBIDE MONONITRATE 30 MG/1
30 TABLET, EXTENDED RELEASE ORAL DAILY
Status: DISCONTINUED | OUTPATIENT
Start: 2019-08-02 | End: 2019-08-02

## 2019-08-01 RX ORDER — INSULIN ASPART 100 [IU]/ML
0-5 INJECTION, SOLUTION INTRAVENOUS; SUBCUTANEOUS EVERY 6 HOURS PRN
Status: DISCONTINUED | OUTPATIENT
Start: 2019-08-01 | End: 2019-08-04 | Stop reason: HOSPADM

## 2019-08-01 RX ORDER — LANOLIN ALCOHOL/MO/W.PET/CERES
1000 CREAM (GRAM) TOPICAL DAILY
Status: DISCONTINUED | OUTPATIENT
Start: 2019-08-02 | End: 2019-08-04 | Stop reason: HOSPADM

## 2019-08-01 RX ORDER — SODIUM CHLORIDE 0.9 % (FLUSH) 0.9 %
10 SYRINGE (ML) INJECTION
Status: DISCONTINUED | OUTPATIENT
Start: 2019-08-01 | End: 2019-08-04 | Stop reason: HOSPADM

## 2019-08-01 RX ORDER — ROSUVASTATIN CALCIUM 20 MG/1
40 TABLET, COATED ORAL DAILY
Status: DISCONTINUED | OUTPATIENT
Start: 2019-08-02 | End: 2019-08-04 | Stop reason: HOSPADM

## 2019-08-01 RX ORDER — FOLIC ACID 1 MG/1
1 TABLET ORAL DAILY
Status: DISCONTINUED | OUTPATIENT
Start: 2019-08-01 | End: 2019-08-04 | Stop reason: HOSPADM

## 2019-08-01 RX ORDER — DORZOLAMIDE HCL 20 MG/ML
1 SOLUTION/ DROPS OPHTHALMIC 2 TIMES DAILY
Status: DISCONTINUED | OUTPATIENT
Start: 2019-08-01 | End: 2019-08-04 | Stop reason: HOSPADM

## 2019-08-01 RX ORDER — RAMELTEON 8 MG/1
8 TABLET ORAL NIGHTLY PRN
Status: DISCONTINUED | OUTPATIENT
Start: 2019-08-01 | End: 2019-08-04 | Stop reason: HOSPADM

## 2019-08-01 RX ORDER — OXYCODONE HYDROCHLORIDE 5 MG/1
5 TABLET ORAL EVERY 6 HOURS PRN
Status: DISCONTINUED | OUTPATIENT
Start: 2019-08-01 | End: 2019-08-04 | Stop reason: HOSPADM

## 2019-08-01 RX ORDER — PANTOPRAZOLE SODIUM 40 MG/10ML
40 INJECTION, POWDER, LYOPHILIZED, FOR SOLUTION INTRAVENOUS
Status: COMPLETED | OUTPATIENT
Start: 2019-08-01 | End: 2019-08-01

## 2019-08-01 RX ORDER — TRAMADOL HYDROCHLORIDE 50 MG/1
50 TABLET ORAL 2 TIMES DAILY PRN
Status: DISCONTINUED | OUTPATIENT
Start: 2019-08-01 | End: 2019-08-04 | Stop reason: HOSPADM

## 2019-08-01 RX ORDER — ONDANSETRON 8 MG/1
8 TABLET, ORALLY DISINTEGRATING ORAL EVERY 8 HOURS PRN
Status: DISCONTINUED | OUTPATIENT
Start: 2019-08-01 | End: 2019-08-04 | Stop reason: HOSPADM

## 2019-08-01 RX ORDER — PANTOPRAZOLE SODIUM 40 MG/10ML
40 INJECTION, POWDER, LYOPHILIZED, FOR SOLUTION INTRAVENOUS EVERY 12 HOURS
Status: DISCONTINUED | OUTPATIENT
Start: 2019-08-02 | End: 2019-08-04 | Stop reason: HOSPADM

## 2019-08-01 RX ORDER — PROMETHAZINE HYDROCHLORIDE 25 MG/1
25 TABLET ORAL EVERY 6 HOURS PRN
Status: DISCONTINUED | OUTPATIENT
Start: 2019-08-01 | End: 2019-08-04 | Stop reason: HOSPADM

## 2019-08-01 RX ORDER — SODIUM CHLORIDE 0.9 % (FLUSH) 0.9 %
10 SYRINGE (ML) INJECTION
Status: DISCONTINUED | OUTPATIENT
Start: 2019-08-01 | End: 2019-08-01

## 2019-08-01 RX ADMIN — CARVEDILOL 25 MG: 12.5 TABLET, FILM COATED ORAL at 09:08

## 2019-08-01 RX ADMIN — Medication 150 MG: at 09:08

## 2019-08-01 RX ADMIN — PANTOPRAZOLE SODIUM 40 MG: 40 INJECTION, POWDER, FOR SOLUTION INTRAVENOUS at 10:08

## 2019-08-01 RX ADMIN — TIMOLOL MALEATE 1 DROP: 5 SOLUTION OPHTHALMIC at 09:08

## 2019-08-01 RX ADMIN — SODIUM CHLORIDE: 0.9 INJECTION, SOLUTION INTRAVENOUS at 11:08

## 2019-08-01 RX ADMIN — CYANOCOBALAMIN 1000 MCG: 1000 INJECTION, SOLUTION INTRAMUSCULAR; SUBCUTANEOUS at 09:08

## 2019-08-01 RX ADMIN — FOLIC ACID 1 MG: 1 TABLET ORAL at 10:08

## 2019-08-01 NOTE — ED TRIAGE NOTES
"Patient is a 80 yo  female with a chief complaint of dark stools, nausea and vomiting. The patient's PCP called her at home and asked her to report to the ED after receiving her blood results of a low H/H level. Patient reports dark stools for 1 month. Patient stated she "made herself vomit to get relief from the chest discomfort" and "it's like something is blocking my food". Patient reports dizziness and weakness earlier today. Patient denies shortness of breathe, abdominal pain, and loss of consciousness. Patient's  at bedside.   "

## 2019-08-01 NOTE — TELEPHONE ENCOUNTER
I saw that her anemia has worsened a bit. I called her -- she continues to have tarry black stools, also today some vomiting and burning with eating/sense of dysphagia. I advised she go to ED, she agrees.    [facility]

## 2019-08-01 NOTE — ED PROVIDER NOTES
"Encounter Date: 8/1/2019    SCRIBE #1 NOTE: IJennifer, calvin scribing for, and in the presence of, Mary Anne Lee MD. Other sections scribed: HPI, ROS, PE.       History     Chief Complaint   Patient presents with    Melena     Sent by MD for low H/H. Reports black stool for about 1 month. Denies blood thinners. Reports history of GERD     Time patient was seen by the provider: 4:43 PM      The patient is a 79 y.o. female with co-morbidities including: HTN, HLD, CKD, CAD s/p CABG and multiple stent placements, DM2, GERD, glaucoma, as well as diabetic retinopathy who presents to the ED with a complaint of dark stool that has been present for over a month. Patient's associated symptoms are light-headedness and nausea. She also notes having had 2 episodes emesis this morning- no blood, no coffee grinds today. Patient had blood work done today and was advised to present to the ED due to the results.  Patient states she feels that "something is blocking" her abdomen. Patient endorses having difficulty having a bowel movement without consuming a laxative. Patient was advised to stop consuming aspirin. Patient had a colonoscopy in February 2019. Patient denies abdominal pain, chest pain, and hematemesis.   Per chart pt had recent blood work done which showed a drop in her hgb, repeat hgb today further drop to 6.8.       The history is provided by the patient and the spouse.     Review of patient's allergies indicates:   Allergen Reactions    Penicillins Swelling     Swelling (extremities)    Trulicity [dulaglutide] Nausea Only and Rash     Past Medical History:   Diagnosis Date    Allergy     Arteriosclerosis of arteries of extremities 8/25/2016    Arthritis     Brain infection     Cataract     CKD (chronic kidney disease) stage 3, GFR 30-59 ml/min     Coma     Coronary artery disease     Diabetes mellitus type II 1981    Diabetic neuropathy     Early dry stage nonexudative age-related macular " degeneration of both eyes 6/17/2019    GERD (gastroesophageal reflux disease)     Glaucoma     Hyperlipidemia     Hypertension     Type 2 diabetes mellitus with diabetic peripheral angiopathy without gangrene, with long-term current use of insulin 8/21/2016     Past Surgical History:   Procedure Laterality Date    CARDIAC CATHETERIZATION  03/2010    x 2    CARDIAC SURGERY  1994    CARPAL TUNNEL RELEASE      L    Carpal Tunnel Release, Right hand Right 10/19/2016    Performed by [facility] at [facility] OR [facility]    COLONOSCOPY N/A 2/5/2019    Performed by [facility] Oconnell MD at [facility] ENDO (4TH FLR)    COLONOSCOPY N/A 11/2/2015    Performed by Sanju Clinton MD at [facility] END[facility] (4TH [facility])    CORONARY ARTERY BYPASS GRAFT  08/13/1994    x 1    ESOPHAGOGASTRODUODENOSCOPY (EGD) N/A 4/18/2017    Performed by Hitesh Quiroga MD at [facility] END[facility] (4TH [facility])    ESOPHAGOGASTRODUODENOSCOPY (EGD) N/A 4/27/2015    Performed by Hitesh [facility]iroga [facility] at [facility] ENDO (4TH FLR)    MANOMETRY-ESOPHAGEAL-WITH IMPEDANCE N/A 6/13/2017    Performed by [facility]connell [facility] at [facility] END[facility] (4TH [facility]LR)    RELEASE-FINGER-TRIGGER / Index / Long Right 10/19/2016    Performed by [facility] at [facility] OR [facility]    TUBAL LIGATION  1970s     Family History   Problem Relation Age of Onset    Diabetes Father     Diabetes Brother     Blindness Brother     Diabetes Maternal Grandmother     Diabetes Paternal Aunt     Diabetes Paternal Uncle     Amblyopia Neg Hx     Cancer Neg Hx     Cataracts Neg Hx     Glaucoma Neg Hx     Hypertension Neg Hx     Macular degeneration Neg Hx     Retinal detachment Neg Hx     Strabismus Neg Hx     Stroke Neg Hx     Thyroid disease Neg Hx     Colon cancer Neg Hx     Esophageal cancer Neg Hx     Stomach cancer Neg Hx     Rectal cancer Neg Hx     Ulcerative colitis Neg Hx     Crohn's disease Neg Hx     Irritable bowel syndrome Neg Hx     Celiac disease Neg Hx     Eczema Neg Hx     Lupus Neg Hx      Psoriasis Neg Hx     Melanoma Neg Hx      Social History     Tobacco Use    Smoking status: Former Smoker     Years: 2.00     Last attempt to quit: 3/25/1963     Years since quittin.3    Smokeless tobacco: Never Used   Substance Use Topics    Alcohol use: No    Drug use: No     Review of Systems   Constitutional: Negative for appetite change.   HENT: Negative for trouble swallowing.    Respiratory: Negative for chest tightness and shortness of breath.    Cardiovascular: Negative for chest pain.   Gastrointestinal: Positive for nausea and vomiting. Negative for abdominal distention and abdominal pain.   Genitourinary: Negative for dysuria.   Musculoskeletal: Negative for back pain.   Skin: Negative for rash.   Neurological: Positive for light-headedness.   Hematological: Does not bruise/bleed easily.       Physical Exam     Initial Vitals [19 1540]   BP Pulse Resp Temp SpO2   125/62 69 16 98.8 °F (37.1 °C) 99 %      MAP       --         Physical Exam    Nursing note and vitals reviewed.  Constitutional: She appears well-developed and well-nourished. No distress.   HENT:   Head: Normocephalic and atraumatic.   Eyes: EOM are normal. Pupils are equal, round, and reactive to light. No scleral icterus.   Neck: Neck supple.   Cardiovascular: Normal rate, regular rhythm, normal heart sounds and intact distal pulses. Exam reveals no gallop and no friction rub.    No murmur heard.  Pulmonary/Chest: Breath sounds normal. No respiratory distress. She has no wheezes. She has no rhonchi. She has no rales. She exhibits no tenderness.   Abdominal: Soft. Bowel sounds are normal. She exhibits no distension. There is tenderness. There is no rebound and no guarding.   Minimum tenderness in epigastrium.    Genitourinary: Rectal exam shows guaiac positive stool. Guaiac positive stool. : Acceptable.  Musculoskeletal: Normal range of motion. She exhibits no edema or tenderness.   No lower extremity  edema.    Neurological: She is alert and oriented to person, place, and time. No cranial nerve deficit.   Skin: Skin is warm and dry.   Psychiatric: Her behavior is normal. Thought content normal.         ED Course   Procedures  Labs Reviewed   CBC W/ AUTO DIFFERENTIAL - Abnormal; Notable for the following components:       Result Value    RBC 2.66 (*)     Hemoglobin 7.5 (*)     Hematocrit 26.4 (*)     Mean Corpuscular Volume 99 (*)     Mean Corpuscular Hemoglobin Conc 28.4 (*)     RDW 18.7 (*)     All other components within normal limits   COMPREHENSIVE METABOLIC PANEL - Abnormal; Notable for the following components:    Glucose 171 (*)     ALT 7 (*)     eGFR if  45.7 (*)     eGFR if non  39.7 (*)     All other components within normal limits   VITAMIN B12 - Abnormal; Notable for the following components:    Vitamin B-12 >2000 (*)     All other components within normal limits   PROTIME-INR   IRON AND TIBC   FERRITIN   FOLATE   POCT H. PYLORI   TYPE & SCREEN   POCT GLUCOSE MONITORING CONTINUOUS     EKG Readings: (Independently Interpreted)   Sinus 65, no acute ischemic changes       Imaging Results    None          Medical Decision Making:   History:   Old Medical Records: I decided to obtain old medical records.  Initial Assessment:   78 y/o F presents for emergent evaluation of symptomatic anemia and dark stools over the past month.  hgb drop to under 7 from outpt labs today.  2 PIV  Routine blood work, T&S  Admit for serial hgb and GI evaluation possible EGD    Pt is a Jehovahs witness- states she is refusing blood transfusion at this time..  Independently Interpreted Test(s):   I have ordered and independently interpreted EKG Reading(s) - see prior notes  Clinical Tests:   Lab Tests: Ordered and Reviewed  Medical Tests: Ordered and Reviewed  Other:   I have discussed this case with another health care provider.       <> Summary of the Discussion: Hospital medicine             Scribe Attestation:   Scribe #1: I performed the above scribed service and the documentation accurately describes the services I performed. I attest to the accuracy of the note.    Attending Attestation:             Attending ED Notes:   6:04 PM  hgb of 7.5  Admit observation status  protonix 40mg IV             Clinical Impression:       ICD-10-CM ICD-9-CM   1. Gastrointestinal hemorrhage, unspecified gastrointestinal hemorrhage type K92.2 578.9   2. Anemia D64.9 285.9         Disposition:   Disposition: Placed in Observation  Condition: Stable                        Mary Anne Lee MD  08/01/19 [id number]

## 2019-08-01 NOTE — H&P
"[facility] Hospital Medicine  History & Physical    Patient Name: Eliu Paz  MRN: [id number]  Admission Date: 8/1/2019  Attending Physician: JANET Mcdonald MD   Primary Care Provider: Ryan Carlson MD    [facility] Medicine Team: [facility] HOSP MED E ANGELA Mcdonald MD     Patient information was obtained from patient, past medical records and ER records.     Subjective:     Principal Problem:Gastrointestinal hemorrhage    Chief Complaint:   Chief Complaint   Patient presents with    Melena     Sent by MD for low H/H. Reports black stool for about 1 month. Denies blood thinners. Reports history of GERD        HPI: Ms. Paz is a very nice 78yo lady with a past medical history of DM2, CKD3, and CAD.  She is also followed closely by GI due to a past history of GERD, LA grade A esophagitis, and gastroparesis.  She was last seen in GI clinic on 4/30/19, at which time they noted, "Ms. Paz's last GI visit was with me in 1/2019. previous GES revealed delayed gastric emptying. 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  Previously treated with acitigall for GB stones.  She is treating her acid reflux with zantac 150 mg 2 times daily with good control of pyrosis. She does have a hx of LA grade A esophagitis w/ documented healing. She had been on daily PPIs in the past, but opted to switch to H2RAs in stead d/t potential s/e of long term PPI use."     Her last EGD was on (4/18/17), and revealed a normal esophagus, erythematous mucosa in the stomach and a normal examined duodenum.  Of note, she is Orthodox, and refuses all blood products.  For her CAD she has been on long term ASA 81 mg PO Qday.    She states that she is chronically constipated and always has GERD symptoms with epigastric burning.  When the constipation starts, she normally, "has to take a laxative, and then when everything finally comes out, it's all black."  This has been going on for many " months now, so an EGD was scheduled for 8/8/19, but routine labs revealed significant anemia, so it was recommended that she come to the ED.  She has had some nausea and occasional emesis.    Past Medical History:   Diagnosis Date    Allergy     Arteriosclerosis of arteries of extremities 8/25/2016    Arthritis     Brain infection     Cataract     CKD (chronic kidney disease) stage 3, GFR 30-59 ml/min     Coma     Coronary artery disease     Diabetes mellitus type II 1981    Diabetic neuropathy     Early dry stage nonexudative age-related macular degeneration of both eyes 6/17/2019    GERD (gastroesophageal reflux disease)     Glaucoma     Hyperlipidemia     Hypertension     Type 2 diabetes mellitus with diabetic peripheral angiopathy without gangrene, with long-term current use of insulin 8/21/2016       Past Surgical History:   Procedure Laterality Date    CARDIAC CATHETERIZATION  03/2010    x 2    CARDIAC SURGERY  1994    CARPAL TUNNEL RELEASE      L    Carpal Tunnel Release, Right hand Right 10/19/2016    Performed by [facility]arnie Sanders [facility] at [facility] OR 1ST FLR    COLONOSCOPY N/A 2/5/2019    Performed by Kenneth Oconnell MD at [facility] ENDO (4TH FLR)    COLONOSCOPY N/A 11/2/2015    Performed by Sanju Clinton MD at [facility] ENDO (4TH FLR)    CORONARY ARTERY BYPASS GRAFT  08/13/1994    x 1    ESOPHAGOGASTRODUODENOSCOPY (EGD) N/A 4/18/2017    Performed by Hitesh Quiroga MD at [facility] ENDO (4TH FLR)    ESOPHAGOGASTRODUODENOSCOPY (EGD) N/A 4/27/2015    Performed by Hitesh Quiroga MD at [facility] ENDO (4TH FLR)    MANOMETRY-ESOPHAGEAL-WITH IMPEDANCE N/A 6/13/2017    Performed by Kenneth Oconnell MD at [facility] ENDO (4TH FLR)    RELEASE-FINGER-TRIGGER / Index / Long Right 10/19/2016    Performed by Marnie Sanders MD at [facility] OR 1ST FLR    TUBAL LIGATION  1970s       Review of patient's allergies indicates:   Allergen Reactions    Penicillins Swelling     Swelling (extremities)    Trulicity [dulaglutide] Nausea  "Only and Rash       No current facility-administered medications on file prior to encounter.      Current Outpatient Medications on File Prior to Encounter   Medication Sig    amLODIPine (NORVASC) 10 MG tablet TAKE 1 TABLET (10 MG TOTAL) BY MOUTH ONCE DAILY.    blood sugar diagnostic (ONETOUCH VERIO) Strp Inject 1 each into the skin 3 (three) times daily with meals. Use to test blood sugar four times a day.    brimonidine 0.2% (ALPHAGAN) 0.2 % Drop Place 1 drop into both eyes 3 (three) times daily.    candesartan (ATACAND) 4 MG tablet Take 1 tablet (4 mg total) by mouth once daily.    carvedilol (COREG) 25 MG tablet TAKE 1 TABLET (25 MG TOTAL) BY MOUTH 2 (TWO) TIMES DAILY.    ciclopirox (PENLAC) 8 % Soln Apply topically nightly.    dorzolamide-timolol 2-0.5% (COSOPT) 22.3-6.8 mg/mL ophthalmic solution Place 1 drop into both eyes 2 (two) times daily.    insulin asp prt-insulin aspart, NOVOLOG 70/30, (NOVOLOG MIX 70-30 U-100 INSULN) 100 unit/mL (70-30) Soln INJECT 18 UNITS INTO THE SKIN 2 (TWO) TIMES DAILY BEFORE MEALS.    insulin syringe-needle U-100 (BD INSULIN SYRINGE ULTRA-FINE) 0.5 mL 31 gauge x 5/16" Syrg USE TO INJECT TWICE DAILY WITH INSULIN INJECTIONS    omeprazole (PRILOSEC) 40 MG capsule Take 1 capsule (40 mg total) by mouth every morning. Take 30 minutes before eating on an empty stomach    tiZANidine (ZANAFLEX) 4 MG tablet 1/2 to 1 tab every 6 hours as needed    traMADol (ULTRAM) 50 mg tablet TAKE 1 TABLET BY MOUTH TWICE A DAY AS NEEDED    aspirin (ECOTRIN) 81 MG EC tablet Take 1 tablet (81 mg total) by mouth once daily.    clobetasol 0.05% (TEMOVATE) 0.05 % Oint Apply small amount to vulva area twice a day for 4 weeks then once a day for 4 weeks then once a day on Mondays and Thursdays    fluocinonide (LIDEX) 0.05 % external solution Apply topically 2 (two) times daily.    isosorbide mononitrate (IMDUR) 30 MG 24 hr tablet TAKE 1 TABLET (30 MG TOTAL) BY MOUTH ONCE DAILY.    ketoconazole " "(NIZORAL) 2 % cream Apply topically once daily.    ketoconazole (NIZORAL) 2 % shampoo Apply topically every other day.    lactulose (CHRONULAC) 10 gram/15 mL solution TAKE 30 MLS (20 G TOTAL) BY MOUTH DAILY AS NEEDED (CONSTIPATION).    latanoprost 0.005 % ophthalmic solution Place 1 drop into both eyes every evening.    nitroGLYCERIN 0.4 MG/DOSE TL SPRY (NITROLINGUAL) 400 mcg/spray spray PLACE 2 SPRAYS UNDER THE TONGUE EVERY 5 MINUTES AS NEEDED FOR CHEST PAIN    rosuvastatin (CRESTOR) 40 MG Tab TAKE 1 TABLET BY MOUTH ONCE DAILY.    spironolactone (ALDACTONE) 25 MG tablet TAKE 2 TABLETS BY MOUTH EVERY DAY    triamcinolone acetonide 0.1% (KENALOG) 0.1 % ointment Apply topically 2 (two) times daily. Apply small amount to itchy areas on body BID PRN.  Do not use on face or in groin.     Family History     Problem Relation (Age of Onset)    Blindness Brother    Diabetes Father, Brother, Maternal Grandmother, Paternal Aunt, Paternal Uncle        Tobacco Use    Smoking status: Former Smoker     Years: 2.00     Last attempt to quit: 3/25/1963     Years since quittin.3    Smokeless tobacco: Never Used   Substance and Sexual Activity    Alcohol use: No    Drug use: No    Sexual activity: Not Currently     Partners: Male     Review of Systems   Constitutional: Positive for fatigue. Negative for chills and fever.   HENT: Negative for congestion.    Eyes: Negative for itching.   Respiratory: Positive for shortness of breath. Negative for cough.    Cardiovascular: Negative for chest pain.   Gastrointestinal: Positive for abdominal pain, constipation, nausea and vomiting. Negative for diarrhea.        +GERD and epigastric "burning"   Endocrine: Positive for cold intolerance.   Genitourinary: Negative for dysuria.   Musculoskeletal: Positive for back pain.   Skin: Negative for pallor.   Allergic/Immunologic: Negative for immunocompromised state.   Neurological: Negative for dizziness and weakness.   Hematological: " Does not bruise/bleed easily.   Psychiatric/Behavioral: Negative for confusion. The patient is not nervous/anxious.      Objective:     Vital Signs (Most Recent):  Temp: 98.8 °F (37.1 °C) (08/01/19 1540)  Pulse: (!) 59 (08/01/19 1848)  Resp: 18 (08/01/19 1848)  BP: (!) 118/59 (08/01/19 1848)  SpO2: 100 % (08/01/19 1848) Vital Signs (24h Range):  Temp:  [98.8 °F (37.1 °C)] 98.8 °F (37.1 °C)  Pulse:  [59-69] 59  Resp:  [16-18] 18  SpO2:  [99 %-100 %] 100 %  BP: (118-125)/(59-62) 118/59     Weight: 79.8 kg (176 lb)  Body mass index is 34.37 kg/m².    Physical Exam   Constitutional: She is oriented to person, place, and time. She appears well-developed and well-nourished.  Non-toxic appearance. She does not have a sickly appearance. She does not appear ill. No distress.   HENT:   Head: Normocephalic and atraumatic.   Mouth/Throat: No oropharyngeal exudate.   Hard of hearing   Eyes: Pupils are equal, round, and reactive to light. Conjunctivae and EOM are normal. Right eye exhibits no discharge. Left eye exhibits no discharge. No scleral icterus.   Neck: Normal range of motion. Neck supple. No JVD present. No tracheal deviation present. No thyromegaly present.   Cardiovascular: Normal rate, regular rhythm, normal heart sounds and intact distal pulses. Exam reveals no gallop and no friction rub.   No murmur heard.  Sternotomy scar   Pulmonary/Chest: Effort normal and breath sounds normal. No accessory muscle usage or stridor. No tachypnea and no bradypnea. No respiratory distress. She has no wheezes. She has no rales. She exhibits no tenderness.   Abdominal: Soft. Bowel sounds are normal. She exhibits no distension and no mass. There is tenderness in the epigastric area. There is no rebound and no guarding.   Genitourinary:   Genitourinary Comments: No todd in place   Musculoskeletal: Normal range of motion. She exhibits no edema or tenderness.   Lymphadenopathy:     She has no cervical adenopathy.   No edema to legs    Neurological: She is alert and oriented to person, place, and time. She displays normal reflexes. No cranial nerve deficit. She exhibits normal muscle tone. Coordination normal. GCS eye subscore is 4. GCS verbal subscore is 5. GCS motor subscore is 6.   Skin: Skin is warm and dry. No rash noted. She is not diaphoretic. No erythema. No pallor.   Psychiatric: She has a normal mood and affect. Her speech is normal and behavior is normal. Judgment and thought content normal. Cognition and memory are normal.   Nursing note and vitals reviewed.        CRANIAL NERVES     CN III, IV, VI   Pupils are equal, round, and reactive to light.  Extraocular motions are normal.     All labs, radiographs and EKG's personally reviewed:  Significant Labs:   Recent Results (from the past 24 hour(s))   Hemoglobin A1c    Collection Time: 08/01/19  9:11 AM   Result Value Ref Range    Hemoglobin A1C 5.7 (H) 4.0 - 5.6 %    Estimated Avg Glucose 117 68 - 131 mg/dL   CBC Without Differential    Collection Time: 08/01/19  9:11 AM   Result Value Ref Range    WBC 4.48 3.90 - 12.70 K/uL    RBC 2.42 (L) 4.00 - 5.40 M/uL    Hemoglobin 6.8 (L) 12.0 - 16.0 g/dL    Hematocrit 23.7 (L) 37.0 - 48.5 %    Mean Corpuscular Volume 98 82 - 98 fL    Mean Corpuscular Hemoglobin 28.1 27.0 - 31.0 pg    Mean Corpuscular Hemoglobin Conc 28.7 (L) 32.0 - 36.0 g/dL    RDW 18.5 (H) 11.5 - 14.5 %    Platelets 217 150 - 350 K/uL    MPV 10.2 9.2 - 12.9 fL   CBC auto differential    Collection Time: 08/01/19  4:52 PM   Result Value Ref Range    WBC 5.77 3.90 - 12.70 K/uL    RBC 2.66 (L) 4.00 - 5.40 M/uL    Hemoglobin 7.5 (L) 12.0 - 16.0 g/dL    Hematocrit 26.4 (L) 37.0 - 48.5 %    Mean Corpuscular Volume 99 (H) 82 - 98 fL    Mean Corpuscular Hemoglobin 28.2 27.0 - 31.0 pg    Mean Corpuscular Hemoglobin Conc 28.4 (L) 32.0 - 36.0 g/dL    RDW 18.7 (H) 11.5 - 14.5 %    Platelets 234 150 - 350 K/uL    MPV 11.1 9.2 - 12.9 fL    Immature Granulocytes 0.3 0.0 - 0.5 %    Gran #  (ANC) 3.6 1.8 - 7.7 K/uL    Immature Grans (Abs) 0.02 0.00 - 0.04 K/uL    Lymph # 1.6 1.0 - 4.8 K/uL    Mono # 0.5 0.3 - 1.0 K/uL    Eos # 0.0 0.0 - 0.5 K/uL    Baso # 0.03 0.00 - 0.20 K/uL    nRBC 0 0 /100 WBC    Gran% 62.5 38.0 - 73.0 %    Lymph% 28.4 18.0 - 48.0 %    Mono% 7.8 4.0 - 15.0 %    Eosinophil% 0.5 0.0 - 8.0 %    Basophil% 0.5 0.0 - 1.9 %    Differential Method Automated    Comprehensive metabolic panel    Collection Time: 08/01/19  4:52 PM   Result Value Ref Range    Sodium 140 136 - 145 mmol/L    Potassium 4.8 3.5 - 5.1 mmol/L    Chloride 105 95 - 110 mmol/L    CO2 24 23 - 29 mmol/L    Glucose 171 (H) 70 - 110 mg/dL    BUN, Bld 18 8 - 23 mg/dL    Creatinine 1.3 0.5 - 1.4 mg/dL    Calcium 9.7 8.7 - 10.5 mg/dL    Total Protein 7.5 6.0 - 8.4 g/dL    Albumin 3.7 3.5 - 5.2 g/dL    Total Bilirubin 0.4 0.1 - 1.0 mg/dL    Alkaline Phosphatase 71 55 - 135 U/L    AST 20 10 - 40 U/L    ALT 7 (L) 10 - 44 U/L    Anion Gap 11 8 - 16 mmol/L    eGFR if African American 45.7 (A) >60 mL/min/1.73 m^2    eGFR if non  39.7 (A) >60 mL/min/1.73 m^2   Protime-INR    Collection Time: 08/01/19  4:52 PM   Result Value Ref Range    Prothrombin Time 10.7 9.0 - 12.5 sec    INR 1.0 0.8 - 1.2   Type & Screen    Collection Time: 08/01/19  4:52 PM   Result Value Ref Range    Group & Rh O POS     Indirect Melvin NEG    Results for ANNABELLE MANCUSO (MRN [id number]) as of 8/1/2019 18:34   Ref. Range 12/25/2018 13:27 12/27/2018 11:40 7/23/2019 17:15 8/1/2019 09:11 8/1/2019 16:52   Hemoglobin Latest Ref Range: 12.0 - 16.0 g/dL 12.4  7.9 (L) 6.8 (L) 7.5 (L)   Hematocrit Latest Ref Range: 37.0 - 48.5 % 39.2  26.0 (L) 23.7 (L) 26.4 (L)   MCV Latest Ref Range: 82 - 98 fL 88  94 98 99 (H)     Significant Imaging:   No radiographs done in the ED    2D ECHO CFD 12/13/17:    1 - Moderate left atrial enlargement.     2 - Normal left ventricular systolic function (EF 60-65%).     3 - No wall motion abnormalities.     4 - Indeterminate  LV diastolic function.     5 - Normal right ventricular systolic function .     6 - The estimated PA systolic pressure is 29 mmHg.   This document has been electronically    SIGNED BY: Karl George MD On: 12/13/2017 12:27    Assessment/Plan:       Gastrointestinal hemorrhage   Melena  -pantoprazole injection 40 mg, 40 mg, Intravenous, ED 1 Time   -[START ON 8/2/2019] pantoprazole injection 40 mg, 40 mg, Intravenous, Q12H  -Check H.pylori Sab  -GI consult for EGD  -Hold home ASA  -Avoid all NSAIDS, heparinoids  -IV fluids    Acute post-hemorrhagic anemia  -Patient is a Islam, refuses all blood products  -Plan for bone marrow support:   -cyanocobalamin injection 100 mcg, Subcutaneous, Once    -[START ON 8/2/2019] cyanocobalamin tablet 1,000 mcg, Oral, Daily   -folic acid tablet 1 mg, 1 mg, Oral, Daily, JANET Mcdonald MD   -Iron polysaccharides capsule 150 mg, 150 mg, Oral, Daily  -If Fe levels very low, plan for IV Ferrlecit in am  -Checking B12, folate, Fe studies now  -Serial H/H q8 6 hours    Diabetes mellitus 2, uncontrolled with CKD 3  -Home regime:   -insulin asp prt-insulin aspart, NOVOLOG 70/30, 100 unit/mL (70-30) Soln   -INJECT 18 UNITS INTO THE SKIN 2 (TWO) TIMES DAILY BEFORE MEALS  -Holding NPO for possible EGD  -For now, will plan Levemir 10 Units x 1 and low dose SSI    Coronary artery disease, native heart without angina  -rosuvastatin tablet 40 mg, 40 mg, Oral, Daily  -carvedilol tablet 25 mg, 25 mg, Oral, BID  -isosorbide mononitrate 24 hr tablet 30 mg, 30 mg, Oral, Daily  -SL NTG 0.4 mg q5 minutes x 3 prn CP  -Holding home ASA for GIB    Essential hypertension  spironolactone tablet 50 mg, 50 mg, Oral, Daily  candesartan tablet 4 mg, 4 mg, Oral, Daily  amLODIPine tablet 10 mg, 10 mg, Oral, Daily  carvedilol tablet 25 mg, 25 mg, Oral, BID    Glaucoma  -brimonidine 0.2% (ALPHAGAN) 0.2 % Drop, Place 1 drop into both eyes 3 (three) times daily  -dorzolamide 2 % ophthalmic solution 1  drop, 1 drop, Both Eyes, BID   -timolol maleate 0.5% ophthalmic solution 1 drop, 1 drop, Both Eyes, BID  -latanoprost 0.005 % ophthalmic solution 1 drop, 1 drop, Both Eyes, QHS    VTE Risk Mitigation (From admission, onward)        Ordered     Place MONY hose  Until discontinued      08/01/19 1854     Place sequential compression device  Until discontinued      08/01/19 1854     Reason for No Pharmacological VTE Prophylaxis  Once      08/01/19 1854     IP VTE HIGH RISK PATIENT  Once      08/01/19 1854            ANGELA Mcdonald MD  Department of Hospital Medicine   [facility]

## 2019-08-02 ENCOUNTER — ANESTHESIA (OUTPATIENT)
Dept: ENDOSCOPY | Facility: HOSPITAL | Age: 79
DRG: 378 | End: 2019-08-02
Payer: MEDICARE

## 2019-08-02 ENCOUNTER — ANESTHESIA EVENT (OUTPATIENT)
Dept: ENDOSCOPY | Facility: HOSPITAL | Age: 79
DRG: 378 | End: 2019-08-02
Payer: MEDICARE

## 2019-08-02 PROBLEM — D53.9 MACROCYTIC ANEMIA: Status: ACTIVE | Noted: 2019-08-02

## 2019-08-02 LAB
ALBUMIN SERPL BCP-MCNC: 3.1 G/DL (ref 3.5–5.2)
ALP SERPL-CCNC: 59 U/L (ref 55–135)
ALT SERPL W/O P-5'-P-CCNC: 6 U/L (ref 10–44)
ANION GAP SERPL CALC-SCNC: 10 MMOL/L (ref 8–16)
AST SERPL-CCNC: 17 U/L (ref 10–40)
BASOPHILS # BLD AUTO: 0.02 K/UL (ref 0–0.2)
BASOPHILS # BLD AUTO: 0.04 K/UL (ref 0–0.2)
BASOPHILS NFR BLD: 0.4 % (ref 0–1.9)
BASOPHILS NFR BLD: 0.8 % (ref 0–1.9)
BILIRUB SERPL-MCNC: 0.4 MG/DL (ref 0.1–1)
BUN SERPL-MCNC: 18 MG/DL (ref 8–23)
CALCIUM SERPL-MCNC: 8.9 MG/DL (ref 8.7–10.5)
CHLORIDE SERPL-SCNC: 108 MMOL/L (ref 95–110)
CO2 SERPL-SCNC: 22 MMOL/L (ref 23–29)
CREAT SERPL-MCNC: 1.1 MG/DL (ref 0.5–1.4)
DIFFERENTIAL METHOD: ABNORMAL
DIFFERENTIAL METHOD: ABNORMAL
EOSINOPHIL # BLD AUTO: 0.1 K/UL (ref 0–0.5)
EOSINOPHIL # BLD AUTO: 0.1 K/UL (ref 0–0.5)
EOSINOPHIL NFR BLD: 1.3 % (ref 0–8)
EOSINOPHIL NFR BLD: 1.7 % (ref 0–8)
ERYTHROCYTE [DISTWIDTH] IN BLOOD BY AUTOMATED COUNT: 18.6 % (ref 11.5–14.5)
ERYTHROCYTE [DISTWIDTH] IN BLOOD BY AUTOMATED COUNT: 18.7 % (ref 11.5–14.5)
EST. GFR  (AFRICAN AMERICAN): 56 ML/MIN/1.73 M^2
EST. GFR  (NON AFRICAN AMERICAN): 48.5 ML/MIN/1.73 M^2
ESTIMATED AVG GLUCOSE: 114 MG/DL (ref 68–131)
GLUCOSE SERPL-MCNC: 150 MG/DL (ref 70–110)
HBA1C MFR BLD HPLC: 5.6 % (ref 4–5.6)
HCT VFR BLD AUTO: 23.4 % (ref 37–48.5)
HCT VFR BLD AUTO: 23.6 % (ref 37–48.5)
HGB BLD-MCNC: 6.6 G/DL (ref 12–16)
HGB BLD-MCNC: 6.7 G/DL (ref 12–16)
IMM GRANULOCYTES # BLD AUTO: 0.01 K/UL (ref 0–0.04)
IMM GRANULOCYTES # BLD AUTO: 0.01 K/UL (ref 0–0.04)
IMM GRANULOCYTES NFR BLD AUTO: 0.2 % (ref 0–0.5)
IMM GRANULOCYTES NFR BLD AUTO: 0.2 % (ref 0–0.5)
LYMPHOCYTES # BLD AUTO: 1.7 K/UL (ref 1–4.8)
LYMPHOCYTES # BLD AUTO: 1.8 K/UL (ref 1–4.8)
LYMPHOCYTES NFR BLD: 36.6 % (ref 18–48)
LYMPHOCYTES NFR BLD: 38.6 % (ref 18–48)
MAGNESIUM SERPL-MCNC: 2.1 MG/DL (ref 1.6–2.6)
MCH RBC QN AUTO: 28.6 PG (ref 27–31)
MCH RBC QN AUTO: 28.8 PG (ref 27–31)
MCHC RBC AUTO-ENTMCNC: 28.2 G/DL (ref 32–36)
MCHC RBC AUTO-ENTMCNC: 28.4 G/DL (ref 32–36)
MCV RBC AUTO: 101 FL (ref 82–98)
MCV RBC AUTO: 101 FL (ref 82–98)
MONOCYTES # BLD AUTO: 0.6 K/UL (ref 0.3–1)
MONOCYTES # BLD AUTO: 0.6 K/UL (ref 0.3–1)
MONOCYTES NFR BLD: 12 % (ref 4–15)
MONOCYTES NFR BLD: 13.4 % (ref 4–15)
NEUTROPHILS # BLD AUTO: 2.2 K/UL (ref 1.8–7.7)
NEUTROPHILS # BLD AUTO: 2.3 K/UL (ref 1.8–7.7)
NEUTROPHILS NFR BLD: 47.1 % (ref 38–73)
NEUTROPHILS NFR BLD: 47.7 % (ref 38–73)
NRBC BLD-RTO: 0 /100 WBC
NRBC BLD-RTO: 0 /100 WBC
PHOSPHATE SERPL-MCNC: 3.9 MG/DL (ref 2.7–4.5)
PLATELET # BLD AUTO: 192 K/UL (ref 150–350)
PLATELET # BLD AUTO: 197 K/UL (ref 150–350)
PMV BLD AUTO: 10.7 FL (ref 9.2–12.9)
PMV BLD AUTO: 10.7 FL (ref 9.2–12.9)
POCT GLUCOSE: 130 MG/DL (ref 70–110)
POCT GLUCOSE: 145 MG/DL (ref 70–110)
POCT GLUCOSE: 154 MG/DL (ref 70–110)
POCT GLUCOSE: 166 MG/DL (ref 70–110)
POCT GLUCOSE: 177 MG/DL (ref 70–110)
POCT GLUCOSE: 213 MG/DL (ref 70–110)
POTASSIUM SERPL-SCNC: 4.3 MMOL/L (ref 3.5–5.1)
PROT SERPL-MCNC: 6.3 G/DL (ref 6–8.4)
RBC # BLD AUTO: 2.31 M/UL (ref 4–5.4)
RBC # BLD AUTO: 2.33 M/UL (ref 4–5.4)
SODIUM SERPL-SCNC: 140 MMOL/L (ref 136–145)
WBC # BLD AUTO: 4.58 K/UL (ref 3.9–12.7)
WBC # BLD AUTO: 4.76 K/UL (ref 3.9–12.7)

## 2019-08-02 PROCEDURE — 25000003 PHARM REV CODE 250: Performed by: INTERNAL MEDICINE

## 2019-08-02 PROCEDURE — 43255 PR EGD, FLEX, W/CTRL BLEED, ANY METHOD: ICD-10-PCS | Mod: ,,, | Performed by: INTERNAL MEDICINE

## 2019-08-02 PROCEDURE — C9113 INJ PANTOPRAZOLE SODIUM, VIA: HCPCS | Performed by: INTERNAL MEDICINE

## 2019-08-02 PROCEDURE — 99223 1ST HOSP IP/OBS HIGH 75: CPT | Mod: 25,GC,, | Performed by: INTERNAL MEDICINE

## 2019-08-02 PROCEDURE — 63600175 PHARM REV CODE 636 W HCPCS: Performed by: NURSE ANESTHETIST, CERTIFIED REGISTERED

## 2019-08-02 PROCEDURE — 36415 COLL VENOUS BLD VENIPUNCTURE: CPT

## 2019-08-02 PROCEDURE — 99223 PR INITIAL HOSPITAL CARE,LEVL III: ICD-10-PCS | Mod: 25,GC,, | Performed by: INTERNAL MEDICINE

## 2019-08-02 PROCEDURE — D9220A PRA ANESTHESIA: ICD-10-PCS | Mod: CRNA,,, | Performed by: NURSE ANESTHETIST, CERTIFIED REGISTERED

## 2019-08-02 PROCEDURE — 43255 EGD CONTROL BLEEDING ANY: CPT | Performed by: INTERNAL MEDICINE

## 2019-08-02 PROCEDURE — 80053 COMPREHEN METABOLIC PANEL: CPT

## 2019-08-02 PROCEDURE — 27202087 HC PROBE, APC: Performed by: INTERNAL MEDICINE

## 2019-08-02 PROCEDURE — 83036 HEMOGLOBIN GLYCOSYLATED A1C: CPT

## 2019-08-02 PROCEDURE — 63600175 PHARM REV CODE 636 W HCPCS: Performed by: PHYSICIAN ASSISTANT

## 2019-08-02 PROCEDURE — 99233 PR SUBSEQUENT HOSPITAL CARE,LEVL III: ICD-10-PCS | Mod: ,,, | Performed by: PHYSICIAN ASSISTANT

## 2019-08-02 PROCEDURE — D9220A PRA ANESTHESIA: ICD-10-PCS | Mod: ANES,,, | Performed by: ANESTHESIOLOGY

## 2019-08-02 PROCEDURE — 43255 EGD CONTROL BLEEDING ANY: CPT | Mod: ,,, | Performed by: INTERNAL MEDICINE

## 2019-08-02 PROCEDURE — D9220A PRA ANESTHESIA: Mod: ANES,,, | Performed by: ANESTHESIOLOGY

## 2019-08-02 PROCEDURE — 83735 ASSAY OF MAGNESIUM: CPT

## 2019-08-02 PROCEDURE — 84100 ASSAY OF PHOSPHORUS: CPT

## 2019-08-02 PROCEDURE — 82962 GLUCOSE BLOOD TEST: CPT | Performed by: INTERNAL MEDICINE

## 2019-08-02 PROCEDURE — 37000009 HC ANESTHESIA EA ADD 15 MINS: Performed by: INTERNAL MEDICINE

## 2019-08-02 PROCEDURE — 86677 HELICOBACTER PYLORI ANTIBODY: CPT

## 2019-08-02 PROCEDURE — 37000008 HC ANESTHESIA 1ST 15 MINUTES: Performed by: INTERNAL MEDICINE

## 2019-08-02 PROCEDURE — 85025 COMPLETE CBC W/AUTO DIFF WBC: CPT

## 2019-08-02 PROCEDURE — 99233 SBSQ HOSP IP/OBS HIGH 50: CPT | Mod: ,,, | Performed by: PHYSICIAN ASSISTANT

## 2019-08-02 PROCEDURE — 11000001 HC ACUTE MED/SURG PRIVATE ROOM

## 2019-08-02 PROCEDURE — D9220A PRA ANESTHESIA: Mod: CRNA,,, | Performed by: NURSE ANESTHETIST, CERTIFIED REGISTERED

## 2019-08-02 PROCEDURE — 63600175 PHARM REV CODE 636 W HCPCS: Performed by: INTERNAL MEDICINE

## 2019-08-02 RX ORDER — LIDOCAINE HCL/PF 100 MG/5ML
SYRINGE (ML) INTRAVENOUS
Status: DISCONTINUED | OUTPATIENT
Start: 2019-08-02 | End: 2019-08-02

## 2019-08-02 RX ORDER — PROPOFOL 10 MG/ML
VIAL (ML) INTRAVENOUS
Status: DISCONTINUED | OUTPATIENT
Start: 2019-08-02 | End: 2019-08-02

## 2019-08-02 RX ADMIN — PANTOPRAZOLE SODIUM 40 MG: 40 INJECTION, POWDER, FOR SOLUTION INTRAVENOUS at 04:08

## 2019-08-02 RX ADMIN — BRIMONIDINE TARTRATE 1 DROP: 2 SOLUTION OPHTHALMIC at 09:08

## 2019-08-02 RX ADMIN — SODIUM CHLORIDE 500 ML: 0.9 INJECTION, SOLUTION INTRAVENOUS at 04:08

## 2019-08-02 RX ADMIN — LATANOPROST 1 DROP: 50 SOLUTION OPHTHALMIC at 09:08

## 2019-08-02 RX ADMIN — FOLIC ACID 1 MG: 1 TABLET ORAL at 09:08

## 2019-08-02 RX ADMIN — TIMOLOL MALEATE 1 DROP: 5 SOLUTION OPHTHALMIC at 04:08

## 2019-08-02 RX ADMIN — TIMOLOL MALEATE 1 DROP: 5 SOLUTION OPHTHALMIC at 09:08

## 2019-08-02 RX ADMIN — PROPOFOL 50 MG: 10 INJECTION, EMULSION INTRAVENOUS at 02:08

## 2019-08-02 RX ADMIN — PROPOFOL 20 MG: 10 INJECTION, EMULSION INTRAVENOUS at 02:08

## 2019-08-02 RX ADMIN — CYANOCOBALAMIN TAB 1000 MCG 1000 MCG: 1000 TAB at 09:08

## 2019-08-02 RX ADMIN — Medication 150 MG: at 09:08

## 2019-08-02 RX ADMIN — DORZOLAMIDE HYDROCHLORIDE 1 DROP: 20 SOLUTION/ DROPS OPHTHALMIC at 09:08

## 2019-08-02 RX ADMIN — LIDOCAINE HYDROCHLORIDE 80 MG: 20 INJECTION, SOLUTION INTRAVENOUS at 01:08

## 2019-08-02 RX ADMIN — PANTOPRAZOLE SODIUM 40 MG: 40 INJECTION, POWDER, FOR SOLUTION INTRAVENOUS at 09:08

## 2019-08-02 NOTE — PROGRESS NOTES
"      [facility] Hospital Medicine  Progress Note    Patient Name: Eliu Paz  MRN: [id number]  Patient Class: IP- Inpatient   Admission Date: 8/1/2019  Length of Stay: 0 days  Attending Physician: JANET Mcdonald MD  Primary Care Provider: Ryan Carlson MD    [facility] Medicine Team: [facility] HOSP MED E Honey Garcia PA-C    Subjective:     Principal Problem:Gastrointestinal hemorrhage    Interval History: No reported events overnight. BP borderline low today, will hold BP meds in setting of bleed. Pt seen at bedside this AM, no new complaints. Continue to monitor H/H, plan for EGD today. Pt again declined blood transfusion after discussing risks and benefits, instructed patient to notify providers if she changes her mind.    Review of Systems   Constitutional: Positive for fatigue. Negative for fever.   HENT: Negative for congestion.    Eyes: Negative for itching.   Respiratory: Positive for shortness of breath.    Cardiovascular: Negative for chest pain.   Gastrointestinal: Positive for abdominal pain, constipation, nausea and vomiting. Negative for diarrhea.        +GERD and epigastric "burning"   Endocrine: Positive for cold intolerance.   Genitourinary: Negative for dysuria and hematuria.   Musculoskeletal: Positive for back pain.   Skin: Negative for pallor.   Allergic/Immunologic: Negative for immunocompromised state.   Neurological: Negative for weakness.   Hematological: Does not bruise/bleed easily.   Psychiatric/Behavioral: Negative for agitation and confusion. The patient is not nervous/anxious.      Objective:     Vital Signs (Most Recent):  Temp: 98.7 °F (37.1 °C) (08/02/19 1102)  Pulse: 67 (08/02/19 1116)  Resp: 17 (08/02/19 1102)  BP: (!) 101/52 (08/02/19 1102)  SpO2: 100 % (08/02/19 1102) Vital Signs (24h Range):  Temp:  [98 °F (36.7 °C)-98.8 °F (37.1 °C)] 98.7 °F (37.1 °C)  Pulse:  [54-69] 67  Resp:  [16-18] 17  SpO2:  [99 %-100 %] 100 %  BP: (100-125)/(50-62) 101/52 "     Weight: 76.7 kg (169 lb 1.5 oz)  Body mass index is 33.02 kg/m².    Intake/Output Summary (Last 24 hours) at 8/2/2019 1153  Last data filed at 8/2/2019 0800  Gross per 24 hour   Intake 435 ml   Output --   Net 435 ml      Physical Exam   Constitutional: She is oriented to person, place, and time.  Non-toxic appearance. She does not have a sickly appearance. She does not appear ill. No distress.   HENT:   Head: Normocephalic and atraumatic.   Hard of hearing   Eyes: EOM are normal. Right eye exhibits no discharge. Left eye exhibits no discharge.   Neck: Normal range of motion. Neck supple.   Cardiovascular: Normal rate, regular rhythm and normal heart sounds.   Sternotomy scar   Pulmonary/Chest: Effort normal. No accessory muscle usage. No tachypnea and no bradypnea. No respiratory distress.   Abdominal: Soft. Bowel sounds are normal. She exhibits no distension and no mass. There is tenderness in the epigastric area.   Genitourinary:   Genitourinary Comments: No todd in place   Musculoskeletal: She exhibits no edema.   Lymphadenopathy:   No edema to legs   Neurological: She is alert and oriented to person, place, and time. GCS eye subscore is 4. GCS verbal subscore is 5. GCS motor subscore is 6.   Skin: Skin is warm and dry. She is not diaphoretic.   Psychiatric: She has a normal mood and affect. Her speech is normal and behavior is normal. Cognition and memory are normal.   Nursing note and vitals reviewed.        Overview/Hospital Course: Patient admitted for upper GI bleed. HDS, Hgb between 6-7. Patient is Mandaeism and has refused blood transfusions. GI consulted, planning for EGD 8/2. Continue supportive care and monitoring H/H.    Significant Labs: All pertinent labs within the past 24 hours have been reviewed.    Significant Imaging: I have reviewed all pertinent imaging results/findings within the past 24 hours.    Assessment/Plan:       Gastrointestinal hemorrhage   Melena  -pantoprazole  injection 40 mg, 40 mg, Intravenous, ED 1 Time   -[START ON 8/2/2019] pantoprazole injection 40 mg, 40 mg, Intravenous, Q12H  -Check H.pylori Sab  -GI consult for EGD: plan for EGD 8/2  -Hold home ASA  -Avoid all NSAIDS, heparinoids  -IV fluids     Acute post-hemorrhagic anemia  -Patient is a Mandaen, refuses all blood products  -Plan for bone marrow support:              -cyanocobalamin injection 100 mcg, Subcutaneous, Once               -[START ON 8/2/2019] cyanocobalamin tablet 1,000 mcg, Oral, Daily              -folic acid tablet 1 mg, 1 mg, Oral, Daily, JANET Mcdonald MD              -Iron polysaccharides capsule 150 mg, 150 mg, Oral, Daily  -If Fe levels very low, plan for IV Ferrlecit in am  -Checking B12, folate, Fe studies now: supplement  -Serial H/H q8H; currently stable between 6-7     Diabetes mellitus 2, uncontrolled with CKD 3  -Home regime:              -insulin asp prt-insulin aspart, NOVOLOG 70/30, 100 unit/mL (70-30) Soln              -INJECT 18 UNITS INTO THE SKIN 2 (TWO) TIMES DAILY BEFORE MEALS  -Holding NPO for possible EGD  -For now, will plan Levemir 10 Units x 1 and low dose SSI     Coronary artery disease, native heart without angina  -rosuvastatin tablet 40 mg, 40 mg, Oral, Daily  -carvedilol tablet 25 mg, 25 mg, Oral, BID  -isosorbide mononitrate 24 hr tablet 30 mg, 30 mg, Oral, Daily  -SL NTG 0.4 mg q5 minutes x 3 prn CP  -Holding home ASA for GIB; 8/2 holding BP meds     Essential hypertension  spironolactone tablet 50 mg, 50 mg, Oral, Daily  candesartan tablet 4 mg, 4 mg, Oral, Daily  amLODIPine tablet 10 mg, 10 mg, Oral, Daily  carvedilol tablet 25 mg, 25 mg, Oral, BID  Holding BP meds 8/2 in setting of hypotension and GIB     Glaucoma  -brimonidine 0.2% (ALPHAGAN) 0.2 % Drop, Place 1 drop into both eyes 3 (three) times daily  -dorzolamide 2 % ophthalmic solution 1 drop, 1 drop, Both Eyes, BID   -timolol maleate 0.5% ophthalmic solution 1 drop, 1 drop, Both Eyes,  BID  -latanoprost 0.005 % ophthalmic solution 1 drop, 1 drop, Both Eyes, QHS    VTE Risk Mitigation (From admission, onward)        Ordered     Place MONY hose  Until discontinued      08/01/19 1854     Place sequential compression device  Until discontinued      08/01/19 1854     Reason for No Pharmacological VTE Prophylaxis  Once      08/01/19 1854     IP VTE HIGH RISK PATIENT  Once      08/01/19 1854           Discussed with staff  Honey Garcia PA-C  Department of Hospital Medicine   [facility]

## 2019-08-02 NOTE — ANESTHESIA POSTPROCEDURE EVALUATION
Anesthesia Post Evaluation    Patient: Eliu Paz    Procedure(s) Performed: Procedure(s) (LRB):  EGD (ESOPHAGOGASTRODUODENOSCOPY) (N/A)    Final Anesthesia Type: general  Patient location during evaluation: [facility]  Patient participation: Yes- Able to Participate  Level of consciousness: awake and alert  Post-procedure vital signs: reviewed and stable  Pain management: adequate  Airway patency: patent  PONV status at discharge: No PONV  Anesthetic complications: no      Cardiovascular status: blood pressure returned to baseline  Respiratory status: unassisted  Hydration status: euvolemic  Follow-up not needed.          Vitals Value Taken Time   BP 95/48 8/2/2019  3:02 PM   Temp 36.5 °C (97.7 °F) 8/2/2019  2:29 PM   Pulse 52 8/2/2019  3:06 PM   Resp 21 8/2/2019  3:06 PM   SpO2 100 % 8/2/2019  3:06 PM   Vitals shown include unvalidated device data.      No case tracking events are documented in the log.      Pain/Linda Score: No data recorded

## 2019-08-02 NOTE — PLAN OF CARE
Problem: Adult Inpatient Plan of Care  Goal: Plan of Care Review  Outcome: Ongoing (interventions implemented as appropriate)  POC reviewed with patient, who verbalized understanding. AAOx4.   Remains free of falls and injury.   VSS. RA without difficulty.   NPO. No  Nausea. No Pain. Blood count dipped from Hbg 7.3 to 6.6 this am  IVF infusing. Voiding per commode. 0 X BM.   Up with assist and cane. Telemetry monitor. Blood Glucose monitor Q6. TEDS in place.   No acute events. No distress noted.  Call bell in reach.   Will continue to monitor.

## 2019-08-02 NOTE — ASSESSMENT & PLAN NOTE
"79 year old female with a history of CAD (off ASA for a couple of weeks), DM Type 2, and CKD Stage 3 on who GI is being consulted for a suspected GI bleed in a Jehovah witness patient.    At home patient reports ongoing "tarry stool" and although at [facility] she has a macrocytic anemia with no overt melena since she is not willing to accept blood products we feel the benefits of EGD at this time outweigh the risk. EGD today to rule out esophagitis vs gastritis vs PUD.    Recommendations:  --NPO for EGD today  --Monitor for overt signs of bleeding  --Trend H/H and transfuse as needed  --Continue PPI IV BID  "

## 2019-08-02 NOTE — TRANSFER OF CARE
Anesthesia Transfer of Care Note    Patient: Eliu Paz    Procedure(s) Performed: Procedure(s) (LRB):  EGD (ESOPHAGOGASTRODUODENOSCOPY) (N/A)    Patient location: [facility]    Anesthesia Type: general    Transport from OR: Transported from OR on room air with adequate spontaneous ventilation    Post pain: adequate analgesia    Post assessment: tolerated procedure well and no apparent anesthetic complications    Post vital signs: stable    Level of consciousness: awake, alert and oriented    Nausea/Vomiting: no nausea/vomiting    Complications: none    Transfer of care protocol was followed      Last vitals:   Visit Vitals  /80 (BP Location: Left arm, Patient Position: Lying)   Pulse (!) 59   Temp 36.6 °C (97.9 °F) (Temporal)   Resp 16   Ht 5' (1.524 m)   Wt 76.7 kg (169 lb 1.5 oz)   SpO2 100%   Breastfeeding? No   BMI 33.02 kg/m²

## 2019-08-02 NOTE — SUBJECTIVE & OBJECTIVE
Past Medical History:   Diagnosis Date    Allergy     Arteriosclerosis of arteries of extremities 8/25/2016    Arthritis     Brain infection     Cataract     CKD (chronic kidney disease) stage 3, GFR 30-59 ml/min     Coma     Coronary artery disease     Diabetes mellitus type II 1981    Diabetic neuropathy     Early dry stage nonexudative age-related macular degeneration of both eyes 6/17/2019    GERD (gastroesophageal reflux disease)     Glaucoma     Hyperlipidemia     Hypertension     Type 2 diabetes mellitus with diabetic peripheral angiopathy without gangrene, with long-term current use of insulin 8/21/2016       Past Surgical History:   Procedure Laterality Date    CARDIAC CATHETERIZATION  03/2010    x 2    CARDIAC SURGERY  1994    CARPAL TUNNEL RELEASE      L    Carpal Tunnel Release, Right hand Right 10/19/2016    Performed by Marnie Sanders MD at [facility] OR 1ST FLR    COLONOSCOPY N/A 2/5/2019    Performed by Kenneth Oconnell MD at [facility] ENDO (4TH FLR)    COLONOSCOPY N/A 11/2/2015    Performed by Sanju Clinton MD at [facility] ENDO (4TH FLR)    CORONARY ARTERY BYPASS GRAFT  08/13/1994    x 1    ESOPHAGOGASTRODUODENOSCOPY (EGD) N/A 4/18/2017    Performed by Hitesh Quiroga MD at [facility] ENDO (4TH FLR)    ESOPHAGOGASTRODUODENOSCOPY (EGD) N/A 4/27/2015    Performed by Hitesh Quiroga MD at [facility] ENDO (4TH FLR)    MANOMETRY-ESOPHAGEAL-WITH IMPEDANCE N/A 6/13/2017    Performed by Kenneth Oconnell MD at [facility] ENDO (4TH FLR)    RELEASE-FINGER-TRIGGER / Index / Long Right 10/19/2016    Performed by Marnie Sanders MD at [facility] OR 1ST FLR    TUBAL LIGATION  1970s       Review of patient's allergies indicates:   Allergen Reactions    Penicillins Swelling     Swelling (extremities)    Trulicity [dulaglutide] Nausea Only and Rash     Family History     Problem Relation (Age of Onset)    Blindness Brother    Diabetes Father, Brother, Maternal Grandmother, Paternal Aunt, Paternal Uncle        Tobacco Use     Smoking status: Former Smoker     Years: 2.00     Last attempt to quit: 3/25/1963     Years since quittin.3    Smokeless tobacco: Never Used   Substance and Sexual Activity    Alcohol use: No    Drug use: No    Sexual activity: Not Currently     Partners: Male     Review of Systems   Constitutional: Negative for activity change, appetite change, chills, diaphoresis, fatigue, fever and unexpected weight change.   HENT: Negative for trouble swallowing and voice change.    Eyes: Negative for photophobia, pain, discharge, redness, itching and visual disturbance.   Respiratory: Negative for cough and shortness of breath.    Cardiovascular: Negative for chest pain and palpitations.   Gastrointestinal: Positive for blood in stool and constipation. Negative for abdominal distention, abdominal pain, anal bleeding, diarrhea, nausea, rectal pain and vomiting.   Endocrine: Negative for cold intolerance, heat intolerance, polydipsia, polyphagia and polyuria.   Genitourinary: Negative for dysuria, frequency, hematuria and urgency.   Musculoskeletal: Negative for back pain and neck pain.   Skin: Negative for color change, pallor, rash and wound.   Neurological: Negative for dizziness, syncope, weakness and light-headedness.     Objective:     Vital Signs (Most Recent):  Temp: 98.5 °F (36.9 °C) (19)  Pulse: 69 (19)  Resp: 16 (19)  BP: (!) 110/52 (19)  SpO2: 100 % (19) Vital Signs (24h Range):  Temp:  [98 °F (36.7 °C)-98.8 °F (37.1 °C)] 98.5 °F (36.9 °C)  Pulse:  [54-69] 69  Resp:  [16-18] 16  SpO2:  [99 %-100 %] 100 %  BP: (100-125)/(50-62) 110/52     Weight: 76.7 kg (169 lb 1.5 oz) (19)  Body mass index is 33.02 kg/m².      Intake/Output Summary (Last 24 hours) at 2019 0911  Last data filed at 2019 0400  Gross per 24 hour   Intake 435 ml   Output --   Net 435 ml       Lines/Drains/Airways     Peripheral Intravenous Line                  Peripheral IV - Single Lumen 08/01/19 1644 18 G Left Antecubital less than 1 day                Physical Exam   Constitutional: She is oriented to person, place, and time. No distress.   HENT:   Head: Normocephalic and atraumatic.   Eyes: No scleral icterus.   Cardiovascular: Normal rate and regular rhythm.   Pulmonary/Chest: Effort normal and breath sounds normal.   Abdominal: Soft. Bowel sounds are normal.   Genitourinary:   Genitourinary Comments: Exterior skin tag with brown/green stool in rectal vault   Musculoskeletal: She exhibits no edema.   Neurological: She is alert and oriented to person, place, and time.   Skin: She is not diaphoretic.   Nursing note and vitals reviewed.      Significant Labs:  CBC:   Recent Labs   Lab 08/01/19  0911 08/01/19  1652 08/01/19  2329 08/02/19  0614   WBC 4.48 5.77  --  4.76   HGB 6.8* 7.5* 7.4* 6.6*   HCT 23.7* 26.4* 25.1* 23.4*    234  --  192     CMP:   Recent Labs   Lab 08/02/19  0614   *   CALCIUM 8.9   ALBUMIN 3.1*   PROT 6.3      K 4.3   CO2 22*      BUN 18   CREATININE 1.1   ALKPHOS 59   ALT 6*   AST 17   BILITOT 0.4     Coagulation:   Recent Labs   Lab 08/01/19  1652   INR 1.0       Significant Imaging:  Imaging results within the past 24 hours have been reviewed.

## 2019-08-02 NOTE — UM SECONDARY REVIEW
EHR Determination IP approp for  per Dr Kenneth Gross , secure chat message sent to Dr Mcdonald making him aware , ANNETTE

## 2019-08-02 NOTE — HPI
"79 year old female with a history of CAD (off ASA for a couple of weeks), DM Type 2, and CKD Stage 3 on who GI is being consulted for a suspected GI bleed in a Jehovah witness patient.    Patient had a colonoscopy 02/2019 in which a 10 mm TVA was resected/retrieved. Since then she has been constipated and has been taken intermittent laxatives (unsure of the names). During that period she has been seeing "tarry stools" intermittently and was scheduled for an EGD on 8/8/19. However she now presents to the ED as her hemoglobin is 6.6. At home she has been off ASA and is not on iron/PPI/NSAID. She reports that her last "tarry stool" was yesterday evening.    Prior GI Procedures   Colon 2/2019  Diverticulosis from sigmoid to ascending colon.  One 10 mm polyp at the hepatic flexure, removed with a hot snare. Resected and retrieved.  Pathology: TVA    EGD 4/2017  Normal esophagus.  Erythematous mucosa in the stomach. Biopsied.  Normal examined duodenum.  Path:  Gastric antrum:  Gastric mucosa with mild chronic inflammation and focal intestinal metaplasia;  Negative for dysplasia.  Gastric body:  Gastric mucosa without significant histologic alteration;  H. pylori is not identified on H&E-stained sections;  Negative for intestinal metaplasia or dysplasia.    Colon 11/2015  Perianal skin tags found on perianal exam.  One 10 mm polyp in the distal transverse colon, resected and retrieved.  Diverticulosis in the entire examined colon.  Path: TVA    EGD 4/2015  LA Grade A reflux esophagitis.  Small hiatus hernia.  Erythematous mucosa in the antrum. Biopsied.  Normal examined duodenum.  Path:  STOMACH (BIOPSY):  Antrum with features of reactive gastropathy  Intestinal metaplasia is present  Negative for dysplasia  Negative for active inflammation  Negative for Helicobacter pylori organisms on H&E stain  "

## 2019-08-02 NOTE — NURSING TRANSFER
Nursing Transfer Note      8/2/2019     Transfer from [facility] 34 to obs 02    Transfer via stretcher    Transfer with tele monitor, dentures, and cane   Transported by BEATRICE Mendenhall    Medicines sent: na    Chart send with patient: yes  Notified: JUANITO Hinds  Patient reassessed at: 8/2/2019 1515    Upon arrival to floor: Patient assisted to bed and nurses station notified.

## 2019-08-02 NOTE — ANESTHESIA RELEASE NOTE
Anesthesia Release from PACU Note    Patient: Eliu Paz    Procedure(s) Performed: Procedure(s) (LRB):  EGD (ESOPHAGOGASTRODUODENOSCOPY) (N/A)    Anesthesia type: general    Post pain: Adequate analgesia    Post assessment: no apparent anesthetic complications, tolerated procedure well and no evidence of recall    Last Vitals:   Visit Vitals  BP (!) 95/48   Pulse (!) 52   Temp 36.5 °C (97.7 °F)   Resp 16   Ht 5' (1.524 m)   Wt 76.7 kg (169 lb 1.5 oz)   SpO2 100%   Breastfeeding? No   BMI 33.02 kg/m²       Post vital signs: stable    Level of consciousness: awake, alert  and oriented    Nausea/Vomiting: no nausea/no vomiting    Complications: none    Airway Patency: patent    Respiratory: unassisted    Cardiovascular: stable and blood pressure at baseline    Hydration: euvolemic

## 2019-08-02 NOTE — CONSULTS
"[facility]  Gastroenterology  Consult Note    Patient Name: Eliu Paz  MRN: [id number]  Admission Date: 8/1/2019  Hospital Length of Stay: 0 days  Code Status: Full Code   Attending Provider: JANET Mcdonald MD   Consulting Provider: Kenyatta Ferguson MD  Primary Care Physician: Ryan Carlson MD  Principal Problem:Gastrointestinal hemorrhage    Inpatient consult to Gastroenterology  Consult performed by: Kenyatta Ferguson MD  Consult ordered by: JANET Mcdonald MD        Subjective:     HPI:  79 year old female with a history of CAD (off ASA for a couple of weeks), DM Type 2, and CKD Stage 3 on who GI is being consulted for a suspected GI bleed in a Jehovah witness patient.    Patient had a colonoscopy 02/2019 in which a 10 mm TVA was resected/retrieved. Since then she has been constipated and has been taken intermittent laxatives (unsure of the names). During that period she has been seeing "tarry stools" intermittently and was scheduled for an EGD on 8/8/19. However she now presents to the ED as her hemoglobin is 6.6. At home she has been off ASA and is not on iron/PPI/NSAID. She reports that her last "tarry stool" was yesterday evening.    Prior GI Procedures   Colon 2/2019  Diverticulosis from sigmoid to ascending colon.  One 10 mm polyp at the hepatic flexure, removed with a hot snare. Resected and retrieved.  Pathology: TVA    EGD 4/2017  Normal esophagus.  Erythematous mucosa in the stomach. Biopsied.  Normal examined duodenum.  Path:  Gastric antrum:  Gastric mucosa with mild chronic inflammation and focal intestinal metaplasia;  Negative for dysplasia.  Gastric body:  Gastric mucosa without significant histologic alteration;  H. pylori is not identified on H&E-stained sections;  Negative for intestinal metaplasia or dysplasia.    Colon 11/2015  Perianal skin tags found on perianal exam.  One 10 mm polyp in the distal transverse colon, resected and retrieved.  Diverticulosis in the " entire examined colon.  Path: TVA    EGD 4/2015  LA Grade A reflux esophagitis.  Small hiatus hernia.  Erythematous mucosa in the antrum. Biopsied.  Normal examined duodenum.  Path:  STOMACH (BIOPSY):  Antrum with features of reactive gastropathy  Intestinal metaplasia is present  Negative for dysplasia  Negative for active inflammation  Negative for Helicobacter pylori organisms on H&E stain    Past Medical History:   Diagnosis Date    Allergy     Arteriosclerosis of arteries of extremities 8/25/2016    Arthritis     Brain infection     Cataract     CKD (chronic kidney disease) stage 3, GFR 30-59 ml/min     Coma     Coronary artery disease     Diabetes mellitus type II 1981    Diabetic neuropathy     Early dry stage nonexudative age-related macular degeneration of both eyes 6/17/2019    GERD (gastroesophageal reflux disease)     Glaucoma     Hyperlipidemia     Hypertension     Type 2 diabetes mellitus with diabetic peripheral angiopathy without gangrene, with long-term current use of insulin 8/21/2016       Past Surgical History:   Procedure Laterality Date    CARDIAC CATHETERIZATION  03/2010    x 2    CARDIAC SURGERY  1994    CARPAL TUNNEL RELEASE      L    Carpal Tunnel Release, Right hand Right 10/19/2016    Performed by [facility] at [facility] OR 1ST FLR    COLONOSCOPY N/A 2/5/2019    Performed by Kenneth Oconnell MD at [facility] ENDO (4TH FLR)    COLONOSCOPY N/A 11/2/2015    Performed by Sanju Clinton MD at [facility] ENDO (4TH FLR)    CORONARY ARTERY BYPASS GRAFT  08/13/1994    x 1    ESOPHAGOGASTRODUODENOSCOPY (EGD) N/A 4/18/2017    Performed by Hitesh Quiroga MD at [facility] ENDO (4TH FLR)    ESOPHAGOGASTRODUODENOSCOPY (EGD) N/A 4/27/2015    Performed by Hitesh Quiroga MD at [facility] ENDO (4TH FLR)    MANOMETRY-ESOPHAGEAL-WITH IMPEDANCE N/A 6/13/2017    Performed by Kenneth Oconnell MD at [facility] ENDO (4TH FLR)    RELEASE-FINGER-TRIGGER / Index / Long Right 10/19/2016    Performed by Marnie CABRERA  MD [facility] at [facility] OR 1ST FLR    TUBAL LIGATION  1970s       Review of patient's allergies indicates:   Allergen Reactions    Penicillins Swelling     Swelling (extremities)    Trulicity [dulaglutide] Nausea Only and Rash     Family History     Problem Relation (Age of Onset)    Blindness Brother    Diabetes Father, Brother, Maternal Grandmother, Paternal Aunt, Paternal Uncle        Tobacco Use    Smoking status: Former Smoker     Years: 2.00     Last attempt to quit: 3/25/1963     Years since quittin.3    Smokeless tobacco: Never Used   Substance and Sexual Activity    Alcohol use: No    Drug use: No    Sexual activity: Not Currently     Partners: Male     Review of Systems   Constitutional: Negative for activity change, appetite change, chills, diaphoresis, fatigue, fever and unexpected weight change.   HENT: Negative for trouble swallowing and voice change.    Eyes: Negative for photophobia, pain, discharge, redness, itching and visual disturbance.   Respiratory: Negative for cough and shortness of breath.    Cardiovascular: Negative for chest pain and palpitations.   Gastrointestinal: Positive for blood in stool and constipation. Negative for abdominal distention, abdominal pain, anal bleeding, diarrhea, nausea, rectal pain and vomiting.   Endocrine: Negative for cold intolerance, heat intolerance, polydipsia, polyphagia and polyuria.   Genitourinary: Negative for dysuria, frequency, hematuria and urgency.   Musculoskeletal: Negative for back pain and neck pain.   Skin: Negative for color change, pallor, rash and wound.   Neurological: Negative for dizziness, syncope, weakness and light-headedness.     Objective:     Vital Signs (Most Recent):  Temp: 98.5 °F (36.9 °C) (19)  Pulse: 69 (19)  Resp: 16 (19)  BP: (!) 110/52 (19)  SpO2: 100 % (19) Vital Signs (24h Range):  Temp:  [98 °F (36.7 °C)-98.8 °F (37.1 °C)] 98.5 °F (36.9 °C)  Pulse:   "[54-69] 69  Resp:  [16-18] 16  SpO2:  [99 %-100 %] 100 %  BP: (100-125)/(50-62) 110/52     Weight: 76.7 kg (169 lb 1.5 oz) (08/02/19 0400)  Body mass index is 33.02 kg/m².      Intake/Output Summary (Last 24 hours) at 8/2/2019 0911  Last data filed at 8/2/2019 0400  Gross per 24 hour   Intake 435 ml   Output --   Net 435 ml       Lines/Drains/Airways     Peripheral Intravenous Line                 Peripheral IV - Single Lumen 08/01/19 1644 18 G Left Antecubital less than 1 day                Physical Exam   Constitutional: She is oriented to person, place, and time. No distress.   HENT:   Head: Normocephalic and atraumatic.   Eyes: No scleral icterus.   Cardiovascular: Normal rate and regular rhythm.   Pulmonary/Chest: Effort normal and breath sounds normal.   Abdominal: Soft. Bowel sounds are normal.   Genitourinary:   Genitourinary Comments: Exterior skin tag with brown/green stool in rectal vault   Musculoskeletal: She exhibits no edema.   Neurological: She is alert and oriented to person, place, and time.   Skin: She is not diaphoretic.   Nursing note and vitals reviewed.      Significant Labs:  CBC:   Recent Labs   Lab 08/01/19  0911 08/01/19  1652 08/01/19  2329 08/02/19  0614   WBC 4.48 5.77  --  4.76   HGB 6.8* 7.5* 7.4* 6.6*   HCT 23.7* 26.4* 25.1* 23.4*    234  --  192     CMP:   Recent Labs   Lab 08/02/19  0614   *   CALCIUM 8.9   ALBUMIN 3.1*   PROT 6.3      K 4.3   CO2 22*      BUN 18   CREATININE 1.1   ALKPHOS 59   ALT 6*   AST 17   BILITOT 0.4     Coagulation:   Recent Labs   Lab 08/01/19  1652   INR 1.0       Significant Imaging:  Imaging results within the past 24 hours have been reviewed.    Assessment/Plan:     Macrocytic anemia  79 year old female with a history of CAD (off ASA for a couple of weeks), DM Type 2, and CKD Stage 3 on who GI is being consulted for a suspected GI bleed [facility].    At home patient reports ongoing "tarry stool" and although " [place] [facility] she has a macrocytic anemia with no overt melena since she is not willing to accept blood products we feel the benefits of EGD at this time outweigh the risk. EGD today to rule out esophagitis vs gastritis vs PUD.    Recommendations:  --NPO for EGD today  --Monitor for overt signs of bleeding  --Trend H/H and transfuse as needed  --Continue PPI IV BID        Thank you for your consult. I will follow-up with patient. Please contact us if you have any additional questions.    Kenyatta Ferguson M.D.  Gastroenterology Fellow, PGY-VI  Pager: [phone]  [facility]

## 2019-08-02 NOTE — PLAN OF CARE
08/02/19 1544   Discharge Assessment   Assessment Type Discharge Planning Assessment   Confirmed/corrected address and phone number on facesheet? Yes   Assessment information obtained from? Patient   Expected Length of Stay (days) 2   Communicated expected length of stay with patient/caregiver yes   Prior to hospitilization cognitive status: Alert/Oriented   Prior to hospitalization functional status: Assistive Equipment   Current cognitive status: Alert/Oriented   Current Functional Status: Assistive Equipment   Lives With spouse   Able to Return to Prior Arrangements yes   Is patient able to care for self after discharge? Yes   Patient's perception of discharge disposition home or selfcare   Readmission Within the Last 30 Days no previous admission in last 30 days   Patient currently being followed by outpatient case management? No   Patient currently receives any other outside agency services? No   Equipment Currently Used at Home glucometer;cane, quad;rollator   Do you have any problems affording any of your prescribed medications? No   Is the patient taking medications as prescribed? yes   Does the patient have transportation home? Yes   Transportation Anticipated family or friend will provide  (spouse)   Does the patient receive services at the [facility]? No   Discharge Plan A Home with family   Discharge Plan B Home Health   DME Needed Upon Discharge  none   Patient/Family in Agreement with Plan yes     Dx: GI hemorrhage  Consult: GI  Pharm: [facility]  [facility]osp f/u appt: Dr. Carlson (PCP) on 8/6/19 at 0940

## 2019-08-02 NOTE — NURSING
Transfer from the ED. Via wheelchair. Ambulated to bed with assist. VSS. Reviewing the orders. Will continue to monitor.

## 2019-08-02 NOTE — PLAN OF CARE
Problem: Adult Inpatient Plan of Care  Goal: Plan of Care Review  Outcome: Ongoing (interventions implemented as appropriate)  Patient awake, alert and responsive, oriented x 4. EGD done today. NPO status maintained.  Monitored blood pressure. Normal saline 500 cc bolus given as ordered. Able to ambulate with stable gait. No incidents of fall. Reviewed plan of care with patient, verbalized understanding.

## 2019-08-02 NOTE — NURSING
Assisted patient to the bedside, ambulated using a cane with a steady gait. [facility] PA at the bedside explaining to patient plan for her care and informed of low Hemoglobin, and the need for blood transfusion. Patient stated she will not get transfusion for Rastafari reason. Will hold BP medication due to low blood pressure as per ordered.

## 2019-08-02 NOTE — TREATMENT PLAN
GI Treatment Plan  08/02/2019  6:11 PM    Impression:            - Normal esophagus.  - Hematin (altered blood/coffee-ground-like material) in the gastric antrum.  - A single bleeding angioectasia in the duodenum. Treated with argon plasma coagulation (APC).  - No specimens collected.    Recommendation:         - [facility].  - Clear liquid diet today.  - Continue present medications.  - We will continue to follow alongside primary team    Kenyatta Ferguson M.D.  Gastroenterology Fellow, PGY-VI  Pager: [phone]  [facility]

## 2019-08-02 NOTE — PROVATION PATIENT INSTRUCTIONS
Discharge Summary/Instructions after an Endoscopic Procedure  Patient Name: Eliu Paz  Patient MRN: [id number]  Patient YOB: 1940  Friday, August 02, 2019  Wesly Garza MD  RESTRICTIONS:  During your procedure today, you received medications for sedation.  These   medications may affect your judgment, balance and coordination.  Therefore,   for 24 hours, you have the following restrictions:   - DO NOT drive a car, operate machinery, make legal/financial decisions,   sign important papers or drink alcohol.    ACTIVITY:  Today: no heavy lifting, straining or running due to procedural   sedation/anesthesia.  The following day: return to full activity including work.  DIET:  Eat and drink normally unless instructed otherwise.     TREATMENT FOR COMMON SIDE EFFECTS:  - Mild abdominal pain, nausea, belching, bloating or excessive gas:  rest,   eat lightly and use a heating pad.  - Sore Throat: treat with throat lozenges and/or gargle with warm salt   water.  - Because air was used during the procedure, expelling large amounts of air   from your rectum or belching is normal.  - If a bowel prep was taken, you may not have a bowel movement for 1-3 days.    This is normal.  SYMPTOMS TO WATCH FOR AND REPORT TO YOUR PHYSICIAN:  1. Abdominal pain or bloating, other than gas cramps.  2. Chest pain.  3. Back pain.  4. Signs of infection such as: chills or fever occurring within 24 hours   after the procedure.  5. Rectal bleeding, which would show as bright red, maroon, or black stools.   (A tablespoon of blood from the rectum is not serious, especially if   hemorrhoids are present.)  6. Vomiting.  7. Weakness or dizziness.  GO DIRECTLY TO THE NEAREST EMERGENCY ROOM IF YOU HAVE ANY OF THE FOLLOWING:      Difficulty breathing              Chills and/or fever over 101 F   Persistent vomiting and/or vomiting blood   Severe abdominal pain   Severe chest pain   Black, tarry stools   Bleeding- more than one  tablespoon   Any other symptom or condition that you feel may need urgent attention  Your doctor recommends these additional instructions:  If any biopsies were taken, your doctors clinic will contact you in 1 to 2   weeks with any results.  - [facility].   - Clear liquid diet tomorrow.   - Continue present medications.  For questions, problems or results please call your physician - Wesly Garza MD at Work:  [phone].  [facility], EMERGENCY ROOM PHONE NUMBER: [phone]  IF A COMPLICATION OR EMERGENCY SITUATION ARISES AND YOU ARE UNABLE TO REACH   YOUR PHYSICIAN - GO DIRECTLY TO THE EMERGENCY ROOM.  Wesly Garza MD  8/2/2019 2:25:04 PM  This report has been verified and signed electronically.  [employer]

## 2019-08-03 LAB
ALBUMIN SERPL BCP-MCNC: 3.2 G/DL (ref 3.5–5.2)
ALP SERPL-CCNC: 60 U/L (ref 55–135)
ALT SERPL W/O P-5'-P-CCNC: 5 U/L (ref 10–44)
ANION GAP SERPL CALC-SCNC: 9 MMOL/L (ref 8–16)
AST SERPL-CCNC: 15 U/L (ref 10–40)
BASOPHILS # BLD AUTO: 0.03 K/UL (ref 0–0.2)
BASOPHILS # BLD AUTO: 0.03 K/UL (ref 0–0.2)
BASOPHILS # BLD AUTO: 0.04 K/UL (ref 0–0.2)
BASOPHILS NFR BLD: 0.5 % (ref 0–1.9)
BASOPHILS NFR BLD: 0.6 % (ref 0–1.9)
BASOPHILS NFR BLD: 0.8 % (ref 0–1.9)
BILIRUB SERPL-MCNC: 0.5 MG/DL (ref 0.1–1)
BUN SERPL-MCNC: 12 MG/DL (ref 8–23)
CALCIUM SERPL-MCNC: 8.8 MG/DL (ref 8.7–10.5)
CHLORIDE SERPL-SCNC: 107 MMOL/L (ref 95–110)
CO2 SERPL-SCNC: 22 MMOL/L (ref 23–29)
CREAT SERPL-MCNC: 1 MG/DL (ref 0.5–1.4)
DIFFERENTIAL METHOD: ABNORMAL
EOSINOPHIL # BLD AUTO: 0.1 K/UL (ref 0–0.5)
EOSINOPHIL NFR BLD: 1.6 % (ref 0–8)
EOSINOPHIL NFR BLD: 1.6 % (ref 0–8)
EOSINOPHIL NFR BLD: 1.7 % (ref 0–8)
ERYTHROCYTE [DISTWIDTH] IN BLOOD BY AUTOMATED COUNT: 18.3 % (ref 11.5–14.5)
ERYTHROCYTE [DISTWIDTH] IN BLOOD BY AUTOMATED COUNT: 18.3 % (ref 11.5–14.5)
ERYTHROCYTE [DISTWIDTH] IN BLOOD BY AUTOMATED COUNT: 18.5 % (ref 11.5–14.5)
EST. GFR  (AFRICAN AMERICAN): >60 ML/MIN/1.73 M^2
EST. GFR  (NON AFRICAN AMERICAN): 54.5 ML/MIN/1.73 M^2
GLUCOSE SERPL-MCNC: 138 MG/DL (ref 70–110)
H PYLORI IGG SERPL QL IA: NEGATIVE
HCT VFR BLD AUTO: 21.6 % (ref 37–48.5)
HCT VFR BLD AUTO: 22.1 % (ref 37–48.5)
HCT VFR BLD AUTO: 24.4 % (ref 37–48.5)
HGB BLD-MCNC: 6.3 G/DL (ref 12–16)
HGB BLD-MCNC: 6.6 G/DL (ref 12–16)
HGB BLD-MCNC: 6.9 G/DL (ref 12–16)
IMM GRANULOCYTES # BLD AUTO: 0.01 K/UL (ref 0–0.04)
IMM GRANULOCYTES # BLD AUTO: 0.01 K/UL (ref 0–0.04)
IMM GRANULOCYTES # BLD AUTO: 0.02 K/UL (ref 0–0.04)
IMM GRANULOCYTES NFR BLD AUTO: 0.2 % (ref 0–0.5)
IMM GRANULOCYTES NFR BLD AUTO: 0.2 % (ref 0–0.5)
IMM GRANULOCYTES NFR BLD AUTO: 0.4 % (ref 0–0.5)
LYMPHOCYTES # BLD AUTO: 1.2 K/UL (ref 1–4.8)
LYMPHOCYTES # BLD AUTO: 1.5 K/UL (ref 1–4.8)
LYMPHOCYTES # BLD AUTO: 1.7 K/UL (ref 1–4.8)
LYMPHOCYTES NFR BLD: 23.6 % (ref 18–48)
LYMPHOCYTES NFR BLD: 29.1 % (ref 18–48)
LYMPHOCYTES NFR BLD: 30.1 % (ref 18–48)
MAGNESIUM SERPL-MCNC: 1.9 MG/DL (ref 1.6–2.6)
MCH RBC QN AUTO: 28.3 PG (ref 27–31)
MCH RBC QN AUTO: 28.6 PG (ref 27–31)
MCH RBC QN AUTO: 29.2 PG (ref 27–31)
MCHC RBC AUTO-ENTMCNC: 28.3 G/DL (ref 32–36)
MCHC RBC AUTO-ENTMCNC: 29.2 G/DL (ref 32–36)
MCHC RBC AUTO-ENTMCNC: 29.9 G/DL (ref 32–36)
MCV RBC AUTO: 101 FL (ref 82–98)
MCV RBC AUTO: 97 FL (ref 82–98)
MCV RBC AUTO: 98 FL (ref 82–98)
MONOCYTES # BLD AUTO: 0.6 K/UL (ref 0.3–1)
MONOCYTES # BLD AUTO: 0.7 K/UL (ref 0.3–1)
MONOCYTES # BLD AUTO: 0.7 K/UL (ref 0.3–1)
MONOCYTES NFR BLD: 11 % (ref 4–15)
MONOCYTES NFR BLD: 12.5 % (ref 4–15)
MONOCYTES NFR BLD: 13 % (ref 4–15)
NEUTROPHILS # BLD AUTO: 3 K/UL (ref 1.8–7.7)
NEUTROPHILS # BLD AUTO: 3 K/UL (ref 1.8–7.7)
NEUTROPHILS # BLD AUTO: 3.2 K/UL (ref 1.8–7.7)
NEUTROPHILS NFR BLD: 55 % (ref 38–73)
NEUTROPHILS NFR BLD: 57.5 % (ref 38–73)
NEUTROPHILS NFR BLD: 60.6 % (ref 38–73)
NRBC BLD-RTO: 0 /100 WBC
PHOSPHATE SERPL-MCNC: 3.7 MG/DL (ref 2.7–4.5)
PLATELET # BLD AUTO: 180 K/UL (ref 150–350)
PLATELET # BLD AUTO: 182 K/UL (ref 150–350)
PLATELET # BLD AUTO: 194 K/UL (ref 150–350)
PMV BLD AUTO: 10.3 FL (ref 9.2–12.9)
PMV BLD AUTO: 10.6 FL (ref 9.2–12.9)
PMV BLD AUTO: 10.7 FL (ref 9.2–12.9)
POCT GLUCOSE: 124 MG/DL (ref 70–110)
POCT GLUCOSE: 152 MG/DL (ref 70–110)
POCT GLUCOSE: 169 MG/DL (ref 70–110)
POCT GLUCOSE: 172 MG/DL (ref 70–110)
POTASSIUM SERPL-SCNC: 4.1 MMOL/L (ref 3.5–5.1)
PROT SERPL-MCNC: 6.4 G/DL (ref 6–8.4)
RBC # BLD AUTO: 2.23 M/UL (ref 4–5.4)
RBC # BLD AUTO: 2.26 M/UL (ref 4–5.4)
RBC # BLD AUTO: 2.41 M/UL (ref 4–5.4)
SODIUM SERPL-SCNC: 138 MMOL/L (ref 136–145)
WBC # BLD AUTO: 4.99 K/UL (ref 3.9–12.7)
WBC # BLD AUTO: 5.16 K/UL (ref 3.9–12.7)
WBC # BLD AUTO: 5.74 K/UL (ref 3.9–12.7)

## 2019-08-03 PROCEDURE — 11000001 HC ACUTE MED/SURG PRIVATE ROOM

## 2019-08-03 PROCEDURE — 36415 COLL VENOUS BLD VENIPUNCTURE: CPT

## 2019-08-03 PROCEDURE — 99233 SBSQ HOSP IP/OBS HIGH 50: CPT | Mod: ,,, | Performed by: PHYSICIAN ASSISTANT

## 2019-08-03 PROCEDURE — 63600175 PHARM REV CODE 636 W HCPCS: Performed by: INTERNAL MEDICINE

## 2019-08-03 PROCEDURE — C9113 INJ PANTOPRAZOLE SODIUM, VIA: HCPCS | Performed by: INTERNAL MEDICINE

## 2019-08-03 PROCEDURE — 25000003 PHARM REV CODE 250: Performed by: INTERNAL MEDICINE

## 2019-08-03 PROCEDURE — 80053 COMPREHEN METABOLIC PANEL: CPT

## 2019-08-03 PROCEDURE — 99233 PR SUBSEQUENT HOSPITAL CARE,LEVL III: ICD-10-PCS | Mod: ,,, | Performed by: PHYSICIAN ASSISTANT

## 2019-08-03 PROCEDURE — 85025 COMPLETE CBC W/AUTO DIFF WBC: CPT | Mod: 91

## 2019-08-03 PROCEDURE — 84100 ASSAY OF PHOSPHORUS: CPT

## 2019-08-03 PROCEDURE — 83735 ASSAY OF MAGNESIUM: CPT

## 2019-08-03 RX ORDER — SODIUM FERRIC GLUCONATE COMPLEX IN SUCROSE 12.5 MG/ML
125 INJECTION INTRAVENOUS ONCE
Status: DISCONTINUED | OUTPATIENT
Start: 2019-08-03 | End: 2019-08-03 | Stop reason: ALTCHOICE

## 2019-08-03 RX ADMIN — DORZOLAMIDE HYDROCHLORIDE 1 DROP: 20 SOLUTION/ DROPS OPHTHALMIC at 10:08

## 2019-08-03 RX ADMIN — PANTOPRAZOLE SODIUM 40 MG: 40 INJECTION, POWDER, FOR SOLUTION INTRAVENOUS at 09:08

## 2019-08-03 RX ADMIN — Medication 150 MG: at 08:08

## 2019-08-03 RX ADMIN — DORZOLAMIDE HYDROCHLORIDE 1 DROP: 20 SOLUTION/ DROPS OPHTHALMIC at 08:08

## 2019-08-03 RX ADMIN — TIMOLOL MALEATE 1 DROP: 5 SOLUTION OPHTHALMIC at 08:08

## 2019-08-03 RX ADMIN — TIMOLOL MALEATE 1 DROP: 5 SOLUTION OPHTHALMIC at 10:08

## 2019-08-03 RX ADMIN — BRIMONIDINE TARTRATE 1 DROP: 2 SOLUTION OPHTHALMIC at 08:08

## 2019-08-03 RX ADMIN — EPOETIN ALFA-EPBX 3840 UNITS: 4000 INJECTION, SOLUTION INTRAVENOUS; SUBCUTANEOUS at 12:08

## 2019-08-03 RX ADMIN — LATANOPROST 1 DROP: 50 SOLUTION OPHTHALMIC at 10:08

## 2019-08-03 RX ADMIN — SODIUM CHLORIDE 125 MG: 9 INJECTION, SOLUTION INTRAVENOUS at 01:08

## 2019-08-03 RX ADMIN — ROSUVASTATIN CALCIUM 40 MG: 20 TABLET, FILM COATED ORAL at 08:08

## 2019-08-03 RX ADMIN — BRIMONIDINE TARTRATE 1 DROP: 2 SOLUTION OPHTHALMIC at 10:08

## 2019-08-03 RX ADMIN — FOLIC ACID 1 MG: 1 TABLET ORAL at 08:08

## 2019-08-03 RX ADMIN — CYANOCOBALAMIN TAB 1000 MCG 1000 MCG: 1000 TAB at 08:08

## 2019-08-03 NOTE — TREATMENT PLAN
GI Treatment Plan  08/04/2019  6:20 AM    Chart reviewed and patient seen on both Saturday and Sunday.  Initially kept over the weekend as patient reported ongoing melena (is a JW and does not accept blood) however unsure that she is truly having melena at this point (based on initial KIRBY and hemoglobin trend).    Recommendations:  --Advance diet as tolerated  --Daily CBC   --Monitor for overt signs of bleeding (in her situation it is best that a hat be placed on the toilet and the nursing staff monitor her BM)  --Can transition from PPI IV BID to a daily PPI PO  --We will continue to follow alongside primary team     Kenyatta Ferguson M.D.  Gastroenterology Fellow, PGY-VI  Pager: [phone]  [facility]

## 2019-08-03 NOTE — PLAN OF CARE
Problem: Adult Inpatient Plan of Care  Goal: Plan of Care Review  Outcome: Ongoing (interventions implemented as appropriate)  POC reviewed with patient, who verbalized understanding. AAOx4.   Remains free of falls and injury.   VSS. IV replaced with 20g in left FA. NS 100ml/hr.   Tolerating Clears diet. No  Nausea. No Pain.   Voiding per urinal, commode. 1 X BM.   Up with assist to BC. Up ad marsha.   Telemetry monitor. Blood Glucose monitor ac/hs.   No acute events. No distress noted. Family at bedside. Call bell in reach.   Will continue to monitor.

## 2019-08-03 NOTE — PROGRESS NOTES
"      [facility] Hospital Medicine  Progress Note    Patient Name: Eliu Paz  MRN: [id number]  Patient Class: IP- Inpatient   Admission Date: 8/1/2019  Length of Stay: 1 days  Attending Physician: JANET Mcdonald MD  Primary Care Provider: Ryan Carlson MD    [facility] Medicine Team: [facility] MED E Honey Garcia PA-C    Subjective:     Principal Problem:Gastrointestinal hemorrhage    Interval History: No reported events overnight. EGD done yesterday, angioectasia seen and intervened on. Continue clear liquid diet and monitor daily CBC. Holding BP meds to avoid hypotension in setting of GIB. GI following along.     Review of Systems   Constitutional: Positive for fatigue. Negative for fever.   HENT: Negative for congestion.    Eyes: Negative for photophobia and itching.   Respiratory: Positive for shortness of breath.    Cardiovascular: Negative for chest pain.   Gastrointestinal: Positive for abdominal pain, constipation, nausea (resolved) and vomiting (resolved). Negative for diarrhea.        +GERD and epigastric "burning"   Genitourinary: Negative for dysuria and hematuria.   Musculoskeletal: Positive for back pain.   Skin: Negative for pallor.   Neurological: Negative for weakness and headaches.   Hematological: Does not bruise/bleed easily.   Psychiatric/Behavioral: Negative for confusion. The patient is not nervous/anxious.      Objective:     Vital Signs (Most Recent):  Temp: 98 °F (36.7 °C) (08/03/19 1206)  Pulse: 62 (08/03/19 1206)  Resp: 18 (08/03/19 1206)  BP: (!) 110/51 (08/03/19 1206)  SpO2: 99 % (08/03/19 1206) Vital Signs (24h Range):  Temp:  [96.8 °F (36 °C)-98.6 °F (37 °C)] 98 °F (36.7 °C)  Pulse:  [] 62  Resp:  [16-22] 18  SpO2:  [98 %-100 %] 99 %  BP: ()/(44-64) 110/51     Weight: 76.7 kg (169 lb 1.5 oz)  Body mass index is 33.02 kg/m².    Intake/Output Summary (Last 24 hours) at 8/3/2019 1425  Last data filed at 8/3/2019 0900  Gross per 24 hour   Intake " 480 ml   Output --   Net 480 ml      Physical Exam   Constitutional: She is oriented to person, place, and time. She appears well-developed.  Non-toxic appearance. She does not have a sickly appearance. She does not appear ill. No distress.   HENT:   Head: Normocephalic and atraumatic.   Hard of hearing   Eyes: EOM are normal.   Neck: Normal range of motion. Neck supple.   Cardiovascular: Normal rate and regular rhythm.   Sternotomy scar   Pulmonary/Chest: Effort normal. No accessory muscle usage. No tachypnea and no bradypnea. No respiratory distress.   Abdominal: Soft. Bowel sounds are normal. She exhibits no distension. There is tenderness in the epigastric area.   Musculoskeletal: She exhibits no edema or tenderness.   Lymphadenopathy:   No edema to legs   Neurological: She is alert and oriented to person, place, and time. GCS eye subscore is 4. GCS verbal subscore is 5. GCS motor subscore is 6.   Skin: Skin is warm and dry. She is not diaphoretic.   Psychiatric: She has a normal mood and affect. Her speech is normal and behavior is normal. Thought content normal. Cognition and memory are normal.   Nursing note and vitals reviewed.        Overview/Hospital Course: Patient admitted for upper GI bleed. HDS, Hgb between 6-7. Patient is Anglican and has refused blood transfusions. GI consulted, EGD 8/2 showed angioectasia in the duodenum, intervened on. Continue supportive care and monitoring H/H.    Significant Labs: All pertinent labs within the past 24 hours have been reviewed.    Significant Imaging: I have reviewed all pertinent imaging results/findings within the past 24 hours.    Assessment/Plan:    Gastrointestinal hemorrhage   Melena  -pantoprazole injection 40 mg, 40 mg, Intravenous, ED 1 Time   -[START ON 8/2/2019] pantoprazole injection 40 mg, 40 mg, Intravenous, Q12H  -Check H.pylori Sab: in process  -GI following; EGD 8/2 shows coffee-ground-like material in gastric antrum, single bleeding  angioectasia in duodenum intervened on; continue CLD for now>> CBC daily  -Hold home ASA  -Avoid all NSAIDS, heparinoids  -IV fluids     Acute post-hemorrhagic anemia  -Patient is a Mosque, refuses all blood products  -Plan for bone marrow support:              -cyanocobalamin injection 100 mcg, Subcutaneous, Once               -[START ON 8/2/2019] cyanocobalamin tablet 1,000 mcg, Oral, Daily              -folic acid tablet 1 mg, 1 mg, Oral, Daily, JANET Mcdonald MD              -Iron polysaccharides capsule 150 mg, 150 mg, Oral, Daily  -If Fe levels very low, plan for IV Ferrlecit in am  -Checking B12, folate, Fe studies now: supplement  - see GIB above     Diabetes mellitus 2, uncontrolled with CKD 3  -Home regime:              -insulin asp prt-insulin aspart, NOVOLOG 70/30, 100 unit/mL (70-30) Soln              -INJECT 18 UNITS INTO THE SKIN 2 (TWO) TIMES DAILY BEFORE MEALS  -For now low dose SSI  - stable continue to monitor     Coronary artery disease, native heart without angina  -rosuvastatin tablet 40 mg, 40 mg, Oral, Daily  -carvedilol tablet 25 mg, 25 mg, Oral, BID  -isosorbide mononitrate 24 hr tablet 30 mg, 30 mg, Oral, Daily  -SL NTG 0.4 mg q5 minutes x 3 prn CP  -Holding home ASA for GIB; 8/2 holding BP meds     Essential hypertension  spironolactone tablet 50 mg, 50 mg, Oral, Daily  candesartan tablet 4 mg, 4 mg, Oral, Daily  amLODIPine tablet 10 mg, 10 mg, Oral, Daily  carvedilol tablet 25 mg, 25 mg, Oral, BID  Holding BP meds 8/2 in setting of hypotension and GIB     Glaucoma  -brimonidine 0.2% (ALPHAGAN) 0.2 % Drop, Place 1 drop into both eyes 3 (three) times daily  -dorzolamide 2 % ophthalmic solution 1 drop, 1 drop, Both Eyes, BID   -timolol maleate 0.5% ophthalmic solution 1 drop, 1 drop, Both Eyes, BID  -latanoprost 0.005 % ophthalmic solution 1 drop, 1 drop, Both Eyes, QHS    VTE Risk Mitigation (From admission, onward)        Ordered     Place MONY hose  Until discontinued       08/01/19 1854     Place sequential compression device  Until discontinued      08/01/19 1854     Reason for No Pharmacological VTE Prophylaxis  Once      08/01/19 1854     IP VTE HIGH RISK PATIENT  Once      08/01/19 1854           Discussed with staff & GI team  Honey Garcia PA-C  Department of Hospital Medicine   [facility]

## 2019-08-04 VITALS
HEIGHT: 60 IN | TEMPERATURE: 97 F | RESPIRATION RATE: 20 BRPM | WEIGHT: 169.06 LBS | SYSTOLIC BLOOD PRESSURE: 127 MMHG | OXYGEN SATURATION: 100 % | BODY MASS INDEX: 33.19 KG/M2 | DIASTOLIC BLOOD PRESSURE: 55 MMHG | HEART RATE: 56 BPM

## 2019-08-04 LAB
BASOPHILS # BLD AUTO: 0.04 K/UL (ref 0–0.2)
BASOPHILS NFR BLD: 0.8 % (ref 0–1.9)
DIFFERENTIAL METHOD: ABNORMAL
EOSINOPHIL # BLD AUTO: 0.1 K/UL (ref 0–0.5)
EOSINOPHIL NFR BLD: 2.7 % (ref 0–8)
ERYTHROCYTE [DISTWIDTH] IN BLOOD BY AUTOMATED COUNT: 18.1 % (ref 11.5–14.5)
HCT VFR BLD AUTO: 27 % (ref 37–48.5)
HGB BLD-MCNC: 7.8 G/DL (ref 12–16)
IMM GRANULOCYTES # BLD AUTO: 0.02 K/UL (ref 0–0.04)
IMM GRANULOCYTES NFR BLD AUTO: 0.4 % (ref 0–0.5)
LYMPHOCYTES # BLD AUTO: 1.5 K/UL (ref 1–4.8)
LYMPHOCYTES NFR BLD: 31.3 % (ref 18–48)
MAGNESIUM SERPL-MCNC: 1.8 MG/DL (ref 1.6–2.6)
MCH RBC QN AUTO: 28.8 PG (ref 27–31)
MCHC RBC AUTO-ENTMCNC: 28.9 G/DL (ref 32–36)
MCV RBC AUTO: 100 FL (ref 82–98)
MONOCYTES # BLD AUTO: 0.6 K/UL (ref 0.3–1)
MONOCYTES NFR BLD: 13.4 % (ref 4–15)
NEUTROPHILS # BLD AUTO: 2.5 K/UL (ref 1.8–7.7)
NEUTROPHILS NFR BLD: 51.4 % (ref 38–73)
NRBC BLD-RTO: 0 /100 WBC
PHOSPHATE SERPL-MCNC: 3.8 MG/DL (ref 2.7–4.5)
PLATELET # BLD AUTO: 211 K/UL (ref 150–350)
PMV BLD AUTO: 10.8 FL (ref 9.2–12.9)
POCT GLUCOSE: 133 MG/DL (ref 70–110)
POCT GLUCOSE: 152 MG/DL (ref 70–110)
POCT GLUCOSE: 223 MG/DL (ref 70–110)
RBC # BLD AUTO: 2.71 M/UL (ref 4–5.4)
WBC # BLD AUTO: 4.79 K/UL (ref 3.9–12.7)

## 2019-08-04 PROCEDURE — 25000003 PHARM REV CODE 250: Performed by: PHYSICIAN ASSISTANT

## 2019-08-04 PROCEDURE — 99239 HOSP IP/OBS DSCHRG MGMT >30: CPT | Mod: ,,, | Performed by: PHYSICIAN ASSISTANT

## 2019-08-04 PROCEDURE — 85025 COMPLETE CBC W/AUTO DIFF WBC: CPT

## 2019-08-04 PROCEDURE — 83735 ASSAY OF MAGNESIUM: CPT

## 2019-08-04 PROCEDURE — 25000003 PHARM REV CODE 250: Performed by: INTERNAL MEDICINE

## 2019-08-04 PROCEDURE — 36415 COLL VENOUS BLD VENIPUNCTURE: CPT

## 2019-08-04 PROCEDURE — C9113 INJ PANTOPRAZOLE SODIUM, VIA: HCPCS | Performed by: INTERNAL MEDICINE

## 2019-08-04 PROCEDURE — 99239 PR HOSPITAL DISCHARGE DAY,>30 MIN: ICD-10-PCS | Mod: ,,, | Performed by: PHYSICIAN ASSISTANT

## 2019-08-04 PROCEDURE — 63600175 PHARM REV CODE 636 W HCPCS: Performed by: INTERNAL MEDICINE

## 2019-08-04 PROCEDURE — 84100 ASSAY OF PHOSPHORUS: CPT

## 2019-08-04 RX ORDER — IRON POLYSACCHARIDE COMPLEX 150 MG
150 CAPSULE ORAL DAILY
Refills: 0 | COMMUNITY
Start: 2019-08-05 | End: 2019-08-20 | Stop reason: ALTCHOICE

## 2019-08-04 RX ORDER — SODIUM FERRIC GLUCONATE COMPLEX IN SUCROSE 12.5 MG/ML
125 INJECTION INTRAVENOUS ONCE
Status: DISCONTINUED | OUTPATIENT
Start: 2019-08-04 | End: 2019-08-04

## 2019-08-04 RX ORDER — ISOSORBIDE MONONITRATE 30 MG/1
30 TABLET, EXTENDED RELEASE ORAL DAILY
Status: DISCONTINUED | OUTPATIENT
Start: 2019-08-04 | End: 2019-08-04

## 2019-08-04 RX ORDER — FOLIC ACID 1 MG/1
1 TABLET ORAL DAILY
Qty: 30 TABLET | Refills: 11 | Status: SHIPPED | OUTPATIENT
Start: 2019-08-05 | End: 2019-08-06

## 2019-08-04 RX ORDER — PANTOPRAZOLE SODIUM 40 MG/1
40 TABLET, DELAYED RELEASE ORAL
Status: DISCONTINUED | OUTPATIENT
Start: 2019-08-04 | End: 2019-08-04 | Stop reason: HOSPADM

## 2019-08-04 RX ORDER — LANOLIN ALCOHOL/MO/W.PET/CERES
1000 CREAM (GRAM) TOPICAL DAILY
COMMUNITY
Start: 2019-08-05 | End: 2019-08-06

## 2019-08-04 RX ORDER — CARVEDILOL 12.5 MG/1
12.5 TABLET ORAL 2 TIMES DAILY
Status: DISCONTINUED | OUTPATIENT
Start: 2019-08-04 | End: 2019-08-04

## 2019-08-04 RX ADMIN — Medication 150 MG: at 09:08

## 2019-08-04 RX ADMIN — DORZOLAMIDE HYDROCHLORIDE 1 DROP: 20 SOLUTION/ DROPS OPHTHALMIC at 09:08

## 2019-08-04 RX ADMIN — TIMOLOL MALEATE 1 DROP: 5 SOLUTION OPHTHALMIC at 09:08

## 2019-08-04 RX ADMIN — ROSUVASTATIN CALCIUM 40 MG: 20 TABLET, FILM COATED ORAL at 09:08

## 2019-08-04 RX ADMIN — BRIMONIDINE TARTRATE 1 DROP: 2 SOLUTION OPHTHALMIC at 09:08

## 2019-08-04 RX ADMIN — INSULIN ASPART 2 UNITS: 100 INJECTION, SOLUTION INTRAVENOUS; SUBCUTANEOUS at 12:08

## 2019-08-04 RX ADMIN — FOLIC ACID 1 MG: 1 TABLET ORAL at 09:08

## 2019-08-04 RX ADMIN — CYANOCOBALAMIN TAB 1000 MCG 1000 MCG: 1000 TAB at 09:08

## 2019-08-04 RX ADMIN — PANTOPRAZOLE SODIUM 40 MG: 40 TABLET, DELAYED RELEASE ORAL at 12:08

## 2019-08-04 NOTE — PLAN OF CARE
Problem: Adult Inpatient Plan of Care  Goal: Plan of Care Review  Outcome: Ongoing (interventions implemented as appropriate)  Patient received from observation. See nursing note. Denied pain or discomfort. No fall or occurrences noted. Educated on same. Bed in low position. Call bell in reach. Refused bed alarm. She stated she gets up on her own and denied light headedness or dizziness.

## 2019-08-04 NOTE — NURSING
Report received from xochilt medeiros earlier at 5:38 pm . received her medications from nurse at 6:05 pm and Patient arrived to unit per wheelchair alert and oriented at 6:35 pm. Slightly hard of hearing. Ambulatory in room , cane on table . Stated she uses it to walk otherwise using the iv pole to walk. IV of normal saline infusing at 100 ml/ hour . 20 gauge L. Forearm noted in place and dressing intact. Patient reported being stuck numerous times to get the iv. Report to on coming nurse. BBS CTA, heart rate 55, vitals  Taken and stable . Gait slow and steady. Patient full code. Assessment on going. Skin intact. Telemetry on.

## 2019-08-04 NOTE — DISCHARGE SUMMARY
"[facility] Hospital Medicine  Discharge Summary      Patient Name: Eliu Paz  MRN: [id number]  Admission Date: 8/1/2019  Hospital Length of Stay: 2 days  Discharge Date and Time: 8/4/2019  3:42 PM  Attending Physician: Cinda att. providers found   Discharging Provider: Honey Garcia PA-C  Primary Care Provider: Ryan Carlson MD    [facility] Medicine Team: [facility] MED E Honey Garcia PA-C    HPI: Ms. Paz is a very nice 80yo lady with a past medical history of DM2, CKD3, and CAD.  She is also followed closely by GI due to a past history of GERD, LA grade A esophagitis, and gastroparesis.  She was last seen in GI clinic on 4/30/19, at which time they noted, "Ms. Paz's last GI visit was with me in 1/2019. previous GES revealed delayed gastric emptying. 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  Previously treated with acitigall for GB stones.  She is treating her acid reflux with zantac 150 mg 2 times daily with good control of pyrosis. She does have a hx of LA grade A esophagitis w/ documented healing. She had been on daily PPIs in the past, but opted to switch to H2RAs in stead d/t potential s/e of long term PPI use."      Her last EGD was on (4/18/17), and revealed a normal esophagus, erythematous mucosa in the stomach and a normal examined duodenum.  Of note, she is Moravian, and refuses all blood products.  For her CAD she has been on long term ASA 81 mg PO Qday.     She states that she is chronically constipated and always has GERD symptoms with epigastric burning.  When the constipation starts, she normally, "has to take a laxative, and then when everything finally comes out, it's all black."  This has been going on for many months now, so an EGD was scheduled for 8/8/19, but routine labs revealed significant anemia, so it was recommended that she come to the ED.  She has had some nausea and occasional emesis.    Procedure(s) " (LRB):  EGD (ESOPHAGOGASTRODUODENOSCOPY) (N/A)      Hospital Course: Patient admitted for upper GI bleed. HDS, Hgb lowest at 6.3. Patient is Shinto and has refused blood transfusions. GI consulted, EGD 8/2 showed angioectasia in the duodenum, intervened on. Hgb improved s/p EGD and with bone marrow support (Fe, folic acid, B12). Hgb 7.8 at time of discharge. Able to tolerate regular diet prior to discharge. Suspect dark stools in setting of iron supplements, patient unlikely still bleeding given improvement in hgb. PCP f/u 8/6 with CBC for H/H surveillance. Follow up with GI. Return precautions discussed, patient voiced understanding.     Consults:   Consults (From admission, onward)        Status Ordering Provider     Inpatient consult to Gastroenterology  Once     Provider:  (Not yet assigned)    Completed JANET QUINTANILLA          Final Active Diagnoses:    Diagnosis Date Noted POA    PRINCIPAL PROBLEM:  Gastrointestinal hemorrhage [K92.2] 08/01/2019 Yes    Macrocytic anemia [D53.9] 08/02/2019 Yes      Problems Resolved During this Admission:      Discharged Condition: stable    Disposition: Home or Self Care  Gastrointestinal hemorrhage   Melena  -pantoprazole injection 40 mg, 40 mg, Intravenous, ED 1 Time   -[START ON 8/2/2019] pantoprazole injection 40 mg, 40 mg, Intravenous, Q12H  - H. Pylori negative  -GI following; EGD 8/2 shows coffee-ground-like material in gastric antrum, single bleeding angioectasia in duodenum intervened on  - tolerating regular diet  - hgb 7.8 at time of d/c (improvement from 6.3 without any blood products)   -Hold home ASA during stay; OK to resume at d/c  -Avoid all NSAIDS, heparinoids  -IV fluids (possibly causing hemodilution, however hgb continuing to improve despite hemodilution)     Acute post-hemorrhagic anemia  -Patient is a Confucianism, refuses all blood products  -Plan for bone marrow support:              -cyanocobalamin injection 100 mcg, Subcutaneous,  Once               -[START ON 8/2/2019] cyanocobalamin tablet 1,000 mcg, Oral, Daily              -folic acid tablet 1 mg, 1 mg, Oral, Daily, W. Harpreet Mcdonald MD              -Iron polysaccharides capsule 150 mg, 150 mg, Oral, Daily  -If Fe levels very low, plan for IV Ferrlecit in am  -Checking B12, folate, Fe studies now: supplement               Fe possibly contributing to dark stools  - see GIB above     Diabetes mellitus 2, uncontrolled with CKD 3  -Home regime:              -insulin asp prt-insulin aspart, NOVOLOG 70/30, 100 unit/mL (70-30) Soln              -INJECT 18 UNITS INTO THE SKIN 2 (TWO) TIMES DAILY BEFORE MEALS  -For now low dose SSI  - stable continue to monitor     Coronary artery disease, native heart without angina  -rosuvastatin tablet 40 mg, 40 mg, Oral, Daily  -carvedilol tablet 25 mg, 25 mg, Oral, BID  -isosorbide mononitrate 24 hr tablet 30 mg, 30 mg, Oral, Daily  -SL NTG 0.4 mg q5 minutes x 3 prn CP  -Holding home ASA for GIB; OK to resume at d/c     Essential hypertension  spironolactone tablet 50 mg, 50 mg, Oral, Daily  candesartan tablet 4 mg, 4 mg, Oral, Daily  amLODIPine tablet 10 mg, 10 mg, Oral, Daily  carvedilol tablet 25 mg, 25 mg, Oral, BID  Holding BP meds 8/2 in setting of hypotension and GIB: BP improved prior to d/c OK to resume  Home meds     Glaucoma  -brimonidine 0.2% (ALPHAGAN) 0.2 % Drop, Place 1 drop into both eyes 3 (three) times daily  -dorzolamide 2 % ophthalmic solution 1 drop, 1 drop, Both Eyes, BID   -timolol maleate 0.5% ophthalmic solution 1 drop, 1 drop, Both Eyes, BID  -latanoprost 0.005 % ophthalmic solution 1 drop, 1 drop, Both Eyes, QHS  Follow Up:  Follow-up Information     Ryan Carlson MD On 8/6/2019.    Specialty:  Internal Medicine  Why:  at 9:40 AM; previously scheduled appointment  Contact information:  [address]  [facility] [id number]  [phone]             [facility] In 1 week.    Specialty:  Gastroenterology  Contact  information:  [address]  [facility] [address]  [phone]               Patient Instructions:      Notify your health care provider if you experience any of the following:  temperature >100.4     Notify your health care provider if you experience any of the following:  redness, tenderness, or signs of infection (pain, swelling, redness, odor or green/yellow discharge around incision site)     Notify your health care provider if you experience any of the following:  persistent nausea and vomiting or diarrhea     Notify your health care provider if you experience any of the following:  worsening rash     Notify your health care provider if you experience any of the following:  increased confusion or weakness     Notify your health care provider if you experience any of the following:   Order Comments: Blood seen in stool, toilet paper or toilet bowl     Activity as tolerated     Medications:  Reconciled Home Medications:      Medication List      START taking these medications    cyanocobalamin 1000 MCG tablet  Commonly known as:  VITAMIN B-12  Take 1 tablet (1,000 mcg total) by mouth once daily.  Start taking on:  8/5/2019     folic acid 1 MG tablet  Commonly known as:  FOLVITE  Take 1 tablet (1 mg total) by mouth once daily.  Start taking on:  8/5/2019     iron polysaccharides 150 mg iron Cap  Commonly known as:  NIFEREX  Take 1 capsule (150 mg total) by mouth once daily.  Start taking on:  8/5/2019        CONTINUE taking these medications    amLODIPine 10 MG tablet  Commonly known as:  NORVASC  TAKE 1 TABLET (10 MG TOTAL) BY MOUTH ONCE DAILY.     aspirin 81 MG EC tablet  Commonly known as:  ECOTRIN  Take 1 tablet (81 mg total) by mouth once daily.     blood sugar diagnostic Strp  Commonly known as:  ONETOUCH VERIO  Inject 1 each into the skin 3 (three) times daily with meals. Use to test blood sugar four times a day.     brimonidine 0.2% 0.2 % Drop  Commonly known as:  ALPHAGAN  Place 1 drop into  "both eyes 3 (three) times daily.     candesartan 4 MG tablet  Commonly known as:  ATACAND  Take 1 tablet (4 mg total) by mouth once daily.     carvedilol 25 MG tablet  Commonly known as:  COREG  TAKE 1 TABLET (25 MG TOTAL) BY MOUTH 2 (TWO) TIMES DAILY.     ciclopirox 8 % Soln  Commonly known as:  PENLAC  Apply topically nightly.     clobetasol 0.05% 0.05 % Oint  Commonly known as:  TEMOVATE  Apply small amount to vulva area twice a day for 4 weeks then once a day for 4 weeks then once a day on Mondays and Thursdays     dorzolamide-timolol 2-0.5% 22.3-6.8 mg/mL ophthalmic solution  Commonly known as:  COSOPT  Place 1 drop into both eyes 2 (two) times daily.     fluocinonide 0.05 % external solution  Commonly known as:  LIDEX  Apply topically 2 (two) times daily.     insulin asp prt-insulin aspart (NOVOLOG 70/30) 100 unit/mL (70-30) Soln  Commonly known as:  NovoLOG Mix 70-30 U-100 Insuln  INJECT 18 UNITS INTO THE SKIN 2 (TWO) TIMES DAILY BEFORE MEALS.     insulin syringe-needle U-100 0.5 mL 31 gauge x 5/16" Syrg  Commonly known as:  BD INSULIN SYRINGE ULTRA-FINE  USE TO INJECT TWICE DAILY WITH INSULIN INJECTIONS     isosorbide mononitrate 30 MG 24 hr tablet  Commonly known as:  IMDUR  TAKE 1 TABLET (30 MG TOTAL) BY MOUTH ONCE DAILY.     * ketoconazole 2 % cream  Commonly known as:  NIZORAL  Apply topically once daily.     * ketoconazole 2 % shampoo  Commonly known as:  NIZORAL  Apply topically every other day.     lactulose 10 gram/15 mL solution  Commonly known as:  CHRONULAC  TAKE 30 MLS (20 G TOTAL) BY MOUTH DAILY AS NEEDED (CONSTIPATION).     latanoprost 0.005 % ophthalmic solution  Place 1 drop into both eyes every evening.     nitroGLYCERIN 0.4 MG/DOSE TL SPRY 400 mcg/spray spray  Commonly known as:  NITROLINGUAL  PLACE 2 SPRAYS UNDER THE TONGUE EVERY 5 MINUTES AS NEEDED FOR CHEST PAIN     omeprazole 40 MG capsule  Commonly known as:  PRILOSEC  Take 1 capsule (40 mg total) by mouth every morning. Take 30 " minutes before eating on an empty stomach     rosuvastatin 40 MG Tab  Commonly known as:  CRESTOR  TAKE 1 TABLET BY MOUTH ONCE DAILY.     spironolactone 25 MG tablet  Commonly known as:  ALDACTONE  TAKE 2 TABLETS BY MOUTH EVERY DAY     tiZANidine 4 MG tablet  Commonly known as:  ZANAFLEX  1/2 to 1 tab every 6 hours as needed     traMADol 50 mg tablet  Commonly known as:  ULTRAM  TAKE 1 TABLET BY MOUTH TWICE A DAY AS NEEDED     triamcinolone acetonide 0.1% 0.1 % ointment  Commonly known as:  KENALOG  Apply topically 2 (two) times daily. Apply small amount to itchy areas on body BID PRN.  Do not use on face or in groin.         * This list has 2 medication(s) that are the same as other medications prescribed for you. Read the directions carefully, and ask your doctor or other care provider to review them with you.                Significant Diagnostic Studies: Endoscopy: EGD: single bleeding angioectasia in duodenum treated with frankie plasma coagulation.  H. Pylori serum Ab negative    Pending Diagnostic Studies:     None        Indwelling Lines/Drains at time of discharge:   Lines/Drains/Airways          None          Time spent on the discharge of patient: >45 minutes  Patient was seen and examined on the date of discharge and determined to be suitable for discharge.       Discussed with staff and GI  Honey Garcia PA-C  Department of Hospital Medicine  [facility]

## 2019-08-04 NOTE — PROGRESS NOTES
Pt is ready for discharge . discharge papers given and explained to pt and pt verbalized understanding . Peripheral IV access and cardiac monitor removed .  Pt left the floor to home  with her family on wheelchair .

## 2019-08-04 NOTE — PLAN OF CARE
Problem: Adult Inpatient Plan of Care  Goal: Plan of Care Review  Outcome: Ongoing (interventions implemented as appropriate)  Patient AA&Ox4. VSs stable on RA. Telemetry monitoring and glucose monitoring continued. IVF infusing per order. Patient ad marsha to bathroom per self. Gait steady. Safety intact. Patient denies pain and nausea. Denies needs. Will continue to monitor. Bed in lowest, locked position with side rails up x2. Call light and bedside table within reach. Nonskid socks in place.

## 2019-08-04 NOTE — NURSING
Loss of IV access. Patient very hard stick. Nurses attempted >10 times for IV access in ED per pt and reporting nurse. Spoke with [facility], edgardo RN. Stated that it would take her a little bit, but she would come and get IV access on patient. AM nurse notified.

## 2019-08-04 NOTE — PROGRESS NOTES
Spoke with ROBBI Gee And notified her that pt's IV access is out and she is schedule for IV medications . Pt is very hard stick . MD said it is ok for no IV access now , pt may go home today . Will switch IV med to oral . Will keep monitoring .

## 2019-08-04 NOTE — PLAN OF CARE
Problem: Adult Inpatient Plan of Care  Goal: Plan of Care Review  Outcome: Outcome(s) achieved Date Met: 08/04/19 08/04/19 1428   Plan of Care Review   Plan of Care Reviewed With patient;grandchild(kai)   Pt awake and oriented x4, vital signs stable, tolerated diet well, no N/V, no BM today. No falls. Medications given as scheduled. Pt ready for discharge per MD order

## 2019-08-04 NOTE — PROGRESS NOTES
"      [facility] Hospital Medicine  Progress Note    Patient Name: Eliu Paz  MRN: [id number]  Patient Class: IP- Inpatient   Admission Date: 8/1/2019  Length of Stay: 2 days  Attending Physician: JANET Mcdonald MD  Primary Care Provider: Ryan Carlson MD    [facility] Medicine Team: [facility]OSP MED E Honey Garcia PANikosC    Subjective:     Principal Problem:Gastrointestinal hemorrhage    Interval History: ?Possible dark stool overnight but was unwitnessed. Iron possibly contributing? Hgb 7.8 this AM improved from initial labs. Continue to monitor, possible d/c this PM.     Review of Systems   Constitutional: Positive for fatigue. Negative for fever.   Eyes: Negative for photophobia and itching.   Cardiovascular: Negative for chest pain.   Gastrointestinal: Positive for abdominal pain, constipation, nausea (resolved) and vomiting (resolved). Negative for diarrhea.        +GERD and epigastric "burning" >>improved   Genitourinary: Negative for dysuria and hematuria.   Musculoskeletal: Positive for back pain.   Skin: Negative for pallor.   Neurological: Negative for weakness and headaches.   Hematological: Does not bruise/bleed easily.   Psychiatric/Behavioral: Negative for confusion.     Objective:     Vital Signs (Most Recent):  Temp: 97.4 °F (36.3 °C) (08/04/19 0817)  Pulse: 63 (08/04/19 0817)  Resp: 16 (08/04/19 0817)  BP: (!) 146/64 (08/04/19 0817)  SpO2: 99 % (08/04/19 0817) Vital Signs (24h Range):  Temp:  [96.8 °F (36 °C)-98.4 °F (36.9 °C)] 97.4 °F (36.3 °C)  Pulse:  [49-70] 63  Resp:  [14-18] 16  SpO2:  [97 %-100 %] 99 %  BP: (110-158)/(51-70) 146/64     Weight: 76.7 kg (169 lb 1.5 oz)  Body mass index is 33.02 kg/m².    Intake/Output Summary (Last 24 hours) at 8/4/2019 0942  Last data filed at 8/4/2019 0601  Gross per 24 hour   Intake 4575 ml   Output 2 ml   Net 4573 ml      Physical Exam   Constitutional: She is oriented to person, place, and time.  Non-toxic appearance. She does not " have a sickly appearance. She does not appear ill. No distress.   HENT:   Head: Normocephalic and atraumatic.   Hard of hearing   Eyes: EOM are normal. Right eye exhibits no discharge. Left eye exhibits no discharge.   Neck: Normal range of motion.   Cardiovascular: Normal rate.   Sternotomy scar   Pulmonary/Chest: Effort normal. No accessory muscle usage. No tachypnea and no bradypnea. No respiratory distress.   Abdominal: Soft. Bowel sounds are normal. She exhibits no distension. There is tenderness in the epigastric area.   Musculoskeletal: She exhibits no edema or tenderness.   Lymphadenopathy:   No edema to legs   Neurological: She is alert and oriented to person, place, and time. GCS eye subscore is 4. GCS verbal subscore is 5. GCS motor subscore is 6.   Skin: Skin is warm and dry. She is not diaphoretic.   Psychiatric: She has a normal mood and affect. Her speech is normal and behavior is normal. Cognition and memory are normal.   Nursing note and vitals reviewed.        Overview/Hospital Course: Patient admitted for upper GI bleed. HDS, Hgb between 6-7. Patient is Mor[employer] and has refused blood transfusions. GI consulted, EGD 8/2 showed angioectasia in the duodenum, intervened on. Continue supportive care and monitoring H/H which has improved since admission.     Significant Labs: All pertinent labs within the past 24 hours have been reviewed.    Significant Imaging: I have reviewed all pertinent imaging results/findings within the past 24 hours.    Assessment/Plan:      Gastrointestinal hemorrhage   Melena  -pantoprazole injection 40 mg, 40 mg, Intravenous, ED 1 Time   -[START ON 8/2/2019] pantoprazole injection 40 mg, 40 mg, Intravenous, Q12H  - H. Pylori negative  -GI following; EGD 8/2 shows coffee-ground-like material in gastric antrum, single bleeding angioectasia in duodenum intervened on; continue CLD for now>> CBC daily: hgb 7.8 today  -Hold home ASA  -Avoid all NSAIDS, heparinoids  -IV  fluids     Acute post-hemorrhagic anemia  -Patient is a [employer], refuses all blood products  -Plan for bone marrow support:              -cyanocobalamin injection 100 mcg, Subcutaneous, Once               -[START ON 8/2/2019] cyanocobalamin tablet 1,000 mcg, Oral, Daily              -folic acid tablet 1 mg, 1 mg, Oral, Daily, JANET Mcdonald MD              -Iron polysaccharides capsule 150 mg, 150 mg, Oral, Daily  -If Fe levels very low, plan for IV Ferrlecit in am  -Checking B12, folate, Fe studies now: supplement    Fe possibly contributing to dark stools  - see GIB above     Diabetes mellitus 2, uncontrolled with CKD 3  -Home regime:              -insulin asp prt-insulin aspart, NOVOLOG 70/30, 100 unit/mL (70-30) Soln              -INJECT 18 UNITS INTO THE SKIN 2 (TWO) TIMES DAILY BEFORE MEALS  -For now low dose SSI  - stable continue to monitor     Coronary artery disease, native heart without angina  -rosuvastatin tablet 40 mg, 40 mg, Oral, Daily  -carvedilol tablet 25 mg, 25 mg, Oral, BID  -isosorbide mononitrate 24 hr tablet 30 mg, 30 mg, Oral, Daily  -SL NTG 0.4 mg q5 minutes x 3 prn CP  -Holding home ASA for GIB; 8/2 holding BP meds     Essential hypertension  spironolactone tablet 50 mg, 50 mg, Oral, Daily  candesartan tablet 4 mg, 4 mg, Oral, Daily  amLODIPine tablet 10 mg, 10 mg, Oral, Daily  carvedilol tablet 25 mg, 25 mg, Oral, BID  Holding BP meds 8/2 in setting of hypotension and GIB     Glaucoma  -brimonidine 0.2% (ALPHAGAN) 0.2 % Drop, Place 1 drop into both eyes 3 (three) times daily  -dorzolamide 2 % ophthalmic solution 1 drop, 1 drop, Both Eyes, BID   -timolol maleate 0.5% ophthalmic solution 1 drop, 1 drop, Both Eyes, BID  -latanoprost 0.005 % ophthalmic solution 1 drop, 1 drop, Both Eyes, QHS  VTE Risk Mitigation (From admission, onward)        Ordered     Place MONY hose  Until discontinued      08/01/19 0816     Place sequential compression device  Until discontinued      08/01/19  1854     Reason for No Pharmacological VTE Prophylaxis  Once      08/01/19 1854     IP VTE HIGH RISK PATIENT  Once      08/01/19 1854           Discussed with DAVI Garcia PA-C  Department of Hospital Medicine   [facility]

## 2019-08-04 NOTE — DISCHARGE INSTRUCTIONS
Follow up with PCP 8/6; get blood work checked prior to appointment  Return to the ER if you notice any blood in bowel movements

## 2019-08-05 ENCOUNTER — TELEPHONE (OUTPATIENT)
Dept: GASTROENTEROLOGY | Facility: CLINIC | Age: 79
End: 2019-08-05

## 2019-08-05 NOTE — TELEPHONE ENCOUNTER
----- Message from Audelia Diana MA sent at 8/5/2019  9:54 AM CDT -----  Contact: [phone]  Needs Advice    Reason for call: pt was supposed to have an endoscopy procedure this week with Dr Clinton then follow up with Miss Izquierdo in October.  Pt said she had the procedure in the hospital and now needs to see  Felecia sooner as a follow up.         Communication Preference: [phone]    Additional Information: please advise /I was not able to access a sooner appt

## 2019-08-06 ENCOUNTER — OFFICE VISIT (OUTPATIENT)
Dept: INTERNAL MEDICINE | Facility: CLINIC | Age: 79
End: 2019-08-06
Payer: MEDICARE

## 2019-08-06 VITALS
DIASTOLIC BLOOD PRESSURE: 60 MMHG | HEIGHT: 60 IN | HEART RATE: 58 BPM | OXYGEN SATURATION: 99 % | BODY MASS INDEX: 33.63 KG/M2 | WEIGHT: 171.31 LBS | SYSTOLIC BLOOD PRESSURE: 108 MMHG

## 2019-08-06 DIAGNOSIS — K92.2 GASTROINTESTINAL HEMORRHAGE, UNSPECIFIED GASTROINTESTINAL HEMORRHAGE TYPE: Primary | ICD-10-CM

## 2019-08-06 PROCEDURE — 99214 OFFICE O/P EST MOD 30 MIN: CPT | Mod: S$PBB,,, | Performed by: INTERNAL MEDICINE

## 2019-08-06 PROCEDURE — 99999 PR PBB SHADOW E&M-EST. PATIENT-LVL III: ICD-10-PCS | Mod: PBBFAC,,, | Performed by: INTERNAL MEDICINE

## 2019-08-06 PROCEDURE — 99213 OFFICE O/P EST LOW 20 MIN: CPT | Mod: PBBFAC | Performed by: INTERNAL MEDICINE

## 2019-08-06 PROCEDURE — 99214 PR OFFICE/OUTPT VISIT, EST, LEVL IV, 30-39 MIN: ICD-10-PCS | Mod: S$PBB,,, | Performed by: INTERNAL MEDICINE

## 2019-08-06 PROCEDURE — 99999 PR PBB SHADOW E&M-EST. PATIENT-LVL III: CPT | Mod: PBBFAC,,, | Performed by: INTERNAL MEDICINE

## 2019-08-06 RX ORDER — OMEPRAZOLE 40 MG/1
40 CAPSULE, DELAYED RELEASE ORAL EVERY MORNING
Qty: 30 CAPSULE | Refills: 11 | Status: SHIPPED | OUTPATIENT
Start: 2019-08-06 | End: 2020-07-17

## 2019-08-06 RX ORDER — FERROUS SULFATE 325(65) MG
325 TABLET ORAL
Refills: 0 | COMMUNITY
Start: 2019-08-06 | End: 2021-03-24

## 2019-08-06 NOTE — PLAN OF CARE
08/06/19 1529   Final Note   Assessment Type Final Discharge Note     Patient discharged home with no needs on 8/4/19.

## 2019-08-06 NOTE — PROGRESS NOTES
Subjective:       Patient ID: Eliu Paz is a 79 y.o. female.    Chief Complaint: Follow-up (3 month (ED) requesting cbc, patient still is having black stool but is on iron pills )    Here for hosp d/c f/u.    Admitted for UGIB (presented with melanotic stools and weak feeling). In hosp EGD showed angioectasias with active bleeding in stomach and duodenum, had argon treatment.    In hosp treated with IV iron, she is a Christianity and declines transfusion.    Felt weak this morning.    Still have dark stools/black. But she does not report melena.    decreased abd pains. Appetite a bit btr.no vomiting since home from hosp. No syncope or presyncope.    No chest pains.      Review of Systems   Constitutional: Positive for activity change and fatigue. Negative for fever.   HENT: Negative for congestion.    Respiratory: Negative for chest tightness and shortness of breath.    Cardiovascular: Negative for chest pain, palpitations and leg swelling.   Gastrointestinal: Negative for abdominal distention, abdominal pain, nausea and vomiting.   Genitourinary: Negative for decreased urine volume.   Musculoskeletal: Negative for arthralgias and myalgias.   Skin: Negative for rash.   Neurological: Negative for weakness.   Psychiatric/Behavioral: Negative for confusion.       Objective:      Physical Exam   Constitutional: She appears well-developed and well-nourished. No distress.   Not toxic appearing   HENT:   Head: Normocephalic and atraumatic.   Eyes:   Does have scleral pallor   Neck: Normal range of motion.   Cardiovascular: Normal rate, regular rhythm and normal heart sounds.   Pulmonary/Chest: Effort normal and breath sounds normal.   Abdominal: Soft. Bowel sounds are normal. She exhibits no distension and no mass. There is tenderness. There is no rebound and no guarding. No hernia.   Mild subxiphoid tenderness to deep palpation     Musculoskeletal: She exhibits edema.   1+ bilat lower extrem edema   Skin: She  is not diaphoretic.   Psychiatric: She has a normal mood and affect. Her behavior is normal.       Assessment:       1. Gastrointestinal hemorrhage, unspecified gastrointestinal hemorrhage type        Plan:       Eliu Steinberg was seen today for follow-up.    Diagnoses and all orders for this visit:    Gastrointestinal hemorrhage, unspecified gastrointestinal hemorrhage type  -     ferrous sulfate (FEOSOL) 325 mg (65 mg iron) Tab tablet; Take 1 tablet (325 mg total) by mouth daily with breakfast.  -     CBC auto differential; Future  -     Iron and TIBC; Future  -     Ferritin; Future  -     omeprazole (PRILOSEC) 40 MG capsule; Take 1 capsule (40 mg total) by mouth every morning. Take 30 minutes before eating on an empty stomach  Patient Instructions     Try Miralax or polyethylene glycol powder with glass water.    Orders Placed This Encounter                ferrous sulfate (FEOSOL) 325 mg (65 mg iron) Tab tablet    omeprazole (PRILOSEC) 40 MG capsule   Start these medicine      Check labs one week from now to trend anemia, take OTC iron.      Health Maintenance       Date Due Completion Date    Aspirin/Antiplatelet Therapy 03/17/1960 ---    Shingles Vaccine (1 of 2) 03/17/1992 ---    Influenza Vaccine (1) 08/01/2019 2/7/2017 (Declined)    Override on 2/7/2017: Declined (per provider note)    Override on 10/15/2015: Not Clinically Appropriate (declines)    Hemoglobin A1c 02/02/2020 8/2/2019    Foot Exam 02/28/2020 2/28/2019    Override on 12/20/2018: Done    Override on 9/5/2018: Done    Override on 9/1/2017: Done (podiatry)    Override on 12/2/2016: Done    Override on 8/24/2016: Done (by cardiologist)    Override on 7/14/2015: Done    Eye Exam 06/17/2020 6/17/2019    Lipid Panel 07/08/2020 7/8/2019    Colonoscopy 02/05/2024 2/5/2019    DEXA SCAN 11/05/2025 11/5/2015    TETANUS VACCINE 02/07/2027 2/7/2017 (Declined)    Override on 2/7/2017: Declined (per provider note)          Follow up in about 2 months (around  10/6/2019) for blood work 1 week, 8/13/19.    Future Appointments   Date Time Provider Department Center   8/7/2019 10:30 AM Wendi Mer[facility]   8/20/2019 10:30 AM Zuleika Francis MD [facility] OPHTHAL [address]   10/1/2019  9:00 AM [facility]   10/8/2019  9:00 AM Ryan Carlson MD [facility] IM [facility]   10/31/2019  2:00 PM Jeri Izquierdo NP [facility] GASTRO [facility]

## 2019-08-06 NOTE — PATIENT INSTRUCTIONS
Try Miralax or polyethylene glycol powder with glass water.    Orders Placed This Encounter                ferrous sulfate (FEOSOL) 325 mg (65 mg iron) Tab tablet    omeprazole (PRILOSEC) 40 MG capsule   Start these medicine

## 2019-08-07 ENCOUNTER — LAB VISIT (OUTPATIENT)
Dept: LAB | Facility: HOSPITAL | Age: 79
End: 2019-08-07
Attending: INTERNAL MEDICINE
Payer: MEDICARE

## 2019-08-07 DIAGNOSIS — K92.2 GASTROINTESTINAL HEMORRHAGE, UNSPECIFIED GASTROINTESTINAL HEMORRHAGE TYPE: ICD-10-CM

## 2019-08-07 LAB
BASOPHILS # BLD AUTO: 0.03 K/UL (ref 0–0.2)
BASOPHILS NFR BLD: 0.6 % (ref 0–1.9)
DIFFERENTIAL METHOD: ABNORMAL
EOSINOPHIL # BLD AUTO: 0.2 K/UL (ref 0–0.5)
EOSINOPHIL NFR BLD: 3.2 % (ref 0–8)
ERYTHROCYTE [DISTWIDTH] IN BLOOD BY AUTOMATED COUNT: 17.8 % (ref 11.5–14.5)
HCT VFR BLD AUTO: 25.3 % (ref 37–48.5)
HGB BLD-MCNC: 7.5 G/DL (ref 12–16)
LYMPHOCYTES # BLD AUTO: 2 K/UL (ref 1–4.8)
LYMPHOCYTES NFR BLD: 42.3 % (ref 18–48)
MCH RBC QN AUTO: 29.5 PG (ref 27–31)
MCHC RBC AUTO-ENTMCNC: 29.6 G/DL (ref 32–36)
MCV RBC AUTO: 100 FL (ref 82–98)
MONOCYTES # BLD AUTO: 0.5 K/UL (ref 0.3–1)
MONOCYTES NFR BLD: 11.2 % (ref 4–15)
NEUTROPHILS # BLD AUTO: 2 K/UL (ref 1.8–7.7)
NEUTROPHILS NFR BLD: 42.7 % (ref 38–73)
PLATELET # BLD AUTO: 188 K/UL (ref 150–350)
PMV BLD AUTO: 10.4 FL (ref 9.2–12.9)
RBC # BLD AUTO: 2.54 M/UL (ref 4–5.4)
WBC # BLD AUTO: 4.75 K/UL (ref 3.9–12.7)

## 2019-08-07 PROCEDURE — 82728 ASSAY OF FERRITIN: CPT

## 2019-08-07 PROCEDURE — 36415 COLL VENOUS BLD VENIPUNCTURE: CPT

## 2019-08-07 PROCEDURE — 83540 ASSAY OF IRON: CPT

## 2019-08-07 PROCEDURE — 85025 COMPLETE CBC W/AUTO DIFF WBC: CPT

## 2019-08-08 ENCOUNTER — TELEPHONE (OUTPATIENT)
Dept: INTERNAL MEDICINE | Facility: CLINIC | Age: 79
End: 2019-08-08

## 2019-08-08 DIAGNOSIS — K92.2 GASTROINTESTINAL HEMORRHAGE, UNSPECIFIED GASTROINTESTINAL HEMORRHAGE TYPE: Primary | ICD-10-CM

## 2019-08-08 DIAGNOSIS — Z53.1 REFUSAL OF BLOOD TRANSFUSIONS AS PATIENT IS JEHOVAH'S WITNESS: ICD-10-CM

## 2019-08-08 DIAGNOSIS — D64.9 ANEMIA, UNSPECIFIED TYPE: ICD-10-CM

## 2019-08-08 LAB
FERRITIN SERPL-MCNC: 172 NG/ML (ref 20–300)
IRON SERPL-MCNC: 36 UG/DL (ref 30–160)
SATURATED IRON: 12 % (ref 20–50)
TOTAL IRON BINDING CAPACITY: 299 UG/DL (ref 250–450)
TRANSFERRIN SERPL-MCNC: 202 MG/DL (ref 200–375)

## 2019-08-08 NOTE — TELEPHONE ENCOUNTER
----- Message from Eduarda Mendoza sent at 8/8/2019  4:38 PM CDT -----  Contact: Patient/ [phone]  Type: Returning a call    Who left a message?Maude Larson LPN    When did the practice call?Today    Comments:Please call back.      Thanks

## 2019-08-08 NOTE — TELEPHONE ENCOUNTER
Hi, please call her --  Her blood counts are still low and her iron is still low. I recommend that she see a hematologist.  Also, make sure to take the daily iron pill.    Lastly -- please set her up for another blood count 1 week from today 8/15/19  Orders Placed This Encounter    CBC auto differential     Let me know if patient has any questions.  Thank you, Ryan Carlson

## 2019-08-09 DIAGNOSIS — E11.22 TYPE 2 DIABETES MELLITUS WITH STAGE 3 CHRONIC KIDNEY DISEASE, WITH LONG-TERM CURRENT USE OF INSULIN: Chronic | ICD-10-CM

## 2019-08-09 DIAGNOSIS — N18.30 TYPE 2 DIABETES MELLITUS WITH STAGE 3 CHRONIC KIDNEY DISEASE, WITH LONG-TERM CURRENT USE OF INSULIN: Chronic | ICD-10-CM

## 2019-08-09 DIAGNOSIS — Z79.4 TYPE 2 DIABETES MELLITUS WITH STAGE 3 CHRONIC KIDNEY DISEASE, WITH LONG-TERM CURRENT USE OF INSULIN: Chronic | ICD-10-CM

## 2019-08-09 RX ORDER — ROSUVASTATIN CALCIUM 40 MG/1
TABLET, COATED ORAL
Qty: 90 TABLET | Refills: 11 | Status: SHIPPED | OUTPATIENT
Start: 2019-08-09 | End: 2020-09-15 | Stop reason: SDUPTHER

## 2019-08-09 NOTE — TELEPHONE ENCOUNTER
----- Message from Cammy Ruvalcaba sent at 8/9/2019  9:19 AM CDT -----  Contact: Patient [phone]  Patient is returning a phone call.  Who left a message for the patient: Maude MCADAMS  Does patient know what this is regarding:  Returned call  Comments:   Please call and advise  Thank you

## 2019-08-12 ENCOUNTER — TELEPHONE (OUTPATIENT)
Dept: INTERNAL MEDICINE | Facility: CLINIC | Age: 79
End: 2019-08-12

## 2019-08-12 NOTE — TELEPHONE ENCOUNTER
Spoke to pt , pt states she isn't sure why she called pt states she will try to remember the reason she called , reminded the pt on upcoming appts made , pt verbalized understanding

## 2019-08-12 NOTE — TELEPHONE ENCOUNTER
----- Message from Concepcion Liriano sent at 8/12/2019  8:49 AM CDT -----  Contact: Patient [phone]  Patient stated that she missed a call from you.    Please call and advise.    Thank You

## 2019-08-13 ENCOUNTER — TELEPHONE (OUTPATIENT)
Dept: INTERNAL MEDICINE | Facility: CLINIC | Age: 79
End: 2019-08-13

## 2019-08-13 NOTE — TELEPHONE ENCOUNTER
----- Message from Makayla Amin sent at 8/13/2019  8:59 AM CDT -----  Contact: Kindra (daughter) [phone]  Patients daughter is calling to check on the status of a form she had dropped off in order for patients daughter to work from home to care of patient.  Please advise, thanks

## 2019-08-13 NOTE — TELEPHONE ENCOUNTER
Spoke with patients daughter and advised that the back sheet of her paper needs to be faxed because the company only got the front portion. She also wants a copy emailed to her so I'm going to do that as well.

## 2019-08-15 ENCOUNTER — INITIAL CONSULT (OUTPATIENT)
Dept: HEMATOLOGY/ONCOLOGY | Facility: CLINIC | Age: 79
End: 2019-08-15
Payer: MEDICARE

## 2019-08-15 ENCOUNTER — LAB VISIT (OUTPATIENT)
Dept: LAB | Facility: HOSPITAL | Age: 79
End: 2019-08-15
Attending: INTERNAL MEDICINE
Payer: MEDICARE

## 2019-08-15 VITALS
WEIGHT: 169.06 LBS | HEIGHT: 60 IN | RESPIRATION RATE: 16 BRPM | HEART RATE: 62 BPM | BODY MASS INDEX: 33.19 KG/M2 | DIASTOLIC BLOOD PRESSURE: 63 MMHG | SYSTOLIC BLOOD PRESSURE: 135 MMHG | OXYGEN SATURATION: 98 % | TEMPERATURE: 98 F

## 2019-08-15 DIAGNOSIS — D64.9 ANEMIA, UNSPECIFIED TYPE: ICD-10-CM

## 2019-08-15 DIAGNOSIS — K92.2 GASTROINTESTINAL HEMORRHAGE, UNSPECIFIED GASTROINTESTINAL HEMORRHAGE TYPE: ICD-10-CM

## 2019-08-15 LAB
BASOPHILS # BLD AUTO: 0.02 K/UL (ref 0–0.2)
BASOPHILS NFR BLD: 0.4 % (ref 0–1.9)
DIFFERENTIAL METHOD: ABNORMAL
EOSINOPHIL # BLD AUTO: 0.1 K/UL (ref 0–0.5)
EOSINOPHIL NFR BLD: 2.3 % (ref 0–8)
ERYTHROCYTE [DISTWIDTH] IN BLOOD BY AUTOMATED COUNT: 17.8 % (ref 11.5–14.5)
HCT VFR BLD AUTO: 31.7 % (ref 37–48.5)
HGB BLD-MCNC: 8.9 G/DL (ref 12–16)
IMM GRANULOCYTES # BLD AUTO: 0.01 K/UL (ref 0–0.04)
IMM GRANULOCYTES NFR BLD AUTO: 0.2 % (ref 0–0.5)
LYMPHOCYTES # BLD AUTO: 1.3 K/UL (ref 1–4.8)
LYMPHOCYTES NFR BLD: 26.6 % (ref 18–48)
MCH RBC QN AUTO: 29 PG (ref 27–31)
MCHC RBC AUTO-ENTMCNC: 28.1 G/DL (ref 32–36)
MCV RBC AUTO: 103 FL (ref 82–98)
MONOCYTES # BLD AUTO: 0.6 K/UL (ref 0.3–1)
MONOCYTES NFR BLD: 12.5 % (ref 4–15)
NEUTROPHILS # BLD AUTO: 2.8 K/UL (ref 1.8–7.7)
NEUTROPHILS NFR BLD: 58 % (ref 38–73)
NRBC BLD-RTO: 0 /100 WBC
PLATELET # BLD AUTO: 162 K/UL (ref 150–350)
PMV BLD AUTO: 11.4 FL (ref 9.2–12.9)
RBC # BLD AUTO: 3.07 M/UL (ref 4–5.4)
WBC # BLD AUTO: 4.81 K/UL (ref 3.9–12.7)

## 2019-08-15 PROCEDURE — 99205 OFFICE O/P NEW HI 60 MIN: CPT | Mod: S$PBB,,, | Performed by: INTERNAL MEDICINE

## 2019-08-15 PROCEDURE — 99999 PR PBB SHADOW E&M-EST. PATIENT-LVL III: ICD-10-PCS | Mod: PBBFAC,,, | Performed by: INTERNAL MEDICINE

## 2019-08-15 PROCEDURE — 36415 COLL VENOUS BLD VENIPUNCTURE: CPT

## 2019-08-15 PROCEDURE — 99999 PR PBB SHADOW E&M-EST. PATIENT-LVL III: CPT | Mod: PBBFAC,,, | Performed by: INTERNAL MEDICINE

## 2019-08-15 PROCEDURE — 85025 COMPLETE CBC W/AUTO DIFF WBC: CPT

## 2019-08-15 PROCEDURE — 99205 PR OFFICE/OUTPT VISIT, NEW, LEVL V, 60-74 MIN: ICD-10-PCS | Mod: S$PBB,,, | Performed by: INTERNAL MEDICINE

## 2019-08-15 PROCEDURE — 99213 OFFICE O/P EST LOW 20 MIN: CPT | Mod: PBBFAC,25 | Performed by: INTERNAL MEDICINE

## 2019-08-15 NOTE — PROGRESS NOTES
Subjective:       Patient ID: Eliu Paz is a 79 y.o. female.    Chief Complaint: No chief complaint on file.    HPI   REFERRING PHYSICIAN:  Ryan Carlson M.D.    REASON FOR REFERRAL:  Anemia workup.    HISTORY OF PRESENT ILLNESS:  Ms. Paz is a 79-year-old  female   who is a Yazidi.  She was recently admitted with melena and was   found to have an upper GI bleed.  An endoscopy was performed on 08/02/2019, and   showed no ulcers, but she did have a single 5 mm telangiectasia with bleeding in   the second portion of the duodenum.  This was anticoagulated with argon plasma.    Upon presentation, her hemoglobin was 6.8 g/dL, her hematocrit was 23.7% with   an MCV of 98.  Her hemoglobin dropped to as low as 6.6 g/dL with hematocrit of   23.4.  The patient refused blood transfusions.  She was started on iron   replacement therapy by her primary care physician and she was eventually   discharged.  A CBC that was ordered by her primary care physician earlier today   shows that her white cells are 4800 per cubic millimeter, hemoglobin is 8.9   g/dL, hematocrit is 31.7%,  and platelets 162,000 per cubic millimeter.    A week ago, her H and H was 7.5 and 25.3% respectively.  She is referred for   evaluation.    PAST MEDICAL HISTORY:  As above.  She has a history of hypertension, diabetes,   coronary artery disease, status post bypass in 1994.    SOCIAL HISTORY:  She is retired from the school system.  She does not smoke and   does not drink alcohol.  She is  and lives with her .    FAMILY HISTORY:  Negative for gastrointestinal bleed.        Review of Systems    Overall she feels well. She complains of mild fatigue and SOB with exertion.  She has not had any further episodes of GI bleed since her discharge.  She relates a 20 lbs weight loss over the last 6 months.  She denies any anxiety, depression, easy bruising, fevers, chills, night  sweats,  nausea, vomiting,  diarrhea, constipation, diplopia,   blurred vision, headache, chest pain, palpitations, shortness of breath, breast pain, abdominal pain, extremity pain, or difficulty ambulating.  The remainder of the ten-point ROS, including general, skin, lymph, H/N, cardiorespiratory, GI, , Neuro, Endocrine, and psychiatric is negative.     Objective:      Physical Exam      She is alert, oriented to time, place, person, pleasant, well      nourished, in no acute physical distress.                                    VITAL SIGNS:  Reviewed                                      HEENT:  Normal.  There are no nasal, oral, lip, gingival, auricular, lid,    or conjunctival lesions.  Mucosae are moist and pink, and there is no        thrush.  Pupils are equal, reactive to light and accommodation.              Extraocular muscle movements are intact.  MAxillary teeth are missing and mandibular dentition is fair. .  There is no frontal or maxillary tenderness.                                     NECK:  Supple without JVD, adenopathy, or thyromegaly.                       LUNGS:  Clear to auscultation without wheezing, rales, or rhonchi.           CARDIOVASCULAR:  Reveals an S1, S2, no murmurs, no rubs, no gallops.  A scar form her CABG noted     ABDOMEN:  Soft, nontender, without organomegaly.  Bowel sounds are    present.                                                                     EXTREMITIES:  No cyanosis, clubbing,  But she does have trace edema at the ankle level.                               BREASTS:  Deferred                                     LYMPHATIC:  There is no cervical, axillary, or supraclavicular adenopathy.   SKIN:  Warm and moist, without petechiae, rashes, induration, or ecchymoses.           NEUROLOGIC:  DTRs are 0-1+ bilaterally, symmetrical, motor function is 5/5,  and cranial nerves are  within normal limits.    Assessment:       1. Anemia, secondary to her recent GI bleed, improving with Fe  supplementation therapy.        2.   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  3.     CAD s/p CABG, hypertension, DM.  Plan:         I had a long discussion with her;   She presented with significant anemia that was attributed to her recent GI bleed.  Her anemia seems to be improving with fe supplementation therapy.  Her MCV is 103; this could be secondary to reticulocytosis.  Other possible etiologies include MDS , liver disease, and hypothyroidism (less likely).  Her B12 and folic acid were recently checked and were not low.   At this point we will proceed as follows:  Will check an SPEP.  Will check reticulocyte count.  Will rule out hemolysis.  I have advised her to remain on fe supplements and repeat her CBC in 2 weeks.  Her multiple questions were answered to her satisfaction.  I spent approximately 60 minutes reviewing the available records and evaluating the patient, out of which over 50% of the time was spent face to face with the patient in counseling and coordinating this patient's care.

## 2019-08-15 NOTE — LETTER
August 15, 2019      Ryan Carlson MD  [address]  [facility] [address]           [facility]  [address]  [facility] [address]  Phone: [phone]          Patient: Eliu Paz   MR Number: [id number]   YOB: 1940   Date of Visit: 8/15/2019       Dear Dr. Ryan Carlson:    Thank you for referring Eliu Paz to me for evaluation. Attached you will find relevant portions of my assessment and plan of care.    If you have questions, please do not hesitate to call me. I look forward to following Eliu Paz along with you.    Sincerely,    Jed Velez MD    Enclosure  CC:  No Recipients    If you would like to receive this communication electronically, please contact [email or web address] or [phone] to request more information on [employer] access.    For providers and/or their staff who would like to refer a patient to [facility], please contact us through our one-stop-shop provider referral line, [facility], at [phone].    If you feel you have received this communication in error or would no longer like to receive these types of communications, please e-mail [email or web address]

## 2019-08-19 NOTE — PROGRESS NOTES
HPI     Glaucoma      Additional comments: 4 month ck today and pt states no complaints (NO   HRT)              Comments     DLS: 5/21/19    1. POAG OU  2. Type 2 DM no DR  3. NS OU  4. ARMD OU  5. Ptosis OS>OD    MEDS:  Cosopt BID OU  Brimonidine TID OU  Latanoprost QHS OU          Last edited by Rand Vergara MA on 8/20/2019 10:23 AM. (History)            Assessment /Plan     For exam results, see Encounter Report.    Primary open-angle glaucoma, bilateral, severe stage    Early dry stage nonexudative age-related macular degeneration of both eyes    Senile nuclear sclerosis, bilateral    Cortical age-related cataract, bilateral    Nonexudative age-related macular degeneration, bilateral, intermediate dry stage    Type 2 diabetes mellitus without ophthalmic manifestations    Macular drusen, bilateral           Glaucoma (type and duration)    POAG severe   First HVF   2013   First photos   2017   Treatment / Drops started   Latanoprost, travatan           Family history    ?        Glaucoma meds    Latanoprost / cosopt (added - 1//15/2019)         H/O adverse rxn to glaucoma drops    none        LASERS    none        GLAUCOMA SURGERIES    none        OTHER EYE SURGERIES    none        CDR    0.75 w/ sup thinning /0.9 - sup and inf thinning         Tbase    16-20/16-20          Tmax    20/20            Ttarget    15/15           HVF    6 test 2013 to  2019 - SAD od // gen depression, SAD, IAD os (?prog os0         Gonio    3+ ou        CCT    544/525        OCT    5 test 2013 to 2018 - RNFL - decr thru out od // dec. Thru out  os        HRT   2 test 2017 to 2018 MR -  Dec. S od // dec. Ijoesph S/T os /// CDR 0.78 od // 0.760 os        Disc photos    2017, 2019     - Ttoday    11/10 below  target of 15 ou) // good resp to adding brimonidine   - Test done today  - gonio     POAG severe  Severe RNFL thinning with progressive field loss   - on latanoprost qhs OU, continue  - cosopt ou bid - added  (1/2019)    -brimonidine added 5/21/2019 - good resp 19/18 --> 11/10     NS cataract OU - hold on CE - pt is not having any problems with vision presently     Macular Drusen OU  AMD OU, seeing Benevento   AREDS 2 Vitamins  Home [employer] Grid Testing   - seen by  retina - akshat rogers - saw Benevento 6/1/2018 - to F/U in 6 months    Ptosis   Pt C/O droopy lid OS > OD       GLAUCOMA  Good response to adding brimonidine - below target today 8/20/2019 (( 19/18--> 11/10))       - Hold off on CE for now - but may need to consider it soon     If IOP goes above target again consider SLT's       F/U HRT - 4 months

## 2019-08-20 ENCOUNTER — OFFICE VISIT (OUTPATIENT)
Dept: OPHTHALMOLOGY | Facility: CLINIC | Age: 79
End: 2019-08-20
Payer: MEDICARE

## 2019-08-20 DIAGNOSIS — H25.13 SENILE NUCLEAR SCLEROSIS, BILATERAL: ICD-10-CM

## 2019-08-20 DIAGNOSIS — H35.363 MACULAR DRUSEN, BILATERAL: ICD-10-CM

## 2019-08-20 DIAGNOSIS — H35.3131 EARLY DRY STAGE NONEXUDATIVE AGE-RELATED MACULAR DEGENERATION OF BOTH EYES: ICD-10-CM

## 2019-08-20 DIAGNOSIS — H40.1133 PRIMARY OPEN-ANGLE GLAUCOMA, BILATERAL, SEVERE STAGE: Primary | ICD-10-CM

## 2019-08-20 DIAGNOSIS — H35.3132 NONEXUDATIVE AGE-RELATED MACULAR DEGENERATION, BILATERAL, INTERMEDIATE DRY STAGE: ICD-10-CM

## 2019-08-20 DIAGNOSIS — E11.9 TYPE 2 DIABETES MELLITUS WITHOUT OPHTHALMIC MANIFESTATIONS: ICD-10-CM

## 2019-08-20 DIAGNOSIS — H25.013 CORTICAL AGE-RELATED CATARACT, BILATERAL: ICD-10-CM

## 2019-08-20 PROCEDURE — 92012 INTRM OPH EXAM EST PATIENT: CPT | Mod: S$PBB,,, | Performed by: OPHTHALMOLOGY

## 2019-08-20 PROCEDURE — 99999 PR PBB SHADOW E&M-EST. PATIENT-LVL II: ICD-10-PCS | Mod: PBBFAC,,, | Performed by: OPHTHALMOLOGY

## 2019-08-20 PROCEDURE — 99999 PR PBB SHADOW E&M-EST. PATIENT-LVL II: CPT | Mod: PBBFAC,,, | Performed by: OPHTHALMOLOGY

## 2019-08-20 PROCEDURE — 99212 OFFICE O/P EST SF 10 MIN: CPT | Mod: PBBFAC | Performed by: OPHTHALMOLOGY

## 2019-08-20 PROCEDURE — 92012 PR EYE EXAM, EST PATIENT,INTERMED: ICD-10-PCS | Mod: S$PBB,,, | Performed by: OPHTHALMOLOGY

## 2019-08-25 DIAGNOSIS — Z79.4 TYPE 2 DIABETES MELLITUS WITH STAGE 3 CHRONIC KIDNEY DISEASE, WITH LONG-TERM CURRENT USE OF INSULIN: Chronic | ICD-10-CM

## 2019-08-25 DIAGNOSIS — E11.22 TYPE 2 DIABETES MELLITUS WITH STAGE 3 CHRONIC KIDNEY DISEASE, WITH LONG-TERM CURRENT USE OF INSULIN: Chronic | ICD-10-CM

## 2019-08-25 DIAGNOSIS — N18.30 TYPE 2 DIABETES MELLITUS WITH STAGE 3 CHRONIC KIDNEY DISEASE, WITH LONG-TERM CURRENT USE OF INSULIN: Chronic | ICD-10-CM

## 2019-08-27 ENCOUNTER — OFFICE VISIT (OUTPATIENT)
Dept: INTERNAL MEDICINE | Facility: CLINIC | Age: 79
End: 2019-08-27
Payer: MEDICARE

## 2019-08-27 VITALS
BODY MASS INDEX: 34.06 KG/M2 | DIASTOLIC BLOOD PRESSURE: 62 MMHG | HEART RATE: 65 BPM | OXYGEN SATURATION: 99 % | SYSTOLIC BLOOD PRESSURE: 122 MMHG | HEIGHT: 60 IN | WEIGHT: 173.5 LBS

## 2019-08-27 DIAGNOSIS — Z79.4 TYPE 2 DIABETES MELLITUS WITH DIABETIC POLYNEUROPATHY, WITH LONG-TERM CURRENT USE OF INSULIN: Chronic | ICD-10-CM

## 2019-08-27 DIAGNOSIS — E11.42 TYPE 2 DIABETES MELLITUS WITH DIABETIC POLYNEUROPATHY, WITH LONG-TERM CURRENT USE OF INSULIN: Chronic | ICD-10-CM

## 2019-08-27 DIAGNOSIS — D64.9 ANEMIA, UNSPECIFIED TYPE: Primary | ICD-10-CM

## 2019-08-27 PROCEDURE — 99214 OFFICE O/P EST MOD 30 MIN: CPT | Mod: S$PBB,,, | Performed by: INTERNAL MEDICINE

## 2019-08-27 PROCEDURE — 99999 PR PBB SHADOW E&M-EST. PATIENT-LVL III: CPT | Mod: PBBFAC,,, | Performed by: INTERNAL MEDICINE

## 2019-08-27 PROCEDURE — 99999 PR PBB SHADOW E&M-EST. PATIENT-LVL III: ICD-10-PCS | Mod: PBBFAC,,, | Performed by: INTERNAL MEDICINE

## 2019-08-27 PROCEDURE — 99214 PR OFFICE/OUTPT VISIT, EST, LEVL IV, 30-39 MIN: ICD-10-PCS | Mod: S$PBB,,, | Performed by: INTERNAL MEDICINE

## 2019-08-27 PROCEDURE — 99213 OFFICE O/P EST LOW 20 MIN: CPT | Mod: PBBFAC | Performed by: INTERNAL MEDICINE

## 2019-08-27 RX ORDER — INSULIN ASPART 100 [IU]/ML
INJECTION, SUSPENSION SUBCUTANEOUS
Qty: 50 ML | Refills: 11 | Status: SHIPPED | OUTPATIENT
Start: 2019-08-27 | End: 2019-11-18

## 2019-08-27 NOTE — PROGRESS NOTES
Subjective:       Patient ID: Eliu Paz is a 79 y.o. female.    Chief Complaint: Follow-up (patient is having trouble hearing with both ears even with hearing aids )    Patient is here for followup for chronic conditions.    Some weakness R hand, has had CTS surgery.    Since last visit taking iron pill, no longer any blood in stools or melena.    Review of Systems   Constitutional: Negative for activity change, fatigue and fever.   HENT: Negative for congestion.    Respiratory: Negative for chest tightness and shortness of breath.    Cardiovascular: Negative for chest pain, palpitations and leg swelling.   Gastrointestinal: Negative for abdominal distention, abdominal pain, nausea and vomiting.   Genitourinary: Negative for decreased urine volume.   Musculoskeletal: Negative for arthralgias and myalgias.   Skin: Negative for rash.   Neurological: Negative for weakness.   Psychiatric/Behavioral: Negative for confusion.       Objective:      Physical Exam   Constitutional: She appears well-developed and well-nourished. No distress.   HENT:   Head: Normocephalic and atraumatic.   Eyes:   No longer pale appearing   Neck: Normal range of motion.   Cardiovascular: Normal rate, regular rhythm and normal heart sounds.   Pulmonary/Chest: Effort normal and breath sounds normal.   Abdominal: Soft. Bowel sounds are normal. She exhibits no distension and no mass. There is no tenderness. There is no rebound and no guarding. No hernia.        Musculoskeletal: She exhibits edema.   1+ bilat lower extrem edema   Neurological:   Hand with bilat CTS surgical scars. Mild + Tinels on the R.   Skin: She is not diaphoretic.   Psychiatric: She has a normal mood and affect. Her behavior is normal.       Assessment:       1. Anemia, unspecified type    2. Type 2 diabetes mellitus with diabetic polyneuropathy, with long-term current use of insulin        Plan:       Here for follow up.    Anemia improved with iron.    GIB -- has GI  f/u. Remain off ASA since R>B.    Dm2 -- she reports sugars are controlled.    CTS symptoms -- she will try splint and call if symptoms worsen    Eliu Steinberg was seen today for follow-up.    Diagnoses and all orders for this visit:    Anemia, unspecified type    Type 2 diabetes mellitus with diabetic polyneuropathy, with long-term current use of insulin        Health Maintenance       Date Due Completion Date    Aspirin/Antiplatelet Therapy 03/17/1960 ---    Shingles Vaccine (1 of 2) 03/17/1992 ---    Influenza Vaccine (1) 09/01/2019 2/7/2017 (Declined)    Override on 2/7/2017: Declined (per provider note)    Override on 10/15/2015: Not Clinically Appropriate (declines)    Hemoglobin A1c 02/02/2020 8/2/2019    Foot Exam 02/28/2020 2/28/2019    Override on 12/20/2018: Done    Override on 9/5/2018: Done    Override on 9/1/2017: Done (podiatry)    Override on 12/2/2016: Done    Override on 8/24/2016: Done (by cardiologist)    Override on 7/14/2015: Done    Lipid Panel 07/08/2020 7/8/2019    Eye Exam 08/20/2020 8/20/2019    Colonoscopy 02/05/2024 2/5/2019    DEXA SCAN 11/05/2025 11/5/2015    TETANUS VACCINE 02/07/2027 2/7/2017 (Declined)    Override on 2/7/2017: Declined (per provider note)          Follow up in about 3 months (around 11/27/2019).    Future Appointments   Date Time Provider Department Center   9/4/2019  2:00 PM LAB, HEMONC CANCER BLDG [facility] LAB [facility]   9/4/2019  3:00 PM Jed Velez MD [facility] HEM ONC [facility]   10/31/2019  2:00 PM Jeri Izquierdo NP [facility] GASTRO [facility]   11/18/2019  8:40 AM Ryan Carlson MD [facility] IM [facility] PCW   12/17/2019  9:45 AM Zuleika Francis MD [facility]

## 2019-09-04 ENCOUNTER — LAB VISIT (OUTPATIENT)
Dept: LAB | Facility: HOSPITAL | Age: 79
End: 2019-09-04
Payer: MEDICARE

## 2019-09-04 ENCOUNTER — OFFICE VISIT (OUTPATIENT)
Dept: HEMATOLOGY/ONCOLOGY | Facility: CLINIC | Age: 79
End: 2019-09-04
Payer: MEDICARE

## 2019-09-04 VITALS
OXYGEN SATURATION: 97 % | BODY MASS INDEX: 34.23 KG/M2 | SYSTOLIC BLOOD PRESSURE: 135 MMHG | RESPIRATION RATE: 18 BRPM | DIASTOLIC BLOOD PRESSURE: 65 MMHG | WEIGHT: 175.25 LBS | HEART RATE: 67 BPM

## 2019-09-04 DIAGNOSIS — D50.0 ANEMIA DUE TO CHRONIC BLOOD LOSS: Primary | ICD-10-CM

## 2019-09-04 DIAGNOSIS — D64.9 ANEMIA, UNSPECIFIED TYPE: ICD-10-CM

## 2019-09-04 LAB
BASOPHILS # BLD AUTO: 0.04 K/UL (ref 0–0.2)
BASOPHILS NFR BLD: 0.7 % (ref 0–1.9)
DIFFERENTIAL METHOD: ABNORMAL
EOSINOPHIL # BLD AUTO: 0.1 K/UL (ref 0–0.5)
EOSINOPHIL NFR BLD: 1.7 % (ref 0–8)
ERYTHROCYTE [DISTWIDTH] IN BLOOD BY AUTOMATED COUNT: 14.8 % (ref 11.5–14.5)
HCT VFR BLD AUTO: 33.5 % (ref 37–48.5)
HGB BLD-MCNC: 10.2 G/DL (ref 12–16)
IMM GRANULOCYTES # BLD AUTO: 0.01 K/UL (ref 0–0.04)
IMM GRANULOCYTES NFR BLD AUTO: 0.2 % (ref 0–0.5)
LYMPHOCYTES # BLD AUTO: 2 K/UL (ref 1–4.8)
LYMPHOCYTES NFR BLD: 33.4 % (ref 18–48)
MCH RBC QN AUTO: 28.5 PG (ref 27–31)
MCHC RBC AUTO-ENTMCNC: 30.4 G/DL (ref 32–36)
MCV RBC AUTO: 94 FL (ref 82–98)
MONOCYTES # BLD AUTO: 0.8 K/UL (ref 0.3–1)
MONOCYTES NFR BLD: 13.9 % (ref 4–15)
NEUTROPHILS # BLD AUTO: 2.9 K/UL (ref 1.8–7.7)
NEUTROPHILS NFR BLD: 50.1 % (ref 38–73)
NRBC BLD-RTO: 0 /100 WBC
PLATELET # BLD AUTO: 187 K/UL (ref 150–350)
PMV BLD AUTO: 11.1 FL (ref 9.2–12.9)
RBC # BLD AUTO: 3.58 M/UL (ref 4–5.4)
WBC # BLD AUTO: 5.83 K/UL (ref 3.9–12.7)

## 2019-09-04 PROCEDURE — 99214 OFFICE O/P EST MOD 30 MIN: CPT | Mod: PBBFAC | Performed by: INTERNAL MEDICINE

## 2019-09-04 PROCEDURE — 99999 PR PBB SHADOW E&M-EST. PATIENT-LVL IV: ICD-10-PCS | Mod: PBBFAC,,, | Performed by: INTERNAL MEDICINE

## 2019-09-04 PROCEDURE — 36415 COLL VENOUS BLD VENIPUNCTURE: CPT

## 2019-09-04 PROCEDURE — 85025 COMPLETE CBC W/AUTO DIFF WBC: CPT

## 2019-09-04 PROCEDURE — 99214 PR OFFICE/OUTPT VISIT, EST, LEVL IV, 30-39 MIN: ICD-10-PCS | Mod: S$PBB,,, | Performed by: INTERNAL MEDICINE

## 2019-09-04 PROCEDURE — 99999 PR PBB SHADOW E&M-EST. PATIENT-LVL IV: CPT | Mod: PBBFAC,,, | Performed by: INTERNAL MEDICINE

## 2019-09-04 PROCEDURE — 99214 OFFICE O/P EST MOD 30 MIN: CPT | Mod: S$PBB,,, | Performed by: INTERNAL MEDICINE

## 2019-09-04 NOTE — PROGRESS NOTES
Subjective:       Patient ID: Eliu Paz is a 79 y.o. female.    Chief Complaint: No chief complaint on file.    HPI    Ms. Paz  returns today with a repeat CBC.  Her WBcs are 5,00 /mm3, Hg 10.2 gr/dl, Ht 33.5 % and platelerts 187,000 /mm3.  Her MCV is 94.  Briefly, she is a 79-year-old  female who is a [employer].  She was recently admitted with melena and was found to have an upper GI bleed.  An endoscopy was performed on 08/02/2019, and showed no ulcers, but she did have a single 5 mm telangiectasia with bleeding in the second portion of the duodenum.  This was anticoagulated with argon plasma.  Upon presentation, her hemoglobin was 6.8 g/dL, her hematocrit was 23.7% with   an MCV of 98.  Her hemoglobin dropped to as low as 6.6 g/dL with hematocrit of 23.4 %.  The patient refused blood transfusions.  She was started on iron replacement therapy by her primary care physician and she was eventually   discharged.      Review of Systems    Overall she feels well. She again mentions feeling cold and experiencing mild fatigue and SOB with exertion.  She has not had any further episodes of GI bleed since her discharge.    She denies any anxiety, depression, easy bruising, fevers, chills, night  sweats,  nausea, vomiting, diarrhea, constipation, diplopia,   blurred vision, headache, chest pain, palpitations, shortness of breath, breast pain, abdominal pain, extremity pain, or difficulty ambulating.  The remainder of the ten-point ROS, including general, skin, lymph, H/N, cardiorespiratory, GI, , Neuro, Endocrine, and psychiatric is negative.     Objective:      Physical Exam      She is alert, oriented to time, place, person, pleasant, well      nourished, in no acute physical distress.                                    VITAL SIGNS:  Reviewed                                      HEENT:  Normal.  There are no nasal, oral, lip, gingival, auricular, lid,    or conjunctival lesions.  Mucosae  are moist and pink, and there is no        thrush.  Pupils are equal, reactive to light and accommodation.              Extraocular muscle movements are intact.  MAxillary teeth are missing and mandibular dentition is fair. .  There is no frontal or maxillary tenderness.                                     NECK:  Supple without JVD, adenopathy, or thyromegaly.                       LUNGS:  Clear to auscultation without wheezing, rales, or rhonchi.           CARDIOVASCULAR:  Reveals an S1, S2, no murmurs, no rubs, no gallops.  A scar form her CABG noted     ABDOMEN:  Soft, nontender, without organomegaly.  Bowel sounds are    present.                                                                     EXTREMITIES:  No cyanosis, clubbing,  But she does have trace edema at the ankle level.                               BREASTS:  Deferred                                     LYMPHATIC:  There is no cervical, axillary, or supraclavicular adenopathy.   SKIN:  Warm and moist, without petechiae, rashes, induration, or ecchymoses.           NEUROLOGIC:  DTRs are 0-1+ bilaterally, symmetrical, motor function is 5/5,  and cranial nerves are  within normal limits.    Assessment:       1. Anemia, secondary to her recent GI bleed, improving with Fe supplementation therapy.        2.   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  3.     CAD s/p CABG, hypertension, DM.  Plan:         I had a long discussion with her;   Her anemia has improved significantly over the last month.  I have advised her to remain on fe supplements and repeat her CBC in 2 weeks.  Her multiple questions were answered to her satisfaction.  I

## 2019-09-05 ENCOUNTER — TELEPHONE (OUTPATIENT)
Dept: HEMATOLOGY/ONCOLOGY | Facility: CLINIC | Age: 79
End: 2019-09-05

## 2019-09-05 NOTE — TELEPHONE ENCOUNTER
----- Message from Paul Campbell sent at 9/5/2019  3:20 PM CDT -----  Contact: Patient  Staff Message     Caller name: Pt    Reason for call: Pt missed 09/04 lab appt, wants to know if she needs to come in and have then done. Or just wait until 09/24.        Communication Preference: [phone]    Additional Information:

## 2019-09-05 NOTE — TELEPHONE ENCOUNTER
Returned call to patient to discuss her questions.   Patient did not answer.  Detailed voicemail left to confirm labs were completed yesterday and her results showed great improvement. Explained that she would be due to receive labs in 2 weeks when she returns to see Dr. Oconnor.     Clinic number provided for any additional questions/concerns.

## 2019-09-06 ENCOUNTER — TELEPHONE (OUTPATIENT)
Dept: GASTROENTEROLOGY | Facility: CLINIC | Age: 79
End: 2019-09-06

## 2019-09-06 NOTE — TELEPHONE ENCOUNTER
----- Message from Vicky Otoole sent at 9/6/2019  9:49 AM CDT -----  Contact: pt: [phone]  Pt would like a sooner appt than 10/31    Please contact pt: [phone]

## 2019-09-16 ENCOUNTER — HOSPITAL ENCOUNTER (EMERGENCY)
Facility: HOSPITAL | Age: 79
Discharge: HOME | End: 2019-09-16
Attending: EMERGENCY MEDICINE
Payer: MEDICARE

## 2019-09-16 VITALS
WEIGHT: 170 LBS | HEART RATE: 72 BPM | SYSTOLIC BLOOD PRESSURE: 123 MMHG | BODY MASS INDEX: 33.38 KG/M2 | HEIGHT: 60 IN | RESPIRATION RATE: 16 BRPM | DIASTOLIC BLOOD PRESSURE: 58 MMHG | TEMPERATURE: 100 F | OXYGEN SATURATION: 97 %

## 2019-09-16 DIAGNOSIS — M54.9 BACK PAIN, UNSPECIFIED BACK LOCATION, UNSPECIFIED BACK PAIN LATERALITY, UNSPECIFIED CHRONICITY: Primary | ICD-10-CM

## 2019-09-16 DIAGNOSIS — I25.10 CORONARY ARTERY DISEASE INVOLVING NATIVE CORONARY ARTERY OF NATIVE HEART WITHOUT ANGINA PECTORIS: Chronic | ICD-10-CM

## 2019-09-16 DIAGNOSIS — M54.2 CERVICALGIA: ICD-10-CM

## 2019-09-16 DIAGNOSIS — I10 ESSENTIAL HYPERTENSION: Chronic | ICD-10-CM

## 2019-09-16 PROCEDURE — 99283 EMERGENCY DEPT VISIT LOW MDM: CPT

## 2019-09-16 PROCEDURE — 99284 PR EMERGENCY DEPT VISIT,LEVEL IV: ICD-10-PCS | Mod: ,,, | Performed by: PHYSICIAN ASSISTANT

## 2019-09-16 PROCEDURE — 99284 EMERGENCY DEPT VISIT MOD MDM: CPT | Mod: ,,, | Performed by: PHYSICIAN ASSISTANT

## 2019-09-16 RX ORDER — LIDOCAINE 50 MG/G
1 PATCH TOPICAL DAILY
Qty: 7 PATCH | Refills: 0 | Status: SHIPPED | OUTPATIENT
Start: 2019-09-16

## 2019-09-16 RX ORDER — CARVEDILOL 25 MG/1
25 TABLET ORAL 2 TIMES DAILY
Qty: 180 TABLET | Refills: 11 | Status: SHIPPED | OUTPATIENT
Start: 2019-09-16 | End: 2020-09-15 | Stop reason: SDUPTHER

## 2019-09-16 NOTE — ED TRIAGE NOTES
"Patient reports gradual onset of back pain that started Saturday and is getting worse. Patient states pain was in the low back and is "now going up my spine". Patient also reports left shoulder blade pain with movement. Patient denies numbness and tingling. Patient reports taking Tramadol this morning and states is helps. Currently rates pain 10/10.  "

## 2019-09-16 NOTE — DISCHARGE INSTRUCTIONS
Continue taking your tramadol every 6 hr as needed for pain. Take Tylenol every 4-6 hours in addition to your pain medication as needed for pain.   Follow up with the Spine Clinic within the next week for re-evaluation if your symptoms continue.  Return to the ER promptly for any new or worsening symptoms.

## 2019-09-16 NOTE — ED PROVIDER NOTES
Encounter Date: 9/16/2019       History     Chief Complaint   Patient presents with    Back Pain     Pt c/o back & neck pain.  Denies injury     79-year-old female presents to the ED for evaluation of back pain. Patient reports pain across the low back that began gradually 2-3 days ago.  She states that the pain now radiates up the middle of her back and is now worsen her neck.  She denies any recent injury or falls.  She denies extremity weakness, fevers, numbness or tingling, bowel or bladder incontinence or difficulty urinating.  Patient denies similar pain, but she has been evaluated in the spine clinic for neck and back pain in the past.        Review of patient's allergies indicates:   Allergen Reactions    Penicillins Swelling    Trulicity [dulaglutide] Nausea Only and Rash     Past Medical History:   Diagnosis Date    Allergy     Arteriosclerosis of arteries of extremities 8/25/2016    Arthritis     Brain infection     Cataract     CKD (chronic kidney disease) stage 3, GFR 30-59 ml/min     Coma     Coronary artery disease     Diabetes mellitus type II 1981    Diabetic neuropathy     Early dry stage nonexudative age-related macular degeneration of both eyes 6/17/2019    GERD (gastroesophageal reflux disease)     Glaucoma     Hyperlipidemia     Hypertension     Type 2 diabetes mellitus with diabetic peripheral angiopathy without gangrene, with long-term current use of insulin 8/21/2016     Past Surgical History:   Procedure Laterality Date    CARDIAC CATHETERIZATION  03/2010    x 2    CARDIAC SURGERY  1994    CARPAL TUNNEL RELEASE      L    Carpal Tunnel Release, Right hand Right 10/19/2016    Per[facility] by [facility] at [facility] OR 1ST FLR    COLONOSCOPY N/A 2/5/2019    Performed by [facility] Oconnell [facility] at [facility] ENDO (4TH FLR)    COLONOSCOPY N/A 11/2/2015    Performed by Sanju Clinton MD at [facility] ENDO (4TH FLR)    CORONARY ARTERY BYPASS GRAFT  08/13/1994    x 1    EGD  (ESOPHAGOGASTRODUODENOSCOPY) N/A 2019    Performed by [facility] at [facility] ENDO (2ND FLR)    ESOPHAGOGASTRODUODENOSCOPY (EGD) N/A 2017    Performed by Hitesh Quiroga [facility] at [facility] ENDO (4TH FLR)    ESOPHAGOGASTRODUODENOSCOPY (EGD) N/A 2015    Performed by [facility] at [facility] ENDO (4TH FLR)    MANOMETRY-ESOPHAGEAL-WITH IMPEDANCE N/A 2017    Performed by [facility]nell [facility] at [facility] ENDO (4TH FLR)    RELEASE-FINGER-TRIGGER / Index / Long Right 10/19/2016    Performed by [facility] at [facility] OR 1ST FLR    TUBAL LIGATION  1970s     Family History   Problem Relation Age of Onset    Diabetes Father     Diabetes Brother     Blindness Brother     Diabetes Maternal Grandmother     Diabetes Paternal Aunt     Diabetes Paternal Uncle     Amblyopia Neg Hx     Cancer Neg Hx     Cataracts Neg Hx     Glaucoma Neg Hx     Hypertension Neg Hx     Macular degeneration Neg Hx     Retinal detachment Neg Hx     Strabismus Neg Hx     Stroke Neg Hx     Thyroid disease Neg Hx     Colon cancer Neg Hx     Esophageal cancer Neg Hx     Stomach cancer Neg Hx     Rectal cancer Neg Hx     Ulcerative colitis Neg Hx     Crohn's disease Neg Hx     Irritable bowel syndrome Neg Hx     Celiac disease Neg Hx     Eczema Neg Hx     Lupus Neg Hx     Psoriasis Neg Hx     Melanoma Neg Hx      Social History     Tobacco Use    Smoking status: Former Smoker     Years: 2.00     Last attempt to quit: 3/25/1963     Years since quittin.5    Smokeless tobacco: Never Used   Substance Use Topics    Alcohol use: No    Drug use: No     Review of Systems   Constitutional: Negative for fever.   HENT: Negative for sore throat.    Respiratory: Negative for shortness of breath.    Cardiovascular: Negative for chest pain.   Gastrointestinal: Negative for nausea.   Genitourinary: Negative for dysuria.   Musculoskeletal: Positive for back pain and neck pain.   Skin: Negative for rash.   Neurological: Negative  for weakness.   Hematological: Does not bruise/bleed easily.       Physical Exam     Initial Vitals [09/16/19 1340]   BP Pulse Resp Temp SpO2   (!) 123/58 72 16 99.7 °F (37.6 °C) 97 %      MAP       --         Physical Exam    Nursing note and vitals reviewed.  Constitutional: She appears well-developed and well-nourished. She is not diaphoretic.  Non-toxic appearance. She does not appear ill. No distress.   HENT:   Head: Normocephalic and atraumatic.   Neck: Neck supple.   Cardiovascular: Normal rate and regular rhythm. Exam reveals no gallop and no friction rub.    No murmur heard.  Pulses:       Dorsalis pedis pulses are 2+ on the right side, and 2+ on the left side.   Pulmonary/Chest: Effort normal and breath sounds normal. No accessory muscle usage. No tachypnea. No respiratory distress. She has no decreased breath sounds. She has no wheezes. She has no rhonchi. She has no rales.   Abdominal: She exhibits no distension.   Musculoskeletal:   There is mild midline C-spine tenderness with moderate left paraspinous tenderness. Patient has full range of motion of the neck with some pain. Mild tenderness across the lumbar back.  No significant lumbar midline tenderness.    Neurological: She is alert.   5/5 strength to BLE.    Skin: No rash noted.   Psychiatric: She has a normal mood and affect. Her behavior is normal.         ED Course   Procedures  Labs Reviewed - No data to display       Imaging Results    None          Medical Decision Making:   History:   Old Medical Records: I decided to obtain old medical records.  Differential Diagnosis:   My differential diagnosis includes but is not limited to:  DDD, herniated disc, muscle strain  ED Management:  80 yo F presents to the ED with atraumatic back and neck pain for the past few days. No red flag symptoms. She is afebrile, NAD. She has normal and equal strength to BLE. Distal pulses intact. I have a low suspicion for acute process such as cord compression or  aortic dissection.  Pt has been seen in Spine clinic this year.  Previous MRIs of the C and L spine reveal DDD.  I feel that pt's sytmpoms are likely an acute exacerbation of this process.  She has Rx for tramadol as well as muscle relaxants at home.  I have also advised to add Tylenol to her pain regimen.  Advised close follow up in Spine clinic if no improvement. Stable for discharge. She is comfortable with this plan.   I have reviewed the patient's records and discussed this case with my supervising physician.                        Clinical Impression:       ICD-10-CM ICD-9-CM   1. Back pain, unspecified back location, unspecified back pain laterality, unspecified chronicity M54.9 724.5   2. Cervicalgia M54.2 723.1         Disposition:   Disposition: Discharged  Condition: Stable                        Eli Bajwa PA-C  09/17/19 0909

## 2019-09-24 ENCOUNTER — TELEPHONE (OUTPATIENT)
Dept: HEMATOLOGY/ONCOLOGY | Facility: CLINIC | Age: 79
End: 2019-09-24

## 2019-09-24 ENCOUNTER — LAB VISIT (OUTPATIENT)
Dept: LAB | Facility: HOSPITAL | Age: 79
End: 2019-09-24
Attending: INTERNAL MEDICINE
Payer: MEDICARE

## 2019-09-24 DIAGNOSIS — D50.0 ANEMIA DUE TO CHRONIC BLOOD LOSS: ICD-10-CM

## 2019-09-24 LAB
ERYTHROCYTE [DISTWIDTH] IN BLOOD BY AUTOMATED COUNT: 14.1 % (ref 11.5–14.5)
FERRITIN SERPL-MCNC: 156 NG/ML (ref 20–300)
HCT VFR BLD AUTO: 35.9 % (ref 37–48.5)
HGB BLD-MCNC: 11.3 G/DL (ref 12–16)
IMM GRANULOCYTES # BLD AUTO: 0.01 K/UL (ref 0–0.04)
MCH RBC QN AUTO: 28 PG (ref 27–31)
MCHC RBC AUTO-ENTMCNC: 31.5 G/DL (ref 32–36)
MCV RBC AUTO: 89 FL (ref 82–98)
NEUTROPHILS # BLD AUTO: 2.8 K/UL (ref 1.8–7.7)
PLATELET # BLD AUTO: 233 K/UL (ref 150–350)
PMV BLD AUTO: 10.6 FL (ref 9.2–12.9)
RBC # BLD AUTO: 4.04 M/UL (ref 4–5.4)
WBC # BLD AUTO: 5.11 K/UL (ref 3.9–12.7)

## 2019-09-24 PROCEDURE — 85027 COMPLETE CBC AUTOMATED: CPT

## 2019-09-24 PROCEDURE — 82728 ASSAY OF FERRITIN: CPT

## 2019-09-24 PROCEDURE — 36415 COLL VENOUS BLD VENIPUNCTURE: CPT

## 2019-09-24 NOTE — TELEPHONE ENCOUNTER
Returned call patient was looking for a earlier appt tomorrow.      ----- Message from Shahnaz Bills sent at 9/24/2019  2:04 PM CDT -----  Contact: Pt   Pt called to check to see if there are any early appts 9/24 has a appt 9/24 for 1:20pm  Callback#[phone]  Thank You  DEBORAH Bills

## 2019-09-25 ENCOUNTER — OFFICE VISIT (OUTPATIENT)
Dept: HEMATOLOGY/ONCOLOGY | Facility: CLINIC | Age: 79
End: 2019-09-25
Payer: MEDICARE

## 2019-09-25 VITALS
DIASTOLIC BLOOD PRESSURE: 76 MMHG | RESPIRATION RATE: 20 BRPM | OXYGEN SATURATION: 99 % | TEMPERATURE: 98 F | HEART RATE: 67 BPM | WEIGHT: 173.94 LBS | SYSTOLIC BLOOD PRESSURE: 179 MMHG | BODY MASS INDEX: 33.97 KG/M2

## 2019-09-25 DIAGNOSIS — D64.9 ANEMIA, UNSPECIFIED TYPE: Primary | ICD-10-CM

## 2019-09-25 PROCEDURE — 99999 PR PBB SHADOW E&M-EST. PATIENT-LVL V: CPT | Mod: PBBFAC,,, | Performed by: INTERNAL MEDICINE

## 2019-09-25 PROCEDURE — 99213 OFFICE O/P EST LOW 20 MIN: CPT | Mod: S$PBB,,, | Performed by: INTERNAL MEDICINE

## 2019-09-25 PROCEDURE — 99999 PR PBB SHADOW E&M-EST. PATIENT-LVL V: ICD-10-PCS | Mod: PBBFAC,,, | Performed by: INTERNAL MEDICINE

## 2019-09-25 PROCEDURE — 99215 OFFICE O/P EST HI 40 MIN: CPT | Mod: PBBFAC | Performed by: INTERNAL MEDICINE

## 2019-09-25 PROCEDURE — 99213 PR OFFICE/OUTPT VISIT, EST, LEVL III, 20-29 MIN: ICD-10-PCS | Mod: S$PBB,,, | Performed by: INTERNAL MEDICINE

## 2019-09-25 NOTE — PROGRESS NOTES
Subjective:       Patient ID: Eliu Paz is a 79 y.o. female.    Chief Complaint: No chief complaint on file.    HPI    Ms. Paz  returns today with a repeat CBC.  Her WBCs are 5,100 /mm3, Hg 11.3 gr/dl, Ht 35.9 % and platelerts 233,000 /mm3.  Her MCV is 89.  Ferritin is 152 ng/ml.  Briefly, she is a 79-year-old  female who is a [employer].  She was recently admitted with melena and was found to have an upper GI bleed.  An endoscopy was performed on 08/02/2019, and showed no ulcers, but she did have a single 5 mm telangiectasia with bleeding in the second portion of the duodenum.  This was anticoagulated with argon plasma.  Upon presentation, her hemoglobin was 6.8 g/dL, her hematocrit was 23.7% with   an MCV of 98.  Her hemoglobin dropped to as low as 6.6 g/dL with hematocrit of 23.4 %.  The patient refused blood transfusions.  She was started on iron replacement therapy by her primary care physician and she was eventually   discharged.      Review of Systems    Overall she feels well, and she has no complaints today.  She has not had any further episodes of GI bleed since her discharge.    She denies any anxiety, depression, easy bruising, fevers, chills, night  sweats,  nausea, vomiting, diarrhea, constipation, diplopia,   blurred vision, headache, chest pain, palpitations, shortness of breath, breast pain, abdominal pain, extremity pain, or difficulty ambulating.  The remainder of the ten-point ROS, including general, skin, lymph, H/N, cardiorespiratory, GI, , Neuro, Endocrine, and psychiatric is negative.     Objective:      Physical Exam      She is alert, oriented to time, place, person, pleasant, well      nourished, in no acute physical distress.                                    VITAL SIGNS:  Reviewed                                      HEENT:  Normal.  There are no nasal, oral, lip, gingival, auricular, lid,    or conjunctival lesions.  Mucosae are moist and pink, and  there is no        thrush.  Pupils are equal, reactive to light and accommodation.              Extraocular muscle movements are intact.  MAxillary teeth are missing and mandibular dentition is fair. .  There is no frontal or maxillary tenderness.                                     NECK:  Supple without JVD, adenopathy, or thyromegaly.                       LUNGS:  Clear to auscultation without wheezing, rales, or rhonchi.           CARDIOVASCULAR:  Reveals an S1, S2, no murmurs, no rubs, no gallops.  A scar form her CABG noted     ABDOMEN:  Soft, nontender, without organomegaly.  Bowel sounds are    present.                                                                     EXTREMITIES:  No cyanosis, clubbing,  But she does have trace edema at the ankle level.                               BREASTS:  Deferred                                     LYMPHATIC:  There is no cervical, axillary, or supraclavicular adenopathy.   SKIN:  Warm and moist, without petechiae, rashes, induration, or ecchymoses.           NEUROLOGIC:  DTRs are 0-1+ bilaterally, symmetrical, motor function is 5/5,  and cranial nerves are  within normal limits.    Assessment:       1. Anemia, secondary to her recent GI bleed, improving with Fe supplementation therapy.        2.   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  3.     CAD s/p CABG, hypertension, DM.  Plan:         I had a long discussion with her;   Her anemia has improved significantly with Fe supplementation therapy.  I have advised her to remain on fe supplements and repeat her CBC in 6 weeks.  Her multiple questions were answered to her satisfaction.

## 2019-09-30 ENCOUNTER — PATIENT OUTREACH (OUTPATIENT)
Dept: ADMINISTRATIVE | Facility: OTHER | Age: 79
End: 2019-09-30

## 2019-10-02 ENCOUNTER — OFFICE VISIT (OUTPATIENT)
Dept: SPINE | Facility: CLINIC | Age: 79
End: 2019-10-02
Attending: PHYSICAL MEDICINE & REHABILITATION
Payer: MEDICARE

## 2019-10-02 VITALS
BODY MASS INDEX: 33.55 KG/M2 | DIASTOLIC BLOOD PRESSURE: 71 MMHG | WEIGHT: 170.88 LBS | TEMPERATURE: 98 F | HEART RATE: 62 BPM | SYSTOLIC BLOOD PRESSURE: 166 MMHG | HEIGHT: 60 IN

## 2019-10-02 DIAGNOSIS — M47.816 SPONDYLOSIS OF LUMBAR REGION WITHOUT MYELOPATHY OR RADICULOPATHY: ICD-10-CM

## 2019-10-02 DIAGNOSIS — M54.2 NECK PAIN: Primary | ICD-10-CM

## 2019-10-02 DIAGNOSIS — M48.02 CERVICAL STENOSIS OF SPINAL CANAL: ICD-10-CM

## 2019-10-02 DIAGNOSIS — M47.22 OSTEOARTHRITIS OF SPINE WITH RADICULOPATHY, CERVICAL REGION: ICD-10-CM

## 2019-10-02 PROCEDURE — 99214 PR OFFICE/OUTPT VISIT, EST, LEVL IV, 30-39 MIN: ICD-10-PCS | Mod: S$PBB,,, | Performed by: PHYSICAL MEDICINE & REHABILITATION

## 2019-10-02 PROCEDURE — 99213 OFFICE O/P EST LOW 20 MIN: CPT | Mod: PBBFAC | Performed by: PHYSICAL MEDICINE & REHABILITATION

## 2019-10-02 PROCEDURE — 99999 PR PBB SHADOW E&M-EST. PATIENT-LVL III: ICD-10-PCS | Mod: PBBFAC,,, | Performed by: PHYSICAL MEDICINE & REHABILITATION

## 2019-10-02 PROCEDURE — 99214 OFFICE O/P EST MOD 30 MIN: CPT | Mod: S$PBB,,, | Performed by: PHYSICAL MEDICINE & REHABILITATION

## 2019-10-02 PROCEDURE — 99999 PR PBB SHADOW E&M-EST. PATIENT-LVL III: CPT | Mod: PBBFAC,,, | Performed by: PHYSICAL MEDICINE & REHABILITATION

## 2019-10-02 NOTE — PROGRESS NOTES
Subjective:      Patient ID: Eliu Paz is a 79 y.o. female.    Chief Complaint: Low-back Pain and Neck Pain    Ms Paz is a 80 yo female here for follow up of back and leg pain and neck pain.  She was last seen by me on 4/28/2019 and she was doing better after pt, but had to stop PT due to cost.  She is still having neck and back pain.  We discussed injections, but she wanted to wait.  She was in the hospital and then she had increased neck pain and went to ER.  She feels like the neck pain has gotten better.  She can hear her neck pop. The pain is down her back and to the shoulder.  She does feel like the pain is improved.  She is still dealing with anemia.  She was not able to continue HEP due to anemia.  The pain is 1/10 now, worst 6/10 turning to the left, best 1/10.  She does have low back pain and tailbone pain.      MRI lumbar 1/23/2019  Lumbar spine alignment demonstrates grade 1 anterolisthesis of L4 on L5, likely related to facet degenerative disease.  No spondylolysis.  Vertebral body heights are well maintained without evidence for fracture.  No marrow replacement process.  Chronic degenerative vertebral endplate changes noted at T11-T12 and L4-L5.    Distal spinal cord demonstrates normal contour and signal intensity.  Cauda equina is normal without findings to suggest arachnoiditis.  Conus medullaris terminates at L2.    Limited evaluation of the retroperitoneal organs demonstrates no significant abnormalities.  Suspected colonic diverticuli.  Aorta tapers normally.  SI joints are symmetric.    T12-L1: No spinal canal stenosis or neural foraminal narrowing.    L1-L2: No spinal canal stenosis or neural foraminal narrowing.    L2-L3: Small circumferential disc bulge, mild left-sided facet hypertrophy and mild bilateral ligamentum flavum buckling contribute to mild spinal canal stenosis.  No neural foraminal narrowing.    L3-L4: Moderate bilateral facet hypertrophy and mild bilateral ligamentum  flavum buckling contribute to mild spinal canal stenosis.  No neural foraminal narrowing.    L4-L5: Grade 1 anterolisthesis of L4 on L5 moderate circumferential disc bulge, severe bilateral facet hypertrophy and moderate bilateral ligamentum flavum buckling contribute to severe spinal canal stenosis and severe left and moderate right neural foraminal narrowing.    L5-S1: Mild bilateral facet hypertrophy contributing to mild right-sided neural foraminal narrowing.  No spinal canal stenosis.    Impression      1. Multilevel lumbar degenerative changes most pronounced at L4-L5 noting grade 1 anterolisthesis of L4 on L5, circumferential disc bulge, facet hypertrophy and ligamentum flavum buckling contributing to severe spinal canal stenosis and severe left and moderate right neural foraminal narrowing.      MRI cervical 1/23/2019  There is grade 1 retrolisthesis of C3 on C4. No fracture. No marrow signal abnormality suspicious for an infiltrative process.    The cervical cord is normal in caliber and signal characteristics.  There is STIR signal hyperintensity within the juan, presumably related to chronic microvascular ischemic change.  Cerebellar tonsils are in their expected location.  The adjacent soft tissue structures show no significant abnormalities.    The right vertebral artery flow void is not seen, presumably related to known hypoplastic nature.  Left vertebral artery flow void is present.  Prevertebral soft tissues are normal.  Visualized parotid glands are within normal limits.  Paraspinal musculature demonstrates normal bulk and signal intensity.    C2-C3:  Small broad-based posterior disc osteophyte complex and moderate left and mild right facet hypertrophy contribute to mild spinal canal stenosis and moderate left and mild right neural foraminal narrowing.    C3-C4: Grade 1 retrolisthesis of C3 on C4, mild right facet hypertrophy and moderate bilateral uncovertebral joint spurring contribute to moderate  spinal canal stenosis and moderate right and mild left neural foraminal narrowing.    C4-C5:  Small posterior disc osteophyte complex, moderate left and mild right facet hypertrophy and moderate right uncovertebral joint spurring contribute to moderate right and mild left neural foraminal narrowing.  No spinal canal stenosis.    C5-C6:  Small broad-based posterior disc osteophyte complex and moderate bilateral uncovertebral joint spurring contribute to moderate spinal canal stenosis and moderate bilateral neural foraminal narrowing.    C6-C7:  Small broad-based posterior disc osteophyte complex and moderate bilateral uncovertebral joint spurring contribute to moderate spinal canal stenosis and moderate left and mild right neural foraminal narrowing.    C7-T1:   Small broad-based posterior disc osteophyte complex and moderate right and mild left uncovertebral joint spurring contribute to moderate right and mild left neural foraminal narrowing.  No spinal canal stenosis.    T1-T2: Posterior disc osteophyte complex, bilateral uncovertebral spurring and ligamentum flavum buckling results in moderate spinal canal stenosis and severe right and moderate left neural foraminal narrowing.    T2-T3: Posterior disc osteophyte complex results in moderate spinal canal stenosis and moderate right and mild left neural foraminal narrowing.    Impression      1. Multilevel cervical degenerative changes contributing to mild to moderate spinal canal stenosis and moderate to severe neural foraminal narrowing from C2-C3 through T2-T3.  2. Nonvisualization of the right vertebral artery flow, presumably related to its hypoplastic nature but not well evaluated.      CT soft tissue neck 12/2018  Patient has a kyphotic posture.  There is mild reversal of the normal cervical lordosis.  Significant disc space loss seen at multiple levels in the cervical spine.  No osseous lytic or destructive process.  Multiple sternotomy wires visualized in  the sternum.    X-ray cervical spine 2013  AP and lateral views of the cervical spine are compared to December 4, 2008.  Extensive degenerative changes are noted most marked at the C3-C4 level and C5-C6 level.  There is intervertebral joint space narrowing at these levels which is unchanged.    Vascular calcifications are present in the region of the carotid arteries in the soft tissues.    Impression        Degenerative changes similar to December 4, 20 08      X-ray lumbar 12/18/2018  DJD.  Grade 1 L4/L5 anterolisthesis.  The disc spaces are narrowed between L4 and S1 vertebral segments.  No fracture or dislocation.  No bone destruction identified.    Past Medical History:  No date: Allergy  No date: Arthritis  No date: Brain infection  No date: Cataract  No date: CKD (chronic kidney disease) stage 3, GFR 30-59 ml/min  No date: Coma  No date: Coronary artery disease  1981: Diabetes mellitus type II  No date: Diabetic neuropathy  No date: GERD (gastroesophageal reflux disease)  No date: Glaucoma  No date: Hyperlipidemia  No date: Hypertension    Past Surgical History:  03/2010: CARDIAC CATHETERIZATION      Comment:  x 2  No date: CARPAL TUNNEL RELEASE      Comment:  L  10/19/2016: Carpal Tunnel Release, Right hand; Right      Comment:  Performed by Marnie Sanders MD at [facility] OR 1ST FLR  11/2/2015: COLONOSCOPY; N/A      Comment:  Performed by Sanju Clinton MD at [facility] ENDO (4TH FLR)  08/13/1994: CORONARY ARTERY BYPASS GRAFT      Comment:  x 1  4/18/2017: ESOPHAGOGASTRODUODENOSCOPY (EGD); N/A      Comment:  Performed by Hitesh Quiroga MD at [facility] ENDO (4TH FLR)  4/27/2015: ESOPHAGOGASTRODUODENOSCOPY (EGD); N/A      Comment:  Performed by Hitesh Quiroga MD at [facility] ENDO (4TH FLR)  6/13/2017: MANOMETRY-ESOPHAGEAL-WITH IMPEDANCE; N/A      Comment:  Performed by Kenneth Oconnell MD at [facility] ENDO (4TH FLR)  10/19/2016: RELEASE-FINGER-TRIGGER / Index / Long; Right      Comment:  Performed by Marnie Sanders MD at [facility] OR  1ST FLR  1970s: TUBAL LIGATION    Review of patient's family history indicates:  Problem: Diabetes      Relation: Father          Age of Onset: (Not Specified)  Problem: Diabetes      Relation: Brother          Age of Onset: (Not Specified)  Problem: Blindness      Relation: Brother          Age of Onset: (Not Specified)  Problem: Diabetes      Relation: Maternal Grandmother          Age of Onset: (Not Specified)  Problem: Diabetes      Relation: Paternal Aunt          Age of Onset: (Not Specified)  Problem: Diabetes      Relation: Paternal Uncle          Age of Onset: (Not Specified)  Problem: Amblyopia      Relation: Neg Hx          Age of Onset: (Not Specified)  Problem: Cancer      Relation: Neg Hx          Age of Onset: (Not Specified)  Problem: Cataracts      Relation: Neg Hx          Age of Onset: (Not Specified)  Problem: Glaucoma      Relation: Neg Hx          Age of Onset: (Not Specified)  Problem: Hypertension      Relation: Neg Hx          Age of Onset: (Not Specified)  Problem: Macular degeneration      Relation: Neg Hx          Age of Onset: (Not Specified)  Problem: Retinal detachment      Relation: Neg Hx          Age of Onset: (Not Specified)  Problem: Strabismus      Relation: Neg Hx          Age of Onset: (Not Specified)  Problem: Stroke      Relation: Neg Hx          Age of Onset: (Not Specified)  Problem: Thyroid disease      Relation: Neg Hx          Age of Onset: (Not Specified)  Problem: Colon cancer      Relation: Neg Hx          Age of Onset: (Not Specified)  Problem: Esophageal cancer      Relation: Neg Hx          Age of Onset: (Not Specified)  Problem: Stomach cancer      Relation: Neg Hx          Age of Onset: (Not Specified)  Problem: Rectal cancer      Relation: Neg Hx          Age of Onset: (Not Specified)  Problem: Ulcerative colitis      Relation: Neg Hx          Age of Onset: (Not Specified)  Problem: Crohn's disease      Relation: Neg Hx          Age of Onset: (Not  Specified)  Problem: Irritable bowel syndrome      Relation: Neg Hx          Age of Onset: (Not Specified)  Problem: Celiac disease      Relation: Neg Hx          Age of Onset: (Not Specified)  Problem: Eczema      Relation: Neg Hx          Age of Onset: (Not Specified)  Problem: Lupus      Relation: Neg Hx          Age of Onset: (Not Specified)  Problem: Psoriasis      Relation: Neg Hx          Age of Onset: (Not Specified)  Problem: Melanoma      Relation: Neg Hx          Age of Onset: (Not Specified)      Social History    Socioeconomic History      Marital status:       Spouse name: Not on file      Number of children: Not on file      Years of education: Not on file      Highest education level: Not on file    Social Needs      Financial resource strain: Not on file      Food insecurity - worry: Not on file      Food insecurity - inability: Not on file      Transportation needs - medical: Not on file      Transportation needs - non-medical: Not on file    Occupational History      Not on file    Tobacco Use      Smoking status: Former Smoker        Years: 2.00        Quit date: 3/25/1963        Years since quittin.8      Smokeless tobacco: Never Used    Substance and Sexual Activity      Alcohol use: No      Drug use: No      Sexual activity: Not Currently        Partners: Male    Other Topics      Concerns:        Are you pregnant or think you may be?: Not Asked        Breast-feeding: Not Asked    Social History Narrative      ,  with heart issues, 4 kids, 2 .      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      Current Outpatient Medications:  amLODIPine (NORVASC) 10 MG tablet, TAKE 1 TABLET (10 MG TOTAL) BY MOUTH ONCE DAILY., Disp: 90 tablet, Rfl: 11  aspirin (ECOTRIN) 81 MG EC tablet, Take 1 tablet (81 mg total) by mouth once daily., Disp: , Rfl: 0  carvedilol (COREG) 25 MG tablet, TAKE 1 TABLET (25 MG TOTAL) BY MOUTH 2 (TWO) TIMES DAILY., Disp: 180 tablet,  "Rfl: 11  ciclopirox (PENLAC) 8 % Soln, Apply topically nightly., Disp: 6.6 mL, Rfl: 11  clobetasol 0.05% (TEMOVATE) 0.05 % Oint, Apply small amount to vulva area twice a day for 4 weeks then once a day for 4 weeks then once a day on Mondays and Thursdays, Disp: 60 g, Rfl: 1  fluocinonide (LIDEX) 0.05 % external solution, Apply topically 2 (two) times daily., Disp: 60 mL, Rfl: 1  fluticasone (FLONASE) 50 mcg/actuation nasal spray, SPRAY TWICE IN EACH NOSTRIL EVERY DAY, Disp: 16 g, Rfl: 11  insulin asp prt-insulin aspart, NOVOLOG 70/30, (NOVOLOG MIX 70-30 U-100 INSULN) 100 unit/mL (70-30) Soln, Inject 16 units w/ breakfast and 20 units at dinner time., Disp: 50 mL, Rfl: 11  insulin syringe-needle U-100 (BD INSULIN SYRINGE ULTRA-FINE) 0.5 mL 31 gauge x 5/16" Syrg, USE TO INJECT TWICE DAILY WITH INSULIN INJECTIONS, Disp: 300 each, Rfl: 11  isosorbide mononitrate (IMDUR) 30 MG 24 hr tablet, TAKE 1 TABLET (30 MG TOTAL) BY MOUTH ONCE DAILY., Disp: 90 tablet, Rfl: 11  ketoconazole (NIZORAL) 2 % cream, Apply topically once daily., Disp: 30 g, Rfl: 1  lactulose (CHRONULAC) 10 gram/15 mL solution, TAKE 30 MLS (20 G TOTAL) BY MOUTH DAILY AS NEEDED (CONSTIPATION)., Disp: 946 mL, Rfl: 1  latanoprost 0.005 % ophthalmic solution, Place 1 drop into both eyes every evening., Disp: 3 Bottle, Rfl: 4  losartan (COZAAR) 100 MG tablet, TAKE 1 TABLET (100 MG TOTAL) BY MOUTH ONCE DAILY., Disp: 90 tablet, Rfl: 3  nitroGLYCERIN 0.4 MG/DOSE TL SPRY (NITROLINGUAL) 400 mcg/spray spray, PLACE 2 SPRAYS UNDER THE TONGUE EVERY 5 MINUTES AS NEEDED FOR CHEST PAIN, Disp: 36 g, Rfl: 0  ONETOUCH DELICA LANCETS 30 gauge Misc, 1 lancet by Misc.(Non-Drug; Combo Route) route 4 (four) times daily., Disp: 150 each, Rfl: 11  ONETOUCH VERIO Strp, USE TO TEST BLOOD SUGAR 4 TIMES A DAY, Disp: 150 strip, Rfl: 11  ranitidine (ZANTAC) 150 MG tablet, Take 1 tablet (150 mg total) by mouth 2 (two) times daily., Disp: 60 tablet, Rfl: 11  rosuvastatin (CRESTOR) 40 MG Tab, " TAKE 1 TABLET BY MOUTH ONCE DAILY., Disp: 90 tablet, Rfl: 3  spironolactone (ALDACTONE) 25 MG tablet, TAKE 2 TABLETS BY MOUTH EVERY DAY, Disp: 180 tablet, Rfl: 11  sulfamethoxazole-trimethoprim 800-160mg (BACTRIM DS) 800-160 mg Tab, Take 1 tablet by mouth 2 (two) times daily. for 7 days, Disp: 14 tablet, Rfl: 0  traMADol (ULTRAM) 50 mg tablet, TAKE 1 TABLET BY MOUTH TWICE DAILY AS NEEDED, Disp: 60 tablet, Rfl: 0  triamcinolone acetonide 0.1% (KENALOG) 0.1 % ointment, Apply topically 2 (two) times daily. Apply small amount to itchy areas on body BID PRN.  Do not use on face or in groin., Disp: 80 g, Rfl: 1    No current facility-administered medications for this visit.       Review of patient's allergies indicates:   -- Penicillins -- Swelling    --  Swelling (extremities)   -- Trulicity (dulaglutide) -- Nausea Only and Rash        Review of Systems   Constitution: Negative for weight gain and weight loss.   Cardiovascular: Negative for chest pain.   Respiratory: Negative for shortness of breath.    Musculoskeletal: Positive for back pain, joint pain, myalgias and neck pain. Negative for joint swelling.   Gastrointestinal: Negative for abdominal pain, bowel incontinence, nausea and vomiting.   Genitourinary: Negative for bladder incontinence.   Neurological: Positive for numbness (feet and right hand) and paresthesias (feet).         Objective:        General: Eliu Steinberg is well-developed, well-nourished, appears stated age, in no acute distress, alert and oriented to time, place and person.     General    Vitals reviewed.  Constitutional: She is oriented to person, place, and time. She appears well-developed and well-nourished.   HENT:   Head: Normocephalic and atraumatic.   Pulmonary/Chest: Effort normal.   Neurological: She is alert and oriented to person, place, and time.   Psychiatric: She has a normal mood and affect. Her behavior is normal. Judgment and thought content normal.     General Musculoskeletal Exam    Gait: abnormal (slow but without cane)     Back (L-Spine & T-Spine) / Neck (C-Spine) Exam     Tenderness Right paramedian tenderness of the Sacrum and Lower C-Spine. Left paramedian tenderness of the Lower C-Spine.     Back (L-Spine & T-Spine) Range of Motion   Extension: 10   Flexion: 80   Lateral bend right: 10   Lateral bend left: 10     Neck (C-Spine) Range of Motion   Flexion:     40  Extension: 30Right Lateral Bend: 10 Left Lateral Bend: 10 Right Rotation: 40 Left Rotation: 40     Spinal Sensation   Right Side Sensation  C-Spine Level: normal   L-Spine Level: normal  S-Spine Level: normal  Left Side Sensation  C-Spine Level: normal  L-Spine Level: normal  S-Spine Level: normal    Back (L-Spine & T-Spine) Tests   Right Side Tests  Straight leg raise:      Sitting SLR: > 70 degrees      Left Side Tests  Straight leg raise:     Sitting SLR: > 70 degrees          Other She has no scoliosis .  Spinal Kyphosis:  Absent      Muscle Strength   Right Upper Extremity   Biceps: 5/5/5   Deltoid:  5/5  Triceps:  5/5  Wrist extension: 5/5/5   Finger Flexors:  5/5  Left Upper Extremity  Biceps: 5/5/5   Deltoid:  5/5  Triceps:  5/5  Wrist extension: 5/5/5   Finger Flexors:  5/5  Right Lower Extremity   Hip Flexion: 4/5   Quadriceps:  5/5   Anterior tibial:  5/5/5  EHL:  5/5  Left Lower Extremity   Hip Flexion: 4/5   Quadriceps:  3/5   Anterior tibial:  5/5/5   EHL:  5/5    Reflexes     Left Side  Biceps:  2+  Triceps:  2+  Brachioradialis:  2+  Quadriceps:  2+  Achilles:  2+  Left Nguyen's Sign:  Absent    Right Side   Biceps:  2+  Triceps:  2+  Brachioradialis:  2+  Quadriceps:  2+  Achilles:  2+  Right Nguyen's Sign:  absent    Vascular Exam     Right Pulses        Carotid:                  2+    Left Pulses        Carotid:                  2+              Assessment:       1. Neck pain    2. Cervical stenosis of spinal canal    3. Osteoarthritis of spine with radiculopathy, cervical region    4. Spondylosis of lumbar  region without myelopathy or radiculopathy           Plan:            1.  We reviewed the MRI, we discussed the degenerative changes in the neck and the lower back.  We can do a cervical C7-T1 intralaminar FARA.    She does not want to try now.  Currently her neck pain is worse than her back.  She does not want to consider injections  2.  Tramadol as needed, she does not take often  3.  PT restart HEP, she cannot go back due to expense.  She still knows her exercises.   we discussed staying active.  We discussed sitting with head above spine  4.   Restart tylenol.  You can take 4 extra strength a day (500-650mg).  Take 2 pills twice a day.  5.  Continue tizanidine may take it 3 times a day, or take a second pill at night  6.  RTC 4 months    Follow-up: Follow up in about 4 months (around 2/2/2020). If there are any questions prior to this, the patient was instructed to contact the office.

## 2019-10-15 ENCOUNTER — TELEPHONE (OUTPATIENT)
Dept: INTERNAL MEDICINE | Facility: CLINIC | Age: 79
End: 2019-10-15

## 2019-10-15 NOTE — TELEPHONE ENCOUNTER
We were called --  Contact: Allison [phone]  Patient's daughter called to ask you to extend her permission to work from home to take care of her mother.  Please call Allison for more details.    From dtr's work --  Raymond Lamb,      Ms. Kindra Paz (our employee) has indicated that her mother (your patient -  Genesis Paz) has responded well to treatment.      You have previously provided info related to Ms. Paz's need for a flexible schedule to assist in the care/recovery of her mother.  Can you please provide the dates/times of Genesis's appointments since July 24, 2019? We need this information in order to confirm her work schedule.    We are NOT asking for any personal health related information on her mother....just the dates and times of her appointments 7/24/2019 - 10/31/2019.    Thank you,       Isaac Acuna, [facility]  [employer]  [email or web address]

## 2019-10-15 NOTE — TELEPHONE ENCOUNTER
Hi, please call her daughter back --  I do not think I can extend the period that her daughter is allowed to have a modified work scheduled.  Let me know if daughter has any questions.  Thank you, Ryan Carlson    We were called --  Contact: Allison [phone]  Patient's daughter called to ask you to extend her permission to work from home to take care of her mother.  Please call Allison for more details.

## 2019-10-23 ENCOUNTER — TELEPHONE (OUTPATIENT)
Dept: INTERNAL MEDICINE | Facility: CLINIC | Age: 79
End: 2019-10-23

## 2019-10-23 DIAGNOSIS — L98.9 HEEL SORE: Primary | ICD-10-CM

## 2019-10-23 NOTE — TELEPHONE ENCOUNTER
----- Message from Cammy Ruvalcaba sent at 10/23/2019  9:13 AM CDT -----  Contact: Patient [phone]  Patient has sore spot near the heel near the instep on the right foot and pain in the big toe. Need to know if she should get a referral to podiatry or come in to be seen.  Request call back.    Please call and advise  Thank you

## 2019-10-23 NOTE — TELEPHONE ENCOUNTER
Hi, please contact the patient to assist in scheduling    Orders Placed This Encounter    Ambulatory referral to Podiatry     I think a foot doctor is a good idea, if the appt is too far away, then please offer her urgent care here.    Let me know if patient has any questions.  Thank you, Ryan Carlson

## 2019-10-24 ENCOUNTER — PATIENT OUTREACH (OUTPATIENT)
Dept: ADMINISTRATIVE | Facility: OTHER | Age: 79
End: 2019-10-24

## 2019-10-24 ENCOUNTER — OFFICE VISIT (OUTPATIENT)
Dept: PODIATRY | Facility: CLINIC | Age: 79
End: 2019-10-24
Payer: MEDICARE

## 2019-10-24 VITALS
HEART RATE: 61 BPM | SYSTOLIC BLOOD PRESSURE: 140 MMHG | HEIGHT: 60 IN | DIASTOLIC BLOOD PRESSURE: 64 MMHG | WEIGHT: 170 LBS | BODY MASS INDEX: 33.38 KG/M2

## 2019-10-24 DIAGNOSIS — B35.1 ONYCHOMYCOSIS DUE TO DERMATOPHYTE: ICD-10-CM

## 2019-10-24 DIAGNOSIS — M20.11 VALGUS DEFORMITY OF BOTH GREAT TOES: ICD-10-CM

## 2019-10-24 DIAGNOSIS — M20.41 HAMMER TOES OF BOTH FEET: ICD-10-CM

## 2019-10-24 DIAGNOSIS — M20.42 HAMMER TOES OF BOTH FEET: ICD-10-CM

## 2019-10-24 DIAGNOSIS — E11.49 TYPE II DIABETES MELLITUS WITH NEUROLOGICAL MANIFESTATIONS: Primary | ICD-10-CM

## 2019-10-24 DIAGNOSIS — M20.12 VALGUS DEFORMITY OF BOTH GREAT TOES: ICD-10-CM

## 2019-10-24 PROCEDURE — 99213 OFFICE O/P EST LOW 20 MIN: CPT | Mod: PBBFAC | Performed by: PODIATRIST

## 2019-10-24 PROCEDURE — 99499 NO LOS: ICD-10-PCS | Mod: S$PBB,,, | Performed by: PODIATRIST

## 2019-10-24 PROCEDURE — 99999 PR PBB SHADOW E&M-EST. PATIENT-LVL III: ICD-10-PCS | Mod: PBBFAC,,, | Performed by: PODIATRIST

## 2019-10-24 PROCEDURE — 99499 UNLISTED E&M SERVICE: CPT | Mod: S$PBB,,, | Performed by: PODIATRIST

## 2019-10-24 PROCEDURE — 99999 PR PBB SHADOW E&M-EST. PATIENT-LVL III: CPT | Mod: PBBFAC,,, | Performed by: PODIATRIST

## 2019-10-24 NOTE — PROGRESS NOTES
Subjective:      Patient ID: Eliu Paz is a 79 y.o. female.    Chief Complaint: Consult (sore spot in the arch of the foot //)    Eliu Steinberg is a 79 y.o. female who presents to the clinic upon referral from Dr. Carlson  for evaluation and treatment of diabetic feet. Eliu Steinberg has a past medical history of Allergy, Arteriosclerosis of arteries of extremities (8/25/2016), Arthritis, Brain infection, Cataract, CKD (chronic kidney disease) stage 3, GFR 30-59 ml/min, Coma, Coronary artery disease, Diabetes mellitus type II (1981), Diabetic neuropathy, Early dry stage nonexudative age-related macular degeneration of both eyes (6/17/2019), GERD (gastroesophageal reflux disease), Glaucoma, Hyperlipidemia, Hypertension, and Type 2 diabetes mellitus with diabetic peripheral angiopathy without gangrene, with long-term current use of insulin (8/21/2016). Patient relates no major problem with feet. Only complaints today consist of Diabetes, increased risk amputation needing evaluation/management/optomization of foot care.    CC2:  long toenails.  Gradual onset, worsening over past several weeks, aggravated by increased weight bearing, shoe gear, pressure.  No previous medical treatment.  OTC pain med not helping.     PCP: Ryan Carlson MD    Date Last Seen by PCP:   Chief Complaint   Patient presents with    Consult     sore spot in the arch of the foot //        Current shoe gear: Casual shoes    Hemoglobin A1C   Date Value Ref Range Status   08/02/2019 5.6 4.0 - 5.6 % Final     Comment:     ADA Screening Guidelines:  5.7-6.4%  Consistent with prediabetes  >or=6.5%  Consistent with diabetes  High levels of fetal hemoglobin interfere with the HbA1C  assay. Heterozygous hemoglobin variants (HbS, HgC, etc)do  not significantly interfere with this assay.   However, presence of multiple variants may affect accuracy.     08/01/2019 5.7 (H) 4.0 - 5.6 % Final     Comment:     ADA Screening Guidelines:  5.7-6.4%  Consistent with  prediabetes  >or=6.5%  Consistent with diabetes  High levels of fetal hemoglobin interfere with the HbA1C  assay. Heterozygous hemoglobin variants (HbS, HgC, etc)do  not significantly interfere with this assay.   However, presence of multiple variants may affect accuracy.     12/04/2018 7.9 (H) 4.0 - 5.6 % Final     Comment:     ADA Screening Guidelines:  5.7-6.4%  Consistent with prediabetes  >or=6.5%  Consistent with diabetes  High levels of fetal hemoglobin interfere with the HbA1C  assay. Heterozygous hemoglobin variants (HbS, HgC, etc)do  not significantly interfere with this assay.   However, presence of multiple variants may affect accuracy.             Review of Systems   Constitution: Negative for chills, diaphoresis, fever, malaise/fatigue and night sweats.   Cardiovascular: Positive for leg swelling. Negative for claudication, cyanosis and syncope.   Skin: Positive for nail changes. Negative for color change, dry skin, rash, suspicious lesions and unusual hair distribution.   Musculoskeletal: Negative for falls, joint pain, joint swelling, muscle cramps, muscle weakness and stiffness.   Gastrointestinal: Negative for constipation, diarrhea, nausea and vomiting.   Neurological: Positive for sensory change. Negative for brief paralysis, disturbances in coordination, focal weakness, numbness, paresthesias and tremors.           Objective:      Physical Exam   Constitutional: She is oriented to person, place, and time. She appears well-developed and well-nourished. She is cooperative.   Oriented to time, place, and person.   Cardiovascular:   Pulses:       Dorsalis pedis pulses are 1+ on the right side, and 1+ on the left side.        Posterior tibial pulses are 1+ on the right side, and 1+ on the left side.   Capillary fill time 3-5 seconds.  All toes warm to touch.      <2+ non pitting lower extremity edema bilateral.    Negative elevational pallor and dependent rubor bilateral.     Musculoskeletal:    Normal angle, base, station of gait. Decreased stride length, early heel off, moderately propulsive toe off bilateral.    All ten toes without clubbing, cyanosis, or signs of ischemia.      Patient has hammertoes of digits 2-5 bilateral                  partially reducible without symptom today.    Bunions both feet without symptom.    No pain to palpation bilateral lower extremities.      Range of motion, stability, muscle strength, and muscle tone are age and health appropriate normal bilateral feet and legs.       Lymphadenopathy:   Negative lymphadenopathy bilateral popliteal fossa and tarsal tunnel.  Negative lymphangitic streaking bilateral foot/ankle bilateral.     Neurological: She is alert and oriented to person, place, and time. She has normal strength. She is not disoriented. She displays no atrophy and no tremor. A sensory deficit is present. She exhibits normal muscle tone.   Reflex Scores:       Patellar reflexes are 2+ on the right side and 2+ on the left side.       Achilles reflexes are 2+ on the right side and 2+ on the left side.  Decreased/absent vibratory sensation bilateral feet to 128Hz tuning fork.     Skin: Skin is warm, dry and intact. No abrasion, no bruising, no burn, no ecchymosis, no laceration, no lesion, no petechiae and no rash noted. She is not diaphoretic. No cyanosis or erythema. No pallor. Nails show no clubbing.   Skin thin, atrophic, with decreased density and distribution of pedal hair bilateral, but without hyperpigmentation, therese discoloration,  ulcers, masses, nodules or cords palpated bilateral feet and legs.      Toenails 1st, right 5th  left are hypertrophic thickened 2-3 mm, dystrophic, discolored tanish brown with tan, gray crumbly subungual debris.  Not tender to distal nail plate pressure, without periungual skin abnormality of each.               Assessment:       Encounter Diagnoses   Name Primary?    Type II diabetes mellitus with neurological manifestations  Yes    Onychomycosis due to dermatophyte     Hammer toes of both feet     Valgus deformity of both great toes          Plan:       Eliu Steinberg was seen today for consult.    Diagnoses and all orders for this visit:    Type II diabetes mellitus with neurological manifestations    Onychomycosis due to dermatophyte    Hammer toes of both feet    Valgus deformity of both great toes      I counseled the patient on her conditions, their implications and medical management.        - Shoe inspection. Diabetic Foot Education. Patient reminded of the importance of good nutrition and blood sugar control to help prevent podiatric complications of diabetes. Patient instructed on proper foot hygeine. We discussed wearing proper shoe gear, daily foot inspections, never walking without protective shoe gear, never putting sharp instruments to feet, routine podiatric visits at least annually.    continue penlac    Discussed conservative treatment with shoes of adequate dimensions, material, and style to alleviate symptoms and delay or prevent surgical intervention.    continue DM shoes,  Inserts.    Non covered foot care:    With the patient's permission, I debrided all ten toenails with a sterile nipper and curette, removing all offending nail and debris.  Patient tolerated the procedure well and related significant relief.            No follow-ups on file.

## 2019-10-24 NOTE — LETTER
October 24, 2019      Ryan Carlson MD  [address]  [place] [address]           [facility] - Podiatry  [address]  [place] [address]  Phone: [phone]          Patient: Eliu Paz   MR Number: [id number]   YOB: 1940   Date of Visit: 10/24/2019       Dear Dr. Ryan Carlson:    Thank you for referring Eliu Paz to me for evaluation. Attached you will find relevant portions of my assessment and plan of care.    If you have questions, please do not hesitate to call me. I look forward to following Eliu Paz along with you.    Sincerely,    Johnson Lorenzo, DPJOHN    Enclosure  CC:  No Recipients    If you would like to receive this communication electronically, please contact [email or web address] or [phone] to request more information on [facility] Link access.    For providers and/or their staff who would like to refer a patient to [facility], please contact us through our one-stop-shop provider referral line, [facility], at [phone].    If you feel you have received this communication in error or would no longer like to receive these types of communications, please e-mail [email or web address]

## 2019-10-30 ENCOUNTER — PATIENT OUTREACH (OUTPATIENT)
Dept: ADMINISTRATIVE | Facility: OTHER | Age: 79
End: 2019-10-30

## 2019-10-31 ENCOUNTER — OFFICE VISIT (OUTPATIENT)
Dept: GASTROENTEROLOGY | Facility: CLINIC | Age: 79
End: 2019-10-31
Payer: MEDICARE

## 2019-10-31 VITALS
HEIGHT: 60 IN | WEIGHT: 172 LBS | BODY MASS INDEX: 33.77 KG/M2 | SYSTOLIC BLOOD PRESSURE: 155 MMHG | DIASTOLIC BLOOD PRESSURE: 71 MMHG | HEART RATE: 66 BPM

## 2019-10-31 DIAGNOSIS — K59.09 CONSTIPATION, CHRONIC: ICD-10-CM

## 2019-10-31 DIAGNOSIS — K31.84 GASTROPARESIS: Primary | ICD-10-CM

## 2019-10-31 DIAGNOSIS — Z87.19 H/O: UPPER GI BLEED: ICD-10-CM

## 2019-10-31 DIAGNOSIS — K21.9 GASTROESOPHAGEAL REFLUX DISEASE, ESOPHAGITIS PRESENCE NOT SPECIFIED: ICD-10-CM

## 2019-10-31 PROCEDURE — 99999 PR PBB SHADOW E&M-EST. PATIENT-LVL V: CPT | Mod: PBBFAC,,, | Performed by: NURSE PRACTITIONER

## 2019-10-31 PROCEDURE — 99999 PR PBB SHADOW E&M-EST. PATIENT-LVL V: ICD-10-PCS | Mod: PBBFAC,,, | Performed by: NURSE PRACTITIONER

## 2019-10-31 PROCEDURE — 99213 PR OFFICE/OUTPT VISIT, EST, LEVL III, 20-29 MIN: ICD-10-PCS | Mod: S$PBB,,, | Performed by: NURSE PRACTITIONER

## 2019-10-31 PROCEDURE — 99213 OFFICE O/P EST LOW 20 MIN: CPT | Mod: S$PBB,,, | Performed by: NURSE PRACTITIONER

## 2019-10-31 PROCEDURE — 99215 OFFICE O/P EST HI 40 MIN: CPT | Mod: PBBFAC | Performed by: NURSE PRACTITIONER

## 2019-10-31 NOTE — LETTER
October 31, 2019      Ryan Carlson MD  [address]  [place] [address]           [facility] - Gastroenterology  [address]  [place] [address]  Phone: [phone]  Fax: [phone]          Patient: Eliu Paz   MR Number: [id number]   YOB: 1940   Date of Visit: 10/31/2019       Dear Dr. Ryan Carlson:    Thank you for referring Eliu Paz to me for evaluation. Attached you will find relevant portions of my assessment and plan of care.    If you have questions, please do not hesitate to call me. I look forward to following Eliu Paz along with you.    Sincerely,    Jeri Izquierdo, ROSALINDA    Enclosure  CC:  No Recipients    If you would like to receive this communication electronically, please contact [email or web address] or [phone] to request more information on [facility] Link access.    For providers and/or their staff who would like to refer a patient to [facility], please contact us through our one-stop-shop provider referral line, [facility] , at [phone].    If you feel you have received this communication in error or would no longer like to receive these types of communications, please e-mail [email or web address]

## 2019-10-31 NOTE — PATIENT INSTRUCTIONS
We wanted to let you know that recently, the U.S. Food and Drug Administration announced that some ranitidine medicines (including the commonly known Zantac) contain a nitrosamine impurity called N-nitrosodimethylamine (NDMA) at low levels. NDMA is classified as a probable human carcinogen based on results from lab tests. Although NDMA may cause harm in large amounts, the levels the FDA is finding in ranitidine from preliminary tests barely exceed amounts you might expect to find in common foods. The FDA is not calling for individuals to stop taking ranitidine at this time. However we are advising you to stop this until we better understand what is going on.      You can use omeprazole 40mg mg daily , 30 minutes before breakfast    You can also try gaviscon with alginate.  Chew 1-2 tablets after meals and at bedtime as needed (up to 4x a day).  For best results follow by a half glass of water or other liquid.   Gaviscon is available in liquid formulation.  Take 1-4 tablespoons 4x a day for Regular Strength and 1-4 teaspoonfuls 4x a day for Extra Strength.  The special european formulation with alginate appears more effective and is usually available on [employer].  Can try regular Gaviscon if you are unable to find the one with alginate.    You may try ginger and over the counter FDgard as needed for nausea. Continue the gastroparesis diet (small frequent meals throughout the day, mostly liquid and soft meals, like shakes, smoothies, soup).     Continue to take miralax every other day for the constipation.

## 2019-10-31 NOTE — PROGRESS NOTES
[facility] Established Patient Visit    Reason for Visit:  The primary encounter diagnosis was Gastroparesis. Diagnoses of Gastroesophageal reflux disease, esophagitis presence not specified, Constipation, chronic, and H/O: upper GI bleed were also pertinent to this visit.    PCP: Ryan Carlson    HPI:     This is a 79 y.o. female here for f/u of GERD,constipation, gastroparesis.  Ms. Paz's last GI visit was with me in 4/2019. previous GES revealed delayed gastric emptying. 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  Previously treated with acitigall for GB stones.      She is no longer treating her acid reflux with zantac 150 mg 2 times daily. She does have a hx of [place] grade A esophagitis w/ documented healing. Now on omeprazole 40 mg daily with good control.     Nausea twice monthly or less. vomiting very rarely, and early satiety if eats high fiber meal- but has not been eating high fiber lately. On GP diet. Eats small portions daily, mostly soup.   Not on smoothies or shakes.  Has seen the  GI dietitian for GP diet.   Has DM.  last A1c normal 5.7. BS running 120s.  On insulin.   Was on trulicity, but d/c d/t s/e. Has not tried elis or FDgard for nausea because she has not needed it.     Gas and bloating. Some improvement recently. Occurs occasionally. Better with belching.     abd pain-none  Bowel habits -  Constipation. taking miralax QOD.  No longer taking lactulose PRN. Having 1 bristol type 4 BM every other day   GI bleeding - denies hematochezia, hematemesis, melena, BRBPR, black/tarry stools, and coffee ground emesis. Was admitted to the hospital in 8/2019 for low blood counts. EGD at that time with bleeding duodenal angioectasia-treated with APC. Subsequent labs trending up.most recent hgb 11.3. No longer seeing black stools, nor BRBPR.  On iron 325 mg daily and omeprazole 40 mg daily    NSAID usage - no longer taking ASA 81 daily since UGI  bleed    ROS:  Constitutional: No fevers, no chills, No unintentional weight loss, no fatigue,   ENT: No allergies  CV: No chest pain, no palpitations, + perif. edema, no sob on exertion  Pulm: No cough, No shortness of breath, no wheezes, no sputum  Ophtho: No vision changes  GI: see HPI;   Derm: No rash  Heme: No lymphadenopathy, No bruising  MSK: No arthritis, + muscle pain/back, no muscle weakness  : No dysuria, No hematuria  Endo: + cold intolerance  Neuro: No syncope, No seizure,     PMHX:  has a past medical history of Allergy, Arteriosclerosis of arteries of extremities (8/25/2016), Arthritis, Brain infection, Cataract, CKD (chronic kidney disease) stage 3, GFR 30-59 ml/min, Coma, Coronary artery disease, Diabetes mellitus type II (1981), Diabetic neuropathy, Early dry stage nonexudative age-related macular degeneration of both eyes (6/17/2019), GERD (gastroesophageal reflux disease), Glaucoma, Hyperlipidemia, Hypertension, and Type 2 diabetes mellitus with diabetic peripheral angiopathy without gangrene, with long-term current use of insulin (8/21/2016).    PSHX:  has a past surgical history that includes Carpal tunnel release; Tubal ligation (1970s); Colonoscopy (N/A, 11/2/2015); Cardiac catheterization (03/2010); Coronary artery bypass graft (08/13/1994); Cardiac surgery (1994); Colonoscopy (N/A, 2/5/2019); and Esophagogastroduodenoscopy (N/A, 8/2/2019).    The patient's social and family histories were reviewed by me and updated in the appropriate section of the electronic medical record.    Review of patient's allergies indicates:   Allergen Reactions    Pcn [penicillins] Swelling     Swelling (extremities)^, Swelling (extremities)^  Swelling (extremities)^, Swelling (extremities)^  Swelling (extremities)^, Swelling (extremities)^    Trulicity [dulaglutide] Nausea Only and Rash       Current Outpatient Medications   Medication Sig    amLODIPine (NORVASC) 10 MG tablet TAKE 1 TABLET (10 MG TOTAL) BY  "MOUTH ONCE DAILY.    blood sugar diagnostic (ONETOUCH VERIO) Strp Inject 1 each into the skin 3 (three) times daily with meals. Use to test blood sugar four times a day.    brimonidine 0.2% (ALPHAGAN) 0.2 % Drop Place 1 drop into both eyes 3 (three) times daily.    candesartan (ATACAND) 4 MG tablet Take 1 tablet (4 mg total) by mouth once daily.    carvedilol (COREG) 25 MG tablet TAKE 1 TABLET (25 MG TOTAL) BY MOUTH 2 (TWO) TIMES DAILY.    ciclopirox (PENLAC) 8 % Soln Apply topically nightly.    clobetasol 0.05% (TEMOVATE) 0.05 % Oint Apply small amount to vulva area twice a day for 4 weeks then once a day for 4 weeks then once a day on Mondays and Thursdays    dorzolamide-timolol 2-0.5% (COSOPT) 22.3-6.8 mg/mL ophthalmic solution Place 1 drop into both eyes 2 (two) times daily.    ferrous sulfate (FEOSOL) 325 mg (65 mg iron) Tab tablet Take 1 tablet (325 mg total) by mouth daily with breakfast.    fluocinonide (LIDEX) 0.05 % external solution Apply topically 2 (two) times daily.    insulin asp prt-insulin aspart, NOVOLOG 70/30, (NOVOLOG MIX 70-30 U-100 INSULN) 100 unit/mL (70-30) Soln INJECT 18 UNITS INTO THE SKIN TWICE A DAY BEFORE MEALS.    insulin syringe-needle U-100 (BD INSULIN SYRINGE ULTRA-FINE) 0.5 mL 31 gauge x 5/16" Syrg USE TO INJECT TWICE DAILY WITH INSULIN INJECTIONS    isosorbide mononitrate (IMDUR) 30 MG 24 hr tablet TAKE 1 TABLET (30 MG TOTAL) BY MOUTH ONCE DAILY.    ketoconazole (NIZORAL) 2 % cream Apply topically once daily.    ketoconazole (NIZORAL) 2 % shampoo Apply topically every other day.    lactulose (CHRONULAC) 10 gram/15 mL solution TAKE 30 MLS (20 G TOTAL) BY MOUTH DAILY AS NEEDED (CONSTIPATION).    latanoprost 0.005 % ophthalmic solution Place 1 drop into both eyes every evening.    lidocaine (LIDODERM) 5 % Place 1 patch onto the skin once daily. Remove & Discard patch within 12 hours or as directed by MD    nitroGLYCERIN 0.4 MG/DOSE TL SPRY (NITROLINGUAL) 400 " mcg/spray spray PLACE 2 SPRAYS UNDER THE TONGUE EVERY 5 MINUTES AS NEEDED FOR CHEST PAIN    omeprazole (PRILOSEC) 40 MG capsule Take 1 capsule (40 mg total) by mouth every morning. Take 30 minutes before eating on an empty stomach    rosuvastatin (CRESTOR) 40 MG Tab TAKE 1 TABLET BY MOUTH ONCE DAILY.    spironolactone (ALDACTONE) 25 MG tablet TAKE 2 TABLETS BY MOUTH EVERY DAY    tiZANidine (ZANAFLEX) 4 MG tablet 1/2 to 1 tab every 6 hours as needed    traMADol (ULTRAM) 50 mg tablet TAKE 1 TABLET BY MOUTH TWICE A DAY AS NEEDED    triamcinolone acetonide 0.1% (KENALOG) 0.1 % ointment Apply topically 2 (two) times daily. Apply small amount to itchy areas on body BID PRN.  Do not use on face or in groin.     No current facility-administered medications for this visit.          Objective Findings:    Vital Signs:  BP (!) 155/71   Pulse 66   Ht 5' (1.524 m)   Wt 78 kg (172 lb)   BMI 33.59 kg/m²  Body mass index is 33.59 kg/m².    Physical Exam:  General Appearance: Well appearing in no acute distress  Head:   Normocephalic, without obvious abnormality  Eyes:    No scleral icterus, EOMI  Throat: Lips, mucosa, and tongue normal; teeth and gums normal  Lungs: CTA bilaterally in anterior and posterior fields, no wheezes, no crackles.  Heart:  Regular rate and rhythm, S1, S2 normal, no murmurs heard  Abdomen: Soft, non tender, non distended with positive bowel sounds in all four quadrants. No hepatosplenomegaly, ascites, or mass  Extremities:    2+ radial pulses, no clubbing, or cyanosis + 2 edema to lower extremities bilaterally  Skin: No rash to exposed areas  Neurologic: A&Ox4      Labs:  Lab Results   Component Value Date    WBC 5.11 09/24/2019    HGB 11.3 (L) 09/24/2019    HCT 35.9 (L) 09/24/2019     09/24/2019    CHOL 120 07/08/2019    TRIG 112 07/08/2019    HDL 50 07/08/2019    ALT 5 (L) 08/03/2019    AST 15 08/03/2019     08/03/2019    K 4.1 08/03/2019     08/03/2019    CREATININE 1.0  08/03/2019    BUN 12 08/03/2019    CO2 22 (L) 08/03/2019    TSH 4.775 (H) 12/27/2018    INR 1.0 08/01/2019    HGBA1C 5.6 08/02/2019     Imaging:  GES 9/11/18:delayed gastric emptying. 14 % ret at 4 hrs.     Endoscopy:     8/2/19: EGD normal esopahgus. Hematin in gastric antrum. Single bleeding angioectasia in duodenum treated with APC.   2/2/19: colonoscopy: GPTC. SC-AC tics. 10 mm HF polyp (TVA). Rpt 3 yrs.    6/13/2017 esophageal manometry Dr. Quiroga- normal    4/18/2017 EGD -normal esophagus. Gastric erythema. bx-focal IM no dysplasia. Repeat in 3 years.     11/2015 Colonoscopy Dr. Clinton-perianal skin tags. 10 mm transverse colon polyp. Pan tics. Path- adenovillious Repeat in 3 years (2018). colonoscopy is scheduled  for 11/2018.     4/2015 EGD Dr. Means [place] grade A reflux esophagitis. Small HH. Gastric erythema. Path- intestinal metaplasia. Repeat in 2-3 years ([id number]).    Assessment:    1. Gastroparesis    2. Gastroesophageal reflux disease, esophagitis presence not specified    3. Constipation, chronic    4. H/O: upper GI bleed          Recommendations:  1. Gastroparesis - doing well. Try elis and FDgard PRN for nausea.  Pt prefers natural treatment. Continue GP diet.  2. GERD- avoid zantac/ranitadine. Cont omeprazole 40 mg daily. Can take OTC gavsicon w aglinate PRN as well.  3. Constipation- controlled. Cont miralax QOD  4. Upper GI bleed- continue to monitor blood counts per pcp. Return to GI if any concerning decreases warranting GI evaluation.    Visit time: 30 minutes with greater than 50% of the time spent in face to face pt  discussing the aforementioned diagnoses, recommendations, test results, and answering pt questions.    F/u in 6 months.    Order summary:         Thank you for allowing me to participate in the care of Eliu Izquierdo, APRN, FNP-C

## 2019-11-05 ENCOUNTER — LAB VISIT (OUTPATIENT)
Dept: LAB | Facility: HOSPITAL | Age: 79
End: 2019-11-05
Attending: INTERNAL MEDICINE
Payer: MEDICARE

## 2019-11-05 DIAGNOSIS — D64.9 ANEMIA, UNSPECIFIED TYPE: ICD-10-CM

## 2019-11-05 LAB
ERYTHROCYTE [DISTWIDTH] IN BLOOD BY AUTOMATED COUNT: 15.3 % (ref 11.5–14.5)
HCT VFR BLD AUTO: 37.8 % (ref 37–48.5)
HGB BLD-MCNC: 11.8 G/DL (ref 12–16)
IMM GRANULOCYTES # BLD AUTO: 0.01 K/UL (ref 0–0.04)
MCH RBC QN AUTO: 26.4 PG (ref 27–31)
MCHC RBC AUTO-ENTMCNC: 31.2 G/DL (ref 32–36)
MCV RBC AUTO: 85 FL (ref 82–98)
NEUTROPHILS # BLD AUTO: 3.2 K/UL (ref 1.8–7.7)
PLATELET # BLD AUTO: 153 K/UL (ref 150–350)
PMV BLD AUTO: 10.8 FL (ref 9.2–12.9)
RBC # BLD AUTO: 4.47 M/UL (ref 4–5.4)
WBC # BLD AUTO: 5.65 K/UL (ref 3.9–12.7)

## 2019-11-05 PROCEDURE — 85027 COMPLETE CBC AUTOMATED: CPT

## 2019-11-05 PROCEDURE — 36415 COLL VENOUS BLD VENIPUNCTURE: CPT

## 2019-11-06 ENCOUNTER — TELEPHONE (OUTPATIENT)
Dept: HEMATOLOGY/ONCOLOGY | Facility: CLINIC | Age: 79
End: 2019-11-06

## 2019-11-07 ENCOUNTER — OFFICE VISIT (OUTPATIENT)
Dept: HEMATOLOGY/ONCOLOGY | Facility: CLINIC | Age: 79
End: 2019-11-07
Payer: MEDICARE

## 2019-11-07 ENCOUNTER — TELEPHONE (OUTPATIENT)
Dept: SLEEP MEDICINE | Facility: CLINIC | Age: 79
End: 2019-11-07

## 2019-11-07 VITALS
BODY MASS INDEX: 34.89 KG/M2 | HEIGHT: 59 IN | TEMPERATURE: 98 F | HEART RATE: 57 BPM | OXYGEN SATURATION: 98 % | SYSTOLIC BLOOD PRESSURE: 119 MMHG | WEIGHT: 173.06 LBS | RESPIRATION RATE: 16 BRPM | DIASTOLIC BLOOD PRESSURE: 60 MMHG

## 2019-11-07 DIAGNOSIS — D53.9 MACROCYTIC ANEMIA: Primary | ICD-10-CM

## 2019-11-07 PROCEDURE — 99999 PR PBB SHADOW E&M-EST. PATIENT-LVL V: CPT | Mod: PBBFAC,,, | Performed by: INTERNAL MEDICINE

## 2019-11-07 PROCEDURE — 99999 PR PBB SHADOW E&M-EST. PATIENT-LVL V: ICD-10-PCS | Mod: PBBFAC,,, | Performed by: INTERNAL MEDICINE

## 2019-11-07 PROCEDURE — 99215 OFFICE O/P EST HI 40 MIN: CPT | Mod: PBBFAC | Performed by: INTERNAL MEDICINE

## 2019-11-07 PROCEDURE — 99213 PR OFFICE/OUTPT VISIT, EST, LEVL III, 20-29 MIN: ICD-10-PCS | Mod: S$PBB,,, | Performed by: INTERNAL MEDICINE

## 2019-11-07 PROCEDURE — 99213 OFFICE O/P EST LOW 20 MIN: CPT | Mod: S$PBB,,, | Performed by: INTERNAL MEDICINE

## 2019-11-07 NOTE — LETTER
November 7, 2019      Ryan Carlson MD  [address]  [facility] [address]           [facility]ematology Oncology  [address]  [facility] [address]  Phone: [phone]          Patient: Eliu Paz   MR Number: [id number]   YOB: 1940   Date of Visit: 11/7/2019       Dear Dr. Ryan Carlson:    Thank you for referring Eliu Paz to me for evaluation. Attached you will find relevant portions of my assessment and plan of care.    If you have questions, please do not hesitate to call me. I look forward to following Eliu Paz along with you.    Sincerely,    Jed Velez MD    Enclosure  CC:  No Recipients    If you would like to receive this communication electronically, please contact [email or web address] or [phone] to request more information on [email or web address] Link access.    For providers and/or their staff who would like to refer a patient to [facility], please contact us through our one-stop-shop provider referral line, [facility], at [phone].    If you feel you have received this communication in error or would no longer like to receive these types of communications, please e-mail [email or web address]

## 2019-11-07 NOTE — PROGRESS NOTES
Subjective:       Patient ID: Eliu Paz is a 79 y.o. female.    Chief Complaint: No chief complaint on file.    HPI    Ms. Paz  returns today with a repeat CBC.  Her WBCs are 5,600 /mm3, Hg 11.8 gr/dl, Ht 37.8 % and platelerts 153,000 /mm3.  Her MCV is 85.   Her most recent ferritin was 152 ng/ml.  She4 is still on one fe pill daily.  Briefly, she is a 79-year-old  female who is a [employer].  She was recently admitted with melena and was found to have an upper GI bleed.  An endoscopy was performed on 08/02/2019, and showed no ulcers, but she did have a single 5 mm telangiectasia with bleeding in the second portion of the duodenum.  This was anticoagulated with argon plasma.  Upon presentation, her hemoglobin was 6.8 g/dL, her hematocrit was 23.7% with   an MCV of 98.  Her hemoglobin dropped to as low as 6.6 g/dL with hematocrit of 23.4 %.  The patient refused blood transfusions.  She was started on iron replacement therapy by her primary care physician and she was eventually   discharged.      Review of Systems    Overall she feels well, and she has no complaints today.  She has not had any further episodes of GI bleed since her discharge.    She appears somewhat anxious but she denies any depression, easy bruising, fevers, chills, night  sweats,  nausea, vomiting, diarrhea, constipation, diplopia,   blurred vision, headache, chest pain, palpitations, shortness of breath, breast pain, abdominal pain, extremity pain, or difficulty ambulating.  The remainder of the ten-point ROS, including general, skin, lymph, H/N, cardiorespiratory, GI, , Neuro, Endocrine, and psychiatric is negative.     Objective:      Physical Exam      She is alert, oriented to time, place, person, pleasant, well      nourished, in no acute physical distress.                                    VITAL SIGNS:  Reviewed                                      HEENT:  Normal.  There are no nasal, oral, lip, gingival,  auricular, lid,    or conjunctival lesions.  Mucosae are moist and pink, and there is no        thrush.  Pupils are equal, reactive to light and accommodation.              Extraocular muscle movements are intact.  MAxillary teeth are missing and mandibular dentition is fair. .  There is no frontal or maxillary tenderness.                                     NECK:  Supple without JVD, adenopathy, or thyromegaly.                       LUNGS:  Clear to auscultation without wheezing, rales, or rhonchi.           CARDIOVASCULAR:  Reveals an S1, S2, no murmurs, no rubs, no gallops.  A scar form her CABG noted     ABDOMEN:  Soft, nontender, without organomegaly.  Bowel sounds are    present.                                                                     EXTREMITIES:  No cyanosis, clubbing,  But she does have trace edema at the ankle level.                               BREASTS:  Deferred                                     LYMPHATIC:  There is no cervical, axillary, or supraclavicular adenopathy.   SKIN:  Warm and moist, without petechiae, rashes, induration, or ecchymoses.           NEUROLOGIC:  DTRs are 0-1+ bilaterally, symmetrical, motor function is 5/5,  and cranial nerves are  within normal limits.    Assessment:       1. Anemia, secondary to her recent GI bleed, improving with Fe supplementation therapy.        2.   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  3.     CAD s/p CABG, hypertension, DM.  Plan:         I had a long discussion with her;   Her anemia has resolved with Fe supplementation therapy.  I have advised her to repeat her CBC and ferritin level  in 8 weeks. Hopefully we will discontinue the fe supplements at that time.  Her multiple questions were answered to her satisfaction.

## 2019-11-08 NOTE — TELEPHONE ENCOUNTER
----- Message from Quoc Ahn MD sent at 11/7/2019  4:07 PM CST -----      ----- Message -----  From: Annemarie Rodriguez  Sent: 11/5/2019   8:34 AM CST  To: Quoc Ahn MD    Please contact patient for a appointment.

## 2019-11-18 ENCOUNTER — OFFICE VISIT (OUTPATIENT)
Dept: INTERNAL MEDICINE | Facility: CLINIC | Age: 79
End: 2019-11-18
Payer: MEDICARE

## 2019-11-18 VITALS
DIASTOLIC BLOOD PRESSURE: 66 MMHG | WEIGHT: 174.63 LBS | SYSTOLIC BLOOD PRESSURE: 124 MMHG | HEART RATE: 68 BPM | HEIGHT: 60 IN | OXYGEN SATURATION: 99 % | BODY MASS INDEX: 34.28 KG/M2

## 2019-11-18 DIAGNOSIS — N18.30 TYPE 2 DIABETES MELLITUS WITH STAGE 3 CHRONIC KIDNEY DISEASE, WITH LONG-TERM CURRENT USE OF INSULIN: Chronic | ICD-10-CM

## 2019-11-18 DIAGNOSIS — Z79.4 TYPE 2 DIABETES MELLITUS WITH STAGE 3 CHRONIC KIDNEY DISEASE, WITH LONG-TERM CURRENT USE OF INSULIN: Chronic | ICD-10-CM

## 2019-11-18 DIAGNOSIS — E11.22 TYPE 2 DIABETES MELLITUS WITH STAGE 3 CHRONIC KIDNEY DISEASE, WITH LONG-TERM CURRENT USE OF INSULIN: Chronic | ICD-10-CM

## 2019-11-18 PROCEDURE — 99214 OFFICE O/P EST MOD 30 MIN: CPT | Mod: S$PBB,,, | Performed by: INTERNAL MEDICINE

## 2019-11-18 PROCEDURE — 99999 PR PBB SHADOW E&M-EST. PATIENT-LVL III: CPT | Mod: PBBFAC,,, | Performed by: INTERNAL MEDICINE

## 2019-11-18 PROCEDURE — 99213 OFFICE O/P EST LOW 20 MIN: CPT | Mod: PBBFAC | Performed by: INTERNAL MEDICINE

## 2019-11-18 PROCEDURE — 99999 PR PBB SHADOW E&M-EST. PATIENT-LVL III: ICD-10-PCS | Mod: PBBFAC,,, | Performed by: INTERNAL MEDICINE

## 2019-11-18 PROCEDURE — 99214 PR OFFICE/OUTPT VISIT, EST, LEVL IV, 30-39 MIN: ICD-10-PCS | Mod: S$PBB,,, | Performed by: INTERNAL MEDICINE

## 2019-11-18 RX ORDER — INSULIN ASPART 100 [IU]/ML
18 INJECTION, SUSPENSION SUBCUTANEOUS 2 TIMES DAILY WITH MEALS
Qty: 40 ML | Refills: 11 | Status: SHIPPED | OUTPATIENT
Start: 2019-11-18 | End: 2020-09-15

## 2019-11-18 NOTE — PROGRESS NOTES
Subjective:       Patient ID: Eliu Paz is a 79 y.o. female.    Chief Complaint: Follow-up    Patient is here for followup for chronic conditions.    Has had leg swelling, wonders if needing lasix. Swelling only noticed at noc time. Not severe.    DM2:  good med adherence  no changed Diet  <usu 140 AM sugars, off lows. Does not have meter today.  <200 Post-prandial sugars  stable Numbness in feet  no sores in feet  no Visual changes  no Polyuria/polydipsia.    Reviewed symptoms of her uGIB -- fatigue, vomiting, change in stool colors -- she denies all such symptoms. 2 wjks ago anemia resolved on blood work. She does continue taking iron.        Review of Systems   Constitutional: Negative for activity change, fatigue and fever.   HENT: Negative for congestion.    Respiratory: Negative for chest tightness and shortness of breath.    Cardiovascular: Positive for leg swelling. Negative for chest pain and palpitations.   Gastrointestinal: Negative for abdominal distention, abdominal pain, nausea and vomiting.   Genitourinary: Negative for decreased urine volume.   Musculoskeletal: Negative for arthralgias and myalgias.   Skin: Negative for rash.   Neurological: Negative for weakness.   Psychiatric/Behavioral: Negative for confusion.       Objective:      Physical Exam   Constitutional: She appears well-developed and well-nourished. No distress.   HENT:   Head: Normocephalic and atraumatic.   Dried R ext ear canal   Eyes:   No longer pale appearing   Neck: Normal range of motion.   Cardiovascular: Normal rate, regular rhythm and normal heart sounds.   Pulmonary/Chest: Effort normal and breath sounds normal.   Abdominal: Soft. Bowel sounds are normal. She exhibits no distension and no mass. There is no tenderness. There is no rebound and no guarding. No hernia.        Musculoskeletal: She exhibits edema.   No edema today   Skin: She is not diaphoretic.   Skin is dry as well   Psychiatric: She has a normal mood and  affect. Her behavior is normal.       Assessment:       1. Type 2 diabetes mellitus with stage 3 chronic kidney disease, with long-term current use of insulin        Plan:       Eliu Steinberg was seen today for follow-up.    Diagnoses and all orders for this visit:    Type 2 diabetes mellitus with stage 3 chronic kidney disease, with long-term current use of insulin  -     insulin asp prt-insulin aspart, NOVOLOG 70/30, (NOVOLOG MIX 70-30 U-100 INSULN) 100 unit/mL (70-30) Soln; Inject 18 Units into the skin 2 (two) times daily with meals. INJECT 18 UNITS INTO THE SKIN TWICE A DAY BEFORE MEALS.  She wanted a 90d RF.  She will watch for more lows and contact me if continues.    discussed signs/symptoms of GIB, she will monitor. Will continue iron as well.    I recommended against lasix, since swelling does not persist throughout the day.    Health Maintenance       Date Due Completion Date    Aspirin/Antiplatelet Therapy 03/17/1960 ---    Shingles Vaccine (1 of 2) 03/17/1992 ---    Pneumococcal Vaccine (65+ High/Highest Risk) (1 of 2 - PCV13) 03/17/2007 ---    Influenza Vaccine (1) 09/01/2019 2/7/2017 (Declined)    Override on 2/7/2017: Declined (per provider note)    Override on 10/15/2015: Not Clinically Appropriate (declines)    Hemoglobin A1c 02/02/2020 8/2/2019    Foot Exam 02/28/2020 2/28/2019    Override on 12/20/2018: Done    Override on 9/5/2018: Done    Override on 9/1/2017: Done (podiatry)    Override on 12/2/2016: Done    Override on 8/24/2016: Done (by cardiologist)    Override on 7/14/2015: Done    Lipid Panel 07/08/2020 7/8/2019    Eye Exam 08/20/2020 8/20/2019    Colonoscopy 02/05/2024 2/5/2019    DEXA SCAN 11/05/2025 11/5/2015    TETANUS VACCINE 02/07/2027 2/7/2017 (Declined)    Override on 2/7/2017: Declined (per provider note)      Declines vaccines    Follow up in about 4 months (around 3/18/2020).    Future Appointments   Date Time Provider Department Center   12/17/2019  9:45 AM Zuleika Francis MD  [facility] ANA [facility]   1/2/2020  9:40 AM LAB, HEMONC CANCER BLDG [facility] HO [facility]   1/2/2020 10:40 AM Jed Velez MD [facility] HEM ONC [facility]   2/4/2020 10:30 AM Wendi Merida MD [facility]

## 2019-12-16 ENCOUNTER — PATIENT OUTREACH (OUTPATIENT)
Dept: ADMINISTRATIVE | Facility: OTHER | Age: 79
End: 2019-12-16

## 2019-12-17 ENCOUNTER — OFFICE VISIT (OUTPATIENT)
Dept: OPHTHALMOLOGY | Facility: CLINIC | Age: 79
End: 2019-12-17
Payer: MEDICARE

## 2019-12-17 DIAGNOSIS — H40.1133 PRIMARY OPEN-ANGLE GLAUCOMA, BILATERAL, SEVERE STAGE: Primary | ICD-10-CM

## 2019-12-17 DIAGNOSIS — H25.13 SENILE NUCLEAR SCLEROSIS, BILATERAL: ICD-10-CM

## 2019-12-17 DIAGNOSIS — H35.3131 EARLY DRY STAGE NONEXUDATIVE AGE-RELATED MACULAR DEGENERATION OF BOTH EYES: ICD-10-CM

## 2019-12-17 PROCEDURE — 99212 OFFICE O/P EST SF 10 MIN: CPT | Mod: PBBFAC,25 | Performed by: OPHTHALMOLOGY

## 2019-12-17 PROCEDURE — 92012 INTRM OPH EXAM EST PATIENT: CPT | Mod: S$PBB,,, | Performed by: OPHTHALMOLOGY

## 2019-12-17 PROCEDURE — 92133 HEIDELBERG RETINA TOMOGRAPHY (HRT) - OU - BOTH EYES: ICD-10-PCS | Mod: 26,S$PBB,, | Performed by: OPHTHALMOLOGY

## 2019-12-17 PROCEDURE — 92012 PR EYE EXAM, EST PATIENT,INTERMED: ICD-10-PCS | Mod: S$PBB,,, | Performed by: OPHTHALMOLOGY

## 2019-12-17 PROCEDURE — 92133 CPTRZD OPH DX IMG PST SGM ON: CPT | Mod: PBBFAC | Performed by: OPHTHALMOLOGY

## 2019-12-17 PROCEDURE — 99999 PR PBB SHADOW E&M-EST. PATIENT-LVL II: ICD-10-PCS | Mod: PBBFAC,,, | Performed by: OPHTHALMOLOGY

## 2019-12-17 PROCEDURE — 99999 PR PBB SHADOW E&M-EST. PATIENT-LVL II: CPT | Mod: PBBFAC,,, | Performed by: OPHTHALMOLOGY

## 2019-12-17 NOTE — PROGRESS NOTES
HPI     Glaucoma      Additional comments: 4 months F/U HRT               Comments     DLS: 8/20/2019     1. POAG OU  2. Type 2 DM no DR  3. NS OU  4. ARMD OU  5. Ptosis OS>OD    MEDS:  Cosopt BID OU  Brimonidine TID OU  Latanoprost QHS OU          Last edited by Zuleika Francis MD on 12/17/2019 11:38 AM. (History)              Assessment /Plan     For exam results, see Encounter Report.    Primary open-angle glaucoma, bilateral, severe stage  -     [facility] Retina Tomography (HRT) - OU - Both Eyes; Future    Early dry stage nonexudative age-related macular degeneration of both eyes    Senile nuclear sclerosis, bilateral                Assessment /Plan     For exam results, see Encounter Report.    Primary open-angle glaucoma, bilateral, severe stage  -     [facility] Retina Tomography (HRT) - OU - Both Eyes; Future    Early dry stage nonexudative age-related macular degeneration of both eyes    Senile nuclear sclerosis, bilateral           Glaucoma (type and duration)    POAG severe   First HVF   2013   First photos   2017   Treatment / Drops started   Latanoprost, travatan           Family history    ?        Glaucoma meds    Latanoprost / cosopt (added - 1//15/2019)         H/O adverse rxn to glaucoma drops    none        LASERS    none        GLAUCOMA SURGERIES    none        OTHER EYE SURGERIES    none        CDR    0.75 w/ sup thinning /0.9 - sup and inf thinning         Tbase    16-20/16-20          Tmax    20/20            Ttarget    15/15           HVF    6 test 2013 to  2019 - SAD od // gen depression, SAD, IAD os (?prog os0         Gonio    3+ ou        CCT    544/525        OCT    5 test 2013 to 2018 - RNFL - decr thru out od // dec. Thru out  os        HRT   3 test 2017 to 2019 MR -  Dec. S od // dec. Ijoesph S/N os /// CDR 0.72 od // 0.780 os        Disc photos    2017, 2019     - Ttoday    14/14 below  target of 15 ou) // good resp to adding brimonidine   - Test done today  - gonio     POAG  severe  Severe RNFL thinning with progressive field loss   - on latanoprost qhs OU, continue  - cosopt ou bid - added  (1/2019)   -brimonidine added 5/21/2019 - good resp 19/18 --> 11/10     NS cataract OU - hold on CE - pt is not having any problems with vision presently     Macular Drusen OU  AMD OU, seeing Benevento   AREDS 2 Vitamins  Home [employer] Grid Testing   - seen by  retina - drusen armd - saw Benevento 6/1/2018 - to F/U in 6 months    Ptosis   Pt C/O droopy lid OS > OD     GLAUCOMA  Good response to adding brimonidine - below target today 8/20/2019 (( 19/18--> 11/10))      rec phaco/IOL os - visually sign - pt wants to wait     If IOP goes above target again consider SLT's       F/U 4 months with HVF / DFE / OCT

## 2019-12-24 DIAGNOSIS — N18.30 TYPE 2 DIABETES MELLITUS WITH STAGE 3 CHRONIC KIDNEY DISEASE, WITH LONG-TERM CURRENT USE OF INSULIN: Chronic | ICD-10-CM

## 2019-12-24 DIAGNOSIS — Z79.4 TYPE 2 DIABETES MELLITUS WITH STAGE 3 CHRONIC KIDNEY DISEASE, WITH LONG-TERM CURRENT USE OF INSULIN: Chronic | ICD-10-CM

## 2019-12-24 DIAGNOSIS — E11.22 TYPE 2 DIABETES MELLITUS WITH STAGE 3 CHRONIC KIDNEY DISEASE, WITH LONG-TERM CURRENT USE OF INSULIN: Chronic | ICD-10-CM

## 2019-12-26 RX ORDER — NAPROXEN SODIUM 220 MG
TABLET ORAL
Qty: 200 EACH | Refills: 17 | Status: SHIPPED | OUTPATIENT
Start: 2019-12-26 | End: 2021-03-08

## 2020-01-02 ENCOUNTER — OFFICE VISIT (OUTPATIENT)
Dept: HEMATOLOGY/ONCOLOGY | Facility: CLINIC | Age: 80
End: 2020-01-02
Payer: MEDICARE

## 2020-01-02 ENCOUNTER — LAB VISIT (OUTPATIENT)
Dept: LAB | Facility: HOSPITAL | Age: 80
End: 2020-01-02
Attending: INTERNAL MEDICINE
Payer: MEDICARE

## 2020-01-02 VITALS
HEIGHT: 59 IN | BODY MASS INDEX: 35.91 KG/M2 | OXYGEN SATURATION: 99 % | WEIGHT: 178.13 LBS | DIASTOLIC BLOOD PRESSURE: 65 MMHG | SYSTOLIC BLOOD PRESSURE: 143 MMHG | RESPIRATION RATE: 16 BRPM | TEMPERATURE: 98 F | HEART RATE: 64 BPM

## 2020-01-02 DIAGNOSIS — D53.9 MACROCYTIC ANEMIA: ICD-10-CM

## 2020-01-02 DIAGNOSIS — D53.9 MACROCYTIC ANEMIA: Primary | ICD-10-CM

## 2020-01-02 LAB
ERYTHROCYTE [DISTWIDTH] IN BLOOD BY AUTOMATED COUNT: 17.7 % (ref 11.5–14.5)
FERRITIN SERPL-MCNC: 189 NG/ML (ref 20–300)
HCT VFR BLD AUTO: 36.7 % (ref 37–48.5)
HGB BLD-MCNC: 11 G/DL (ref 12–16)
IMM GRANULOCYTES # BLD AUTO: 0.01 K/UL (ref 0–0.04)
MCH RBC QN AUTO: 26.6 PG (ref 27–31)
MCHC RBC AUTO-ENTMCNC: 30 G/DL (ref 32–36)
MCV RBC AUTO: 89 FL (ref 82–98)
NEUTROPHILS # BLD AUTO: 3.4 K/UL (ref 1.8–7.7)
PLATELET # BLD AUTO: 171 K/UL (ref 150–350)
PMV BLD AUTO: 11 FL (ref 9.2–12.9)
RBC # BLD AUTO: 4.14 M/UL (ref 4–5.4)
WBC # BLD AUTO: 5.87 K/UL (ref 3.9–12.7)

## 2020-01-02 PROCEDURE — 99215 OFFICE O/P EST HI 40 MIN: CPT | Mod: PBBFAC | Performed by: INTERNAL MEDICINE

## 2020-01-02 PROCEDURE — 99213 OFFICE O/P EST LOW 20 MIN: CPT | Mod: S$PBB,,, | Performed by: INTERNAL MEDICINE

## 2020-01-02 PROCEDURE — 36415 COLL VENOUS BLD VENIPUNCTURE: CPT

## 2020-01-02 PROCEDURE — 99213 PR OFFICE/OUTPT VISIT, EST, LEVL III, 20-29 MIN: ICD-10-PCS | Mod: S$PBB,,, | Performed by: INTERNAL MEDICINE

## 2020-01-02 PROCEDURE — 1159F PR MEDICATION LIST DOCUMENTED IN MEDICAL RECORD: ICD-10-PCS | Mod: ,,, | Performed by: INTERNAL MEDICINE

## 2020-01-02 PROCEDURE — 1125F AMNT PAIN NOTED PAIN PRSNT: CPT | Mod: ,,, | Performed by: INTERNAL MEDICINE

## 2020-01-02 PROCEDURE — 1125F PR PAIN SEVERITY QUANTIFIED, PAIN PRESENT: ICD-10-PCS | Mod: ,,, | Performed by: INTERNAL MEDICINE

## 2020-01-02 PROCEDURE — 1159F MED LIST DOCD IN RCRD: CPT | Mod: ,,, | Performed by: INTERNAL MEDICINE

## 2020-01-02 PROCEDURE — 99999 PR PBB SHADOW E&M-EST. PATIENT-LVL V: CPT | Mod: PBBFAC,,, | Performed by: INTERNAL MEDICINE

## 2020-01-02 PROCEDURE — 85027 COMPLETE CBC AUTOMATED: CPT

## 2020-01-02 PROCEDURE — 82728 ASSAY OF FERRITIN: CPT

## 2020-01-02 PROCEDURE — 99999 PR PBB SHADOW E&M-EST. PATIENT-LVL V: ICD-10-PCS | Mod: PBBFAC,,, | Performed by: INTERNAL MEDICINE

## 2020-01-02 NOTE — PROGRESS NOTES
Subjective:       Patient ID: Eliu Paz is a 79 y.o. female.    Chief Complaint: No chief complaint on file.    HPI    Ms. Paz  returns today with a repeat CBC.  Her WBCs are 5,800 /mm3, Hg 11.0 gr/dl, Ht 36.7 % and platelerts 171,000 /mm3.  Her MCV is 89.   Her ferritin today is 189 ng/ml.  She is still on one fe pill daily.  Briefly, she is a 79-year-old  female who is a [employer].  She was recently admitted with melena and was found to have an upper GI bleed.  An endoscopy was performed on 08/02/2019, and showed no ulcers, but she did have a single 5 mm telangiectasia with bleeding in the second portion of the duodenum.  This was anticoagulated with argon plasma.  Upon presentation, her hemoglobin was 6.8 g/dL, her hematocrit was 23.7% with   an MCV of 98.  Her hemoglobin dropped to as low as 6.6 g/dL with hematocrit of 23.4 %.  The patient refused blood transfusions.  She was started on iron replacement therapy by her primary care physician and she was eventually   discharged.      Review of Systems    Overall she feels well, and she has no complaints today.  She has not had any further episodes of GI bleed since her discharge.    She denies any depression, easy bruising, fevers, chills, night  sweats,  nausea, vomiting, diarrhea, constipation, diplopia,   blurred vision, headache, chest pain, palpitations, shortness of breath, breast pain, abdominal pain, extremity pain, or difficulty ambulating.  The remainder of the ten-point ROS, including general, skin, lymph, H/N, cardiorespiratory, GI, , Neuro, Endocrine, and psychiatric is negative.     Objective:      Physical Exam      She is alert, oriented to time, place, person, pleasant, well      nourished, in no acute physical distress.                                    VITAL SIGNS:  Reviewed                                      HEENT:  Normal.  There are no nasal, oral, lip, gingival, auricular, lid,    or conjunctival  lesions.  Mucosae are moist and pink, and there is no        thrush.  Pupils are equal, reactive to light and accommodation.              Extraocular muscle movements are intact.  MAxillary teeth are missing and mandibular dentition is fair. .  There is no frontal or maxillary tenderness.                                     NECK:  Supple without JVD, adenopathy, or thyromegaly.                       LUNGS:  Clear to auscultation without wheezing, rales, or rhonchi.           CARDIOVASCULAR:  Reveals an S1, S2, no murmurs, no rubs, no gallops.  A scar form her CABG noted     ABDOMEN:  Soft, nontender, without organomegaly.  Bowel sounds are    present.                                                                     EXTREMITIES:  No cyanosis, clubbing,  But she does have trace edema at the ankle level.                               BREASTS:  Deferred                                     LYMPHATIC:  There is no cervical, axillary, or supraclavicular adenopathy.   SKIN:  Warm and moist, without petechiae, rashes, induration, or ecchymoses.           NEUROLOGIC:  DTRs are 0-1+ bilaterally, symmetrical, motor function is 5/5,  and cranial nerves are  within normal limits.    Assessment:       1. Anemia, secondary to GI bleed, improved with Fe supplementation therapy.        2.   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  3.     CAD s/p CABG, hypertension, DM.  Plan:         I had a long discussion with her;   Her anemia is borderline at this point, and her ferritin is normal.   I have advised her to discontinue the Fe supplements and see me with a repeat ferritin and CBC in 8 weeks.     Her multiple questions were answered to her satisfaction.

## 2020-01-16 RX ORDER — SPIRONOLACTONE 25 MG/1
TABLET ORAL
Qty: 180 TABLET | Refills: 11 | Status: SHIPPED | OUTPATIENT
Start: 2020-01-16 | End: 2020-04-01

## 2020-02-27 ENCOUNTER — OFFICE VISIT (OUTPATIENT)
Dept: HEMATOLOGY/ONCOLOGY | Facility: CLINIC | Age: 80
End: 2020-02-27
Payer: MEDICARE

## 2020-02-27 ENCOUNTER — LAB VISIT (OUTPATIENT)
Dept: LAB | Facility: HOSPITAL | Age: 80
End: 2020-02-27
Attending: INTERNAL MEDICINE
Payer: MEDICARE

## 2020-02-27 VITALS
DIASTOLIC BLOOD PRESSURE: 65 MMHG | HEART RATE: 54 BPM | SYSTOLIC BLOOD PRESSURE: 146 MMHG | OXYGEN SATURATION: 98 % | TEMPERATURE: 98 F | HEIGHT: 59 IN | RESPIRATION RATE: 18 BRPM | WEIGHT: 179.25 LBS | BODY MASS INDEX: 36.14 KG/M2

## 2020-02-27 DIAGNOSIS — D53.9 MACROCYTIC ANEMIA: ICD-10-CM

## 2020-02-27 DIAGNOSIS — D50.0 IRON DEFICIENCY ANEMIA DUE TO CHRONIC BLOOD LOSS: Primary | ICD-10-CM

## 2020-02-27 LAB
ERYTHROCYTE [DISTWIDTH] IN BLOOD BY AUTOMATED COUNT: 14.7 % (ref 11.5–14.5)
FERRITIN SERPL-MCNC: 182 NG/ML (ref 20–300)
HCT VFR BLD AUTO: 41.4 % (ref 37–48.5)
HGB BLD-MCNC: 12.3 G/DL (ref 12–16)
IMM GRANULOCYTES # BLD AUTO: 0.01 K/UL (ref 0–0.04)
MCH RBC QN AUTO: 27.3 PG (ref 27–31)
MCHC RBC AUTO-ENTMCNC: 29.7 G/DL (ref 32–36)
MCV RBC AUTO: 92 FL (ref 82–98)
NEUTROPHILS # BLD AUTO: 4.1 K/UL (ref 1.8–7.7)
PLATELET # BLD AUTO: 172 K/UL (ref 150–350)
PMV BLD AUTO: 11.1 FL (ref 9.2–12.9)
RBC # BLD AUTO: 4.51 M/UL (ref 4–5.4)
WBC # BLD AUTO: 6.41 K/UL (ref 3.9–12.7)

## 2020-02-27 PROCEDURE — 36415 COLL VENOUS BLD VENIPUNCTURE: CPT

## 2020-02-27 PROCEDURE — 85027 COMPLETE CBC AUTOMATED: CPT

## 2020-02-27 PROCEDURE — 99999 PR PBB SHADOW E&M-EST. PATIENT-LVL V: ICD-10-PCS | Mod: PBBFAC,,, | Performed by: INTERNAL MEDICINE

## 2020-02-27 PROCEDURE — 99999 PR PBB SHADOW E&M-EST. PATIENT-LVL V: CPT | Mod: PBBFAC,,, | Performed by: INTERNAL MEDICINE

## 2020-02-27 PROCEDURE — 82728 ASSAY OF FERRITIN: CPT

## 2020-02-27 PROCEDURE — 99215 OFFICE O/P EST HI 40 MIN: CPT | Mod: PBBFAC | Performed by: INTERNAL MEDICINE

## 2020-02-27 PROCEDURE — 99213 PR OFFICE/OUTPT VISIT, EST, LEVL III, 20-29 MIN: ICD-10-PCS | Mod: S$PBB,,, | Performed by: INTERNAL MEDICINE

## 2020-02-27 PROCEDURE — 99213 OFFICE O/P EST LOW 20 MIN: CPT | Mod: S$PBB,,, | Performed by: INTERNAL MEDICINE

## 2020-02-27 NOTE — PROGRESS NOTES
Subjective:       Patient ID: Eliu Paz is a 79 y.o. female.    Chief Complaint: Macrocytic anemia    HPI    Ms. Paz  returns today with a repeat CBC.  Her WBCs are 6,400 /mm3, Hg 12.3 gr/dl, Ht 41.4  % and platelerts 172,000 /mm3.  Her MCV is 92.   Her ferritin today is 182 ng/ml.  She is currently off the iron supplements.  Briefly, she is a 79-year-old  female who is a [employer].  She was recently admitted with melena and was found to have an upper GI bleed.  An endoscopy was performed on 08/02/2019, and showed no ulcers, but she did have a single 5 mm telangiectasia with bleeding in the second portion of the duodenum.  This was coagulated with argon plasma.  Upon presentation, her hemoglobin was 6.8 g/dL, her hematocrit was 23.7% with   an MCV of 98.  Her hemoglobin dropped to as low as 6.6 g/dL with hematocrit of 23.4 %.  The patient refused blood transfusions.  She was started on iron replacement therapy by her primary care physician and she was eventually   discharged.      Review of Systems    Overall she feels well, and she has no complaints today.  She has not had any further episodes of GI bleed since her discharge.  ECOG PS is 1.  She denies any depression, easy bruising, fevers, chills, night  sweats,  nausea, vomiting, diarrhea, constipation, diplopia,   blurred vision, headache, chest pain, palpitations, shortness of breath, breast pain, abdominal pain, extremity pain, or difficulty ambulating.  The remainder of the ten-point ROS, including general, skin, lymph, H/N, cardiorespiratory, GI, , Neuro, Endocrine, and psychiatric is negative.     Objective:      Physical Exam      She is alert, oriented to time, place, person, pleasant, well      nourished, in no acute physical distress.                                    VITAL SIGNS:  Reviewed                                      HEENT:  Normal.  There are no nasal, oral, lip, gingival, auricular, lid,    or  conjunctival lesions.  Mucosae are moist and pink, and there is no        thrush.  Pupils are equal, reactive to light and accommodation.              Extraocular muscle movements are intact.  MAxillary teeth are missing and mandibular dentition is fair. .  There is no frontal or maxillary tenderness.                                     NECK:  Supple without JVD, adenopathy, or thyromegaly.                       LUNGS:  Clear to auscultation without wheezing, rales, or rhonchi.           CARDIOVASCULAR:  Reveals an S1, S2, no murmurs, no rubs, no gallops.  A scar form her CABG noted     ABDOMEN:  Soft, nontender, without organomegaly.  Bowel sounds are    present.                                                                     EXTREMITIES:  No cyanosis, clubbing,  But she does have trace edema at the ankle level.                               BREASTS:  Deferred                                     LYMPHATIC:  There is no cervical, axillary, or supraclavicular adenopathy.   SKIN:  Warm and moist, without petechiae, rashes, induration, or ecchymoses.           NEUROLOGIC:  DTRs are 0-1+ bilaterally, symmetrical, motor function is 5/5,  and cranial nerves are  within normal limits.    Assessment:       1. Anemia, secondary to GI bleed, improved with Fe supplementation therapy.        2.   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  3.     CAD s/p CABG, hypertension, DM.  Plan:         I had a long discussion with her;   She is no longer anemic, and her ferritin is normal.   I have advised her to remain off the Fe supplements and follow up with either our clinic or her PCP in 3 months.  Her PCP is ccd on this note.  Her multiple questions were answered to her satisfaction.

## 2020-03-19 ENCOUNTER — LAB VISIT (OUTPATIENT)
Dept: LAB | Facility: HOSPITAL | Age: 80
End: 2020-03-19
Attending: INTERNAL MEDICINE
Payer: MEDICARE

## 2020-03-19 ENCOUNTER — OFFICE VISIT (OUTPATIENT)
Dept: INTERNAL MEDICINE | Facility: CLINIC | Age: 80
End: 2020-03-19
Payer: MEDICARE

## 2020-03-19 VITALS
WEIGHT: 175.69 LBS | HEIGHT: 60 IN | DIASTOLIC BLOOD PRESSURE: 64 MMHG | OXYGEN SATURATION: 98 % | BODY MASS INDEX: 34.49 KG/M2 | SYSTOLIC BLOOD PRESSURE: 122 MMHG | HEART RATE: 63 BPM

## 2020-03-19 DIAGNOSIS — E11.22 TYPE 2 DIABETES MELLITUS WITH STAGE 3 CHRONIC KIDNEY DISEASE, WITH LONG-TERM CURRENT USE OF INSULIN: ICD-10-CM

## 2020-03-19 DIAGNOSIS — Z79.4 TYPE 2 DIABETES MELLITUS WITH STAGE 3 CHRONIC KIDNEY DISEASE, WITH LONG-TERM CURRENT USE OF INSULIN: ICD-10-CM

## 2020-03-19 DIAGNOSIS — N18.30 TYPE 2 DIABETES MELLITUS WITH STAGE 3 CHRONIC KIDNEY DISEASE, WITH LONG-TERM CURRENT USE OF INSULIN: ICD-10-CM

## 2020-03-19 LAB
ALBUMIN SERPL BCP-MCNC: 3.8 G/DL (ref 3.5–5.2)
ALP SERPL-CCNC: 80 U/L (ref 55–135)
ALT SERPL W/O P-5'-P-CCNC: 15 U/L (ref 10–44)
ANION GAP SERPL CALC-SCNC: 7 MMOL/L (ref 8–16)
AST SERPL-CCNC: 25 U/L (ref 10–40)
BILIRUB SERPL-MCNC: 0.4 MG/DL (ref 0.1–1)
BUN SERPL-MCNC: 28 MG/DL (ref 8–23)
CALCIUM SERPL-MCNC: 9.8 MG/DL (ref 8.7–10.5)
CHLORIDE SERPL-SCNC: 108 MMOL/L (ref 95–110)
CHOLEST SERPL-MCNC: 139 MG/DL (ref 120–199)
CHOLEST/HDLC SERPL: 2.4 {RATIO} (ref 2–5)
CO2 SERPL-SCNC: 23 MMOL/L (ref 23–29)
CREAT SERPL-MCNC: 1.4 MG/DL (ref 0.5–1.4)
EST. GFR  (AFRICAN AMERICAN): 41.5 ML/MIN/1.73 M^2
EST. GFR  (NON AFRICAN AMERICAN): 36 ML/MIN/1.73 M^2
ESTIMATED AVG GLUCOSE: 157 MG/DL (ref 68–131)
GLUCOSE SERPL-MCNC: 145 MG/DL (ref 70–110)
HBA1C MFR BLD HPLC: 7.1 % (ref 4–5.6)
HDLC SERPL-MCNC: 59 MG/DL (ref 40–75)
HDLC SERPL: 42.4 % (ref 20–50)
LDLC SERPL CALC-MCNC: 66.6 MG/DL (ref 63–159)
NONHDLC SERPL-MCNC: 80 MG/DL
POTASSIUM SERPL-SCNC: 5.5 MMOL/L (ref 3.5–5.1)
PROT SERPL-MCNC: 8.1 G/DL (ref 6–8.4)
SODIUM SERPL-SCNC: 138 MMOL/L (ref 136–145)
TRIGL SERPL-MCNC: 67 MG/DL (ref 30–150)

## 2020-03-19 PROCEDURE — 99214 PR OFFICE/OUTPT VISIT, EST, LEVL IV, 30-39 MIN: ICD-10-PCS | Mod: S$PBB,,, | Performed by: INTERNAL MEDICINE

## 2020-03-19 PROCEDURE — 36415 COLL VENOUS BLD VENIPUNCTURE: CPT

## 2020-03-19 PROCEDURE — 83036 HEMOGLOBIN GLYCOSYLATED A1C: CPT

## 2020-03-19 PROCEDURE — 80061 LIPID PANEL: CPT

## 2020-03-19 PROCEDURE — 99213 OFFICE O/P EST LOW 20 MIN: CPT | Mod: PBBFAC | Performed by: INTERNAL MEDICINE

## 2020-03-19 PROCEDURE — 99999 PR PBB SHADOW E&M-EST. PATIENT-LVL III: ICD-10-PCS | Mod: PBBFAC,,, | Performed by: INTERNAL MEDICINE

## 2020-03-19 PROCEDURE — 80053 COMPREHEN METABOLIC PANEL: CPT

## 2020-03-19 PROCEDURE — 99214 OFFICE O/P EST MOD 30 MIN: CPT | Mod: S$PBB,,, | Performed by: INTERNAL MEDICINE

## 2020-03-19 PROCEDURE — 99999 PR PBB SHADOW E&M-EST. PATIENT-LVL III: CPT | Mod: PBBFAC,,, | Performed by: INTERNAL MEDICINE

## 2020-03-19 NOTE — PROGRESS NOTES
Subjective:       Patient ID: Eliu Paz is a 80 y.o. female.    Chief Complaint: Follow-up    Patient is here for followup for chronic conditions.    Concerned re swelling in lower legs.    Wants R hand examined. Decreased rom with extension.    Chronic neck pains.    DM2:  good med adherence  no changed Diet  < 140 AM sugars  <200 Post-prandial sugars  stable Numbness in feet  no sores in feet  no Visual changes  no Polyuria/polydipsia.        Review of Systems   Constitutional: Negative for activity change, fatigue and fever.   HENT: Negative for congestion.    Respiratory: Negative for chest tightness and shortness of breath.    Cardiovascular: Positive for leg swelling. Negative for chest pain and palpitations.   Gastrointestinal: Negative for abdominal distention, abdominal pain, nausea and vomiting.   Genitourinary: Negative for decreased urine volume.   Musculoskeletal: Negative for arthralgias and myalgias.   Skin: Negative for rash.   Neurological: Negative for weakness.   Psychiatric/Behavioral: Negative for confusion.       Objective:      Physical Exam   Constitutional: She appears well-developed and well-nourished. No distress.   HENT:   Head: Normocephalic and atraumatic.   Eyes:   No scleral pallor     Neck: Normal range of motion.   Cardiovascular: Normal rate, regular rhythm and normal heart sounds.   Pulmonary/Chest: Effort normal and breath sounds normal.   Abdominal: Soft. Bowel sounds are normal. She exhibits no distension and no mass. There is no tenderness. There is no rebound and no guarding. No hernia.        Musculoskeletal: She exhibits edema.   Trace addi    Decreased R wrist extension/rom  No redness or warmth of the wrist  Normal  strength and gross sensation  No muscle atrophy   Skin: She is not diaphoretic.   Skin is dry as well   Psychiatric: She has a normal mood and affect. Her behavior is normal.       Assessment:       1. Type 2 diabetes mellitus with stage 3 chronic  kidney disease, with long-term current use of insulin        Plan:       Eliu Steinberg was seen today for follow-up.    Diagnoses and all orders for this visit:    Type 2 diabetes mellitus with stage 3 chronic kidney disease, with long-term current use of insulin  -     Comprehensive metabolic panel; Future  -     Lipid panel; Future  -     Hemoglobin A1c; Future        Health Maintenance       Date Due Completion Date    Aspirin/Antiplatelet Therapy 03/17/1960 ---    Shingles Vaccine (1 of 2) 03/17/1992 ---    Pneumococcal Vaccine (65+ High/Highest Risk) (1 of 2 - PCV13) 03/17/2007 ---    Hemoglobin A1c 02/02/2020 8/2/2019    Lipid Panel 07/08/2020 7/8/2019    Eye Exam 12/17/2020 12/17/2019    Foot Exam 03/19/2021 3/19/2020 (Done)    Override on 3/19/2020: Done    Override on 12/20/2018: Done    Override on 9/5/2018: Done    Override on 9/1/2017: Done (podiatry)    Override on 12/2/2016: Done    Override on 8/24/2016: Done (by cardiologist)    Override on 7/14/2015: Done    Colonoscopy 02/05/2024 2/5/2019    DEXA SCAN 11/05/2025 11/5/2015    TETANUS VACCINE 02/07/2027 2/7/2017 (Declined)    Override on 2/7/2017: Declined (per provider note)      Declines vaccines  No ASA with GIB hx    Follow up in about 6 months (around 9/19/2020).    Future Appointments   Date Time Provider Department Center   4/27/2020  9:45 AM HILDA VISUAL FIELDS [facility]   4/27/2020 10:30 AM Zuleika Francis MD [facility]   9/15/2020  8:40 AM Ryan Carlosn MD [facility]

## 2020-03-31 ENCOUNTER — TELEPHONE (OUTPATIENT)
Dept: GASTROENTEROLOGY | Facility: CLINIC | Age: 80
End: 2020-03-31

## 2020-03-31 NOTE — TELEPHONE ENCOUNTER
MA contact patient to offer appt with Dr. Quiroga per Felecia patient need to follow up in clinic with Dr. Quiroga     Patient didn't answer left detail message to return call

## 2020-04-01 ENCOUNTER — TELEPHONE (OUTPATIENT)
Dept: INTERNAL MEDICINE | Facility: CLINIC | Age: 80
End: 2020-04-01

## 2020-04-01 DIAGNOSIS — I10 ESSENTIAL HYPERTENSION: Primary | ICD-10-CM

## 2020-04-01 RX ORDER — SPIRONOLACTONE 25 MG/1
25 TABLET ORAL DAILY
Qty: 90 TABLET | Refills: 11 | Status: SHIPPED | OUTPATIENT
Start: 2020-04-01 | End: 2021-05-07

## 2020-04-01 NOTE — TELEPHONE ENCOUNTER
Hi, please call her about her blood work from 3/19/20 --  Her potassium was a bit elevated. I recommend that we lower the dose of the spironolactone from 2 tabs per day to 1 tab per day.  I sent in the new rx (although she probably does not need a refill now since she has been getting enough to take 2 tabs daily).  Let me know if patient has any questions.  Thank you, Ryan Carlson

## 2020-04-16 ENCOUNTER — TELEPHONE (OUTPATIENT)
Dept: GASTROENTEROLOGY | Facility: CLINIC | Age: 80
End: 2020-04-16

## 2020-04-16 NOTE — TELEPHONE ENCOUNTER
MA spoke to pt daughter to schedule pt appt per Felecia to follow up with Dr. Quiroga offered virtual/audio visit     Pt daughter stated she will call back once she discuss with pt and thank MA

## 2020-04-18 DIAGNOSIS — N18.30 CKD (CHRONIC KIDNEY DISEASE) STAGE 3, GFR 30-59 ML/MIN: Chronic | ICD-10-CM

## 2020-04-18 DIAGNOSIS — Z79.4 TYPE 2 DIABETES MELLITUS WITH DIABETIC POLYNEUROPATHY, WITH LONG-TERM CURRENT USE OF INSULIN: Chronic | ICD-10-CM

## 2020-04-18 DIAGNOSIS — E11.42 TYPE 2 DIABETES MELLITUS WITH DIABETIC POLYNEUROPATHY, WITH LONG-TERM CURRENT USE OF INSULIN: Chronic | ICD-10-CM

## 2020-04-20 RX ORDER — CANDESARTAN 4 MG/1
4 TABLET ORAL DAILY
Qty: 90 TABLET | Refills: 3 | Status: SHIPPED | OUTPATIENT
Start: 2020-04-20 | End: 2021-02-24 | Stop reason: ALTCHOICE

## 2020-05-01 ENCOUNTER — TELEPHONE (OUTPATIENT)
Dept: GASTROENTEROLOGY | Facility: CLINIC | Age: 80
End: 2020-05-01

## 2020-05-04 RX ORDER — TIZANIDINE 4 MG/1
TABLET ORAL
Qty: 360 TABLET | Refills: 2 | OUTPATIENT
Start: 2020-05-04

## 2020-05-18 DIAGNOSIS — I25.10 CORONARY ARTERY DISEASE INVOLVING NATIVE CORONARY ARTERY OF NATIVE HEART WITHOUT ANGINA PECTORIS: Chronic | ICD-10-CM

## 2020-05-18 RX ORDER — NITROGLYCERIN 400 UG/1
SPRAY ORAL
Qty: 36 G | Refills: 11 | Status: SHIPPED | OUTPATIENT
Start: 2020-05-18 | End: 2021-06-20

## 2020-05-22 ENCOUNTER — PATIENT MESSAGE (OUTPATIENT)
Dept: RHEUMATOLOGY | Facility: CLINIC | Age: 80
End: 2020-05-22

## 2020-06-23 ENCOUNTER — TELEPHONE (OUTPATIENT)
Dept: GASTROENTEROLOGY | Facility: CLINIC | Age: 80
End: 2020-06-23

## 2020-06-23 NOTE — TELEPHONE ENCOUNTER
----- Message from Melissa Etsevez sent at 6/23/2020 10:53 AM CDT -----  Regarding: appointment  Contact: pt @ [phone]  Calling to speak with someone in Dr. Quiroga's office, patient was last seen by Dr. Izquierdo, but is asking to speak with someone before scheduling. Please call.

## 2020-06-24 ENCOUNTER — TELEPHONE (OUTPATIENT)
Dept: GASTROENTEROLOGY | Facility: CLINIC | Age: 80
End: 2020-06-24

## 2020-06-24 NOTE — TELEPHONE ENCOUNTER
----- Message from Osiris Silva MA sent at 6/24/2020 10:22 AM CDT -----  Contact: pt    ----- Message -----  From: Vicky Otoole  Sent: 6/24/2020   9:50 AM CDT  To: [facility] Clinical Staff    Pt is returning a call to Kadie      Please contact pt: [phone]

## 2020-06-24 NOTE — TELEPHONE ENCOUNTER
----- Message from Flex Rueda sent at 6/23/2020  1:29 PM CDT -----  Contact: Patient @ [phone]  Patient is returning a missed call, pls return call

## 2020-06-25 ENCOUNTER — TELEPHONE (OUTPATIENT)
Dept: GASTROENTEROLOGY | Facility: CLINIC | Age: 80
End: 2020-06-25

## 2020-06-25 NOTE — TELEPHONE ENCOUNTER
MA attempted to contact pt to offer appt with Dr. Quiroga or another provider. Pt didn't answer, left detail message to return call to our office.       ----- Message from Jesus Antonio RN sent at 6/25/2020  9:21 AM CDT -----  Regarding: FW: PT  Contact: PT  Patient would like an appointment with Dr Quiroga.   ----- Message -----  From: Mckenzie Onofre  Sent: 6/25/2020   9:08 AM CDT  To: [facility] Clinical Staff  Subject: PT                                               PT called to schedule a check up appointment since it's been awhile since she's been seen and it looks like who she was seeing isn't here anymore. I tried to schedule her an appointment but it looks like the only appointments are virtual visits right now and the PT is hard of hearing and rather come in for an appointment. Please call back     Callback: [phone]

## 2020-07-09 ENCOUNTER — TELEPHONE (OUTPATIENT)
Dept: GASTROENTEROLOGY | Facility: CLINIC | Age: 80
End: 2020-07-09

## 2020-07-09 NOTE — TELEPHONE ENCOUNTER
MA spoke to pt,     Pt is requesting to be seen by Dr. Quiroga since Jeri is no longer here. Pt stated she's experiencing constipation. Pt stated no one followed up with her after Jeri left.    MA offered pt appt with Dr. Quiroga on 7/14/20 at 8 or 8:30 am.    Pt confirmed appt and thank MA     Routed to Dr. Quiroga

## 2020-07-14 ENCOUNTER — PATIENT MESSAGE (OUTPATIENT)
Dept: GASTROENTEROLOGY | Facility: CLINIC | Age: 80
End: 2020-07-14

## 2020-07-14 ENCOUNTER — LAB VISIT (OUTPATIENT)
Dept: LAB | Facility: HOSPITAL | Age: 80
End: 2020-07-14
Attending: INTERNAL MEDICINE
Payer: MEDICARE

## 2020-07-14 ENCOUNTER — OFFICE VISIT (OUTPATIENT)
Dept: GASTROENTEROLOGY | Facility: CLINIC | Age: 80
End: 2020-07-14
Payer: MEDICARE

## 2020-07-14 VITALS
SYSTOLIC BLOOD PRESSURE: 104 MMHG | WEIGHT: 178.56 LBS | DIASTOLIC BLOOD PRESSURE: 59 MMHG | HEIGHT: 60 IN | HEART RATE: 65 BPM | BODY MASS INDEX: 35.05 KG/M2

## 2020-07-14 DIAGNOSIS — D50.0 IRON DEFICIENCY ANEMIA DUE TO CHRONIC BLOOD LOSS: Primary | ICD-10-CM

## 2020-07-14 DIAGNOSIS — D50.0 IRON DEFICIENCY ANEMIA DUE TO CHRONIC BLOOD LOSS: ICD-10-CM

## 2020-07-14 LAB
BASOPHILS # BLD AUTO: 0.03 K/UL (ref 0–0.2)
BASOPHILS NFR BLD: 0.5 % (ref 0–1.9)
DIFFERENTIAL METHOD: ABNORMAL
EOSINOPHIL # BLD AUTO: 0.1 K/UL (ref 0–0.5)
EOSINOPHIL NFR BLD: 1.4 % (ref 0–8)
ERYTHROCYTE [DISTWIDTH] IN BLOOD BY AUTOMATED COUNT: 16 % (ref 11.5–14.5)
FERRITIN SERPL-MCNC: 240 NG/ML (ref 20–300)
HCT VFR BLD AUTO: 39.2 % (ref 37–48.5)
HGB BLD-MCNC: 11.6 G/DL (ref 12–16)
IMM GRANULOCYTES # BLD AUTO: 0.01 K/UL (ref 0–0.04)
IMM GRANULOCYTES NFR BLD AUTO: 0.2 % (ref 0–0.5)
IRON SERPL-MCNC: 64 UG/DL (ref 30–160)
LYMPHOCYTES # BLD AUTO: 1.6 K/UL (ref 1–4.8)
LYMPHOCYTES NFR BLD: 27.7 % (ref 18–48)
MCH RBC QN AUTO: 27 PG (ref 27–31)
MCHC RBC AUTO-ENTMCNC: 29.6 G/DL (ref 32–36)
MCV RBC AUTO: 91 FL (ref 82–98)
MONOCYTES # BLD AUTO: 0.6 K/UL (ref 0.3–1)
MONOCYTES NFR BLD: 10.4 % (ref 4–15)
NEUTROPHILS # BLD AUTO: 3.5 K/UL (ref 1.8–7.7)
NEUTROPHILS NFR BLD: 59.8 % (ref 38–73)
NRBC BLD-RTO: 0 /100 WBC
PLATELET # BLD AUTO: 163 K/UL (ref 150–350)
PMV BLD AUTO: 11.6 FL (ref 9.2–12.9)
RBC # BLD AUTO: 4.29 M/UL (ref 4–5.4)
SATURATED IRON: 21 % (ref 20–50)
TOTAL IRON BINDING CAPACITY: 309 UG/DL (ref 250–450)
TRANSFERRIN SERPL-MCNC: 209 MG/DL (ref 200–375)
WBC # BLD AUTO: 5.77 K/UL (ref 3.9–12.7)

## 2020-07-14 PROCEDURE — 83540 ASSAY OF IRON: CPT

## 2020-07-14 PROCEDURE — 36415 COLL VENOUS BLD VENIPUNCTURE: CPT

## 2020-07-14 PROCEDURE — 99214 PR OFFICE/OUTPT VISIT, EST, LEVL IV, 30-39 MIN: ICD-10-PCS | Mod: S$PBB,,, | Performed by: INTERNAL MEDICINE

## 2020-07-14 PROCEDURE — 99999 PR PBB SHADOW E&M-EST. PATIENT-LVL IV: ICD-10-PCS | Mod: PBBFAC,,, | Performed by: INTERNAL MEDICINE

## 2020-07-14 PROCEDURE — 85025 COMPLETE CBC W/AUTO DIFF WBC: CPT

## 2020-07-14 PROCEDURE — 99214 OFFICE O/P EST MOD 30 MIN: CPT | Mod: S$PBB,,, | Performed by: INTERNAL MEDICINE

## 2020-07-14 PROCEDURE — 82728 ASSAY OF FERRITIN: CPT

## 2020-07-14 PROCEDURE — 99999 PR PBB SHADOW E&M-EST. PATIENT-LVL IV: CPT | Mod: PBBFAC,,, | Performed by: INTERNAL MEDICINE

## 2020-07-14 PROCEDURE — 99214 OFFICE O/P EST MOD 30 MIN: CPT | Mod: PBBFAC | Performed by: INTERNAL MEDICINE

## 2020-07-14 RX ORDER — LACTULOSE 10 G/15ML
SOLUTION ORAL; RECTAL
Qty: 946 ML | Refills: 1 | Status: SHIPPED | OUTPATIENT
Start: 2020-07-14 | End: 2020-08-12 | Stop reason: SDUPTHER

## 2020-07-14 NOTE — LETTER
July 14, 2020      Ryan Carlson MD  [address]  [facility] [address]           [facility]  [address]  [place] [address]  Phone: [phone]  Fax: [phone]          Patient: Eliu Paz   MR Number: [id number]   YOB: 1940   Date of Visit: 7/14/2020       Dear Dr. Ryan Carlson:    Thank you for referring Eliu Paz to me for evaluation. Attached you will find relevant portions of my assessment and plan of care.    If you have questions, please do not hesitate to call me. I look forward to following Eliu Paz along with you.    Sincerely,    Hitesh Quiroga MD    Enclosure  CC:  No Recipients    If you would like to receive this communication electronically, please contact [email or web address] or [phone] to request more information on [employer] access.    For providers and/or their staff who would like to refer a patient to [facility], please contact us through our one-stop-shop provider referral line, [facility], at [phone].    If you feel you have received this communication in error or would no longer like to receive these types of communications, please e-mail [email or web address]

## 2020-07-14 NOTE — PROGRESS NOTES
[facility] Established Patient Visit    Reason for Visit:  The encounter diagnosis was Iron deficiency anemia due to chronic blood loss.    PCP: Ryan Carlson    HPI:     This is a 80 y.o. female here for f/u of GERD,constipation, gastroparesis.  Ms. Paz's last GI visit was with me in 4/2019. previous GES revealed delayed gastric emptying. 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  Previously treated with acitigall for GB stones.      She is no longer treating her acid reflux with zantac 150 mg 2 times daily. She does have a hx of LA grade A esophagitis w/ documented healing. Now on omeprazole 40 mg daily with good control.     Nausea twice monthly or less. vomiting very rarely, and early satiety if eats high fiber meal- but has not been eating high fiber lately. On GP diet. Eats small portions daily, mostly soup.   Not on smoothies or shakes.  Has seen the  GI dietitian for GP diet.   Has DM.  last A1c normal 5.7. BS running 120s.  On insulin.   Was on trulicity, but d/c d/t s/e. Has not tried elis or FDgard for nausea because she has not needed it.     Gas and bloating. Some improvement recently. Occurs occasionally. Better with belching.     abd pain-none  Bowel habits -  Constipation. taking miralax QOD.  No longer taking lactulose PRN. Having 1 bristol type 4 BM every other day   GI bleeding - denies hematochezia, hematemesis, melena, BRBPR, black/tarry stools, and coffee ground emesis. Was admitted to the hospital in 8/2019 for low blood counts. EGD at that time with bleeding duodenal angioectasia-treated with APC. Subsequent labs trending up.most recent hgb 11.3. No longer seeing black stools, nor BRBPR.  On iron 325 mg daily and omeprazole 40 mg daily    NSAID usage - no longer taking ASA 81 daily since UGI bleed    Interval History 07/14/2020:  She had an AVM bleed last fall  As long as she takes lactulose she is having BM's, but she was worried if it was  safe    ROS:  Constitutional: No fevers, no chills, No unintentional weight loss, no fatigue,   ENT: No allergies  CV: No chest pain, no palpitations, + peripheral. edema, no sob on exertion  Pulm: No cough, No shortness of breath, no wheezes, no sputum  Ophtho: No vision changes  GI: see HPI;   Derm: No rash  Heme: No lymphadenopathy, No bruising  MSK: No arthritis, + muscle pain/back, no muscle weakness  : No dysuria, No hematuria  Endo: + cold intolerance  Neuro: No syncope, No seizure,     PMHX:  has a past medical history of Allergy, Arteriosclerosis of arteries of extremities (8/25/2016), Arthritis, Brain infection, Cataract, CKD (chronic kidney disease) stage 3, GFR 30-59 ml/min, Coma, Coronary artery disease, Diabetes mellitus type II (1981), Diabetic neuropathy, Early dry stage nonexudative age-related macular degeneration of both eyes (6/17/2019), GERD (gastroesophageal reflux disease), Glaucoma, Hyperlipidemia, Hypertension, and Type 2 diabetes mellitus with diabetic peripheral angiopathy without gangrene, with long-term current use of insulin (8/21/2016).    PSHX:  has a past surgical history that includes Carpal tunnel release; Tubal ligation (1970s); Colonoscopy (N/A, 11/2/2015); Cardiac catheterization (03/2010); Coronary artery bypass graft (08/13/1994); Cardiac surgery (1994); Colonoscopy (N/A, 2/5/2019); and Esophagogastroduodenoscopy (N/A, 8/2/2019).    The patient's social and family histories were reviewed by me and updated in the appropriate section of the electronic medical record.    Review of patient's allergies indicates:   Allergen Reactions    Pcn [penicillins] Swelling     Swelling (extremities)^, Swelling (extremities)^  Swelling (extremities)^, Swelling (extremities)^  Swelling (extremities)^, Swelling (extremities)^    Trulicity [dulaglutide] Nausea Only and Rash       Current Outpatient Medications   Medication Sig    amLODIPine (NORVASC) 10 MG tablet TAKE 1 TABLET (10 MG TOTAL)  "BY MOUTH ONCE DAILY.    blood sugar diagnostic (ONETOUCH VERIO) Strp Inject 1 each into the skin 3 (three) times daily with meals. Use to test blood sugar four times a day.    carvedilol (COREG) 25 MG tablet TAKE 1 TABLET (25 MG TOTAL) BY MOUTH 2 (TWO) TIMES DAILY.    insulin asp prt-insulin aspart, NOVOLOG 70/30, (NOVOLOG MIX 70-30 U-100 INSULN) 100 unit/mL (70-30) Soln Inject 18 Units into the skin 2 (two) times daily with meals. INJECT 18 UNITS INTO THE SKIN TWICE A DAY BEFORE MEALS.    insulin syringe-needle U-100 0.5 mL 31 gauge x 5/16" Syrg USE TO INJECT TWICE DAILY WITH INSULIN INJECTIONS    isosorbide mononitrate (IMDUR) 30 MG 24 hr tablet TAKE 1 TABLET (30 MG TOTAL) BY MOUTH ONCE DAILY.    lactulose (CHRONULAC) 10 gram/15 mL solution TAKE 30 MLS (20 G TOTAL) BY MOUTH DAILY AS NEEDED (CONSTIPATION).    omeprazole (PRILOSEC) 40 MG capsule Take 1 capsule (40 mg total) by mouth every morning. Take 30 minutes before eating on an empty stomach    rosuvastatin (CRESTOR) 40 MG Tab TAKE 1 TABLET BY MOUTH ONCE DAILY.    traMADol (ULTRAM) 50 mg tablet TAKE 1 TABLET BY MOUTH TWICE A DAY AS NEEDED    brimonidine 0.2% (ALPHAGAN) 0.2 % Drop Place 1 drop into both eyes 3 (three) times daily.    candesartan (ATACAND) 4 MG tablet TAKE 1 TABLET (4 MG TOTAL) BY MOUTH ONCE DAILY.    ciclopirox (PENLAC) 8 % Soln Apply topically nightly.    clobetasol 0.05% (TEMOVATE) 0.05 % Oint Apply small amount to vulva area twice a day for 4 weeks then once a day for 4 weeks then once a day on Mondays and Thursdays    dorzolamide-timolol 2-0.5% (COSOPT) 22.3-6.8 mg/mL ophthalmic solution Place 1 drop into both eyes 2 (two) times daily.    ferrous sulfate (FEOSOL) 325 mg (65 mg iron) Tab tablet Take 1 tablet (325 mg total) by mouth daily with breakfast.    fluocinonide (LIDEX) 0.05 % external solution Apply topically 2 (two) times daily.    ketoconazole (NIZORAL) 2 % cream Apply topically once daily.    ketoconazole " (NIZORAL) 2 % shampoo Apply topically every other day.    latanoprost 0.005 % ophthalmic solution Place 1 drop into both eyes every evening.    lidocaine (LIDODERM) 5 % Place 1 patch onto the skin once daily. Remove & Discard patch within 12 hours or as directed by MD    nitroGLYCERIN 0.4 MG/DOSE TL SPRY (NITROLINGUAL) 400 mcg/spray spray PLACE 2 SPRAYS UNDER THE TONGUE EVERY 5 MINUTES AS NEEDED FOR CHEST PAIN    spironolactone (ALDACTONE) 25 MG tablet Take 1 tablet (25 mg total) by mouth once daily.    tiZANidine (ZANAFLEX) 4 MG tablet 1/2 to 1 tab every 6 hours as needed    triamcinolone acetonide 0.1% (KENALOG) 0.1 % ointment Apply topically 2 (two) times daily. Apply small amount to itchy areas on body BID PRN.  Do not use on face or in groin.     No current facility-administered medications for this visit.          Objective Findings:    Vital Signs:  BP (!) 104/59 (BP Location: Left arm, Patient Position: Sitting, BP Method: Medium (Automatic))   Pulse 65   Ht 5' (1.524 m)   Wt 81 kg (178 lb 9.2 oz)   BMI 34.88 kg/m²  Body mass index is 34.88 kg/m².    Physical Exam:  General Appearance: Well appearing in no acute distress  Head:   Normocephalic, without obvious abnormality  Eyes:    No scleral icterus, EOMI  Throat: Lips, mucosa, and tongue normal; teeth and gums normal  Lungs: CTA bilaterally in anterior and posterior fields, no wheezes, no crackles.  Heart:  Regular rate and rhythm, S1, S2 normal, no murmurs heard  Abdomen: Soft, non tender, non distended with positive bowel sounds in all four quadrants. No hepatosplenomegaly, ascites, or mass  Extremities:    2+ radial pulses, no clubbing, or cyanosis + 2 edema to lower extremities bilaterally  Skin: No rash to exposed areas  Neurologic: A&Ox4      Labs:  Lab Results   Component Value Date    WBC 6.41 02/27/2020    HGB 12.3 02/27/2020    HCT 41.4 02/27/2020     02/27/2020    CHOL 139 03/19/2020    TRIG 67 03/19/2020    HDL 59 03/19/2020     ALT 15 03/19/2020    AST 25 03/19/2020     03/19/2020    K 5.5 (H) 03/19/2020     03/19/2020    CREATININE 1.4 03/19/2020    BUN 28 (H) 03/19/2020    CO2 23 03/19/2020    TSH 4.775 (H) 12/27/2018    INR 1.0 08/01/2019    HGBA1C 7.1 (H) 03/19/2020     Imaging:  GES 9/11/18:delayed gastric emptying. 14 % ret at 4 hrs.     Endoscopy:     8/2/19: EGD normal esopahgus. Hematin in gastric antrum. Single bleeding angioectasia in duodenum treated with APC.   2/2/19: colonoscopy: GPTC. SC-AC tics. 10 mm HF polyp (TVA). Rpt 3 yrs.  6/13/2017 esophageal manometry Dr. Quiroga- normal  4/18/2017 EGD -normal esophagus. Gastric erythema. bx-focal IM no dysplasia. Repeat in 3 years.   11/2015 Colonoscopy Dr. Clinton-perianal skin tags. 10 mm transverse colon polyp. Pan tics. Path- adenovillious Repeat in 3 years (2018). colonoscopy is scheduled  for 11/2018.   4/2015 EGD Dr. Quiroga- LA grade A reflux esophagitis. Small HH. Gastric erythema. Path- intestinal metaplasia. Repeat in 2-3 years ([id number]).  2019 - EGD - AVM treated  2019 - Colonoscopy - 10 mm polyp    Assessment:    1. Iron deficiency anemia due to chronic blood loss          Recommendations:  1. Gastroparesis - doing well. Try elis and FDgard PRN for nausea.  Pt prefers natural treatment. Continue GP diet.  2. GERD- avoid zantac/ranitadine. Cont omeprazole 40 mg daily. Can take OTC gavsicon w aglinate PRN as well.  3. Constipation- Will refill her lactulose today  4. Upper GI bleed - Will check her CBC and iron studies today as she endorses occasional PICA    F/u in 6 months.    Order summary:  Orders Placed This Encounter    CBC auto differential    Iron and TIBC    Ferritin         Thank you for allowing me to participate in the care of Eliu Quiroga MD

## 2020-07-17 DIAGNOSIS — Z71.89 COMPLEX CARE COORDINATION: ICD-10-CM

## 2020-07-20 ENCOUNTER — TELEPHONE (OUTPATIENT)
Dept: INTERNAL MEDICINE | Facility: CLINIC | Age: 80
End: 2020-07-20

## 2020-07-20 NOTE — TELEPHONE ENCOUNTER
Spoke to pt. She is requesting to be seen. She stated she has burning when urinating and having neck pain.   She has an appt tomorrow morning.

## 2020-07-20 NOTE — TELEPHONE ENCOUNTER
----- Message from Josse Cantu sent at 7/20/2020 11:21 AM CDT -----  Contact: Self [phone]  Would like to get medical advice.  Symptoms (please be specific):  neck pain, possible UTI  How long has patient had these symptoms:  three days  Pharmacy name and phone #:  [id number] - [place], [place] - [address] [phone] (Phone)  [phone] (Fax)  Any drug allergies (copy from chart):      Would the patient rather a call back or a response via [facility]?:  call back  Comments:

## 2020-07-21 ENCOUNTER — OFFICE VISIT (OUTPATIENT)
Dept: INTERNAL MEDICINE | Facility: CLINIC | Age: 80
End: 2020-07-21
Payer: MEDICARE

## 2020-07-21 VITALS
HEART RATE: 76 BPM | BODY MASS INDEX: 35.67 KG/M2 | DIASTOLIC BLOOD PRESSURE: 58 MMHG | TEMPERATURE: 99 F | WEIGHT: 181.69 LBS | SYSTOLIC BLOOD PRESSURE: 112 MMHG | HEIGHT: 60 IN | OXYGEN SATURATION: 99 %

## 2020-07-21 DIAGNOSIS — M79.10 MYALGIA: ICD-10-CM

## 2020-07-21 DIAGNOSIS — N30.00 ACUTE CYSTITIS WITHOUT HEMATURIA: Primary | ICD-10-CM

## 2020-07-21 DIAGNOSIS — H91.90 HEARING LOSS, UNSPECIFIED HEARING LOSS TYPE, UNSPECIFIED LATERALITY: ICD-10-CM

## 2020-07-21 LAB
BACTERIA #/AREA URNS AUTO: NORMAL /HPF
BILIRUB UR QL STRIP: NEGATIVE
CLARITY UR REFRACT.AUTO: ABNORMAL
COLOR UR AUTO: YELLOW
GLUCOSE UR QL STRIP: NEGATIVE
HGB UR QL STRIP: NEGATIVE
KETONES UR QL STRIP: NEGATIVE
LEUKOCYTE ESTERASE UR QL STRIP: ABNORMAL
MICROSCOPIC COMMENT: NORMAL
NITRITE UR QL STRIP: NEGATIVE
PH UR STRIP: 5 [PH] (ref 5–8)
PROT UR QL STRIP: NEGATIVE
RBC #/AREA URNS AUTO: 0 /HPF (ref 0–4)
SP GR UR STRIP: 1.01 (ref 1–1.03)
SQUAMOUS #/AREA URNS AUTO: 2 /HPF
URN SPEC COLLECT METH UR: ABNORMAL
WBC #/AREA URNS AUTO: 3 /HPF (ref 0–5)

## 2020-07-21 PROCEDURE — 99214 OFFICE O/P EST MOD 30 MIN: CPT | Mod: S$PBB,,, | Performed by: INTERNAL MEDICINE

## 2020-07-21 PROCEDURE — 99999 PR PBB SHADOW E&M-EST. PATIENT-LVL V: CPT | Mod: PBBFAC,,, | Performed by: INTERNAL MEDICINE

## 2020-07-21 PROCEDURE — 99214 PR OFFICE/OUTPT VISIT, EST, LEVL IV, 30-39 MIN: ICD-10-PCS | Mod: S$PBB,,, | Performed by: INTERNAL MEDICINE

## 2020-07-21 PROCEDURE — 81001 URINALYSIS AUTO W/SCOPE: CPT

## 2020-07-21 PROCEDURE — 99999 PR PBB SHADOW E&M-EST. PATIENT-LVL V: ICD-10-PCS | Mod: PBBFAC,,, | Performed by: INTERNAL MEDICINE

## 2020-07-21 PROCEDURE — 99215 OFFICE O/P EST HI 40 MIN: CPT | Mod: PBBFAC | Performed by: INTERNAL MEDICINE

## 2020-07-21 RX ORDER — SULFAMETHOXAZOLE AND TRIMETHOPRIM 800; 160 MG/1; MG/1
1 TABLET ORAL 2 TIMES DAILY
Qty: 10 TABLET | Refills: 0 | Status: SHIPPED | OUTPATIENT
Start: 2020-07-21 | End: 2020-07-26

## 2020-07-21 NOTE — PROGRESS NOTES
Subjective:       Patient ID: Eliu Paz is a 80 y.o. female.    Chief Complaint:   Urinary Tract Infection    HPI - Ms Paz came with her daughter today, with a range of complaints.  We focused on dysuria she'd been having, with some frequency for the past couple of weeks.  She also c/o pain between her shoulder blades and MSK pain in multiple areas of her body.  She has presbycusis and daughter requested referral to audiology.  I deferred other complaints to her primary, Dr. Carlson.    Pmh/meds:  Reviewed and reconciled in EPIC with patient during visit today.    Review of Systems   Constitutional: Positive for fatigue. Negative for fever.   HENT: Negative for congestion.    Respiratory: Negative for shortness of breath.    Cardiovascular: Negative for chest pain.   Gastrointestinal: Negative for abdominal pain.   Genitourinary: Positive for dysuria and frequency.   Musculoskeletal: Positive for arthralgias and myalgias.   Skin: Negative for rash.   Neurological: Negative for headaches.   Psychiatric/Behavioral: Negative for sleep disturbance. The patient is nervous/anxious.        Objective:      Physical Exam  Vitals signs reviewed.   Constitutional:       Appearance: She is well-developed. She is obese.      Comments: Large, elderly woman seated in wheelchair   HENT:      Head: Normocephalic and atraumatic.   Cardiovascular:      Rate and Rhythm: Normal rate and regular rhythm.      Heart sounds: Normal heart sounds. No murmur. No friction rub. No gallop.    Pulmonary:      Effort: Pulmonary effort is normal. No respiratory distress.      Breath sounds: Normal breath sounds. No wheezing or rales.   Chest:      Chest wall: No tenderness.   Musculoskeletal:         General: Tenderness (widespread, worse between shoulder blades) present.   Skin:     General: Skin is warm and dry.      Findings: No erythema.   Neurological:      Mental Status: She is alert and oriented to person, place, and time.          Assessment:       1. Acute cystitis without hematuria    2. Hearing loss, unspecified hearing loss type, unspecified laterality    3. Myalgia        Plan:       Eliu Steinberg was seen today for urinary tract infection.    Diagnoses and all orders for this visit:    Acute cystitis without hematuria - new complaint.  Sending a UA along with treatment.  No macrobid b/c of age and kidney function  -     sulfamethoxazole-trimethoprim 800-160mg (BACTRIM DS) 800-160 mg Tab; Take 1 tablet by mouth 2 (two) times daily. for 5 days  -     Urinalysis    Hearing loss, unspecified hearing loss type, unspecified laterality - worsening.  Will ask audiology  -     Ambulatory referral/consult to Audiology; Future    Myalgia - discussed use of topicals and tylenol.  Daughter had concocted some topical gel that had THC oil in it.  I recommended ICY Hot    rtc prn    G Nimisha López MD MPH  Staff Internist

## 2020-07-23 ENCOUNTER — TELEPHONE (OUTPATIENT)
Dept: RHEUMATOLOGY | Facility: CLINIC | Age: 80
End: 2020-07-23

## 2020-07-23 NOTE — TELEPHONE ENCOUNTER
Spoke to patient about scheduling an appt.   APpt scheduled.   Will add to wait list        ----- Message from Jasen Rodas sent at 7/23/2020  1:00 PM CDT -----  Contact: pt  Please call pt at [phone]    Patient is requesting an immediate appt due to having severe pain and swelling of her legs & ankles     Thank you

## 2020-07-28 ENCOUNTER — CLINICAL SUPPORT (OUTPATIENT)
Dept: AUDIOLOGY | Facility: CLINIC | Age: 80
End: 2020-07-28
Payer: MEDICARE

## 2020-07-28 DIAGNOSIS — H90.3 SENSORINEURAL HEARING LOSS, BILATERAL: ICD-10-CM

## 2020-07-28 PROCEDURE — 92557 COMPREHENSIVE HEARING TEST: CPT | Mod: PBBFAC | Performed by: AUDIOLOGIST

## 2020-07-28 PROCEDURE — 99999 PR PBB SHADOW E&M-EST. PATIENT-LVL I: CPT | Mod: PBBFAC,,, | Performed by: AUDIOLOGIST

## 2020-07-28 PROCEDURE — 99211 OFF/OP EST MAY X REQ PHY/QHP: CPT | Mod: PBBFAC,25 | Performed by: AUDIOLOGIST

## 2020-07-28 PROCEDURE — 99999 PR PBB SHADOW E&M-EST. PATIENT-LVL I: ICD-10-PCS | Mod: PBBFAC,,, | Performed by: AUDIOLOGIST

## 2020-07-28 NOTE — PROGRESS NOTES
Eliu Paz was seen today for a hearing evaluation.     Pure tone audiometry revealed a moderate - severe SNHL, AU  SRT and PTA are in good agreement bilaterally.  Poor speech discrimination for the right ear: Poor for the left ear     Recommendations:  1. Otologic Evaluation  2. Annual Audiogram or repeat audiogram as needed  3. Hearing Protection  4. Continue use of amplification - Return to HA facility for adjustments

## 2020-07-30 NOTE — PROGRESS NOTES
HPI     Glaucoma      Additional comments: HVF and OCT review today              Eye Problem      Additional comments: Pt c/o FB sensation               Comments     DLS: 12/17/19    1. POAG OU  2. Type 2 DM no DR  3. NS OU  4. ARMD OU  5. Ptosis OS>OD    MEDS:  Cosopt BID OU  Brimonidine TID OU  Latanoprost QHS OU          Last edited by Rand Vergara MA on 7/31/2020 10:27 AM. (History)            Assessment /Plan     For exam results, see Encounter Report.    Primary open-angle glaucoma, bilateral, severe stage    Senile nuclear sclerosis, bilateral    Cortical age-related cataract, bilateral    Early dry stage nonexudative age-related macular degeneration of both eyes    Nonexudative age-related macular degeneration, bilateral, intermediate dry stage    Macular drusen, bilateral    Type 2 diabetes mellitus without ophthalmic manifestations           Glaucoma (type and duration)    POAG severe   First HVF   2013   First photos   2017   Treatment / Drops started   Latanoprost, travatan           Family history    ?        Glaucoma meds    Latanoprost / cosopt (added - 1//15/2019)         H/O adverse rxn to glaucoma drops    none        LASERS    none        GLAUCOMA SURGERIES    none        OTHER EYE SURGERIES    none        CDR    0.75 w/ sup thinning /0.9 - sup and inf thinning         Tbase    16-20/16-20          Tmax    20/20            Ttarget    15/15           HVF    7 test 2013 to  2020 - SAD od // gen depression, SAD, IAD os (?prog os0         Gonio    3+ ou        CCT    544/525        OCT    6 test 2013 to 2020 - RNFL - decr thru out od // dec. Thru out  os        HRT   3 test 2017 to 2019 MR -  Dec. S od // dec. Ijoesph S/N os /// CDR 0.72 od // 0.780 os        Disc photos    2017, 2019     - Ttoday    16/16  (close to   target of 15 ou) // good resp to adding brimonidine   - Test done today  - HVF / DFE / OCT     POAG severe  Severe RNFL thinning with progressive field loss   - on latanoprost qhs OU,  continue  - cosopt ou bid - added  (1/2019)   -brimonidine added 5/21/2019 - good resp 19/18 --> 11/10     NS cataract OU - hold on CE - pt is not having any problems with vision presently     Macular Drusen OU  AMD OU, seeing [facility]   AREDS 2 Vitamins  Home Amsler Grid Testing   - seen by  retina - akshat rogers - saw Ai 6/1/2018 - to F/U in 6 months    Ptosis   Pt C/O droopy lid OS > OD       PLAN     GLAUCOMA  Good response to adding brimonidine - below target today 8/20/2019 (( 19/18--> 11/10))      rec phaco/IOL os - visually sign - pt wants to wait - she would like to see an optometrist to see if her glasses can be improved - has seen dr bob before     If IOP goes above target  consider SLT's       F/U 4 months with gonio // AR/MR/BAT - cataract check - check to see if content with vision and if she got new eye glasses

## 2020-07-31 ENCOUNTER — CLINICAL SUPPORT (OUTPATIENT)
Dept: OPHTHALMOLOGY | Facility: CLINIC | Age: 80
End: 2020-07-31
Payer: MEDICARE

## 2020-07-31 ENCOUNTER — OFFICE VISIT (OUTPATIENT)
Dept: OPHTHALMOLOGY | Facility: CLINIC | Age: 80
End: 2020-07-31
Payer: MEDICARE

## 2020-07-31 DIAGNOSIS — H35.363 MACULAR DRUSEN, BILATERAL: ICD-10-CM

## 2020-07-31 DIAGNOSIS — H35.3131 EARLY DRY STAGE NONEXUDATIVE AGE-RELATED MACULAR DEGENERATION OF BOTH EYES: ICD-10-CM

## 2020-07-31 DIAGNOSIS — E11.9 TYPE 2 DIABETES MELLITUS WITHOUT OPHTHALMIC MANIFESTATIONS: ICD-10-CM

## 2020-07-31 DIAGNOSIS — H25.013 CORTICAL AGE-RELATED CATARACT, BILATERAL: ICD-10-CM

## 2020-07-31 DIAGNOSIS — H40.1133 PRIMARY OPEN-ANGLE GLAUCOMA, BILATERAL, SEVERE STAGE: ICD-10-CM

## 2020-07-31 DIAGNOSIS — H25.13 SENILE NUCLEAR SCLEROSIS, BILATERAL: ICD-10-CM

## 2020-07-31 DIAGNOSIS — H40.1133 PRIMARY OPEN-ANGLE GLAUCOMA, BILATERAL, SEVERE STAGE: Primary | ICD-10-CM

## 2020-07-31 DIAGNOSIS — H35.3132 NONEXUDATIVE AGE-RELATED MACULAR DEGENERATION, BILATERAL, INTERMEDIATE DRY STAGE: ICD-10-CM

## 2020-07-31 PROCEDURE — 92083 HUMPHREY VISUAL FIELD - OU - BOTH EYES: ICD-10-PCS | Mod: 26,S$PBB,, | Performed by: OPHTHALMOLOGY

## 2020-07-31 PROCEDURE — 99999 PR PBB SHADOW E&M-EST. PATIENT-LVL III: CPT | Mod: PBBFAC,,, | Performed by: OPHTHALMOLOGY

## 2020-07-31 PROCEDURE — 92083 EXTENDED VISUAL FIELD XM: CPT | Mod: PBBFAC

## 2020-07-31 PROCEDURE — 92083 EXTENDED VISUAL FIELD XM: CPT | Mod: 26,S$PBB,, | Performed by: OPHTHALMOLOGY

## 2020-07-31 PROCEDURE — 99999 PR PBB SHADOW E&M-EST. PATIENT-LVL III: ICD-10-PCS | Mod: PBBFAC,,, | Performed by: OPHTHALMOLOGY

## 2020-07-31 PROCEDURE — 92133 CPTRZD OPH DX IMG PST SGM ON: CPT | Mod: 26,S$PBB,, | Performed by: OPHTHALMOLOGY

## 2020-07-31 PROCEDURE — 92014 PR EYE EXAM, EST PATIENT,COMPREHESV: ICD-10-PCS | Mod: S$PBB,,, | Performed by: OPHTHALMOLOGY

## 2020-07-31 PROCEDURE — 99213 OFFICE O/P EST LOW 20 MIN: CPT | Mod: PBBFAC | Performed by: OPHTHALMOLOGY

## 2020-07-31 PROCEDURE — 92014 COMPRE OPH EXAM EST PT 1/>: CPT | Mod: S$PBB,,, | Performed by: OPHTHALMOLOGY

## 2020-07-31 PROCEDURE — 92133 CPTRZD OPH DX IMG PST SGM ON: CPT | Mod: PBBFAC

## 2020-07-31 PROCEDURE — 92133 POSTERIOR SEGMENT OCT OPTIC NERVE(OCULAR COHERENCE TOMOGRAPHY) - OU - BOTH EYES: ICD-10-PCS | Mod: 26,S$PBB,, | Performed by: OPHTHALMOLOGY

## 2020-07-31 RX ORDER — DORZOLAMIDE HYDROCHLORIDE AND TIMOLOL MALEATE 20; 5 MG/ML; MG/ML
1 SOLUTION/ DROPS OPHTHALMIC 2 TIMES DAILY
Qty: 3 BOTTLE | Refills: 3 | Status: SHIPPED | OUTPATIENT
Start: 2020-07-31 | End: 2020-10-26

## 2020-07-31 RX ORDER — BRIMONIDINE TARTRATE 2 MG/ML
1 SOLUTION/ DROPS OPHTHALMIC 3 TIMES DAILY
Qty: 45 ML | Refills: 3 | Status: SHIPPED | OUTPATIENT
Start: 2020-07-31 | End: 2022-02-21

## 2020-07-31 RX ORDER — LATANOPROST 50 UG/ML
1 SOLUTION/ DROPS OPHTHALMIC NIGHTLY
Qty: 3 BOTTLE | Refills: 3 | Status: SHIPPED | OUTPATIENT
Start: 2020-07-31 | End: 2020-10-20 | Stop reason: SDUPTHER

## 2020-07-31 NOTE — PROGRESS NOTES
HVF done/rel/fix/coop.good ou./chart checked and asked patient regarding latex allergy./Rx used: .25 + .50 x 10/OD [place] + .50 x 15/OS-smh

## 2020-08-11 ENCOUNTER — TELEPHONE (OUTPATIENT)
Dept: GASTROENTEROLOGY | Facility: CLINIC | Age: 80
End: 2020-08-11

## 2020-08-11 NOTE — TELEPHONE ENCOUNTER
MA spoke to pt,     Pt stated she doesn't need a refill for lactulose 10 gram/15mL right [facility] and thank MA for calling.

## 2020-08-12 ENCOUNTER — OFFICE VISIT (OUTPATIENT)
Dept: INTERNAL MEDICINE | Facility: CLINIC | Age: 80
End: 2020-08-12
Payer: MEDICARE

## 2020-08-12 VITALS — SYSTOLIC BLOOD PRESSURE: 150 MMHG | HEART RATE: 59 BPM | OXYGEN SATURATION: 98 % | DIASTOLIC BLOOD PRESSURE: 60 MMHG

## 2020-08-12 DIAGNOSIS — E11.9 TYPE 2 DIABETES MELLITUS WITHOUT OPHTHALMIC MANIFESTATIONS: Chronic | ICD-10-CM

## 2020-08-12 DIAGNOSIS — I70.0 THORACIC AORTA ATHEROSCLEROSIS: ICD-10-CM

## 2020-08-12 DIAGNOSIS — E11.42 DM TYPE 2 WITH DIABETIC PERIPHERAL NEUROPATHY: ICD-10-CM

## 2020-08-12 DIAGNOSIS — E66.9 OBESITY, CLASS II, BMI 35-39.9: Chronic | ICD-10-CM

## 2020-08-12 DIAGNOSIS — R09.81 SINUS CONGESTION: ICD-10-CM

## 2020-08-12 DIAGNOSIS — E78.5 HYPERLIPIDEMIA, UNSPECIFIED HYPERLIPIDEMIA TYPE: ICD-10-CM

## 2020-08-12 DIAGNOSIS — N18.30 CKD (CHRONIC KIDNEY DISEASE) STAGE 3, GFR 30-59 ML/MIN: Chronic | ICD-10-CM

## 2020-08-12 DIAGNOSIS — Z99.89 DEPENDENCE ON OTHER ENABLING MACHINES AND DEVICES: ICD-10-CM

## 2020-08-12 DIAGNOSIS — I25.10 CORONARY ARTERY DISEASE INVOLVING NATIVE CORONARY ARTERY OF NATIVE HEART WITHOUT ANGINA PECTORIS: Chronic | ICD-10-CM

## 2020-08-12 DIAGNOSIS — K21.00 GASTROESOPHAGEAL REFLUX DISEASE WITH ESOPHAGITIS: ICD-10-CM

## 2020-08-12 DIAGNOSIS — L60.0 INGROWN NAIL OF GREAT TOE OF RIGHT FOOT: ICD-10-CM

## 2020-08-12 DIAGNOSIS — H90.3 SENSORINEURAL HEARING LOSS (SNHL) OF BOTH EARS: ICD-10-CM

## 2020-08-12 DIAGNOSIS — I65.23 CAROTID STENOSIS, BILATERAL: ICD-10-CM

## 2020-08-12 DIAGNOSIS — I10 ESSENTIAL HYPERTENSION: Chronic | ICD-10-CM

## 2020-08-12 DIAGNOSIS — Z00.00 ENCOUNTER FOR PREVENTIVE HEALTH EXAMINATION: Primary | ICD-10-CM

## 2020-08-12 DIAGNOSIS — D50.0 IRON DEFICIENCY ANEMIA DUE TO CHRONIC BLOOD LOSS: ICD-10-CM

## 2020-08-12 PROCEDURE — 99999 PR PBB SHADOW E&M-EST. PATIENT-LVL V: CPT | Mod: PBBFAC,,, | Performed by: PHYSICIAN ASSISTANT

## 2020-08-12 PROCEDURE — 99999 PR PBB SHADOW E&M-EST. PATIENT-LVL V: ICD-10-PCS | Mod: PBBFAC,,, | Performed by: PHYSICIAN ASSISTANT

## 2020-08-12 PROCEDURE — 99215 OFFICE O/P EST HI 40 MIN: CPT | Mod: PBBFAC | Performed by: PHYSICIAN ASSISTANT

## 2020-08-12 PROCEDURE — G0439 PPPS, SUBSEQ VISIT: HCPCS | Mod: S$GLB,,, | Performed by: PHYSICIAN ASSISTANT

## 2020-08-12 PROCEDURE — G0439 PR MEDICARE ANNUAL WELLNESS SUBSEQUENT VISIT: ICD-10-PCS | Mod: S$GLB,,, | Performed by: PHYSICIAN ASSISTANT

## 2020-08-12 RX ORDER — LACTULOSE 10 G/15ML
SOLUTION ORAL; RECTAL
Qty: 946 ML | Refills: 1 | Status: SHIPPED | OUTPATIENT
Start: 2020-08-12 | End: 2020-08-13

## 2020-08-12 NOTE — PROGRESS NOTES
Eliu Paz presented for a  Medicare AWV and comprehensive Health Risk Assessment today. The following components were reviewed and updated:    · Medical history  · Family History  · Social history  · Allergies and Current Medications  · Health Risk Assessment  · Health Maintenance  · Care Team     ** See Completed Assessments for Annual Wellness Visit within the encounter summary.**         The following assessments were completed:  · Living Situation  · CAGE  · Depression Screening  · Timed Get Up and Go  · Whisper Test  · Cognitive Function Screening    · Nutrition Screening  · ADL Screening  · PAQ Screening      Vitals:    08/12/20 0840   BP: (!) 150/60   Pulse: (!) 59   SpO2: 98%     There is no height or weight on file to calculate BMI.  Physical Exam  Vitals signs reviewed.   Constitutional:       General: She is not in acute distress.     Appearance: She is well-developed.   HENT:      Head: Normocephalic and atraumatic.      Right Ear: Tympanic membrane, ear canal and external ear normal.      Left Ear: Tympanic membrane, ear canal and external ear normal.   Eyes:      Pupils: Pupils are equal, round, and reactive to light.   Cardiovascular:      Rate and Rhythm: Normal rate and regular rhythm.      Heart sounds: No murmur. No friction rub.   Pulmonary:      Effort: Pulmonary effort is normal.      Breath sounds: Normal breath sounds. No wheezing or rales.   Abdominal:      General: Bowel sounds are normal.      Palpations: Abdomen is soft.      Tenderness: There is no abdominal tenderness.   Skin:     General: Skin is warm and dry.      Findings: No rash.   Neurological:      Mental Status: She is alert.   Psychiatric:         Mood and Affect: Mood normal.               Diagnoses and health risks identified today and associated recommendations/orders:    1. Encounter for preventive health examination  Exam and Assessments performed  Chart Review Complete  Health Maintenance Reviewed and  updated      2. DM type 2 with diabetic peripheral neuropathy  Lab Results   Component Value Date    HGBA1C 7.1 (H) 03/19/2020   Stable  Followed by PCP      3. Coronary artery disease involving native coronary artery of native heart without angina pectoris  Stable on cardioprudent meds  S/p CABG and PTCA  Followed by Cardiology and PCP    4. Essential hypertension  Stable on current meds  Followed PCP    5. Hyperlipidemia, unspecified hyperlipidemia type  Stable  On statin therapy  Followed by PCP    6. CKD (chronic kidney disease) stage 3, GFR 30-59 ml/min  Cr/GFR stable   Followed by PCP    7. Type 2 diabetes mellitus without ophthalmic manifestations  Lab Results   Component Value Date    HGBA1C 7.1 (H) 03/19/2020   Stable   Followed by       8. Dependence on other enabling machines and devices  Stable with use of cane and walker  Followed by PCP and Rheum    9. Gastroesophageal reflux disease with esophagitis  Stable  On PPI therapy  Followed GI and PCP    10. Sinus congestion  - Ambulatory referral/consult to ENT; Future    11. Ingrown nail of great toe of right foot  - Ambulatory referral/consult to Podiatry; Future    12. Sensorineural hearing loss (SNHL) of both ears  Seen by ENT/Audiology  Stable  Has hearing aids, but rarely wears    13. Thoracic aorta atherosclerosis  Noted on prior imaging  On statin therapy    14. Carotid stenosis, bilateral  Stable  On statin therapy  Carotid US 9/2016  CONCLUSIONS   There is 20 - 39% right Internal Carotid stenosis.   There is 20 - 39% left Internal Carotid stenosis.     15. Obesity, Class II, BMI 35-39.9  BMI reviewed  Encouraged on lifestyle mods    16. Anemia  Stable  On iron therapy  Followed by Hematology and PCP          Mariluz Lorenz with a 5-10 year written screening schedule and personal prevention plan. Recommendations were developed using the USPSTF age appropriate recommendations. Education, counseling, and referrals were provided as needed. After  Visit Summary printed and given to patient which includes a list of additional screenings\tests needed.    Follow up in about 1 month (around 9/15/2020) for PCP follow up and 1 year for next Medicare AWV.    Monisha Doll PA-C

## 2020-08-12 NOTE — PATIENT INSTRUCTIONS
Counseling and Referral of Other Preventative  (Italic type indicates deductible and co-insurance are waived)    Patient Name: Eliu Paz  Today's Date: 8/12/2020    Health Maintenance       Date Due Completion Date    Hepatitis C Screening 08/12/2020 (Originally 1940) ---    Shingles Vaccine (1 of 2) 08/12/2021 (Originally 3/17/1992) ---    Influenza Vaccine (1) 09/01/2020 12/17/2019 (Declined)    Override on 12/17/2019: Declined    Override on 2/7/2017: Declined (per provider note)    Override on 10/15/2015: Not Clinically Appropriate (declines)    Hemoglobin A1c 09/19/2020 3/19/2020    Foot Exam 03/19/2021 3/19/2020 (Done)    Override on 3/19/2020: Done    Override on 12/20/2018: Done    Override on 9/5/2018: Done    Override on 9/1/2017: Done (podiatry)    Override on 12/2/2016: Done    Override on 8/24/2016: Done (by cardiologist)    Override on 7/14/2015: Done    Lipid Panel 03/19/2021 3/19/2020    Eye Exam 07/31/2021 7/31/2020    Colonoscopy 02/05/2024 2/5/2019    DEXA SCAN 11/05/2025 11/5/2015    TETANUS VACCINE 02/07/2027 2/7/2017 (Declined)    Override on 2/7/2017: Declined (per provider note)        Orders Placed This Encounter   Procedures    Ambulatory referral/consult to ENT    Ambulatory referral/consult to Podiatry     The following information is provided to all patients.  This information is to help you find resources for any of the problems found today that may be affecting your health:                Living healthy guide: www.[facility].[place].gov      Understanding Diabetes: www.diabetes.org      Eating healthy: www.cdc.gov/healthyweight      CDC home safety checklist: www.cdc.gov/steadi/patient.html      Agency on Aging: www.goea.[place].gov      Alcoholics anonymous (AA): www.aa.org      Physical Activity: www.richard.nih.gov/dp9ctxa      Tobacco use: www.quitwithusla.org

## 2020-08-13 ENCOUNTER — OFFICE VISIT (OUTPATIENT)
Dept: PODIATRY | Facility: CLINIC | Age: 80
End: 2020-08-13
Payer: MEDICARE

## 2020-08-13 DIAGNOSIS — B35.1 ONYCHOMYCOSIS DUE TO DERMATOPHYTE: ICD-10-CM

## 2020-08-13 DIAGNOSIS — L60.0 INGROWN NAIL OF GREAT TOE OF RIGHT FOOT: ICD-10-CM

## 2020-08-13 DIAGNOSIS — E11.49 TYPE II DIABETES MELLITUS WITH NEUROLOGICAL MANIFESTATIONS: Primary | ICD-10-CM

## 2020-08-13 PROCEDURE — 99999 PR PBB SHADOW E&M-EST. PATIENT-LVL II: CPT | Mod: PBBFAC,,, | Performed by: PODIATRIST

## 2020-08-13 PROCEDURE — 99213 PR OFFICE/OUTPT VISIT, EST, LEVL III, 20-29 MIN: ICD-10-PCS | Mod: 25,S$PBB,, | Performed by: PODIATRIST

## 2020-08-13 PROCEDURE — 99213 OFFICE O/P EST LOW 20 MIN: CPT | Mod: 25,S$PBB,, | Performed by: PODIATRIST

## 2020-08-13 PROCEDURE — 11721 PR DEBRIDEMENT OF NAILS, 6 OR MORE: ICD-10-PCS | Mod: Q9,S$PBB,, | Performed by: PODIATRIST

## 2020-08-13 PROCEDURE — 99212 OFFICE O/P EST SF 10 MIN: CPT | Mod: PBBFAC,PN | Performed by: PODIATRIST

## 2020-08-13 PROCEDURE — 11721 DEBRIDE NAIL 6 OR MORE: CPT | Mod: Q9,PBBFAC,PN | Performed by: PODIATRIST

## 2020-08-13 PROCEDURE — 11721 DEBRIDE NAIL 6 OR MORE: CPT | Mod: Q9,S$PBB,, | Performed by: PODIATRIST

## 2020-08-13 PROCEDURE — 99999 PR PBB SHADOW E&M-EST. PATIENT-LVL II: ICD-10-PCS | Mod: PBBFAC,,, | Performed by: PODIATRIST

## 2020-08-13 NOTE — LETTER
August 14, 2020      Monisha Doll PA-C  [address] [facility] [address]           [place] - Podiatry  [address] [address]  Phone: [phone]  Fax: [phone]          Patient: Eliu Paz   MR Number: [id number]   YOB: 1940   Date of Visit: 8/13/2020       Dear Monisha Doll:    Thank you for referring Eliu Paz to me for evaluation. Attached you will find relevant portions of my assessment and plan of care.    If you have questions, please do not hesitate to call me. I look forward to following Eliu Paz along with you.    Sincerely,    Johnson Lorenzo, MARYSOL    Enclosure  CC:  No Recipients    If you would like to receive this communication electronically, please contact [email or web address] or [phone] to request more information on [employer] Link access.    For providers and/or their staff who would like to refer a patient to [facility], please contact us through our one-stop-shop provider referral line, [facility], at [phone].    If you feel you have received this communication in error or would no longer like to receive these types of communications, please e-mail [email or web address]

## 2020-08-14 ENCOUNTER — TELEPHONE (OUTPATIENT)
Dept: OTOLARYNGOLOGY | Facility: CLINIC | Age: 80
End: 2020-08-14

## 2020-08-14 ENCOUNTER — OFFICE VISIT (OUTPATIENT)
Dept: OTOLARYNGOLOGY | Facility: CLINIC | Age: 80
End: 2020-08-14
Payer: MEDICARE

## 2020-08-14 VITALS — HEART RATE: 62 BPM | SYSTOLIC BLOOD PRESSURE: 133 MMHG | DIASTOLIC BLOOD PRESSURE: 66 MMHG

## 2020-08-14 DIAGNOSIS — H91.13 PRESBYCUSIS OF BOTH EARS: ICD-10-CM

## 2020-08-14 DIAGNOSIS — R09.81 SINUS CONGESTION: ICD-10-CM

## 2020-08-14 DIAGNOSIS — H90.3 SENSORINEURAL HEARING LOSS (SNHL) OF BOTH EARS: Primary | ICD-10-CM

## 2020-08-14 PROCEDURE — 99214 PR OFFICE/OUTPT VISIT, EST, LEVL IV, 30-39 MIN: ICD-10-PCS | Mod: S$PBB,,, | Performed by: OTOLARYNGOLOGY

## 2020-08-14 PROCEDURE — 99214 OFFICE O/P EST MOD 30 MIN: CPT | Mod: S$PBB,,, | Performed by: OTOLARYNGOLOGY

## 2020-08-14 PROCEDURE — 99999 PR PBB SHADOW E&M-EST. PATIENT-LVL V: ICD-10-PCS | Mod: PBBFAC,,, | Performed by: OTOLARYNGOLOGY

## 2020-08-14 PROCEDURE — 99999 PR PBB SHADOW E&M-EST. PATIENT-LVL V: CPT | Mod: PBBFAC,,, | Performed by: OTOLARYNGOLOGY

## 2020-08-14 PROCEDURE — 99215 OFFICE O/P EST HI 40 MIN: CPT | Mod: PBBFAC | Performed by: OTOLARYNGOLOGY

## 2020-08-14 RX ORDER — CICLOPIROX 80 MG/ML
SOLUTION TOPICAL NIGHTLY
Qty: 6.6 ML | Refills: 11 | Status: SHIPPED | OUTPATIENT
Start: 2020-08-14 | End: 2021-08-05

## 2020-08-14 NOTE — PROGRESS NOTES
Subjective:      Patient ID: Eliu Paz is a 80 y.o. female.    Chief Complaint: No chief complaint on file.    Eliu Steinberg is a 80 y.o. female who presents to the clinic upon referral from Dr. Doll  for evaluation and treatment of diabetic feet. Eliu Steinberg has a past medical history of Allergy, Arteriosclerosis of arteries of extremities (8/25/2016), Arthritis, Brain infection, Cataract, CKD (chronic kidney disease) stage 3, GFR 30-59 ml/min, Coma, Coronary artery disease, Diabetes mellitus type II (1981), Diabetic neuropathy, Early dry stage nonexudative age-related macular degeneration of both eyes (6/17/2019), GERD (gastroesophageal reflux disease), Glaucoma, Hyperlipidemia, Hypertension, and Type 2 diabetes mellitus with diabetic peripheral angiopathy without gangrene, with long-term current use of insulin (8/21/2016). Patient relates no major problem with feet. Only complaints today consist of Diabetes, increased risk amputation needing evaluation/management/optomization of foot care.    CC2:  Long thick discolored misshapen toenails.  Gradual onset, worsening over past several weeks, aggravated by increased weight bearing, shoe gear, pressure.  No previous medical treatment.  Self trimming and otc antifungal no help.  OTC  med not helping.     PCP: Ryan Carlson MD    Date Last Seen by PCP:   No chief complaint on file.       Current shoe gear: Casual shoes    Hemoglobin A1C   Date Value Ref Range Status   03/19/2020 7.1 (H) 4.0 - 5.6 % Final     Comment:     ADA Screening Guidelines:  5.7-6.4%  Consistent with prediabetes  >or=6.5%  Consistent with diabetes  High levels of fetal hemoglobin interfere with the HbA1C  assay. Heterozygous hemoglobin variants (HbS, HgC, etc)do  not significantly interfere with this assay.   However, presence of multiple variants may affect accuracy.     08/02/2019 5.6 4.0 - 5.6 % Final     Comment:     ADA Screening Guidelines:  5.7-6.4%  Consistent with prediabetes  >or=6.5%   Consistent with diabetes  High levels of fetal hemoglobin interfere with the HbA1C  assay. Heterozygous hemoglobin variants (HbS, HgC, etc)do  not significantly interfere with this assay.   However, presence of multiple variants may affect accuracy.     08/01/2019 5.7 (H) 4.0 - 5.6 % Final     Comment:     ADA Screening Guidelines:  5.7-6.4%  Consistent with prediabetes  >or=6.5%  Consistent with diabetes  High levels of fetal hemoglobin interfere with the HbA1C  assay. Heterozygous hemoglobin variants (HbS, HgC, etc)do  not significantly interfere with this assay.   However, presence of multiple variants may affect accuracy.             Review of Systems   Constitution: Negative for chills, diaphoresis, fever, malaise/fatigue and night sweats.   Cardiovascular: Negative for claudication, cyanosis, leg swelling and syncope.   Skin: Positive for nail changes. Negative for color change, dry skin, rash, suspicious lesions and unusual hair distribution.   Musculoskeletal: Negative for falls, joint pain, joint swelling, muscle cramps, muscle weakness and stiffness.   Gastrointestinal: Negative for constipation, diarrhea, nausea and vomiting.   Neurological: Positive for paresthesias and sensory change. Negative for brief paralysis, disturbances in coordination, focal weakness, numbness and tremors.           Objective:      Physical Exam  Constitutional:       Appearance: She is well-developed. She is not diaphoretic.      Comments: Oriented to time, place, and person.   Cardiovascular:      Pulses:           Dorsalis pedis pulses are 1+ on the right side and 1+ on the left side.        Posterior tibial pulses are 1+ on the right side and 1+ on the left side.      Comments: Capillary fill time 3-5 seconds.  All toes warm to touch.      <2+ non pitting lower extremity edema bilateral.    Negative elevational pallor and dependent rubor bilateral.    Musculoskeletal:      Comments: Normal angle, base, station of gait.  Decreased stride length, early heel off, moderately propulsive toe off bilateral.    All ten toes without clubbing, cyanosis, or signs of ischemia.      Patient has hammertoes of digits 2-5 bilateral                  partially reducible without symptom today.    Bunions both feet without symptom.    No pain to palpation bilateral lower extremities.      Range of motion, stability, muscle strength, and muscle tone are age and health appropriate normal bilateral feet and legs.       Lymphadenopathy:      Comments: Negative lymphadenopathy bilateral popliteal fossa and tarsal tunnel.  Negative lymphangitic streaking bilateral foot/ankle bilateral.     Skin:     General: Skin is warm and dry.      Coloration: Skin is not pale.      Findings: No abrasion, bruising, burn, ecchymosis, erythema, laceration, lesion, petechiae or rash.      Nails: There is no clubbing.        Comments: Skin thin, atrophic, with decreased density and distribution of pedal hair bilateral, but without hyperpigmentation, therese discoloration,  ulcers, masses, nodules or cords palpated bilateral feet and legs.      Toenails 1-5 right and left are hypertrophic thickened 2-3 mm, dystrophic, discolored tanish brown with tan, gray crumbly subungual debris.  Long, not tender to distal nail plate pressure, without periungual skin abnormality of each.     Neurological:      Mental Status: She is alert and oriented to person, place, and time. She is not disoriented.      Sensory: Sensory deficit present.      Motor: No tremor, atrophy or abnormal muscle tone.      Deep Tendon Reflexes:      Reflex Scores:       Patellar reflexes are 2+ on the right side and 2+ on the left side.       Achilles reflexes are 2+ on the right side and 2+ on the left side.     Comments: Decreased/absent vibratory sensation bilateral feet to 128Hz tuning fork.    Paresthesias, and burning bilateral feet with no clearly identified trigger or source.     Psychiatric:          Behavior: Behavior is cooperative.               Assessment:       Encounter Diagnoses   Name Primary?    Ingrown nail of great toe of right foot     Type II diabetes mellitus with neurological manifestations Yes    Onychomycosis due to dermatophyte          Plan:       Diagnoses and all orders for this visit:    Type II diabetes mellitus with neurological manifestations    Ingrown nail of great toe of right foot  -     Ambulatory referral/consult to Podiatry    Onychomycosis due to dermatophyte    Other orders  -     ciclopirox (PENLAC) 8 % Soln; Apply topically nightly.      I counseled the patient on her conditions, their implications and medical management.        - Shoe inspection. Diabetic Foot Education. Patient reminded of the importance of good nutrition and blood sugar control to help prevent podiatric complications of diabetes. Patient instructed on proper foot hygeine. We discussed wearing proper shoe gear, daily foot inspections, never walking without protective shoe gear, never putting sharp instruments to feet, routine podiatric visits at least annually.    continue penlac - new Rx same    Discussed conservative treatment with shoes of adequate dimensions, material, and style to alleviate symptoms and delay or prevent surgical intervention.    continue DM shoes,  Inserts.  Declines new pair.    With the patient's permission, I debrided all ten toenails with a sterile nipper and curette, removing all offending nail and debris.  Patient tolerated the procedure well and related significant relief.            Follow up in about 1 year (around 8/13/2021).

## 2020-08-14 NOTE — LETTER
August 31, 2020      Monisha Doll PA-C  [address]  [facility] [address]           [place] - EarNose[facility]ro[facility] 4th Fl  [address]  [facility] [address]  Phone: [phone]  Fax: [phone]          Patient: Eliu Paz   MR Number: [id number]   YOB: 1940   Date of Visit: 8/14/2020       Dear Monisha Doll:    Thank you for referring Eliu Paz to me for evaluation. Attached you will find relevant portions of my assessment and plan of care.    If you have questions, please do not hesitate to call me. I look forward to following Eliu Paz along with you.    Sincerely,    Navi Clinton MD    Enclosure  CC:  No Recipients    If you would like to receive this communication electronically, please contact [email or web address] or [phone] to request more information on [employer] Link access.    For providers and/or their staff who would like to refer a patient to [facility], please contact us through our one-stop-shop provider referral line, [facility] [place], at [phone].    If you feel you have received this communication in error or would no longer like to receive these types of communications, please e-mail [email or web address]

## 2020-08-18 ENCOUNTER — OFFICE VISIT (OUTPATIENT)
Dept: OTOLARYNGOLOGY | Facility: CLINIC | Age: 80
End: 2020-08-18
Payer: MEDICARE

## 2020-08-18 VITALS — WEIGHT: 180 LBS | BODY MASS INDEX: 35.15 KG/M2

## 2020-08-18 DIAGNOSIS — H91.13 PRESBYCUSIS OF BOTH EARS: Primary | ICD-10-CM

## 2020-08-18 DIAGNOSIS — H90.3 SENSORINEURAL HEARING LOSS (SNHL) OF BOTH EARS: ICD-10-CM

## 2020-08-18 PROCEDURE — 99214 PR OFFICE/OUTPT VISIT, EST, LEVL IV, 30-39 MIN: ICD-10-PCS | Mod: 57,S$PBB,, | Performed by: OTOLARYNGOLOGY

## 2020-08-18 PROCEDURE — 99999 PR PBB SHADOW E&M-EST. PATIENT-LVL V: ICD-10-PCS | Mod: PBBFAC,,, | Performed by: OTOLARYNGOLOGY

## 2020-08-18 PROCEDURE — 99999 PR PBB SHADOW E&M-EST. PATIENT-LVL V: CPT | Mod: PBBFAC,,, | Performed by: OTOLARYNGOLOGY

## 2020-08-18 PROCEDURE — 99215 OFFICE O/P EST HI 40 MIN: CPT | Mod: PBBFAC | Performed by: OTOLARYNGOLOGY

## 2020-08-18 PROCEDURE — 99214 OFFICE O/P EST MOD 30 MIN: CPT | Mod: 57,S$PBB,, | Performed by: OTOLARYNGOLOGY

## 2020-08-18 NOTE — PROGRESS NOTES
Answers for HPI/ROS submitted by the patient on 8/17/2020   hearing loss: Yes  tinnitus: Yes  trouble swallowing: Yes  Foot swelling?: Yes  abdominal pain: Yes  constipation: Yes  Vomiting?: Yes  heartburn: Yes  Acid Reflux?: Yes  Joint pain? : Yes  Muscle aches / pain?: Yes  back pain: Yes  neck pain: Yes  rash: Yes

## 2020-08-18 NOTE — PROGRESS NOTES
Subjective:       Patient ID: Eliu Paz is a 80 y.o. female.    Chief Complaint: Hearing Loss    Hearing Loss:   Chronicity:  Chronic  Onset:  Over 10 years ago  Progression since onset:  Gradually worsening  Frequency:  Constantly  Severity:  Disabling  Hearing loss characteristics:  Trouble hearing TV, difficult on telephone, worse background noise and impaired select discrimination   Associated symptoms: tinnitus.    Treatments tried:  Hearing aids  Improvement on treatment:  No reliefNo ear surgery and no ear infections.    Review of Systems   HENT: Positive for hearing loss, tinnitus and trouble swallowing.    Gastrointestinal: Positive for abdominal pain, constipation and vomiting.   Musculoskeletal: Positive for arthralgias, back pain and neck pain.   Integumentary:  Positive for rash.         Objective:      Physical Exam  Constitutional:       Appearance: She is well-developed.   HENT:      Head: Normocephalic and atraumatic.      Right Ear: Tympanic membrane, ear canal and external ear normal.      Left Ear: Tympanic membrane, ear canal and external ear normal.      Nose: Nose normal.      Mouth/Throat:      Pharynx: Uvula midline.   Neck:      Musculoskeletal: Normal range of motion.      Thyroid: No thyroid mass.       Data:        Assessment:       1. Presbycusis of both ears    2. Sensorineural hearing loss (SNHL) of both ears        Plan:         Discussed cochlear implantation  with patient and family.  Discussed possibility of facial nerve paralysis, hearing loss, balance loss, device failure, device infection, and wound issues.  Risks, complications, alternatives and other potential problems discussed and patient expressed understanding.    Recommend CI Eval  CT temporal bone   Will need immunizations if she decides to proceed   Will need pre op cardiac clearance.    Answers for HPI/ROS submitted by the patient on 8/17/2020   Foot swelling?: Yes  heartburn: Yes  Acid Reflux?: Yes  Muscle  aches / pain?: Yes

## 2020-08-18 NOTE — LETTER
August 18, 2020      Navi Clinton MD  [address] [facility] [address]  [address]  [place] [address]           [facility] - EarNoseThro[facility] 4th Fl  [address]  [facility] [address]  Phone: [phone]  Fax: [phone]          Patient: Eliu Paz   MR Number: [id number]   YOB: 1940   Date of Visit: 8/18/2020       Dear Dr. Navi Clinton:    Thank you for referring Eliu Paz to me for evaluation. Attached you will find relevant portions of my assessment and plan of care.    If you have questions, please do not hesitate to call me. I look forward to following Eliu Paz along with you.    Sincerely,    Yohan Bhatia MD    Enclosure  CC:  No Recipients    If you would like to receive this communication electronically, please contact [email or web address] or [phone] to request more information on [employer] Link access.    For providers and/or their staff who would like to refer a patient to [facility], please contact us through our one-stop-shop provider referral line, [facility], at [phone].    If you feel you have received this communication in error or would no longer like to receive these types of communications, please e-mail [email or web address]

## 2020-08-19 ENCOUNTER — OFFICE VISIT (OUTPATIENT)
Dept: RHEUMATOLOGY | Facility: CLINIC | Age: 80
End: 2020-08-19
Payer: MEDICARE

## 2020-08-19 VITALS
HEIGHT: 60 IN | TEMPERATURE: 97 F | SYSTOLIC BLOOD PRESSURE: 127 MMHG | HEART RATE: 57 BPM | DIASTOLIC BLOOD PRESSURE: 57 MMHG | WEIGHT: 184.94 LBS | BODY MASS INDEX: 36.31 KG/M2

## 2020-08-19 DIAGNOSIS — M54.2 NECK PAIN: ICD-10-CM

## 2020-08-19 DIAGNOSIS — M79.10 MYALGIA: ICD-10-CM

## 2020-08-19 DIAGNOSIS — M17.0 PRIMARY OSTEOARTHRITIS OF BOTH KNEES: Primary | ICD-10-CM

## 2020-08-19 DIAGNOSIS — S16.1XXA STRAIN OF NECK MUSCLE, INITIAL ENCOUNTER: ICD-10-CM

## 2020-08-19 PROCEDURE — 99214 PR OFFICE/OUTPT VISIT, EST, LEVL IV, 30-39 MIN: ICD-10-PCS | Mod: S$PBB,,, | Performed by: INTERNAL MEDICINE

## 2020-08-19 PROCEDURE — 99214 OFFICE O/P EST MOD 30 MIN: CPT | Mod: S$PBB,,, | Performed by: INTERNAL MEDICINE

## 2020-08-19 PROCEDURE — 99999 PR PBB SHADOW E&M-EST. PATIENT-LVL IV: ICD-10-PCS | Mod: PBBFAC,,, | Performed by: INTERNAL MEDICINE

## 2020-08-19 PROCEDURE — 99999 PR PBB SHADOW E&M-EST. PATIENT-LVL IV: CPT | Mod: PBBFAC,,, | Performed by: INTERNAL MEDICINE

## 2020-08-19 PROCEDURE — 99214 OFFICE O/P EST MOD 30 MIN: CPT | Mod: PBBFAC | Performed by: INTERNAL MEDICINE

## 2020-08-19 ASSESSMENT — ROUTINE ASSESSMENT OF PATIENT INDEX DATA (RAPID3)
PATIENT GLOBAL ASSESSMENT SCORE: 5.5
PAIN SCORE: 6.5
PSYCHOLOGICAL DISTRESS SCORE: 2.2
FATIGUE SCORE: 3
TOTAL RAPID3 SCORE: 4.89
AM STIFFNESS SCORE: 1, YES
MDHAQ FUNCTION SCORE: 0.8

## 2020-08-19 NOTE — PROGRESS NOTES
Chief Complaint   Patient presents with    Disease Management       Patient with chronic pain for a follow up    History of presenting illness    80 year old black female with chronic pain for 3 years   She has osteoarthritis and diabetic neuropathy    JOHANNE pos  No CTD    In the past she took baclofen and that was changed to zanaflex     She is very hard of hearing    She has overall body pain  Even the tips of her fingers hurt    She has ankle swelling +  It goes down when she elevates her legs   When she bears weight and stands they swell    Knees hurt as well    Past history : DM,allergy,catarcts,CKD,CAD,GERD, HTN,HLD    Family history : none relevant     Social history : former smoker quit 1963      Review of Systems   Constitutional: Negative for activity change, appetite change, chills, diaphoresis, fatigue, fever and unexpected weight change.   HENT: Negative for congestion, dental problem, drooling, ear discharge, ear pain, facial swelling, hearing loss, mouth sores, nosebleeds, postnasal drip, rhinorrhea, sinus pressure, sinus pain, sneezing, sore throat, tinnitus, trouble swallowing and voice change.    Eyes: Negative for photophobia, pain, discharge, redness, itching and visual disturbance.   Respiratory: Negative for apnea, cough, choking, chest tightness, shortness of breath, wheezing and stridor.    Cardiovascular: Negative for chest pain, palpitations and leg swelling.   Gastrointestinal: Negative for abdominal distention, abdominal pain, anal bleeding, blood in stool, constipation, diarrhea, nausea, rectal pain and vomiting.   Endocrine: Negative for cold intolerance, heat intolerance, polydipsia, polyphagia and polyuria.   Genitourinary: Negative for decreased urine volume, difficulty urinating, dysuria, enuresis, flank pain, frequency, genital sores, hematuria and urgency.   Musculoskeletal: Positive for arthralgias. Negative for back pain, gait problem, joint swelling, myalgias, neck pain and neck  stiffness.   Skin: Negative for color change, pallor, rash and wound.   Allergic/Immunologic: Negative for environmental allergies, food allergies and immunocompromised state.   Neurological: Negative for dizziness, tremors, seizures, syncope, facial asymmetry, speech difficulty, weakness, light-headedness, numbness and headaches.   Hematological: Negative for adenopathy. Does not bruise/bleed easily.   Psychiatric/Behavioral: Negative for agitation, behavioral problems, confusion, decreased concentration, dysphoric mood, hallucinations, self-injury, sleep disturbance and suicidal ideas. The patient is not nervous/anxious and is not hyperactive.      Physical Exam     Tenderness:   RLE: tibiofemoral  LLE: tibiofemoral    EARL-28 tender joint count: 2  EARL-28 swollen joint count: 0    B/l ankle edema noted    Physical Exam   Constitutional: She is oriented to person, place, and time and well-developed, well-nourished, and in no distress. No distress.   HENT:   Head: Normocephalic.   Mouth/Throat: Oropharynx is clear and moist.   Eyes: Conjunctivae are normal. Pupils are equal, round, and reactive to light. Right eye exhibits no discharge. Left eye exhibits no discharge. No scleral icterus.   Neck: Normal range of motion. No thyromegaly present.   Cardiovascular: Normal rate, regular rhythm, normal heart sounds and intact distal pulses.    Pulmonary/Chest: Effort normal and breath sounds normal. No stridor.   Abdominal: Soft. Bowel sounds are normal.   Lymphadenopathy:     She has no cervical adenopathy.   Neurological: She is alert and oriented to person, place, and time.   Skin: Skin is warm. No rash noted. She is not diaphoretic.     Psychiatric: Affect and judgment normal.   Musculoskeletal: Normal range of motion.       Assessment     80 year old female with chronic pain and   DM,allergy,catarcts,CKD,CAD,GERD, HTN,HLD    She comes for a follow up    She has diabetic neuropathy and osteoarthritis     She has knee  osteoarthritis and that has impaired her mobility      1. Primary osteoarthritis of both knees    2. Myalgia    3. Strain of neck muscle, initial encounter    4. Neck pain              F/u problem    Plan      Offered her knee injections and she didn't want to pursue : steroids or synvisc    Offered knee PT,she refused to go since many sessions in the past didn't help    She would like to brace herself and she is also impaired with her mobility,so suggested a rollator walker    D/c and rtc lidia Lorenz was seen today for disease management.    Diagnoses and all orders for this visit:    Primary osteoarthritis of both knees    Myalgia    Strain of neck muscle, initial encounter    Neck pain

## 2020-08-31 NOTE — PROGRESS NOTES
Subjective:      Eliu Paz is a 80 y.o. female who was self-referred for sinus congestion, but upon questioning of reason for today's visit her only real concern is regarding hearing loss. She has a long history of hearing loss present and constant for the past 10 years or longer. Some gradual worsening since initially noticed, with no acute changes and no acute event at onset. She has significant impacts on daily functioning from her hearing loss including difficulty hearing others in conversation and particular challenge with using telephone and in any background noise. Tinnitus is present bilaterally which is non-pulsatile. She tried hearing aids before but did not have significant benefits. She has not had prior ear surgery. She denies ear drainage, history of significant ear infections, or other concerns. She denies any significant history of sinonasal issues including sinus infections, obstruction of nasal breathing, or abnormal nasal discharge.     Patient reported quality of life assessments:  PRQOL assessments were not completed today.    Past Medical History  She has a past medical history of Allergy, Arteriosclerosis of arteries of extremities, Arthritis, Brain infection, Cataract, CKD (chronic kidney disease) stage 3, GFR 30-59 ml/min, Coma, Coronary artery disease, Diabetes mellitus type II, Diabetic neuropathy, Early dry stage nonexudative age-related macular degeneration of both eyes, GERD (gastroesophageal reflux disease), Glaucoma, Hyperlipidemia, Hypertension, and Type 2 diabetes mellitus with diabetic peripheral angiopathy without gangrene, with long-term current use of insulin.    Past Surgical History  She has a past surgical history that includes Carpal tunnel release; Tubal ligation (1970s); Colonoscopy (N/A, 11/2/2015); Cardiac catheterization (03/2010); Coronary artery bypass graft (08/13/1994); Cardiac surgery (1994); Colonoscopy (N/A, 2/5/2019); and Esophagogastroduodenoscopy  (N/A, 8/2/2019).    Family History  Her family history includes Blindness in her brother; Diabetes in her brother, father, maternal grandmother, paternal aunt, and paternal uncle.    Social History  She reports that she quit smoking about 57 years ago. She quit after 2.00 years of use. She has never used smokeless tobacco. She reports that she does not drink alcohol or use drugs.    Allergies  She is allergic to penicillins and trulicity [dulaglutide].    Medications   She has a current medication list which includes the following prescription(s): amlodipine, blood sugar diagnostic, brimonidine 0.2%, candesartan, carvedilol, ciclopirox, clobetasol 0.05%, dorzolamide-timolol 2-0.5%, ferrous sulfate, fluocinonide, insulin asp prt-insulin aspart (novolog 70/30), insulin syringe-needle u-100, isosorbide mononitrate, ketoconazole, ketoconazole, lactulose, latanoprost, lidocaine, nitroglycerin 0.4 mg/dose tl spry, omeprazole, rosuvastatin, spironolactone, tizanidine, tramadol, and triamcinolone acetonide 0.1%.    Review of Systems    Review of Systems   Constitutional: Negative for fatigue, fever and unexpected weight change.   HENT: Positive for hearing loss, tinnitus and trouble swallowing. Negative for congestion, dental problem, ear discharge, ear pain, facial swelling, hoarse voice, nosebleeds, postnasal drip, rhinorrhea, sinus pressure, sore throat and voice change.    Eyes: Negative for photophobia, discharge, itching and visual disturbance.   Respiratory: Negative for apnea, cough, shortness of breath and wheezing.    Cardiovascular: Positive for leg swelling. Negative for chest pain and palpitations.   Gastrointestinal: Positive for abdominal pain, constipation, nausea and vomiting.   Endocrine: Negative for cold intolerance and heat intolerance.   Genitourinary: Negative for difficulty urinating.   Musculoskeletal: Positive for arthralgias, back pain, joint swelling, myalgias and neck pain.   Skin: Positive for  rash.   Allergic/Immunologic: Negative for environmental allergies and food allergies.   Neurological: Negative for dizziness, seizures, syncope, weakness and headaches.   Hematological: Negative for adenopathy. Does not bruise/bleed easily.   Psychiatric/Behavioral: Negative for decreased concentration, dysphoric mood and sleep disturbance. The patient is not nervous/anxious.    All other systems reviewed and are negative.      Objective:     /66   Pulse 62        Constitutional:   Vital signs are normal. She appears well-developed and well-nourished. She does not appear ill. No distress. Normal speech.  No hoarse voice, breathy voice and strained voice.      Head:  Normocephalic and atraumatic. No skin lesions. Salivary glands normal.  Facial strength is normal.      Ears:    Right Ear: No drainage, swelling or tenderness. Tympanic membrane is not injected, not scarred, not perforated, not erythematous, not retracted and not bulging. No middle ear effusion. No decreased hearing is noted.   Left Ear: No drainage, swelling or tenderness. Tympanic membrane is not injected, not scarred, not perforated, not erythematous, not retracted and not bulging.  No middle ear effusion. No decreased hearing is noted.     Nose:  No mucosal edema, rhinorrhea, nose lacerations, sinus tenderness, septal deviation or polyps. No epistaxis. Turbinate hypertrophy.  Right sinus exhibits no maxillary sinus tenderness and no frontal sinus tenderness. Left sinus exhibits no maxillary sinus tenderness and no frontal sinus tenderness.     Mouth/Throat  Oropharynx clear and moist without lesions or asymmetry, normal uvula midline and lips, teeth, and gums normal. No oral lesions or trismus. No oropharyngeal exudate, posterior oropharyngeal edema or posterior oropharyngeal erythema.     Neck:  Neck normal without thyromegaly masses, asymmetry, normal tracheal structure, crepitus, and tenderness, thyroid normal, trachea normal, phonation  normal, full range of motion with neck supple and no adenopathy. No edema, no erythema, no stridor and no neck rigidity present.     Pulmonary/Chest:   Effort normal. No stridor.     Psychiatric:   She has a normal mood and affect. Her speech is normal and behavior is normal.     Neurological:   She has neurological normal, alert and oriented. No cranial nerve deficit or sensory deficit. Coordination and gait normal.     Skin:   No abrasions, lacerations, lesions, or rashes.       Procedure    None    Data Reviewed    WBC (K/uL)   Date Value   07/14/2020 5.77     Eosinophil% (%)   Date Value   07/14/2020 1.4     Eos # (K/uL)   Date Value   07/14/2020 0.1     Platelets (K/uL)   Date Value   07/14/2020 163     Glucose (mg/dL)   Date Value   03/19/2020 145 (H)     No results found for: IGE     Audiometric evaluation including audiogram, tympanometry, acoustic reflexes, and speech discrimination which was performed on 7/28/2020 was personally reviewed and interpreted.  Notable findings on the audiogram were downsloping severe sensorineural hearing loss bilatearly.  Speech discrimination was poor bilaterally. The audiometric test results reviewed are copied below:        I reviewed all relevant past medical records related to the chief complaint and have summarized pertinent details from these past records in the HPI above.     Assessment:     1. Sensorineural hearing loss (SNHL) of both ears    2. Sinus congestion    3. Presbycusis of both ears         Plan:     I had a long discussion with the patient and her daughter regarding her condition and the further workup and management options.      Sensorineural hearing loss (SNHL) of both ears, Presbycusis of both ears  I discussed with the patient and her daughter that my suspicion for why she has had poor results with hearing aids for the past few years and can't find any hearing aids or adjustments to her aids that will provide any benefit, is that she has too severe of  hearing loss to benefit from amplification. I advised consideration oc cochlear implantation. The difference between cochlear implants and hearing aids was explained, as was the basic concept of the surgical procedures involved. After discussing this, the patient and her daughter were interested in pursuing it further. I recommended they see Dr. Bhatia to further discuss and proceed with the process of cochlear implant evaluation.   - Ambulatory referral/consult to ENT; Future (Dr. Bhatia - Neurootology regarding CI evaluation)    She voiced understanding of this discussion and instructions for management, and all of her questions were answered. I have encouraged her to call for any questions or concerns in the meantime.     Orders Placed This Encounter   Procedures    Ambulatory referral/consult to ENT           Navi Clinton MD    Rhinology, Allergy, and Sinus-Skull Base Surgery    Department of Otorhinolaryngology    [facility]    Phone  [phone]    Fax      [phone]

## 2020-09-01 ENCOUNTER — PATIENT OUTREACH (OUTPATIENT)
Dept: ADMINISTRATIVE | Facility: HOSPITAL | Age: 80
End: 2020-09-01

## 2020-09-01 NOTE — PROGRESS NOTES
Health Maintenance Due   Topic Date Due    Hepatitis C Screening  1940    Influenza Vaccine (1) 09/01/2020     Chart review completed.

## 2020-09-03 ENCOUNTER — PATIENT OUTREACH (OUTPATIENT)
Dept: ADMINISTRATIVE | Facility: OTHER | Age: 80
End: 2020-09-03

## 2020-09-03 DIAGNOSIS — E11.42 DM TYPE 2 WITH DIABETIC PERIPHERAL NEUROPATHY: Primary | ICD-10-CM

## 2020-09-03 NOTE — PROGRESS NOTES
Requested updates within Care Everywhere.  Patient's chart was reviewed for overdue CELESTE topics.  Immunizations reconciled.    Orders placed:Hemoglobin A1c  Tasked appts:n/a  Labs Linked:n/a

## 2020-09-04 ENCOUNTER — HOSPITAL ENCOUNTER (OUTPATIENT)
Dept: RADIOLOGY | Facility: HOSPITAL | Age: 80
Discharge: HOME | End: 2020-09-04
Attending: OTOLARYNGOLOGY
Payer: MEDICARE

## 2020-09-04 ENCOUNTER — OFFICE VISIT (OUTPATIENT)
Dept: OPTOMETRY | Facility: CLINIC | Age: 80
End: 2020-09-04
Payer: MEDICARE

## 2020-09-04 DIAGNOSIS — H90.3 SENSORINEURAL HEARING LOSS (SNHL) OF BOTH EARS: ICD-10-CM

## 2020-09-04 DIAGNOSIS — H25.13 NUCLEAR SCLEROSIS, BILATERAL: Primary | ICD-10-CM

## 2020-09-04 DIAGNOSIS — H52.223 REGULAR ASTIGMATISM OF BOTH EYES WITH PRESBYOPIA: ICD-10-CM

## 2020-09-04 DIAGNOSIS — H52.4 REGULAR ASTIGMATISM OF BOTH EYES WITH PRESBYOPIA: ICD-10-CM

## 2020-09-04 PROCEDURE — 99214 OFFICE O/P EST MOD 30 MIN: CPT | Mod: PBBFAC | Performed by: OPTOMETRIST

## 2020-09-04 PROCEDURE — 92015 PR REFRACTION: ICD-10-PCS | Mod: ,,, | Performed by: OPTOMETRIST

## 2020-09-04 PROCEDURE — 92015 DETERMINE REFRACTIVE STATE: CPT | Mod: ,,, | Performed by: OPTOMETRIST

## 2020-09-04 PROCEDURE — 92014 PR EYE EXAM, EST PATIENT,COMPREHESV: ICD-10-PCS | Mod: S$PBB,,, | Performed by: OPTOMETRIST

## 2020-09-04 PROCEDURE — 92014 COMPRE OPH EXAM EST PT 1/>: CPT | Mod: S$PBB,,, | Performed by: OPTOMETRIST

## 2020-09-04 PROCEDURE — 70480 CT TEMPORAL BONE WITHOUT CONTRAST: ICD-10-PCS | Mod: 26,,, | Performed by: RADIOLOGY

## 2020-09-04 PROCEDURE — 70480 CT ORBIT/EAR/FOSSA W/O DYE: CPT | Mod: 26,,, | Performed by: RADIOLOGY

## 2020-09-04 PROCEDURE — 99999 PR PBB SHADOW E&M-EST. PATIENT-LVL IV: ICD-10-PCS | Mod: PBBFAC,,, | Performed by: OPTOMETRIST

## 2020-09-04 PROCEDURE — 99999 PR PBB SHADOW E&M-EST. PATIENT-LVL IV: CPT | Mod: PBBFAC,,, | Performed by: OPTOMETRIST

## 2020-09-04 PROCEDURE — 70480 CT ORBIT/EAR/FOSSA W/O DYE: CPT | Mod: TC

## 2020-09-04 NOTE — PROGRESS NOTES
HPI     DLS:  7/31/20 Dr Francis  Refraction    1. POAG OU  2. Type 2 DM no DR  3. NS OU  4. ARMD OU  5. Ptosis OS>OD    MEDS:  Cosopt BID OU  Brimonidine TID OU  Latanoprost QHS OU    Pt here for refraction per Dr Francis. Pt states it feels like she has   trash in her eyes.     Last edited by Joe Dudley, OD on 9/4/2020  8:28 AM. (History)            Assessment /Plan     For exam results, see Encounter Report.    Nuclear sclerosis, bilateral  -Educated patient on presence of cataracts at today's exam, monitor at annual dilated fundus exam. 1-3 years surgical estimate.    Regular astigmatism of both eyes with presbyopia  Eyeglass Final Rx     Eyeglass Final Rx       Sphere Cylinder Axis Dist VA Add    Right +0.25 +1.50 010 20/30- +2.75    Left [place] +1.50 005 20/50-- +2.75    Type: Bifocal    Expiration Date: 9/5/2021                  RTC as directed OMD

## 2020-09-15 ENCOUNTER — LAB VISIT (OUTPATIENT)
Dept: LAB | Facility: HOSPITAL | Age: 80
End: 2020-09-15
Attending: INTERNAL MEDICINE
Payer: MEDICARE

## 2020-09-15 ENCOUNTER — OFFICE VISIT (OUTPATIENT)
Dept: INTERNAL MEDICINE | Facility: CLINIC | Age: 80
End: 2020-09-15
Payer: MEDICARE

## 2020-09-15 VITALS
BODY MASS INDEX: 35.28 KG/M2 | WEIGHT: 179.69 LBS | DIASTOLIC BLOOD PRESSURE: 68 MMHG | HEIGHT: 60 IN | HEART RATE: 71 BPM | OXYGEN SATURATION: 98 % | SYSTOLIC BLOOD PRESSURE: 126 MMHG

## 2020-09-15 DIAGNOSIS — E11.22 TYPE 2 DIABETES MELLITUS WITH STAGE 3 CHRONIC KIDNEY DISEASE, WITH LONG-TERM CURRENT USE OF INSULIN: Chronic | ICD-10-CM

## 2020-09-15 DIAGNOSIS — I10 ESSENTIAL HYPERTENSION: Chronic | ICD-10-CM

## 2020-09-15 DIAGNOSIS — I25.10 CORONARY ARTERY DISEASE DUE TO LIPID RICH PLAQUE: ICD-10-CM

## 2020-09-15 DIAGNOSIS — Z11.59 ENCOUNTER FOR HEPATITIS C SCREENING TEST FOR LOW RISK PATIENT: ICD-10-CM

## 2020-09-15 DIAGNOSIS — I25.83 CORONARY ARTERY DISEASE DUE TO LIPID RICH PLAQUE: ICD-10-CM

## 2020-09-15 DIAGNOSIS — N18.30 TYPE 2 DIABETES MELLITUS WITH STAGE 3 CHRONIC KIDNEY DISEASE, WITH LONG-TERM CURRENT USE OF INSULIN: Chronic | ICD-10-CM

## 2020-09-15 DIAGNOSIS — Z79.4 TYPE 2 DIABETES MELLITUS WITH STAGE 3 CHRONIC KIDNEY DISEASE, WITH LONG-TERM CURRENT USE OF INSULIN: Chronic | ICD-10-CM

## 2020-09-15 DIAGNOSIS — I25.10 CORONARY ARTERY DISEASE INVOLVING NATIVE CORONARY ARTERY OF NATIVE HEART WITHOUT ANGINA PECTORIS: Chronic | ICD-10-CM

## 2020-09-15 DIAGNOSIS — N76.0 VULVOVAGINITIS: Primary | ICD-10-CM

## 2020-09-15 LAB
ANION GAP SERPL CALC-SCNC: 10 MMOL/L (ref 8–16)
BUN SERPL-MCNC: 24 MG/DL (ref 8–23)
CALCIUM SERPL-MCNC: 9.4 MG/DL (ref 8.7–10.5)
CHLORIDE SERPL-SCNC: 108 MMOL/L (ref 95–110)
CO2 SERPL-SCNC: 20 MMOL/L (ref 23–29)
CREAT SERPL-MCNC: 1.3 MG/DL (ref 0.5–1.4)
EST. GFR  (AFRICAN AMERICAN): 45.4 ML/MIN/1.73 M^2
EST. GFR  (NON AFRICAN AMERICAN): 39.4 ML/MIN/1.73 M^2
ESTIMATED AVG GLUCOSE: 169 MG/DL (ref 68–131)
GLUCOSE SERPL-MCNC: 126 MG/DL (ref 70–110)
HBA1C MFR BLD HPLC: 7.5 % (ref 4–5.6)
HCV AB SERPL QL IA: NEGATIVE
POTASSIUM SERPL-SCNC: 4.4 MMOL/L (ref 3.5–5.1)
SODIUM SERPL-SCNC: 138 MMOL/L (ref 136–145)

## 2020-09-15 PROCEDURE — 83036 HEMOGLOBIN GLYCOSYLATED A1C: CPT

## 2020-09-15 PROCEDURE — 99214 OFFICE O/P EST MOD 30 MIN: CPT | Mod: S$PBB,,, | Performed by: INTERNAL MEDICINE

## 2020-09-15 PROCEDURE — 36415 COLL VENOUS BLD VENIPUNCTURE: CPT

## 2020-09-15 PROCEDURE — 99214 PR OFFICE/OUTPT VISIT, EST, LEVL IV, 30-39 MIN: ICD-10-PCS | Mod: S$PBB,,, | Performed by: INTERNAL MEDICINE

## 2020-09-15 PROCEDURE — 99999 PR PBB SHADOW E&M-EST. PATIENT-LVL V: CPT | Mod: PBBFAC,,, | Performed by: INTERNAL MEDICINE

## 2020-09-15 PROCEDURE — 80048 BASIC METABOLIC PNL TOTAL CA: CPT

## 2020-09-15 PROCEDURE — 99999 PR PBB SHADOW E&M-EST. PATIENT-LVL V: ICD-10-PCS | Mod: PBBFAC,,, | Performed by: INTERNAL MEDICINE

## 2020-09-15 PROCEDURE — 99215 OFFICE O/P EST HI 40 MIN: CPT | Mod: PBBFAC | Performed by: INTERNAL MEDICINE

## 2020-09-15 PROCEDURE — 86803 HEPATITIS C AB TEST: CPT

## 2020-09-15 RX ORDER — ROSUVASTATIN CALCIUM 40 MG/1
40 TABLET, COATED ORAL DAILY
Qty: 90 TABLET | Refills: 11 | Status: SHIPPED | OUTPATIENT
Start: 2020-09-15 | End: 2021-10-19

## 2020-09-15 RX ORDER — INSULIN ASPART 100 [IU]/ML
15 INJECTION, SUSPENSION SUBCUTANEOUS 2 TIMES DAILY WITH MEALS
Qty: 40 ML | Refills: 11
Start: 2020-09-15 | End: 2020-12-02

## 2020-09-15 RX ORDER — ISOSORBIDE MONONITRATE 30 MG/1
30 TABLET, EXTENDED RELEASE ORAL DAILY
Qty: 90 TABLET | Refills: 11 | Status: SHIPPED | OUTPATIENT
Start: 2020-09-15 | End: 2021-01-13

## 2020-09-15 RX ORDER — CARVEDILOL 25 MG/1
25 TABLET ORAL 2 TIMES DAILY
Qty: 180 TABLET | Refills: 11 | Status: SHIPPED | OUTPATIENT
Start: 2020-09-15 | End: 2022-01-13 | Stop reason: SDUPTHER

## 2020-09-15 RX ORDER — AMLODIPINE BESYLATE 10 MG/1
10 TABLET ORAL DAILY
Qty: 90 TABLET | Refills: 11 | Status: SHIPPED | OUTPATIENT
Start: 2020-09-15 | End: 2021-10-19

## 2020-09-15 NOTE — PROGRESS NOTES
Subjective:       Patient ID: Eliu Paz is a 80 y.o. female.    Chief Complaint: Follow-up    Patient is here for followup for chronic conditions.    Complaint of vaginal area burning sensation, nit actual burning with urination.    Dysuria   This is a recurrent problem. The current episode started 1 to 4 weeks ago. The problem has been waxing and waning. The quality of the pain is described as burning. There has been no fever. There is no history of pyelonephritis. Associated symptoms include constipation and rash. Pertinent negatives include no chills, nausea or vomiting. She has tried antibiotics for the symptoms. The treatment provided moderate relief. Her past medical history is significant for diabetes mellitus, hypertension and recurrent UTIs.     Review of Systems   Constitutional: Negative for activity change, chills, fatigue and fever.   HENT: Negative for congestion.    Respiratory: Negative for chest tightness and shortness of breath.    Cardiovascular: Positive for leg swelling. Negative for chest pain and palpitations.   Gastrointestinal: Positive for constipation. Negative for abdominal distention, abdominal pain, nausea and vomiting.   Genitourinary: Positive for dysuria. Negative for decreased urine volume.   Musculoskeletal: Negative for arthralgias and myalgias.   Skin: Positive for rash.   Neurological: Negative for weakness.   Psychiatric/Behavioral: Negative for confusion.           Objective:      Physical Exam  Constitutional:       General: She is not in acute distress.     Appearance: She is well-developed. She is not diaphoretic.   HENT:      Head: Normocephalic and atraumatic.   Eyes:      Comments: No scleral pallor     Neck:      Musculoskeletal: Normal range of motion.   Cardiovascular:      Rate and Rhythm: Normal rate and regular rhythm.      Heart sounds: Normal heart sounds.   Pulmonary:      Effort: Pulmonary effort is normal.      Breath sounds: Normal breath sounds.    Abdominal:      General: Bowel sounds are normal. There is no distension.      Palpations: Abdomen is soft. There is no mass.      Tenderness: There is no abdominal tenderness. There is no guarding or rebound.      Hernia: No hernia is present.      Comments:      Musculoskeletal:      Comments: Trace addi     Skin:     Comments: Skin is dry as well   Psychiatric:         Behavior: Behavior normal.         Assessment:       1. Vulvovaginitis    2. Type 2 diabetes mellitus with stage 3 chronic kidney disease, with long-term current use of insulin    3. Coronary artery disease due to lipid rich plaque    4. Essential hypertension    5. Coronary artery disease involving native coronary artery of native heart without angina pectoris    6. Encounter for hepatitis C screening test for low risk patient        Plan:       Eliu Steinberg was seen today for follow-up.    Diagnoses and all orders for this visit:    Vulvovaginitis  -     Ambulatory referral/consult to Obstetrics / Gynecology; Future  She declines exam today prefers to see gyn    Type 2 diabetes mellitus with stage 3 chronic kidney disease, with long-term current use of insulin  -     Basic metabolic panel; Future  -     Hemoglobin A1C; Future  -     insulin asp prt-insulin aspart, NOVOLOG 70/30, (NOVOLOG MIX 70-30 U-100 INSULN) 100 unit/mL (70-30) Soln; Inject 15 Units into the skin 2 (two) times daily with meals. INJECT 18 UNITS INTO THE SKIN TWICE A DAY BEFORE MEALS.  -     rosuvastatin (CRESTOR) 40 MG Tab; Take 1 tablet (40 mg total) by mouth once daily.    Coronary artery disease due to lipid rich plaque  -     isosorbide mononitrate (IMDUR) 30 MG 24 hr tablet; Take 1 tablet (30 mg total) by mouth once daily.    Essential hypertension  -     amLODIPine (NORVASC) 10 MG tablet; Take 1 tablet (10 mg total) by mouth once daily.  -     carvediloL (COREG) 25 MG tablet; Take 1 tablet (25 mg total) by mouth 2 (two) times daily.    Coronary artery disease involving  native coronary artery of native heart without angina pectoris  -     carvediloL (COREG) 25 MG tablet; Take 1 tablet (25 mg total) by mouth 2 (two) times daily.    Encounter for hepatitis C screening test for low risk patient  -     Hepatitis C Antibody; Future        Health Maintenance       Date Due Completion Date    Influenza Vaccine (1) 08/01/2020 12/17/2019 (Declined)    Override on 12/17/2019: Declined    Override on 2/7/2017: Declined (per provider note)    Override on 10/15/2015: Not Clinically Appropriate (declines)    Shingles Vaccine (1 of 2) 08/12/2021 (Originally 3/17/1992) ---    Hemoglobin A1c 03/15/2021 9/15/2020    Foot Exam 03/19/2021 3/19/2020 (Done)    Override on 3/19/2020: Done    Override on 12/20/2018: Done    Override on 9/5/2018: Done    Override on 9/1/2017: Done (podiatry)    Override on 12/2/2016: Done    Override on 8/24/2016: Done (by cardiologist)    Override on 7/14/2015: Done    Lipid Panel 03/19/2021 3/19/2020    Eye Exam 09/04/2021 9/4/2020    Colonoscopy 02/05/2024 2/5/2019    DEXA SCAN 11/05/2025 11/5/2015    TETANUS VACCINE 02/07/2027 2/7/2017 (Declined)    Override on 2/7/2017: Declined (per provider note)          Follow up in about 6 months (around 3/15/2021).    Future Appointments   Date Time Provider Department Center   10/1/2020  9:20 AM Aparna Castanon NP [id number]   11/2/2020  8:15 AM Zuleika Francis MD [facility]   3/15/2021  8:40 AM Ryan Carlson MD [facility]

## 2020-10-01 ENCOUNTER — OFFICE VISIT (OUTPATIENT)
Dept: OBSTETRICS AND GYNECOLOGY | Facility: CLINIC | Age: 80
End: 2020-10-01
Payer: MEDICARE

## 2020-10-01 VITALS
DIASTOLIC BLOOD PRESSURE: 60 MMHG | HEIGHT: 60 IN | BODY MASS INDEX: 35.37 KG/M2 | WEIGHT: 180.13 LBS | SYSTOLIC BLOOD PRESSURE: 140 MMHG

## 2020-10-01 DIAGNOSIS — N76.0 VULVOVAGINITIS: ICD-10-CM

## 2020-10-01 LAB
CANDIDA RRNA VAG QL PROBE: NEGATIVE
G VAGINALIS RRNA GENITAL QL PROBE: NEGATIVE
T VAGINALIS RRNA GENITAL QL PROBE: NEGATIVE

## 2020-10-01 PROCEDURE — 99214 OFFICE O/P EST MOD 30 MIN: CPT | Mod: S$PBB,,, | Performed by: NURSE PRACTITIONER

## 2020-10-01 PROCEDURE — 99214 PR OFFICE/OUTPT VISIT, EST, LEVL IV, 30-39 MIN: ICD-10-PCS | Mod: S$PBB,,, | Performed by: NURSE PRACTITIONER

## 2020-10-01 PROCEDURE — 99999 PR PBB SHADOW E&M-EST. PATIENT-LVL IV: CPT | Mod: PBBFAC,,, | Performed by: NURSE PRACTITIONER

## 2020-10-01 PROCEDURE — 99999 PR PBB SHADOW E&M-EST. PATIENT-LVL IV: ICD-10-PCS | Mod: PBBFAC,,, | Performed by: NURSE PRACTITIONER

## 2020-10-01 PROCEDURE — 87510 GARDNER VAG DNA DIR PROBE: CPT

## 2020-10-01 PROCEDURE — 87480 CANDIDA DNA DIR PROBE: CPT

## 2020-10-01 PROCEDURE — 99214 OFFICE O/P EST MOD 30 MIN: CPT | Mod: PBBFAC | Performed by: NURSE PRACTITIONER

## 2020-10-01 NOTE — PROGRESS NOTES
Eliu Paz is a 80 y.o. female  presents with complaint of vulvovaginitis. New patient to me-has seen Dr. Eastman in the vulva clinic years ago. Saw PCP on 9/15 with c/o vulvar burning-no pelvic exam performed, referred to gyn. She went to the ER on  with c/o joint pain-diagnosed with arthralgia and cellulitis of the upper extremity (given Cleocin and Voltaren gel). When pt questioned today about her vaginal symptoms-she denies any vulvovaginal burning, itching, odor, or abnormal discharge. She is not sexually active. She does douche regularly. Reports itching under her abdominal pannus-has been there for over a year, concerned she has a parasite or something contagious. Of note, pt has difficulty hearing.     Past Medical History:   Diagnosis Date    Allergy     Arteriosclerosis of arteries of extremities 2016    Arthritis     Brain infection     Cataract     CKD (chronic kidney disease) stage 3, GFR 30-59 ml/min     Coma     Coronary artery disease     Diabetes mellitus type II     Diabetic neuropathy     Early dry stage nonexudative age-related macular degeneration of both eyes 2019    GERD (gastroesophageal reflux disease)     Glaucoma     Hyperlipidemia     Hypertension     Type 2 diabetes mellitus with diabetic peripheral angiopathy without gangrene, with long-term current use of insulin 2016     Past Surgical History:   Procedure Laterality Date    CARDIAC CATHETERIZATION  03/2010    x 2    CARDIAC SURGERY      CARPAL TUNNEL RELEASE      L    COLONOSCOPY N/A 2015    Procedure: COLONOSCOPY;  Surgeon: Sanju Clinton MD;  Location: [address]);  Service: Endoscopy;  Laterality: N/A;    COLONOSCOPY N/A 2019    Procedure: COLONOSCOPY;  Surgeon: Kenneth Oconnell MD;  Location: [facility] ([address] [place] [address]);  Service: Endoscopy;  Laterality: N/A;    CORONARY ARTERY BYPASS GRAFT  08/13/1994    x 1    ESOPHAGOGASTRODUODENOSCOPY N/A 2019     Procedure: EGD (ESOPHAGOGASTRODUODENOSCOPY);  Surgeon: Wesly Garza MD;  Location: [facility] ([address] [place] [address]);  Service: Endoscopy;  Laterality: N/A;    TUBAL LIGATION  1970s     Social History     Tobacco Use    Smoking status: Former Smoker     Years: 2.00     Quit date: 3/25/1963     Years since quittin.5    Smokeless tobacco: Never Used   Substance Use Topics    Alcohol use: No    Drug use: No     Family History   Problem Relation Age of Onset    Diabetes Father     Diabetes Brother     Blindness Brother     Diabetes Maternal Grandmother     Diabetes Paternal Aunt     Diabetes Paternal Uncle     Amblyopia Neg Hx     Cancer Neg Hx     Cataracts Neg Hx     Glaucoma Neg Hx     Hypertension Neg Hx     Macular degeneration Neg Hx     Retinal detachment Neg Hx     Strabismus Neg Hx     Stroke Neg Hx     Thyroid disease Neg Hx     Colon cancer Neg Hx     Esophageal cancer Neg Hx     Stomach cancer Neg Hx     Rectal cancer Neg Hx     Ulcerative colitis Neg Hx     Crohn's disease Neg Hx     Irritable bowel syndrome Neg Hx     Celiac disease Neg Hx     Eczema Neg Hx     Lupus Neg Hx     Psoriasis Neg Hx     Melanoma Neg Hx      OB History    Para Term  AB Living   3 3       2   SAB TAB Ectopic Multiple Live Births                  # Outcome Date GA Lbr Jhon/2nd Weight Sex Delivery Anes PTL Lv   3 Para            2 Para            1 Para                There were no vitals taken for this visit.    ROS:  GENERAL: No fever, chills, fatigability or weight loss.  VULVAR: No pain, no lesions and no itching.  VAGINAL: No relaxation, no itching, no discharge, no abnormal bleeding and no lesions.  ABDOMEN: No abdominal pain. Denies nausea. Denies vomiting. No diarrhea. No constipation  BREAST: Denies pain. No lumps. No discharge.  URINARY: No incontinence, no nocturia, no frequency and no dysuria.  CARDIOVASCULAR: No chest pain. No shortness of breath. No leg cramps.  NEUROLOGICAL: No  headaches. No vision changes.    PHYSICAL EXAM:  VULVA: normal appearing vulva with no masses, tenderness or lesions   VAGINA: normal appearing vagina with normal color. Scant white discharge, no lesions, ATROPHIC   CERVIX: normal appearing cervix without discharge or lesions   UTERUS: uterus is normal size, shape, consistency and nontender   ADNEXA: normal adnexa in size, nontender and no masses    ASSESSMENT and PLAN:    ICD-10-CM ICD-9-CM    1. Vulvovaginitis  N76.0 616.10 Ambulatory referral/consult to Obstetrics / Gynecology      POCT URINE DIPSTICK WITHOUT MICROSCOPE      Vaginosis Screen by DNA Probe     1. Vaginal exam normal today-affirm was collected. No rash or abnormal discharge seen.   A total of 20 minutes was spent face to face with the patient. She was difficult to follow and seemed confused at times during the visit. Encouraged pt to continue taking medication as prescribed by the ER physician.     Patient was counseled today on vaginitis prevention including :  a. avoiding feminine products such as deoderant soaps, body wash, bubble bath, douches, scented toilet paper, deoderant tampons or pads, feminine wipes, chronic pad use, etc.  b. avoiding other vulvovaginal irritants such as long hot baths, humidity, tight, synthetic clothing, chlorine and sitting around in wet bathing suits  c. wearing cotton underwear, avoiding thong underwear and no underwear to bed  d. taking showers instead of baths and use a hair dryer on cool setting afterwards to dry  e. wearing cotton to exercise and shower immediately after exercise and change clothes  f. using polyurethane condoms without spermicide if sexually active and symptoms are triggered by intercourse    FOLLOW UP: PRN lack of improvement.

## 2020-10-01 NOTE — LETTER
October 1, 2020      Ryan Carlson MD  [address]  [place] [address]           [facility] [address]  [address]  [facility] [address]  Phone: [phone]  Fax: [phone]          Patient: Eliu Paz   MR Number: [id number]   YOB: 1940   Date of Visit: 10/1/2020       Dear Dr. Ryan Carlson:    Thank you for referring Eliu Paz to me for evaluation. Attached you will find relevant portions of my assessment and plan of care.    If you have questions, please do not hesitate to call me. I look forward to following Eliu Paz along with you.    Sincerely,    Aparna Castanon, NP    Enclosure  CC:  No Recipients    If you would like to receive this communication electronically, please contact [email or web address] or [phone] to request more information on [employer] Link access.    For providers and/or their staff who would like to refer a patient to [facility], please contact us through our one-stop-shop provider referral line, [facility] [place], at [phone].    If you feel you have received this communication in error or would no longer like to receive these types of communications, please e-mail [email or web address]

## 2020-10-19 DIAGNOSIS — M79.642 PAIN OF LEFT HAND: ICD-10-CM

## 2020-10-19 DIAGNOSIS — M25.532 ARTHRALGIA OF LEFT WRIST: ICD-10-CM

## 2020-10-19 DIAGNOSIS — M25.40 JOINT SWELLING: ICD-10-CM

## 2020-10-19 RX ORDER — DICLOFENAC SODIUM 10 MG/G
2 GEL TOPICAL 4 TIMES DAILY
Qty: 100 G | Refills: 0 | Status: SHIPPED | OUTPATIENT
Start: 2020-10-19 | End: 2021-04-15 | Stop reason: SDUPTHER

## 2020-10-19 NOTE — TELEPHONE ENCOUNTER
----- Message from Aparna Forte sent at 10/19/2020  8:54 AM CDT -----  Contact: Self   Pt is requesting a call regarding rx she received from er visit. Please advise

## 2020-10-19 NOTE — TELEPHONE ENCOUNTER
Pt states that she received this rx in the ER on 9/18. The ER couldn't find the original Dr who prescribed the medication. Pt would like a refill.

## 2020-10-20 DIAGNOSIS — H40.1133 PRIMARY OPEN-ANGLE GLAUCOMA, BILATERAL, SEVERE STAGE: ICD-10-CM

## 2020-10-21 RX ORDER — LATANOPROST 50 UG/ML
1 SOLUTION/ DROPS OPHTHALMIC NIGHTLY
Qty: 3 BOTTLE | Refills: 3 | Status: SHIPPED | OUTPATIENT
Start: 2020-10-21 | End: 2021-01-19

## 2020-11-01 ENCOUNTER — PATIENT OUTREACH (OUTPATIENT)
Dept: ADMINISTRATIVE | Facility: OTHER | Age: 80
End: 2020-11-01

## 2020-11-01 NOTE — PROGRESS NOTES
Requested updates within Care Everywhere.  Patient's chart was reviewed for overdue CELESTE topics.

## 2020-11-02 ENCOUNTER — OFFICE VISIT (OUTPATIENT)
Dept: OPHTHALMOLOGY | Facility: CLINIC | Age: 80
End: 2020-11-02
Payer: MEDICARE

## 2020-11-02 DIAGNOSIS — H35.3132 NONEXUDATIVE AGE-RELATED MACULAR DEGENERATION, BILATERAL, INTERMEDIATE DRY STAGE: ICD-10-CM

## 2020-11-02 DIAGNOSIS — H35.3130 BILATERAL NONEXUDATIVE AGE-RELATED MACULAR DEGENERATION, UNSPECIFIED STAGE: ICD-10-CM

## 2020-11-02 DIAGNOSIS — H35.3131 EARLY DRY STAGE NONEXUDATIVE AGE-RELATED MACULAR DEGENERATION OF BOTH EYES: ICD-10-CM

## 2020-11-02 DIAGNOSIS — H35.363 MACULAR DRUSEN, BILATERAL: ICD-10-CM

## 2020-11-02 DIAGNOSIS — H40.1133 PRIMARY OPEN-ANGLE GLAUCOMA, BILATERAL, SEVERE STAGE: Primary | ICD-10-CM

## 2020-11-02 DIAGNOSIS — H25.013 CORTICAL AGE-RELATED CATARACT, BILATERAL: ICD-10-CM

## 2020-11-02 DIAGNOSIS — H25.13 SENILE NUCLEAR SCLEROSIS, BILATERAL: ICD-10-CM

## 2020-11-02 PROCEDURE — 99213 OFFICE O/P EST LOW 20 MIN: CPT | Mod: PBBFAC,25 | Performed by: OPHTHALMOLOGY

## 2020-11-02 PROCEDURE — 92020 GONIOSCOPY: CPT | Mod: PBBFAC | Performed by: OPHTHALMOLOGY

## 2020-11-02 PROCEDURE — 99999 PR PBB SHADOW E&M-EST. PATIENT-LVL III: CPT | Mod: PBBFAC,,, | Performed by: OPHTHALMOLOGY

## 2020-11-02 PROCEDURE — 92012 INTRM OPH EXAM EST PATIENT: CPT | Mod: S$PBB,,, | Performed by: OPHTHALMOLOGY

## 2020-11-02 PROCEDURE — 99999 PR PBB SHADOW E&M-EST. PATIENT-LVL III: ICD-10-PCS | Mod: PBBFAC,,, | Performed by: OPHTHALMOLOGY

## 2020-11-02 PROCEDURE — 92020 GONIOSCOPY: CPT | Mod: S$PBB,,, | Performed by: OPHTHALMOLOGY

## 2020-11-02 PROCEDURE — 92012 PR EYE EXAM, EST PATIENT,INTERMED: ICD-10-PCS | Mod: S$PBB,,, | Performed by: OPHTHALMOLOGY

## 2020-11-02 PROCEDURE — 92020 PR SPECIAL EYE EVAL,GONIOSCOPY: ICD-10-PCS | Mod: S$PBB,,, | Performed by: OPHTHALMOLOGY

## 2020-11-02 NOTE — PROGRESS NOTES
HPI     DLS: 7/31/20    Pt here for 4 month check;  Pt states she didn't get new glasses yet from Dr. Dudley.   Reports that she still drives, does not drive at night. Feels like her   vision is stable. Denies visual complaint.     Meds:  Cosopt BID OU   Brimonidine TID OU   Latanoprost QHS OU     1. POAG OU   2. Type 2 DM no DR   3. NS OU   4. ARMD OU   5. Ptosis OS>OD     Last edited by Audelia Portillo MD on 11/2/2020  9:16 AM. (History)        Assessment /Plan     For exam results, see Encounter Report.    Primary open-angle glaucoma, bilateral, severe stage    Senile nuclear sclerosis, bilateral    Cortical age-related cataract, bilateral    Early dry stage nonexudative age-related macular degeneration of both eyes    Nonexudative age-related macular degeneration, bilateral, intermediate dry stage    Bilateral nonexudative age-related macular degeneration, unspecified stage    Macular drusen, bilateral           Glaucoma (type and duration)    POAG severe   First HVF   2013   First photos   2017   Treatment / Drops started   Latanoprost, travatan           Family history    ?        Glaucoma meds    Latanoprost / cosopt (added - 1//15/2019)         H/O adverse rxn to glaucoma drops    none        LASERS    none        GLAUCOMA SURGERIES    none        OTHER EYE SURGERIES    none        CDR    0.75 w/ sup thinning /0.9 - sup and inf thinning         Tbase    16-20/16-20          Tmax    20/20            Ttarget    15/15           HVF    7 test 2013 to  2020 - SAD od // gen depression, SAD, IAD os (?prog os)        Gonio    3+ ou        CCT    544/525        OCT    6 test 2013 to 2020 - RNFL - decr thru out od // dec. Thru out  os        HRT   3 test 2017 to 2019 MR -  Dec. S od // dec. I, bord S/N os /// CDR 0.72 od // 0.780 os        Disc photos    2017, 2019     - Ttoday    15/16  (close to  target of 15 ou) // good resp to adding brimonidine   - Test done today  - IOP /Gonio / MRx / BAT    POAG severe  Severe  "RNFL thinning with progressive field loss   - on latanoprost qhs OU, continue  - cosopt ou bid - added  (1/2019)   -brimonidine added 5/21/2019 - good resp 19/18 --> 11/10     NS cataract OU - hold on CE - pt is not having any problems with vision presently     Macular Drusen OU  AMD OU, seeing [facility]   AREDS 2 Vitamins  Home [facility] Grid Testing   - seen by  retina - akshat rogers - saw Ai 6/1/2018 - to F/U in 6 months    Ptosis   Pt C/O droopy lid OS > OD       PLAN     GLAUCOMA  Good response to adding brimonidine - below target today 8/20/2019 (( 19/18--> 11/10))      rec phaco/IOL os - visually sign - pt wants to wait - she would like to see an optometrist to see if her glasses can be improved - has seen dr bob before - got new Rx - but did not get them made yet (9/4/2020) - she got a bill for this exam - told by her insurance that dr bob was "out of network"     If IOP goes above target consider SLT's     10/30/20  IOP just at goal  Has some mild phacomorphic narrowing in temporal quadrants OU  OK to continue present management, but if IOP worsens, would more strongly suggest CE/IOL, can do MIGS at the same time  Cataracts visually significant by VA and BAT, but patient without complaint  VA is borderline, OK to continue to monitor for now with updated MRx, but if VA worsens OD will not be legal to drive (which she still does)      F/U 4 months with IOP / HRT                  "

## 2020-12-01 ENCOUNTER — TELEPHONE (OUTPATIENT)
Dept: INTERNAL MEDICINE | Facility: CLINIC | Age: 80
End: 2020-12-01

## 2020-12-01 DIAGNOSIS — N18.30 TYPE 2 DIABETES MELLITUS WITH STAGE 3 CHRONIC KIDNEY DISEASE, WITH LONG-TERM CURRENT USE OF INSULIN: Chronic | ICD-10-CM

## 2020-12-01 DIAGNOSIS — Z79.4 TYPE 2 DIABETES MELLITUS WITH STAGE 3 CHRONIC KIDNEY DISEASE, WITH LONG-TERM CURRENT USE OF INSULIN: Chronic | ICD-10-CM

## 2020-12-01 DIAGNOSIS — E11.22 TYPE 2 DIABETES MELLITUS WITH STAGE 3 CHRONIC KIDNEY DISEASE, WITH LONG-TERM CURRENT USE OF INSULIN: Chronic | ICD-10-CM

## 2020-12-01 NOTE — TELEPHONE ENCOUNTER
----- Message from Shantell De Dios sent at 12/1/2020  3:32 PM CST -----  Contact: [phone]  Pt would like call back to be advised if she should see her cardiologist or primary

## 2020-12-02 RX ORDER — INSULIN ASPART 100 [IU]/ML
INJECTION, SUSPENSION SUBCUTANEOUS
Qty: 40 ML | Refills: 11
Start: 2020-12-02 | End: 2021-03-08

## 2020-12-02 NOTE — TELEPHONE ENCOUNTER
Hi, please confirm with her that her only diabetes medicine is insulin 70/30 injections  Please ask how much insulin she is using, 15 or 18 units twice per day.  I will probably recommend that she lower the dose.  She needs to take these injections with meals, breakfast and dinner.  Please let me know,  Thank you, Ryan Carlson

## 2020-12-02 NOTE — TELEPHONE ENCOUNTER
Pt states that her blood sugar has been dropping at night. The lowest was 46. Pt also c/o back and shoulder pain, pt can't rate her pain. Pt declined making an appt to be seen stating that she just panicked and is ok.

## 2020-12-02 NOTE — TELEPHONE ENCOUNTER
Pt states that she only takes Novolog 70/30, 15 units with breakfast and dinner. Pt also states that her blood sugar dropped to 76 last night. Pt obliged to making an appt to see someone. Offered pt an appt to see another provider, pt declined. Only wants to see PCP. Appt scheduled for 12/21.

## 2020-12-02 NOTE — TELEPHONE ENCOUNTER
Hi, please call her one more time -- I recommend that she decrease her dinner time insulin to 12 units instead of 15.  Let me know if patient has any more questions.  Thank you, Ryan Carlson

## 2020-12-07 ENCOUNTER — PATIENT OUTREACH (OUTPATIENT)
Dept: ADMINISTRATIVE | Facility: HOSPITAL | Age: 80
End: 2020-12-07

## 2020-12-07 NOTE — PROGRESS NOTES
Health Maintenance Due   Topic Date Due    Influenza Vaccine (1) 08/01/2020     Chart review completed.

## 2020-12-21 ENCOUNTER — OFFICE VISIT (OUTPATIENT)
Dept: INTERNAL MEDICINE | Facility: CLINIC | Age: 80
End: 2020-12-21
Payer: MEDICARE

## 2020-12-21 ENCOUNTER — PATIENT OUTREACH (OUTPATIENT)
Dept: ADMINISTRATIVE | Facility: OTHER | Age: 80
End: 2020-12-21

## 2020-12-21 VITALS
WEIGHT: 181 LBS | SYSTOLIC BLOOD PRESSURE: 140 MMHG | HEART RATE: 82 BPM | OXYGEN SATURATION: 99 % | HEIGHT: 60 IN | DIASTOLIC BLOOD PRESSURE: 60 MMHG | BODY MASS INDEX: 35.53 KG/M2

## 2020-12-21 DIAGNOSIS — Z79.4 TYPE 2 DIABETES MELLITUS WITH STAGE 3A CHRONIC KIDNEY DISEASE, WITH LONG-TERM CURRENT USE OF INSULIN: ICD-10-CM

## 2020-12-21 DIAGNOSIS — E11.22 TYPE 2 DIABETES MELLITUS WITH STAGE 3A CHRONIC KIDNEY DISEASE, WITH LONG-TERM CURRENT USE OF INSULIN: ICD-10-CM

## 2020-12-21 DIAGNOSIS — I25.83 CORONARY ARTERY DISEASE DUE TO LIPID RICH PLAQUE: Primary | ICD-10-CM

## 2020-12-21 DIAGNOSIS — I25.10 CORONARY ARTERY DISEASE DUE TO LIPID RICH PLAQUE: Primary | ICD-10-CM

## 2020-12-21 DIAGNOSIS — N18.31 TYPE 2 DIABETES MELLITUS WITH STAGE 3A CHRONIC KIDNEY DISEASE, WITH LONG-TERM CURRENT USE OF INSULIN: ICD-10-CM

## 2020-12-21 PROCEDURE — 99999 PR PBB SHADOW E&M-EST. PATIENT-LVL V: ICD-10-PCS | Mod: PBBFAC,,, | Performed by: INTERNAL MEDICINE

## 2020-12-21 PROCEDURE — 99999 PR PBB SHADOW E&M-EST. PATIENT-LVL V: CPT | Mod: PBBFAC,,, | Performed by: INTERNAL MEDICINE

## 2020-12-21 PROCEDURE — 99215 OFFICE O/P EST HI 40 MIN: CPT | Mod: PBBFAC | Performed by: INTERNAL MEDICINE

## 2020-12-21 PROCEDURE — 99214 OFFICE O/P EST MOD 30 MIN: CPT | Mod: S$PBB,,, | Performed by: INTERNAL MEDICINE

## 2020-12-21 PROCEDURE — 99214 PR OFFICE/OUTPT VISIT, EST, LEVL IV, 30-39 MIN: ICD-10-PCS | Mod: S$PBB,,, | Performed by: INTERNAL MEDICINE

## 2020-12-21 NOTE — PROGRESS NOTES
Subjective:       Patient ID: Eliu Paz is a 80 y.o. female.    Chief Complaint: Blood Sugar Problem, Back Pain, and Shoulder Pain    Patient is here for followup for chronic conditions.    Worried abt her heart and legs.    Feels a pressure in the chest and easily out of breath. Using TNG spray helps her feel btr.    Some low sugars, one noc to 49. Since lower insulin dose to 12U bid no longer any lows.    Worried abt her legs since she has chronic swelling and chronic guttate hypomelanosis.    Review of Systems   Constitutional: Negative for activity change, chills, fatigue and fever.   HENT: Negative for congestion.    Respiratory: Positive for shortness of breath. Negative for chest tightness.    Cardiovascular: Positive for leg swelling. Negative for chest pain and palpitations.   Gastrointestinal: Positive for constipation. Negative for abdominal distention, abdominal pain, nausea and vomiting.   Genitourinary: Negative for decreased urine volume and dysuria.   Musculoskeletal: Negative for arthralgias and myalgias.   Skin: Negative for rash.   Neurological: Negative for weakness.   Psychiatric/Behavioral: Negative for confusion.           Objective:      Physical Exam  Constitutional:       General: She is not in acute distress.     Appearance: She is well-developed. She is not diaphoretic.   HENT:      Head: Normocephalic and atraumatic.   Eyes:      Comments: No scleral pallor     Neck:      Musculoskeletal: Normal range of motion.   Cardiovascular:      Rate and Rhythm: Normal rate and regular rhythm.      Heart sounds: Normal heart sounds.   Pulmonary:      Effort: Pulmonary effort is normal.      Breath sounds: Normal breath sounds.   Abdominal:      General: Bowel sounds are normal. There is no distension.      Palpations: Abdomen is soft. There is no mass.      Tenderness: There is no abdominal tenderness. There is no guarding or rebound.      Hernia: No hernia is present.      Comments:       Musculoskeletal:      Comments: Trace addi     Skin:     Comments: Skin is dry as well   Psychiatric:         Behavior: Behavior normal.         Assessment:       1. Coronary artery disease due to lipid rich plaque    2. Type 2 diabetes mellitus with stage 3a chronic kidney disease, with long-term current use of insulin        Plan:       Eliu Steinberg was seen today for blood sugar problem, back pain and shoulder pain.    Diagnoses and all orders for this visit:    Coronary artery disease due to lipid rich plaque  -     Ambulatory referral/consult to Cardiology; Future  She will go to ED if any chest pains/ comes on that does not go away after a few minutes    Type 2 diabetes mellitus with stage 3a chronic kidney disease, with long-term current use of insulin  No more hypos    Chronic ankle edema is not new, I do not detect signs of DVT      Health Maintenance       Date Due Completion Date    Influenza Vaccine (1) 2020 (Declined)    Override on 2019: Declined    Override on 2017: Declined (per provider note)    Override on 10/15/2015: Not Clinically Appropriate (declines)    Shingles Vaccine (1 of 2) 2021 (Originally 3/17/1990) ---    Hemoglobin A1c 03/15/2021 9/15/2020    Foot Exam 2021 3/19/2020 (Done)    Override on 3/19/2020: Done    Override on 2018: Done    Override on 2018: Done    Override on 2017: Done (podiatry)    Override on 2016: Done    Override on 2016: Done (by cardiologist)    Override on 2015: Done    Lipid Panel 2021 3/19/2020    Eye Exam 2021    Colonoscopy 2024    DEXA SCAN 2025    TETANUS VACCINE 2027 (Declined)    Override on 2017: Declined (per provider note)          No follow-ups on file.    Future Appointments   Date Time Provider Department Center   2020 10:00 AM Manuel Aguilar MD [facility] CARDIO [facility]   3/1/2021  8:30 AM Zuleika Francis MD  [facility]   3/15/2021  8:40 AM Ryan Carlson MD [facility] W

## 2020-12-24 ENCOUNTER — OFFICE VISIT (OUTPATIENT)
Dept: CARDIOLOGY | Facility: CLINIC | Age: 80
End: 2020-12-24
Payer: MEDICARE

## 2020-12-24 ENCOUNTER — LAB VISIT (OUTPATIENT)
Dept: LAB | Facility: HOSPITAL | Age: 80
End: 2020-12-24
Payer: MEDICARE

## 2020-12-24 VITALS
OXYGEN SATURATION: 98 % | DIASTOLIC BLOOD PRESSURE: 66 MMHG | BODY MASS INDEX: 36.27 KG/M2 | WEIGHT: 184.75 LBS | HEART RATE: 56 BPM | HEIGHT: 60 IN | SYSTOLIC BLOOD PRESSURE: 148 MMHG

## 2020-12-24 DIAGNOSIS — R07.9 EXERTIONAL CHEST PAIN: Primary | ICD-10-CM

## 2020-12-24 DIAGNOSIS — R60.0 EDEMA OF BOTH LOWER LEGS: ICD-10-CM

## 2020-12-24 DIAGNOSIS — E66.9 OBESITY, DIABETES, AND HYPERTENSION SYNDROME: ICD-10-CM

## 2020-12-24 DIAGNOSIS — Z95.1 S/P CABG (CORONARY ARTERY BYPASS GRAFT): ICD-10-CM

## 2020-12-24 DIAGNOSIS — I25.10 CORONARY ARTERY DISEASE DUE TO LIPID RICH PLAQUE: ICD-10-CM

## 2020-12-24 DIAGNOSIS — E11.59 OBESITY, DIABETES, AND HYPERTENSION SYNDROME: ICD-10-CM

## 2020-12-24 DIAGNOSIS — I15.2 OBESITY, DIABETES, AND HYPERTENSION SYNDROME: ICD-10-CM

## 2020-12-24 DIAGNOSIS — E11.69 OBESITY, DIABETES, AND HYPERTENSION SYNDROME: ICD-10-CM

## 2020-12-24 DIAGNOSIS — I10 ESSENTIAL HYPERTENSION: ICD-10-CM

## 2020-12-24 DIAGNOSIS — I25.83 CORONARY ARTERY DISEASE DUE TO LIPID RICH PLAQUE: ICD-10-CM

## 2020-12-24 DIAGNOSIS — Z87.19 HISTORY OF GI BLEED: ICD-10-CM

## 2020-12-24 DIAGNOSIS — E11.42 DM TYPE 2 WITH DIABETIC PERIPHERAL NEUROPATHY: ICD-10-CM

## 2020-12-24 DIAGNOSIS — R06.09 DOE (DYSPNEA ON EXERTION): ICD-10-CM

## 2020-12-24 DIAGNOSIS — R07.9 EXERTIONAL CHEST PAIN: ICD-10-CM

## 2020-12-24 LAB
BASOPHILS # BLD AUTO: 0.03 K/UL (ref 0–0.2)
BASOPHILS NFR BLD: 0.4 % (ref 0–1.9)
BNP SERPL-MCNC: 126 PG/ML (ref 0–99)
DIFFERENTIAL METHOD: ABNORMAL
EOSINOPHIL # BLD AUTO: 0.1 K/UL (ref 0–0.5)
EOSINOPHIL NFR BLD: 1.9 % (ref 0–8)
ERYTHROCYTE [DISTWIDTH] IN BLOOD BY AUTOMATED COUNT: 15.3 % (ref 11.5–14.5)
HCT VFR BLD AUTO: 37.7 % (ref 37–48.5)
HGB BLD-MCNC: 11.2 G/DL (ref 12–16)
IMM GRANULOCYTES # BLD AUTO: 0.01 K/UL (ref 0–0.04)
IMM GRANULOCYTES NFR BLD AUTO: 0.1 % (ref 0–0.5)
LYMPHOCYTES # BLD AUTO: 1.9 K/UL (ref 1–4.8)
LYMPHOCYTES NFR BLD: 27.4 % (ref 18–48)
MCH RBC QN AUTO: 27.1 PG (ref 27–31)
MCHC RBC AUTO-ENTMCNC: 29.7 G/DL (ref 32–36)
MCV RBC AUTO: 91 FL (ref 82–98)
MONOCYTES # BLD AUTO: 0.8 K/UL (ref 0.3–1)
MONOCYTES NFR BLD: 11.5 % (ref 4–15)
NEUTROPHILS # BLD AUTO: 4.1 K/UL (ref 1.8–7.7)
NEUTROPHILS NFR BLD: 58.7 % (ref 38–73)
NRBC BLD-RTO: 0 /100 WBC
PLATELET # BLD AUTO: 152 K/UL (ref 150–350)
PMV BLD AUTO: 11 FL (ref 9.2–12.9)
RBC # BLD AUTO: 4.14 M/UL (ref 4–5.4)
WBC # BLD AUTO: 7.02 K/UL (ref 3.9–12.7)

## 2020-12-24 PROCEDURE — 99215 PR OFFICE/OUTPT VISIT, EST, LEVL V, 40-54 MIN: ICD-10-PCS | Mod: S$PBB,,, | Performed by: NURSE PRACTITIONER

## 2020-12-24 PROCEDURE — 93010 EKG 12-LEAD: ICD-10-PCS | Mod: S$PBB,,, | Performed by: INTERNAL MEDICINE

## 2020-12-24 PROCEDURE — 83880 ASSAY OF NATRIURETIC PEPTIDE: CPT

## 2020-12-24 PROCEDURE — 99215 OFFICE O/P EST HI 40 MIN: CPT | Mod: PBBFAC | Performed by: NURSE PRACTITIONER

## 2020-12-24 PROCEDURE — 99999 PR PBB SHADOW E&M-EST. PATIENT-LVL V: ICD-10-PCS | Mod: PBBFAC,,, | Performed by: NURSE PRACTITIONER

## 2020-12-24 PROCEDURE — 99215 OFFICE O/P EST HI 40 MIN: CPT | Mod: S$PBB,,, | Performed by: NURSE PRACTITIONER

## 2020-12-24 PROCEDURE — 99999 PR PBB SHADOW E&M-EST. PATIENT-LVL V: CPT | Mod: PBBFAC,,, | Performed by: NURSE PRACTITIONER

## 2020-12-24 PROCEDURE — 36415 COLL VENOUS BLD VENIPUNCTURE: CPT

## 2020-12-24 PROCEDURE — 93010 ELECTROCARDIOGRAM REPORT: CPT | Mod: S$PBB,,, | Performed by: INTERNAL MEDICINE

## 2020-12-24 PROCEDURE — 93005 ELECTROCARDIOGRAM TRACING: CPT | Mod: PBBFAC | Performed by: INTERNAL MEDICINE

## 2020-12-24 PROCEDURE — 85025 COMPLETE CBC W/AUTO DIFF WBC: CPT

## 2020-12-24 RX ORDER — CLOPIDOGREL BISULFATE 75 MG/1
75 TABLET ORAL DAILY
Qty: 30 TABLET | Refills: 11 | Status: SHIPPED | OUTPATIENT
Start: 2020-12-24 | End: 2021-12-21 | Stop reason: SDUPTHER

## 2020-12-24 NOTE — PATIENT INSTRUCTIONS
We discussed keeping a log of home blood pressures and notifying the office if it is consistently running above 130/80 mmHg. I have asked to send me readings via [facility] in 1-2 weeks.       Graded exercise for 30 minutes a day for at least 5 days a week or a total of 150 minutes a week.  You can include walkng, stationary bike, or swim for 30 minutes .  Whatever exercise you choose, make sure you are working hard enough to increase your heart rate.        Lab test HGBA1C goal should be less than 7. Follow-up with your doctor as planned  Recommend diet with low white carbohydrates (limit white breads, pastas, rice) starchy vegetables like potatoes.    Other foods high in carbohydrates and can significantly raise blood sugar levels in diabetics:  Corn  Milk  Fruit other than berries  Juice, soda, punch, sweetened tea.  Beer  Desserts, baked goods, candy, ice cream        Low-Salt Diet  This diet removes foods that are high in salt. It also limits the amount of salt you use when cooking. It is most often used for people with high blood pressure, edema (fluid retention), and kidney, liver, or heart disease.  Table salt contains the mineral sodium. Your body needs sodium to work normally. But too much sodium can make your health problems worse. Your healthcare provider is recommending a low-salt (also called low-sodium) diet for you. Your total daily allowance of salt is 1,500 to 2,300 milligrams (mg). It is less than 1 teaspoon of table salt. This means you can have only about 500 to 700 mg of sodium at each meal. People with certain health problems should limit salt intake to the lower end of the recommended range.    When you cook, dont add much salt. If you can cook without using salt, even better. Dont add salt to your food at the table.  When shopping, read food labels. Salt is often called sodium on the label. Choose foods that are salt-free, low salt, or very low salt. Note that foods with reduced salt may  not lower your salt intake enough.    Beans, potatoes, and pasta  Ok: Dry beans, split peas, lentils, potatoes, rice, macaroni, pasta, spaghetti without added salt  Avoid: Potato chips, tortilla chips, and similar products  Breads and cereals  Ok: Low-sodium breads, rolls, cereals, and cakes; low-salt crackers, matzo crackers  Avoid: Salted crackers, pretzels, popcorn, [place] toast, pancakes, muffins  Dairy  Ok: Milk, chocolate milk, hot chocolate mix, low-salt cheeses, and yogurt  Avoid: Processed cheese and cheese spreads; Roquefort, Camembert, and cottage cheese; buttermilk, instant breakfast drink  Desserts  Ok: Ice cream, frozen yogurt, juice bars, gelatin, cookies and pies, sugar, honey, jelly, hard candy  Avoid: Most pies, cakes and cookies prepared or processed with salt; instant pudding  Drinks  Ok: Tea, coffee, fizzy (carbonated) drinks, juices  Avoid: Flavored coffees, electrolyte replacement drinks, sports drinks  Meats  Ok: All fresh meat, fish, poultry, low-salt tuna, eggs, egg substitute  Avoid: Smoked, pickled, brine-cured, or salted meats and fish. This includes kenney, chipped beef, corned beef, hot dogs, deli meats, ham, kosher meats, salt pork, sausage, canned tuna, salted codfish, smoked salmon, herring, sardines, or anchovies.  Seasonings and spices  Ok: Most seasonings are okay. Good substitutes for salt include: fresh herb blends, hot sauce, lemon, garlic, peña, vinegar, dry mustard, parsley, cilantro, horseradish, tomato paste, regular margarine, mayonnaise, unsalted butter, cream cheese, vegetable oil, cream, low-salt salad dressing and gravy.  Avoid: Regular ketchup, relishes, pickles, soy sauce, teriyaki sauce, Worcestershire sauce, BBQ sauce, tartar sauce, meat tenderizer, chili sauce, regular gravy, regular salad dressing, salted butter  Soups  Ok: Low-salt soups and broths made with allowed foods  Avoid: Bouillon cubes, soups with smoked or salted meats, regular soup and  broth  Vegetables  Ok: Most vegetables are okay; also low-salt tomato and vegetable juices  Avoid: Sauerkraut and other brine-soaked vegetables; pickles and other pickled vegetables; tomato juice, olives  Date Last Reviewed: 8/1/2016  © 2192-9205 [employer]. [address] [place] [id number]. All rights reserved. This information is not intended as a substitute for professional medical care. Always follow your healthcare professional's instructions.

## 2020-12-24 NOTE — PROGRESS NOTES
Cardiology Clinic Note    Subjective:   Chief Complaint: Shortness of Breath (with exertion x 1 month), Numbness (left arm ), Leg Swelling (bilateral), and Coronary artery disease due to lipid rich plaque        Medical History:    CAD s/p CABG x1 in 1994 at [facility]  -s/p PCI at [facility] 3/10/10  Bilateral carotid artery disease, 20-39%  HLD  HTN  DM type II  Smoked in college  Obesity  GERD  gastroparesis    History of Present Illness: Eliu Paz is a 80 y.o. AA female patient of Dr Aguilar who presents for  Shortness of Breath (with exertion x 1 month), Numbness (left arm ), Leg Swelling (bilateral), and Coronary artery disease due to lipid rich plaque.  She was last seen in cardiology clinic 7/2019 and overdue for f/u visit.  She reports for the past 4-5 weeks she began feeling a new sensation of exertional heaviness/pressure in midsternal chest area lasting minutes with occasional numbness sensation to her left upper arm and fatigue. This has occurred about 4-5 times.  Prior to this, She could walk for 30 minutes but now she is feeling more fatigue and can only tolerate walking 5-10 minutes.   She denies any symptoms of orthopnea, palpitations, lightheadedness, presyncope, syncope.   She is not checking home BP.  She reports home weight decreased, but clinic weights show increase.  She is not limiting her Na intake and cooks with Na.  She has bilateral lower leg swelling that resolves at night with rest.  Seems to occur during the day  when standing and moving.  She wears compression hose during the day.      Review of Systems   Constitution: Positive for weight loss. Negative for fever and weight gain.   HENT: Positive for hearing loss (chronic-history of hearing aids-did not work).    Eyes: Negative for vision loss in left eye and vision loss in right eye.        No amaurosis fugax   Cardiovascular: Positive for chest pain, claudication, dyspnea on exertion, leg swelling and orthopnea. Negative for cyanosis,  irregular heartbeat, near-syncope, palpitations and syncope.        As per HPI above   Respiratory: Positive for shortness of breath. Negative for cough and hemoptysis.         Denies JEAN symptoms   Hematologic/Lymphatic: Negative for bleeding problem.   Musculoskeletal: Positive for joint pain (knee pain-chronic).        Uses tramodol rarely for pain   Gastrointestinal: Negative for abdominal pain, constipation, diarrhea, melena, nausea and vomiting.   Genitourinary:        No change in urinary output   Neurological: Negative for light-headedness and vertigo.   Psychiatric/Behavioral: Negative for altered mental status.   Allergic/Immunologic:        Drug allergies listed elsewhere if present       Social History:  Eliu Steinberg reports that she quit smoking about 57 years ago. She quit after 2.00 years of use. She has never used smokeless tobacco. She reports that she does not drink alcohol or use drugs.      Family History:  Eliu Steinberg's family history includes Blindness in her brother; Diabetes in her brother, father, maternal grandmother, paternal aunt, and paternal uncle.      The 10-year ASCVD risk score (Cecille CANTU Jr., et al., 2013) is: 33.7%    Values used to calculate the score:      Age: 80 years      Sex: Female      Is Non- : Yes      Diabetic: Yes      Tobacco smoker: No      Systolic Blood Pressure: 148 mmHg      Is BP treated: Yes      HDL Cholesterol: 59 mg/dL      Total Cholesterol: 139 mg/dL       Medications:  Outpatient Encounter Medications as of 12/24/2020   Medication Sig Dispense Refill    amLODIPine (NORVASC) 10 MG tablet Take 1 tablet (10 mg total) by mouth once daily. 90 tablet 11    blood sugar diagnostic (ONETOUCH VERIO) Strp Inject 1 each into the skin 3 (three) times daily with meals. Use to test blood sugar four times a day. 200 strip 11    brimonidine 0.2% (ALPHAGAN) 0.2 % Drop Place 1 drop into both eyes 3 (three) times daily. 45 mL 3    candesartan (ATACAND) 4 MG  "tablet TAKE 1 TABLET (4 MG TOTAL) BY MOUTH ONCE DAILY. 90 tablet 3    carvediloL (COREG) 25 MG tablet Take 1 tablet (25 mg total) by mouth 2 (two) times daily. 180 tablet 11    ciclopirox (PENLAC) 8 % Soln Apply topically nightly. 6.6 mL 11    clobetasol 0.05% (TEMOVATE) 0.05 % Oint Apply small amount to vulva area twice a day for 4 weeks then once a day for 4 weeks then once a day on Mondays and Thursdays (Patient not taking: Reported on 12/24/2020) 60 g 1    diclofenac sodium (VOLTAREN) 1 % Gel Apply 2 g topically 4 (four) times daily. 100 g 0    dorzolamide-timolol 2-0.5% (COSOPT) 22.3-6.8 mg/mL ophthalmic solution INSTILL 1 DROP INTO BOTH EYES TWICE A DAY 30 mL 1    ferrous sulfate (FEOSOL) 325 mg (65 mg iron) Tab tablet Take 1 tablet (325 mg total) by mouth daily with breakfast.  0    fluocinonide (LIDEX) 0.05 % external solution Apply topically 2 (two) times daily. (Patient not taking: Reported on 12/24/2020) 60 mL 1    insulin asp prt-insulin aspart, NOVOLOG 70/30, (NOVOLOG MIX 70-30 U-100 INSULN) 100 unit/mL (70-30) Soln 15 units with breakfast and 12 units with dinner 40 mL 11    insulin syringe-needle U-100 0.5 mL 31 gauge x 5/16" Syrg USE TO INJECT TWICE DAILY WITH INSULIN INJECTIONS 200 each 17    isosorbide mononitrate (IMDUR) 30 MG 24 hr tablet Take 1 tablet (30 mg total) by mouth once daily. 90 tablet 11    ketoconazole (NIZORAL) 2 % cream Apply topically once daily. (Patient not taking: Reported on 12/24/2020) 30 g 1    ketoconazole (NIZORAL) 2 % shampoo APPLY TOPICALLY EVERY OTHER DAY. 120 mL 5    lactulose (CHRONULAC) 10 gram/15 mL solution TAKE 30 MLS (20 G TOTAL) BY MOUTH DAILY AS NEEDED (CONSTIPATION). 2838 mL 3    latanoprost 0.005 % ophthalmic solution Place 1 drop into both eyes every evening. 3 Bottle 3    lidocaine (LIDODERM) 5 % Place 1 patch onto the skin once daily. Remove & Discard patch within 12 hours or as directed by MD (Patient not taking: Reported on 12/24/2020) 7 " patch 0    nitroGLYCERIN 0.4 MG/DOSE TL SPRY (NITROLINGUAL) 400 mcg/spray spray PLACE 2 SPRAYS UNDER THE TONGUE EVERY 5 MINUTES AS NEEDED FOR CHEST PAIN 36 g 11    omeprazole (PRILOSEC) 40 MG capsule TAKE ONE CAPSULE BY MOUTH EVERY MORNING 90 capsule 3    rosuvastatin (CRESTOR) 40 MG Tab Take 1 tablet (40 mg total) by mouth once daily. 90 tablet 11    spironolactone (ALDACTONE) 25 MG tablet Take 1 tablet (25 mg total) by mouth once daily. 90 tablet 11    tiZANidine (ZANAFLEX) 4 MG tablet 1/2 TO 1 TAB EVERY 6 HOURS AS NEEDED 360 tablet 2    traMADol (ULTRAM) 50 mg tablet TAKE 1 TABLET BY MOUTH TWICE A DAY AS NEEDED 40 tablet 1    traMADoL (ULTRAM) 50 mg tablet Take 1 tablet (50 mg total) by mouth every 6 (six) hours as needed for Pain. (Patient not taking: Reported on 2020) 12 tablet 0    triamcinolone acetonide 0.1% (KENALOG) 0.1 % ointment Apply topically 2 (two) times daily. Apply small amount to itchy areas on body BID PRN.  Do not use on face or in groin. (Patient not taking: Reported on 2020) 80 g 1     No facility-administered encounter medications on file as of 2020.          Objective:   Right Arm BP - Sittin/67 (20 0936)  Left Arm BP - Sittin/66 (20 0936)      BP (!) 148/66 (BP Location: Left arm, Patient Position: Sitting, BP Method: Large (Automatic))   Pulse (!) 56   Ht 5' (1.524 m)   Wt 83.8 kg (184 lb 11.9 oz)   SpO2 98%   BMI 36.08 kg/m²       Physical Exam  Vitals signs reviewed.   Constitutional:       General: She is not in acute distress.     Appearance: She is well-developed. She is obese. She is not ill-appearing, toxic-appearing or diaphoretic.   HENT:      Head: Normocephalic and atraumatic.   Eyes:      General: No scleral icterus.     Extraocular Movements: Extraocular movements intact.      Pupils: Pupils are equal, round, and reactive to light.   Neck:      Musculoskeletal: Neck supple. No muscular tenderness.      Thyroid: No  thyromegaly.      Trachea: No tracheal deviation.      Comments: Questionable JVD  Cardiovascular:      Rate and Rhythm: Normal rate and regular rhythm.      Chest Wall: PMI is not displaced. No thrill.      Pulses:           Carotid pulses are 2+ on the right side and 2+ on the left side.       Radial pulses are 2+ on the right side and 2+ on the left side.        Dorsalis pedis pulses are 1+ on the right side and 1+ on the left side.      Heart sounds: S1 normal and S2 normal. Murmur present. Systolic murmur present with a grade of 2/6. No friction rub. No gallop.    Pulmonary:      Effort: Pulmonary effort is normal. No respiratory distress.      Breath sounds: Normal breath sounds. No stridor. No wheezing, rhonchi or rales.   Chest:      Chest wall: No tenderness.   Abdominal:      General: Bowel sounds are normal. There is no distension or abdominal bruit.      Palpations: Abdomen is soft.      Tenderness: There is no abdominal tenderness. There is no guarding.      Comments: Central adiposity   Musculoskeletal:      Right lower le+ Edema present.      Left lower le+ Edema present.   Lymphadenopathy:      Cervical: No cervical adenopathy.   Skin:     General: Skin is warm and dry.      Comments: Median sternotomy scar well healed     Neurological:      Mental Status: She is alert and oriented to person, place, and time.   Psychiatric:         Mood and Affect: Mood normal.         Behavior: Behavior normal.         Thought Content: Thought content normal.         Judgment: Judgment normal.         Lab Results   Component Value Date     2020    K 4.3 2020     2020    CO2 20 (L) 2020    BUN 29 (H) 2020    CREATININE 1.4 2020     (H) 2020    BNP 89 10/20/2016    HGBA1C 7.5 (H) 09/15/2020    MG 1.8 2019    AST 25 2020    ALT 15 2020    ALKPHOS 80 2020    ALBUMIN 3.8 2020    PROT 8.1 2020    BILITOT 0.4 2020     WBC 8.60 09/18/2020    HGB 12.2 09/18/2020    HCT 36.6 (L) 09/18/2020    MCV 86 09/18/2020     09/18/2020    INR 1.0 08/01/2019    TSH 4.775 (H) 12/27/2018    CHOL 139 03/19/2020    HDL 59 03/19/2020    LDLCALC 66.6 03/19/2020    TRIG 67 03/19/2020         Reviewed:   []  Stress test   []  Angiography   [x]  Echocardiogram   [x]  Labs   [x]  Other:  Old records           Assessment/Plan:     Eliu Paz was seen today for Shortness of Breath (with exertion x 1 month), Numbness (left arm ), Leg Swelling (bilateral), and Coronary artery disease due to lipid rich plaque      Coronary artery disease due to lipid rich plaque-ASA was D/lyly in August after GIB, will Rx Plavix  S/P CABG (coronary artery bypass graft)  -continue GDMT   -     clopidogreL (PLAVIX) 75 mg tablet; Take 1 tablet (75 mg total) by mouth once daily.  Dispense: 30 tablet; Refill: 11    Exertional chest pain-very concerning for angina due to #1  COATES (dyspnea on exertion)  -     Cardiac PET Scan Stress; Future; Expected date: 12/24/2020  -     Echo Color Flow Doppler? Yes; Future  -     IN OFFICE EKG 12-LEAD (to Muse)  -     CBC Auto Differential; Future; Expected date: 12/24/2020  -     BNP; Future; Expected date: 12/24/2020     Hyperlipidemia, unspecified hyperlipidemia type  LDL at goal <70. No changes  Continue statin      Essential hypertension-borderline controlled  -continue medication regimen  -encouraged Home BP monitoring using log sheet provided and report if >130/80   Continue current regimen   Encouraged increased CV exercise to 30 minutes a day for 5 days a week as tolerated      CKD (chronic kidney disease) stage 3, GFR 30-59 ml/min-stable  Continue to f/u with PCP       Type 2 diabetes mellitus with diabetic polyneuropathy-A1C not at goal <7.   F/U with PCP as planned  -CHO reduction       History of GI bleed-denies signs of bleeding  -     CBC Auto Differential; Future; Expected date: 12/24/2020    Obesity, diabetes, and  hypertension syndrome-stable  -encouraged weight loss efforts    Edema of both lower legs-etiology includes venous insufficiency or LV systolic or diastolic dysfunction so ordering ECHO   Discussed Limiting sodium intake   Continue compression stockings during the day   Keep legs elevated   -     BNP; Future; Expected date: 12/24/2020    This patient was discussed and examined with MD Aguilar  due to complexity of patient's diagnoses  JEANNE Muller, FNP-BC   Cardiology Consult    Unless there are intervening problems, patient should return for re-evaluation in Follow up in about 4 weeks (around 1/21/2021).

## 2020-12-24 NOTE — LETTER
December 24, 2020      Ryan Carlson MD  [address]  [facility] [address]           [facility] [address] [address]  [address]  [place] [address]  Phone: [phone]          Patient: Eliu Paz   MR Number: [id number]   YOB: 1940   Date of Visit: 12/24/2020       Dear Dr. Ryan Carlson:    Thank you for referring Eliu Paz to me for evaluation. Attached you will find relevant portions of my assessment and plan of care.    If you have questions, please do not hesitate to call me. I look forward to following Eliu Paz along with you.    Sincerely,    Rolanda Navarrete, NP    Enclosure  CC:  No Recipients    If you would like to receive this communication electronically, please contact [email or web address] or [phone] to request more information on "[employer] Link access.    For providers and/or their staff who would like to refer a patient to [facility], please contact us through our one-stop-shop provider referral line, [facility] , at [phone].    If you feel you have received this communication in error or would no longer like to receive these types of communications, please e-mail [email or web address]

## 2021-01-06 ENCOUNTER — TELEPHONE (OUTPATIENT)
Dept: CARDIOLOGY | Facility: CLINIC | Age: 81
End: 2021-01-06

## 2021-01-08 ENCOUNTER — HOSPITAL ENCOUNTER (OUTPATIENT)
Dept: CARDIOLOGY | Facility: HOSPITAL | Age: 81
Discharge: HOME | End: 2021-01-08
Attending: NURSE PRACTITIONER
Payer: MEDICARE

## 2021-01-08 VITALS
WEIGHT: 178 LBS | BODY MASS INDEX: 34.95 KG/M2 | DIASTOLIC BLOOD PRESSURE: 62 MMHG | SYSTOLIC BLOOD PRESSURE: 117 MMHG | HEART RATE: 72 BPM | HEIGHT: 60 IN

## 2021-01-08 VITALS — HEART RATE: 72 BPM | SYSTOLIC BLOOD PRESSURE: 117 MMHG | DIASTOLIC BLOOD PRESSURE: 62 MMHG

## 2021-01-08 DIAGNOSIS — R07.9 EXERTIONAL CHEST PAIN: ICD-10-CM

## 2021-01-08 DIAGNOSIS — I25.83 CORONARY ARTERY DISEASE DUE TO LIPID RICH PLAQUE: ICD-10-CM

## 2021-01-08 DIAGNOSIS — R06.09 DOE (DYSPNEA ON EXERTION): ICD-10-CM

## 2021-01-08 DIAGNOSIS — I25.10 CORONARY ARTERY DISEASE DUE TO LIPID RICH PLAQUE: ICD-10-CM

## 2021-01-08 LAB
% MYOCARDIUM- DEFECT 1: 18 %
AV INDEX (PROSTH): 0.86
AV MEAN GRADIENT: 4 MMHG
AV PEAK GRADIENT: 7 MMHG
AV VALVE AREA: 2.66 CM2
AV VELOCITY RATIO: 0.72
BSA FOR ECHO PROCEDURE: 1.85 M2
CFR FLOW - ANTERIOR: 0.92
CFR FLOW - INFERIOR: 1.29
CFR FLOW - LATERAL: 1.27
CFR FLOW - MAX: 1.93
CFR FLOW - MIN: 0.65
CFR FLOW - SEPTAL: 0.88
CFR FLOW - WHOLE HEART: 1.09
CFR FLOW- DEFECT 1: 0.84 CC/MIN/G
CV ECHO LV RWT: 0.38 CM
CV PHARM DOSE: 46.8 MG
CV STRESS BASE HR: 67 BPM
DIASTOLIC BLOOD PRESSURE: 63 MMHG
DOP CALC AO PEAK VEL: 1.29 M/S
DOP CALC AO VTI: 29.08 CM
DOP CALC LVOT AREA: 3.1 CM2
DOP CALC LVOT DIAMETER: 1.98 CM
DOP CALC LVOT PEAK VEL: 0.93 M/S
DOP CALC LVOT STROKE VOLUME: 77.21 CM3
DOP CALCLVOT PEAK VEL VTI: 25.09 CM
E WAVE DECELERATION TIME: 211.25 MSEC
E/A RATIO: 0.78
E/E' RATIO: 11.54 M/S
ECHO LV POSTERIOR WALL: 0.8 CM (ref 0.6–1.1)
END DIASTOLIC INDEX-HIGH: 170 ML/M2
END SYSTOLIC INDEX-HIGH: 70 ML/M2
FRACTIONAL SHORTENING: 32 % (ref 28–44)
INTERVENTRICULAR SEPTUM: 0.93 CM (ref 0.6–1.1)
LA MAJOR: 4.7 CM
LA MINOR: 4.83 CM
LA WIDTH: 3.84 CM
LEFT ATRIUM SIZE: 3.54 CM
LEFT ATRIUM VOLUME INDEX MOD: 33.5 ML/M2
LEFT ATRIUM VOLUME INDEX: 31 ML/M2
LEFT ATRIUM VOLUME MOD: 59.56 CM3
LEFT ATRIUM VOLUME: 55.05 CM3
LEFT INTERNAL DIMENSION IN SYSTOLE: 2.87 CM (ref 2.1–4)
LEFT VENTRICLE DIASTOLIC VOLUME INDEX: 44.62 ML/M2
LEFT VENTRICLE DIASTOLIC VOLUME: 79.27 ML
LEFT VENTRICLE MASS INDEX: 64 G/M2
LEFT VENTRICLE SYSTOLIC VOLUME INDEX: 17.6 ML/M2
LEFT VENTRICLE SYSTOLIC VOLUME: 31.34 ML
LEFT VENTRICULAR INTERNAL DIMENSION IN DIASTOLE: 4.22 CM (ref 3.5–6)
LEFT VENTRICULAR MASS: 113.33 G
LV LATERAL E/E' RATIO: 9.38 M/S
LV SEPTAL E/E' RATIO: 15 M/S
MV PEAK A VEL: 0.96 M/S
MV PEAK E VEL: 0.75 M/S
OHS CV CPX 85 PERCENT MAX PREDICTED HEART RATE MALE: 117
OHS CV CPX MAX PREDICTED HEART RATE: 137
OHS CV CPX PATIENT IS FEMALE: 1
OHS CV CPX PATIENT IS MALE: 0
OHS CV CPX PEAK DIASTOLIC BLOOD PRESSURE: 57 MMHG
OHS CV CPX PEAK HEAR RATE: 77 BPM
OHS CV CPX PEAK RATE PRESSURE PRODUCT: NORMAL
OHS CV CPX PEAK SYSTOLIC BLOOD PRESSURE: 132 MMHG
OHS CV CPX PERCENT MAX PREDICTED HEART RATE ACHIEVED: 56
OHS CV CPX RATE PRESSURE PRODUCT PRESENTING: 7437
PERFUSION DEFECT 1 SIZE IN %: 18 %
PISA TR MAX VEL: 1.92 M/S
RA MAJOR: 4.56 CM
RA PRESSURE: 3 MMHG
RA WIDTH: 3.31 CM
REST FLOW - ANTERIOR: 0.93 CC/MIN/G
REST FLOW - INFERIOR: 1.08 CC/MIN/G
REST FLOW - LATERAL: 1.07 CC/MIN/G
REST FLOW - MAX: 1.39 CC/MIN/G
REST FLOW - MIN: 0.65 CC/MIN/G
REST FLOW - SEPTAL: 0.97 CC/MIN/G
REST FLOW - WHOLE HEART: 1.01 CC/MIN/G
REST FLOW- DEFECT 1: 0.87 CC/MIN/G
RETIRED EF AND QEF - SEE NOTES: 51 %
RV TISSUE DOPPLER FREE WALL SYSTOLIC VELOCITY 1 (APICAL 4 CHAMBER VIEW): 7.41 CM/S
SINUS: 3.2 CM
STRESS FLOW - ANTERIOR: 0.86 CC/MIN/G
STRESS FLOW - INFERIOR: 1.38 CC/MIN/G
STRESS FLOW - LATERAL: 1.33 CC/MIN/G
STRESS FLOW - MAX: 1.91 CC/MIN/G
STRESS FLOW - MIN: 0.51 CC/MIN/G
STRESS FLOW - SEPTAL: 0.85 CC/MIN/G
STRESS FLOW - WHOLE HEART: 1.11 CC/MIN/G
STRESS FLOW- DEFECT 1: 0.73 CC/MIN/G
STRESS ST DEPRESSION: 1 MM
SYSTOLIC BLOOD PRESSURE: 111 MMHG
TDI LATERAL: 0.08 M/S
TDI SEPTAL: 0.05 M/S
TDI: 0.07 M/S
TR MAX PG: 15 MMHG
TRICUSPID ANNULAR PLANE SYSTOLIC EXCURSION: 1.08 CM
TV REST PULMONARY ARTERY PRESSURE: 18 MMHG

## 2021-01-08 PROCEDURE — 93306 ECHO (CUPID ONLY): ICD-10-PCS | Mod: 26,,, | Performed by: INTERNAL MEDICINE

## 2021-01-08 PROCEDURE — 63600175 PHARM REV CODE 636 W HCPCS: Performed by: NURSE PRACTITIONER

## 2021-01-08 PROCEDURE — 78434 AQMBF PET REST & RX STRESS: CPT

## 2021-01-08 PROCEDURE — 93306 TTE W/DOPPLER COMPLETE: CPT | Mod: 26,,, | Performed by: INTERNAL MEDICINE

## 2021-01-08 PROCEDURE — 93018 CV STRESS TEST I&R ONLY: CPT | Mod: ,,, | Performed by: INTERNAL MEDICINE

## 2021-01-08 PROCEDURE — 78434 AQMBF PET REST & RX STRESS: CPT | Mod: 26,,, | Performed by: INTERNAL MEDICINE

## 2021-01-08 PROCEDURE — 93016 CARDIAC PET SCAN STRESS (CUPID ONLY): ICD-10-PCS | Mod: ,,, | Performed by: INTERNAL MEDICINE

## 2021-01-08 PROCEDURE — 78434 CARDIAC PET SCAN STRESS (CUPID ONLY): ICD-10-PCS | Mod: 26,,, | Performed by: INTERNAL MEDICINE

## 2021-01-08 PROCEDURE — 78492 CARDIAC PET SCAN STRESS (CUPID ONLY): ICD-10-PCS | Mod: 26,,, | Performed by: INTERNAL MEDICINE

## 2021-01-08 PROCEDURE — 93016 CV STRESS TEST SUPVJ ONLY: CPT | Mod: ,,, | Performed by: INTERNAL MEDICINE

## 2021-01-08 PROCEDURE — 78492 MYOCRD IMG PET MLT RST&STRS: CPT | Mod: 26,,, | Performed by: INTERNAL MEDICINE

## 2021-01-08 PROCEDURE — 93018 CARDIAC PET SCAN STRESS (CUPID ONLY): ICD-10-PCS | Mod: ,,, | Performed by: INTERNAL MEDICINE

## 2021-01-08 PROCEDURE — 93306 TTE W/DOPPLER COMPLETE: CPT

## 2021-01-08 RX ORDER — AMINOPHYLLINE 25 MG/ML
75 INJECTION, SOLUTION INTRAVENOUS ONCE
Status: COMPLETED | OUTPATIENT
Start: 2021-01-08 | End: 2021-01-08

## 2021-01-08 RX ORDER — DIPYRIDAMOLE 5 MG/ML
46.84 INJECTION INTRAVENOUS ONCE
Status: COMPLETED | OUTPATIENT
Start: 2021-01-08 | End: 2021-01-08

## 2021-01-08 RX ADMIN — DIPYRIDAMOLE 46.85 MG: 5 INJECTION INTRAVENOUS at 02:01

## 2021-01-08 RX ADMIN — AMINOPHYLLINE 75 MG: 25 INJECTION, SOLUTION INTRAVENOUS at 02:01

## 2021-01-11 DIAGNOSIS — R06.09 DOE (DYSPNEA ON EXERTION): Primary | ICD-10-CM

## 2021-01-13 ENCOUNTER — OFFICE VISIT (OUTPATIENT)
Dept: CARDIOLOGY | Facility: CLINIC | Age: 81
End: 2021-01-13
Payer: MEDICARE

## 2021-01-13 VITALS
HEART RATE: 67 BPM | DIASTOLIC BLOOD PRESSURE: 57 MMHG | HEIGHT: 60 IN | OXYGEN SATURATION: 100 % | WEIGHT: 179.69 LBS | BODY MASS INDEX: 35.28 KG/M2 | SYSTOLIC BLOOD PRESSURE: 107 MMHG

## 2021-01-13 DIAGNOSIS — I10 ESSENTIAL HYPERTENSION: Chronic | ICD-10-CM

## 2021-01-13 DIAGNOSIS — I25.83 CORONARY ARTERY DISEASE DUE TO LIPID RICH PLAQUE: ICD-10-CM

## 2021-01-13 DIAGNOSIS — I25.118 ATHEROSCLEROSIS OF NATIVE CORONARY ARTERY OF NATIVE HEART WITH STABLE ANGINA PECTORIS: Primary | ICD-10-CM

## 2021-01-13 DIAGNOSIS — N18.31 STAGE 3A CHRONIC KIDNEY DISEASE: Chronic | ICD-10-CM

## 2021-01-13 DIAGNOSIS — I25.10 CORONARY ARTERY DISEASE DUE TO LIPID RICH PLAQUE: ICD-10-CM

## 2021-01-13 DIAGNOSIS — E66.9 OBESITY, CLASS II, BMI 35-39.9: Chronic | ICD-10-CM

## 2021-01-13 DIAGNOSIS — I65.23 CAROTID STENOSIS, BILATERAL: ICD-10-CM

## 2021-01-13 DIAGNOSIS — E78.5 HYPERLIPIDEMIA, UNSPECIFIED HYPERLIPIDEMIA TYPE: ICD-10-CM

## 2021-01-13 DIAGNOSIS — R06.09 DOE (DYSPNEA ON EXERTION): ICD-10-CM

## 2021-01-13 DIAGNOSIS — Z79.4 TYPE 2 DIABETES MELLITUS WITH DIABETIC POLYNEUROPATHY, WITH LONG-TERM CURRENT USE OF INSULIN: Chronic | ICD-10-CM

## 2021-01-13 DIAGNOSIS — Z95.1 S/P CABG (CORONARY ARTERY BYPASS GRAFT): Chronic | ICD-10-CM

## 2021-01-13 DIAGNOSIS — I70.0 THORACIC AORTA ATHEROSCLEROSIS: ICD-10-CM

## 2021-01-13 DIAGNOSIS — E11.42 TYPE 2 DIABETES MELLITUS WITH DIABETIC POLYNEUROPATHY, WITH LONG-TERM CURRENT USE OF INSULIN: Chronic | ICD-10-CM

## 2021-01-13 PROCEDURE — 99205 OFFICE O/P NEW HI 60 MIN: CPT | Mod: S$PBB,GC,, | Performed by: INTERNAL MEDICINE

## 2021-01-13 PROCEDURE — 99205 PR OFFICE/OUTPT VISIT, NEW, LEVL V, 60-74 MIN: ICD-10-PCS | Mod: S$PBB,GC,, | Performed by: INTERNAL MEDICINE

## 2021-01-13 PROCEDURE — 99999 PR PBB SHADOW E&M-EST. PATIENT-LVL V: ICD-10-PCS | Mod: PBBFAC,,, | Performed by: INTERNAL MEDICINE

## 2021-01-13 PROCEDURE — 99999 PR PBB SHADOW E&M-EST. PATIENT-LVL V: CPT | Mod: PBBFAC,,, | Performed by: INTERNAL MEDICINE

## 2021-01-13 PROCEDURE — 99215 OFFICE O/P EST HI 40 MIN: CPT | Mod: PBBFAC | Performed by: INTERNAL MEDICINE

## 2021-01-13 RX ORDER — ISOSORBIDE MONONITRATE 60 MG/1
60 TABLET, EXTENDED RELEASE ORAL DAILY
Qty: 30 TABLET | Refills: 11 | Status: SHIPPED | OUTPATIENT
Start: 2021-01-13 | End: 2021-02-24

## 2021-01-21 ENCOUNTER — OFFICE VISIT (OUTPATIENT)
Dept: CARDIOLOGY | Facility: CLINIC | Age: 81
End: 2021-01-21
Payer: MEDICARE

## 2021-01-21 ENCOUNTER — PATIENT OUTREACH (OUTPATIENT)
Dept: ADMINISTRATIVE | Facility: OTHER | Age: 81
End: 2021-01-21

## 2021-01-21 VITALS
BODY MASS INDEX: 35.88 KG/M2 | HEART RATE: 63 BPM | WEIGHT: 182.75 LBS | HEIGHT: 60 IN | OXYGEN SATURATION: 97 % | SYSTOLIC BLOOD PRESSURE: 127 MMHG | DIASTOLIC BLOOD PRESSURE: 60 MMHG

## 2021-01-21 DIAGNOSIS — N18.30 STAGE 3 CHRONIC KIDNEY DISEASE, UNSPECIFIED WHETHER STAGE 3A OR 3B CKD: ICD-10-CM

## 2021-01-21 DIAGNOSIS — Z95.1 S/P CABG (CORONARY ARTERY BYPASS GRAFT): ICD-10-CM

## 2021-01-21 DIAGNOSIS — R07.9 EXERTIONAL CHEST PAIN: ICD-10-CM

## 2021-01-21 DIAGNOSIS — E11.69 OBESITY, DIABETES, AND HYPERTENSION SYNDROME: ICD-10-CM

## 2021-01-21 DIAGNOSIS — E11.59 OBESITY, DIABETES, AND HYPERTENSION SYNDROME: ICD-10-CM

## 2021-01-21 DIAGNOSIS — I25.83 CORONARY ARTERY DISEASE DUE TO LIPID RICH PLAQUE: Primary | ICD-10-CM

## 2021-01-21 DIAGNOSIS — I87.2 VENOUS INSUFFICIENCY OF BOTH LOWER EXTREMITIES: ICD-10-CM

## 2021-01-21 DIAGNOSIS — I25.10 CORONARY ARTERY DISEASE DUE TO LIPID RICH PLAQUE: Primary | ICD-10-CM

## 2021-01-21 DIAGNOSIS — R94.39 ABNORMAL NUCLEAR STRESS TEST: ICD-10-CM

## 2021-01-21 DIAGNOSIS — E78.5 DYSLIPIDEMIA, GOAL LDL BELOW 70: ICD-10-CM

## 2021-01-21 DIAGNOSIS — I10 ESSENTIAL HYPERTENSION: ICD-10-CM

## 2021-01-21 DIAGNOSIS — I15.2 OBESITY, DIABETES, AND HYPERTENSION SYNDROME: ICD-10-CM

## 2021-01-21 DIAGNOSIS — E66.9 OBESITY, DIABETES, AND HYPERTENSION SYNDROME: ICD-10-CM

## 2021-01-21 DIAGNOSIS — I65.23 CAROTID STENOSIS, BILATERAL: ICD-10-CM

## 2021-01-21 DIAGNOSIS — Z98.61 POST PTCA: ICD-10-CM

## 2021-01-21 PROCEDURE — 99214 PR OFFICE/OUTPT VISIT, EST, LEVL IV, 30-39 MIN: ICD-10-PCS | Mod: S$PBB,,, | Performed by: NURSE PRACTITIONER

## 2021-01-21 PROCEDURE — 99213 OFFICE O/P EST LOW 20 MIN: CPT | Mod: PBBFAC | Performed by: NURSE PRACTITIONER

## 2021-01-21 PROCEDURE — 99999 PR PBB SHADOW E&M-EST. PATIENT-LVL III: CPT | Mod: PBBFAC,,, | Performed by: NURSE PRACTITIONER

## 2021-01-21 PROCEDURE — 99999 PR PBB SHADOW E&M-EST. PATIENT-LVL III: ICD-10-PCS | Mod: PBBFAC,,, | Performed by: NURSE PRACTITIONER

## 2021-01-21 PROCEDURE — 99214 OFFICE O/P EST MOD 30 MIN: CPT | Mod: S$PBB,,, | Performed by: NURSE PRACTITIONER

## 2021-01-22 ENCOUNTER — DOCUMENTATION ONLY (OUTPATIENT)
Dept: CARDIOLOGY | Facility: CLINIC | Age: 81
End: 2021-01-22

## 2021-01-22 DIAGNOSIS — I25.83 CORONARY ARTERY DISEASE DUE TO LIPID RICH PLAQUE: Primary | ICD-10-CM

## 2021-01-22 DIAGNOSIS — I25.10 CORONARY ARTERY DISEASE DUE TO LIPID RICH PLAQUE: Primary | ICD-10-CM

## 2021-01-22 DIAGNOSIS — R94.39 ABNORMAL STRESS TEST: Primary | ICD-10-CM

## 2021-01-22 PROBLEM — I15.2 OBESITY, DIABETES, AND HYPERTENSION SYNDROME: Status: ACTIVE | Noted: 2021-01-22

## 2021-01-22 PROBLEM — I87.2 VENOUS INSUFFICIENCY OF BOTH LOWER EXTREMITIES: Status: ACTIVE | Noted: 2021-01-22

## 2021-01-22 PROBLEM — E11.69 OBESITY, DIABETES, AND HYPERTENSION SYNDROME: Status: ACTIVE | Noted: 2021-01-22

## 2021-01-22 PROBLEM — E66.9 OBESITY, DIABETES, AND HYPERTENSION SYNDROME: Status: ACTIVE | Noted: 2021-01-22

## 2021-01-22 PROBLEM — E11.59 OBESITY, DIABETES, AND HYPERTENSION SYNDROME: Status: ACTIVE | Noted: 2021-01-22

## 2021-01-22 RX ORDER — SODIUM CHLORIDE 9 MG/ML
INJECTION, SOLUTION INTRAVENOUS CONTINUOUS
Status: CANCELLED | OUTPATIENT
Start: 2021-01-22 | End: 2021-01-22

## 2021-01-22 RX ORDER — DIPHENHYDRAMINE HCL 50 MG
50 CAPSULE ORAL ONCE
Status: CANCELLED | OUTPATIENT
Start: 2021-01-22 | End: 2021-01-22

## 2021-01-26 ENCOUNTER — LAB VISIT (OUTPATIENT)
Dept: INTERNAL MEDICINE | Facility: CLINIC | Age: 81
End: 2021-01-26
Payer: MEDICARE

## 2021-01-26 ENCOUNTER — LAB VISIT (OUTPATIENT)
Dept: LAB | Facility: HOSPITAL | Age: 81
End: 2021-01-26
Attending: INTERNAL MEDICINE
Payer: MEDICARE

## 2021-01-26 DIAGNOSIS — I25.10 CORONARY ARTERY DISEASE DUE TO LIPID RICH PLAQUE: ICD-10-CM

## 2021-01-26 DIAGNOSIS — I25.83 CORONARY ARTERY DISEASE DUE TO LIPID RICH PLAQUE: ICD-10-CM

## 2021-01-26 LAB
ANION GAP SERPL CALC-SCNC: 6 MMOL/L (ref 8–16)
BASOPHILS # BLD AUTO: 0.04 K/UL (ref 0–0.2)
BASOPHILS NFR BLD: 0.9 % (ref 0–1.9)
BUN SERPL-MCNC: 26 MG/DL (ref 8–23)
CALCIUM SERPL-MCNC: 9.2 MG/DL (ref 8.7–10.5)
CHLORIDE SERPL-SCNC: 110 MMOL/L (ref 95–110)
CHOLEST SERPL-MCNC: 109 MG/DL (ref 120–199)
CHOLEST/HDLC SERPL: 2.4 {RATIO} (ref 2–5)
CO2 SERPL-SCNC: 22 MMOL/L (ref 23–29)
CREAT SERPL-MCNC: 1.2 MG/DL (ref 0.5–1.4)
DIFFERENTIAL METHOD: ABNORMAL
EOSINOPHIL # BLD AUTO: 0.2 K/UL (ref 0–0.5)
EOSINOPHIL NFR BLD: 3.4 % (ref 0–8)
ERYTHROCYTE [DISTWIDTH] IN BLOOD BY AUTOMATED COUNT: 15.4 % (ref 11.5–14.5)
EST. GFR  (AFRICAN AMERICAN): 50 ML/MIN/1.73 M^2
EST. GFR  (NON AFRICAN AMERICAN): 43.4 ML/MIN/1.73 M^2
GLUCOSE SERPL-MCNC: 114 MG/DL (ref 70–110)
HCT VFR BLD AUTO: 36.4 % (ref 37–48.5)
HDLC SERPL-MCNC: 46 MG/DL (ref 40–75)
HDLC SERPL: 42.2 % (ref 20–50)
HGB BLD-MCNC: 10.7 G/DL (ref 12–16)
IMM GRANULOCYTES # BLD AUTO: 0.01 K/UL (ref 0–0.04)
IMM GRANULOCYTES NFR BLD AUTO: 0.2 % (ref 0–0.5)
LDLC SERPL CALC-MCNC: 53.2 MG/DL (ref 63–159)
LYMPHOCYTES # BLD AUTO: 1.3 K/UL (ref 1–4.8)
LYMPHOCYTES NFR BLD: 27.2 % (ref 18–48)
MCH RBC QN AUTO: 27.2 PG (ref 27–31)
MCHC RBC AUTO-ENTMCNC: 29.4 G/DL (ref 32–36)
MCV RBC AUTO: 93 FL (ref 82–98)
MONOCYTES # BLD AUTO: 0.7 K/UL (ref 0.3–1)
MONOCYTES NFR BLD: 15.7 % (ref 4–15)
NEUTROPHILS # BLD AUTO: 2.4 K/UL (ref 1.8–7.7)
NEUTROPHILS NFR BLD: 52.6 % (ref 38–73)
NONHDLC SERPL-MCNC: 63 MG/DL
NRBC BLD-RTO: 0 /100 WBC
PLATELET # BLD AUTO: 145 K/UL (ref 150–350)
PMV BLD AUTO: 11.2 FL (ref 9.2–12.9)
POTASSIUM SERPL-SCNC: 4.5 MMOL/L (ref 3.5–5.1)
RBC # BLD AUTO: 3.93 M/UL (ref 4–5.4)
SODIUM SERPL-SCNC: 138 MMOL/L (ref 136–145)
TRIGL SERPL-MCNC: 49 MG/DL (ref 30–150)
WBC # BLD AUTO: 4.64 K/UL (ref 3.9–12.7)

## 2021-01-26 PROCEDURE — 36415 COLL VENOUS BLD VENIPUNCTURE: CPT

## 2021-01-26 PROCEDURE — 85025 COMPLETE CBC W/AUTO DIFF WBC: CPT

## 2021-01-26 PROCEDURE — 80061 LIPID PANEL: CPT

## 2021-01-26 PROCEDURE — U0003 INFECTIOUS AGENT DETECTION BY NUCLEIC ACID (DNA OR RNA); SEVERE ACUTE RESPIRATORY SYNDROME CORONAVIRUS 2 (SARS-COV-2) (CORONAVIRUS DISEASE [COVID-19]), AMPLIFIED PROBE TECHNIQUE, MAKING USE OF HIGH THROUGHPUT TECHNOLOGIES AS DESCRIBED BY CMS-2020-01-R: HCPCS

## 2021-01-26 PROCEDURE — 80048 BASIC METABOLIC PNL TOTAL CA: CPT

## 2021-01-27 LAB — SARS-COV-2 RNA RESP QL NAA+PROBE: NOT DETECTED

## 2021-01-28 ENCOUNTER — HOSPITAL ENCOUNTER (OUTPATIENT)
Facility: HOSPITAL | Age: 81
Discharge: HOME | End: 2021-01-28
Attending: INTERNAL MEDICINE | Admitting: INTERNAL MEDICINE
Payer: MEDICARE

## 2021-01-28 VITALS
SYSTOLIC BLOOD PRESSURE: 116 MMHG | DIASTOLIC BLOOD PRESSURE: 56 MMHG | RESPIRATION RATE: 15 BRPM | BODY MASS INDEX: 36.29 KG/M2 | HEIGHT: 59 IN | WEIGHT: 180 LBS | HEART RATE: 65 BPM | OXYGEN SATURATION: 94 % | TEMPERATURE: 97 F

## 2021-01-28 DIAGNOSIS — I25.10 CORONARY ARTERY DISEASE INVOLVING NATIVE CORONARY ARTERY OF NATIVE HEART WITHOUT ANGINA PECTORIS: ICD-10-CM

## 2021-01-28 DIAGNOSIS — R94.39 ABNORMAL STRESS TEST: ICD-10-CM

## 2021-01-28 LAB
ABO + RH BLD: NORMAL
BLD GP AB SCN CELLS X3 SERPL QL: NORMAL
POCT GLUCOSE: 117 MG/DL (ref 70–110)

## 2021-01-28 PROCEDURE — C1894 INTRO/SHEATH, NON-LASER: HCPCS | Performed by: INTERNAL MEDICINE

## 2021-01-28 PROCEDURE — 93459 L HRT ART/GRFT ANGIO: CPT | Performed by: INTERNAL MEDICINE

## 2021-01-28 PROCEDURE — 99152 PR MOD CONSCIOUS SEDATION, SAME PHYS, 5+ YRS, FIRST 15 MIN: ICD-10-PCS | Mod: ,,, | Performed by: INTERNAL MEDICINE

## 2021-01-28 PROCEDURE — 25000003 PHARM REV CODE 250: Performed by: INTERNAL MEDICINE

## 2021-01-28 PROCEDURE — 25000003 PHARM REV CODE 250: Performed by: STUDENT IN AN ORGANIZED HEALTH CARE EDUCATION/TRAINING PROGRAM

## 2021-01-28 PROCEDURE — 63600175 PHARM REV CODE 636 W HCPCS: Performed by: INTERNAL MEDICINE

## 2021-01-28 PROCEDURE — 25500020 PHARM REV CODE 255: Performed by: INTERNAL MEDICINE

## 2021-01-28 PROCEDURE — 99152 MOD SED SAME PHYS/QHP 5/>YRS: CPT | Performed by: INTERNAL MEDICINE

## 2021-01-28 PROCEDURE — 93459: ICD-10-PCS | Mod: 26,,, | Performed by: INTERNAL MEDICINE

## 2021-01-28 PROCEDURE — 93459 L HRT ART/GRFT ANGIO: CPT | Mod: 26,,, | Performed by: INTERNAL MEDICINE

## 2021-01-28 PROCEDURE — 86900 BLOOD TYPING SEROLOGIC ABO: CPT

## 2021-01-28 PROCEDURE — 82962 GLUCOSE BLOOD TEST: CPT | Performed by: INTERNAL MEDICINE

## 2021-01-28 PROCEDURE — 99152 MOD SED SAME PHYS/QHP 5/>YRS: CPT | Mod: ,,, | Performed by: INTERNAL MEDICINE

## 2021-01-28 PROCEDURE — 85347 COAGULATION TIME ACTIVATED: CPT | Performed by: INTERNAL MEDICINE

## 2021-01-28 PROCEDURE — C1769 GUIDE WIRE: HCPCS | Performed by: INTERNAL MEDICINE

## 2021-01-28 PROCEDURE — 99153 MOD SED SAME PHYS/QHP EA: CPT | Performed by: INTERNAL MEDICINE

## 2021-01-28 RX ORDER — HEPARIN SODIUM 1000 [USP'U]/ML
INJECTION, SOLUTION INTRAVENOUS; SUBCUTANEOUS
Status: DISCONTINUED | OUTPATIENT
Start: 2021-01-28 | End: 2021-01-28 | Stop reason: HOSPADM

## 2021-01-28 RX ORDER — HEPARIN SOD,PORCINE/0.9 % NACL 1000/500ML
INTRAVENOUS SOLUTION INTRAVENOUS
Status: DISCONTINUED | OUTPATIENT
Start: 2021-01-28 | End: 2021-01-28 | Stop reason: HOSPADM

## 2021-01-28 RX ORDER — ONDANSETRON 8 MG/1
8 TABLET, ORALLY DISINTEGRATING ORAL EVERY 8 HOURS PRN
Status: DISCONTINUED | OUTPATIENT
Start: 2021-01-28 | End: 2021-01-28 | Stop reason: HOSPADM

## 2021-01-28 RX ORDER — NITROGLYCERIN 5 MG/ML
INJECTION, SOLUTION INTRAVENOUS
Status: DISCONTINUED | OUTPATIENT
Start: 2021-01-28 | End: 2021-01-28 | Stop reason: HOSPADM

## 2021-01-28 RX ORDER — DIPHENHYDRAMINE HCL 50 MG
50 CAPSULE ORAL ONCE
Status: COMPLETED | OUTPATIENT
Start: 2021-01-28 | End: 2021-01-28

## 2021-01-28 RX ORDER — FENTANYL CITRATE 50 UG/ML
INJECTION, SOLUTION INTRAMUSCULAR; INTRAVENOUS
Status: DISCONTINUED | OUTPATIENT
Start: 2021-01-28 | End: 2021-01-28 | Stop reason: HOSPADM

## 2021-01-28 RX ORDER — ASPIRIN 325 MG
325 TABLET ORAL ONCE
Status: COMPLETED | OUTPATIENT
Start: 2021-01-28 | End: 2021-01-28

## 2021-01-28 RX ORDER — PHENYLEPHRINE HYDROCHLORIDE 10 MG/ML
INJECTION INTRAVENOUS
Status: DISCONTINUED | OUTPATIENT
Start: 2021-01-28 | End: 2021-01-28 | Stop reason: HOSPADM

## 2021-01-28 RX ORDER — SODIUM CHLORIDE 9 MG/ML
INJECTION, SOLUTION INTRAVENOUS CONTINUOUS
Status: ACTIVE | OUTPATIENT
Start: 2021-01-28 | End: 2021-01-28

## 2021-01-28 RX ORDER — ACETAMINOPHEN 325 MG/1
650 TABLET ORAL EVERY 4 HOURS PRN
Status: DISCONTINUED | OUTPATIENT
Start: 2021-01-28 | End: 2021-01-28 | Stop reason: HOSPADM

## 2021-01-28 RX ORDER — SODIUM CHLORIDE 9 MG/ML
INJECTION, SOLUTION INTRAVENOUS CONTINUOUS
Status: DISCONTINUED | OUTPATIENT
Start: 2021-01-28 | End: 2021-01-28 | Stop reason: HOSPADM

## 2021-01-28 RX ORDER — MIDAZOLAM HYDROCHLORIDE 1 MG/ML
INJECTION, SOLUTION INTRAMUSCULAR; INTRAVENOUS
Status: DISCONTINUED | OUTPATIENT
Start: 2021-01-28 | End: 2021-01-28 | Stop reason: HOSPADM

## 2021-01-28 RX ADMIN — ASPIRIN 325 MG ORAL TABLET 325 MG: 325 PILL ORAL at 10:01

## 2021-01-28 RX ADMIN — DIPHENHYDRAMINE HYDROCHLORIDE 50 MG: 50 CAPSULE ORAL at 09:01

## 2021-01-28 RX ADMIN — SODIUM CHLORIDE: 0.9 INJECTION, SOLUTION INTRAVENOUS at 09:01

## 2021-01-29 ENCOUNTER — TELEPHONE (OUTPATIENT)
Dept: INTERNAL MEDICINE | Facility: CLINIC | Age: 81
End: 2021-01-29

## 2021-02-01 LAB
POC ACTIVATED CLOTTING TIME K: 224 SEC (ref 74–137)
POC ACTIVATED CLOTTING TIME K: 395 SEC (ref 74–137)
SAMPLE: ABNORMAL
SAMPLE: ABNORMAL

## 2021-02-09 ENCOUNTER — LAB VISIT (OUTPATIENT)
Dept: LAB | Facility: HOSPITAL | Age: 81
End: 2021-02-09
Attending: INTERNAL MEDICINE
Payer: MEDICARE

## 2021-02-09 ENCOUNTER — OFFICE VISIT (OUTPATIENT)
Dept: INTERNAL MEDICINE | Facility: CLINIC | Age: 81
End: 2021-02-09
Payer: MEDICARE

## 2021-02-09 VITALS
DIASTOLIC BLOOD PRESSURE: 78 MMHG | OXYGEN SATURATION: 99 % | SYSTOLIC BLOOD PRESSURE: 150 MMHG | BODY MASS INDEX: 35.45 KG/M2 | WEIGHT: 180.56 LBS | HEIGHT: 60 IN | HEART RATE: 82 BPM

## 2021-02-09 DIAGNOSIS — Z79.4 TYPE 2 DIABETES MELLITUS WITH STAGE 3A CHRONIC KIDNEY DISEASE, WITH LONG-TERM CURRENT USE OF INSULIN: ICD-10-CM

## 2021-02-09 DIAGNOSIS — I20.89 ANGINA OF EFFORT: ICD-10-CM

## 2021-02-09 DIAGNOSIS — I25.83 CORONARY ARTERY DISEASE DUE TO LIPID RICH PLAQUE: ICD-10-CM

## 2021-02-09 DIAGNOSIS — N18.31 TYPE 2 DIABETES MELLITUS WITH STAGE 3A CHRONIC KIDNEY DISEASE, WITH LONG-TERM CURRENT USE OF INSULIN: Primary | ICD-10-CM

## 2021-02-09 DIAGNOSIS — N18.31 TYPE 2 DIABETES MELLITUS WITH STAGE 3A CHRONIC KIDNEY DISEASE, WITH LONG-TERM CURRENT USE OF INSULIN: ICD-10-CM

## 2021-02-09 DIAGNOSIS — Z79.4 TYPE 2 DIABETES MELLITUS WITH STAGE 3A CHRONIC KIDNEY DISEASE, WITH LONG-TERM CURRENT USE OF INSULIN: Primary | ICD-10-CM

## 2021-02-09 DIAGNOSIS — E11.22 TYPE 2 DIABETES MELLITUS WITH STAGE 3A CHRONIC KIDNEY DISEASE, WITH LONG-TERM CURRENT USE OF INSULIN: Primary | ICD-10-CM

## 2021-02-09 DIAGNOSIS — I25.10 CORONARY ARTERY DISEASE DUE TO LIPID RICH PLAQUE: ICD-10-CM

## 2021-02-09 DIAGNOSIS — I10 ESSENTIAL HYPERTENSION: ICD-10-CM

## 2021-02-09 DIAGNOSIS — E11.22 TYPE 2 DIABETES MELLITUS WITH STAGE 3A CHRONIC KIDNEY DISEASE, WITH LONG-TERM CURRENT USE OF INSULIN: ICD-10-CM

## 2021-02-09 LAB
ESTIMATED AVG GLUCOSE: 171 MG/DL (ref 68–131)
HBA1C MFR BLD: 7.6 % (ref 4–5.6)

## 2021-02-09 PROCEDURE — 99214 OFFICE O/P EST MOD 30 MIN: CPT | Mod: S$PBB,,, | Performed by: INTERNAL MEDICINE

## 2021-02-09 PROCEDURE — 36415 COLL VENOUS BLD VENIPUNCTURE: CPT

## 2021-02-09 PROCEDURE — 99214 PR OFFICE/OUTPT VISIT, EST, LEVL IV, 30-39 MIN: ICD-10-PCS | Mod: S$PBB,,, | Performed by: INTERNAL MEDICINE

## 2021-02-09 PROCEDURE — 99999 PR PBB SHADOW E&M-EST. PATIENT-LVL III: ICD-10-PCS | Mod: PBBFAC,,, | Performed by: INTERNAL MEDICINE

## 2021-02-09 PROCEDURE — 99213 OFFICE O/P EST LOW 20 MIN: CPT | Mod: PBBFAC | Performed by: INTERNAL MEDICINE

## 2021-02-09 PROCEDURE — 99999 PR PBB SHADOW E&M-EST. PATIENT-LVL III: CPT | Mod: PBBFAC,,, | Performed by: INTERNAL MEDICINE

## 2021-02-09 PROCEDURE — 83036 HEMOGLOBIN GLYCOSYLATED A1C: CPT

## 2021-02-20 ENCOUNTER — IMMUNIZATION (OUTPATIENT)
Dept: PRIMARY CARE CLINIC | Facility: CLINIC | Age: 81
End: 2021-02-20
Payer: MEDICARE

## 2021-02-20 DIAGNOSIS — Z23 NEED FOR VACCINATION: Primary | ICD-10-CM

## 2021-02-20 PROCEDURE — 0001A PR IMMUNIZ ADMIN, SARS-COV-2 COVID-19 VACC, 30MCG/0.3ML, 1ST DOSE: CPT | Mod: PBBFAC | Performed by: INTERNAL MEDICINE

## 2021-02-20 PROCEDURE — 91300 PR SARS-COV- 2 COVID-19 VACCINE, NO PRSV, 30MCG/0.3ML, IM: CPT | Mod: PBBFAC | Performed by: INTERNAL MEDICINE

## 2021-02-20 RX ADMIN — RNA INGREDIENT BNT-162B2 0.3 ML: 0.23 INJECTION, SUSPENSION INTRAMUSCULAR at 02:02

## 2021-02-23 ENCOUNTER — PATIENT MESSAGE (OUTPATIENT)
Dept: RHEUMATOLOGY | Facility: CLINIC | Age: 81
End: 2021-02-23

## 2021-02-23 ENCOUNTER — PATIENT OUTREACH (OUTPATIENT)
Dept: ADMINISTRATIVE | Facility: OTHER | Age: 81
End: 2021-02-23

## 2021-02-24 ENCOUNTER — OFFICE VISIT (OUTPATIENT)
Dept: CARDIOLOGY | Facility: CLINIC | Age: 81
End: 2021-02-24
Payer: MEDICARE

## 2021-02-24 VITALS
OXYGEN SATURATION: 98 % | WEIGHT: 180.56 LBS | BODY MASS INDEX: 35.45 KG/M2 | DIASTOLIC BLOOD PRESSURE: 59 MMHG | HEART RATE: 57 BPM | HEIGHT: 60 IN | SYSTOLIC BLOOD PRESSURE: 112 MMHG

## 2021-02-24 DIAGNOSIS — I25.10 CORONARY ARTERY DISEASE DUE TO LIPID RICH PLAQUE: Primary | ICD-10-CM

## 2021-02-24 DIAGNOSIS — E78.5 HYPERLIPIDEMIA, UNSPECIFIED HYPERLIPIDEMIA TYPE: ICD-10-CM

## 2021-02-24 DIAGNOSIS — I10 ESSENTIAL HYPERTENSION: ICD-10-CM

## 2021-02-24 DIAGNOSIS — E11.42 DM TYPE 2 WITH DIABETIC PERIPHERAL NEUROPATHY: ICD-10-CM

## 2021-02-24 DIAGNOSIS — I25.83 CORONARY ARTERY DISEASE DUE TO LIPID RICH PLAQUE: Primary | ICD-10-CM

## 2021-02-24 PROCEDURE — 99215 PR OFFICE/OUTPT VISIT, EST, LEVL V, 40-54 MIN: ICD-10-PCS | Mod: S$PBB,GC,, | Performed by: INTERNAL MEDICINE

## 2021-02-24 PROCEDURE — 99215 OFFICE O/P EST HI 40 MIN: CPT | Mod: S$PBB,GC,, | Performed by: INTERNAL MEDICINE

## 2021-02-24 PROCEDURE — 99999 PR PBB SHADOW E&M-EST. PATIENT-LVL V: ICD-10-PCS | Mod: PBBFAC,,, | Performed by: INTERNAL MEDICINE

## 2021-02-24 PROCEDURE — 99215 OFFICE O/P EST HI 40 MIN: CPT | Mod: PBBFAC | Performed by: INTERNAL MEDICINE

## 2021-02-24 PROCEDURE — 99999 PR PBB SHADOW E&M-EST. PATIENT-LVL V: CPT | Mod: PBBFAC,,, | Performed by: INTERNAL MEDICINE

## 2021-02-24 RX ORDER — ISOSORBIDE MONONITRATE 30 MG/1
90 TABLET, EXTENDED RELEASE ORAL DAILY
Qty: 270 TABLET | Refills: 3 | Status: SHIPPED | OUTPATIENT
Start: 2021-02-24 | End: 2021-11-23

## 2021-03-01 ENCOUNTER — OFFICE VISIT (OUTPATIENT)
Dept: OPHTHALMOLOGY | Facility: CLINIC | Age: 81
End: 2021-03-01
Payer: MEDICARE

## 2021-03-01 DIAGNOSIS — H25.13 SENILE NUCLEAR SCLEROSIS, BILATERAL: ICD-10-CM

## 2021-03-01 DIAGNOSIS — E11.9 TYPE 2 DIABETES MELLITUS WITHOUT OPHTHALMIC MANIFESTATIONS: ICD-10-CM

## 2021-03-01 DIAGNOSIS — H25.013 CORTICAL AGE-RELATED CATARACT, BILATERAL: ICD-10-CM

## 2021-03-01 DIAGNOSIS — H35.3131 EARLY DRY STAGE NONEXUDATIVE AGE-RELATED MACULAR DEGENERATION OF BOTH EYES: ICD-10-CM

## 2021-03-01 DIAGNOSIS — H40.1133 PRIMARY OPEN-ANGLE GLAUCOMA, BILATERAL, SEVERE STAGE: Primary | ICD-10-CM

## 2021-03-01 DIAGNOSIS — H35.363 MACULAR DRUSEN, BILATERAL: ICD-10-CM

## 2021-03-01 PROCEDURE — 92133 CPTRZD OPH DX IMG PST SGM ON: CPT | Mod: PBBFAC | Performed by: OPHTHALMOLOGY

## 2021-03-01 PROCEDURE — 99999 PR PBB SHADOW E&M-EST. PATIENT-LVL III: ICD-10-PCS | Mod: PBBFAC,,, | Performed by: OPHTHALMOLOGY

## 2021-03-01 PROCEDURE — 92133 HEIDELBERG RETINA TOMOGRAPHY (HRT) - OU - BOTH EYES: ICD-10-PCS | Mod: 26,S$PBB,, | Performed by: OPHTHALMOLOGY

## 2021-03-01 PROCEDURE — 99213 OFFICE O/P EST LOW 20 MIN: CPT | Mod: PBBFAC,25 | Performed by: OPHTHALMOLOGY

## 2021-03-01 PROCEDURE — 92012 INTRM OPH EXAM EST PATIENT: CPT | Mod: S$PBB,,, | Performed by: OPHTHALMOLOGY

## 2021-03-01 PROCEDURE — 99999 PR PBB SHADOW E&M-EST. PATIENT-LVL III: CPT | Mod: PBBFAC,,, | Performed by: OPHTHALMOLOGY

## 2021-03-01 PROCEDURE — 92012 PR EYE EXAM, EST PATIENT,INTERMED: ICD-10-PCS | Mod: S$PBB,,, | Performed by: OPHTHALMOLOGY

## 2021-03-04 ENCOUNTER — OFFICE VISIT (OUTPATIENT)
Dept: CARDIOLOGY | Facility: CLINIC | Age: 81
End: 2021-03-04
Payer: MEDICARE

## 2021-03-04 VITALS
SYSTOLIC BLOOD PRESSURE: 110 MMHG | HEART RATE: 55 BPM | OXYGEN SATURATION: 98 % | BODY MASS INDEX: 34.71 KG/M2 | DIASTOLIC BLOOD PRESSURE: 53 MMHG | HEIGHT: 60 IN | WEIGHT: 176.81 LBS

## 2021-03-04 DIAGNOSIS — R21 SKIN RASH: ICD-10-CM

## 2021-03-04 DIAGNOSIS — Z53.1 REFUSAL OF BLOOD TRANSFUSIONS AS PATIENT IS JEHOVAH'S WITNESS: ICD-10-CM

## 2021-03-04 DIAGNOSIS — I25.118 CORONARY ARTERY DISEASE OF NATIVE ARTERY OF NATIVE HEART WITH STABLE ANGINA PECTORIS: Primary | ICD-10-CM

## 2021-03-04 DIAGNOSIS — R60.0 EDEMA OF BOTH LOWER LEGS: ICD-10-CM

## 2021-03-04 DIAGNOSIS — E66.9 OBESITY (BMI 30.0-34.9): ICD-10-CM

## 2021-03-04 DIAGNOSIS — E78.5 DYSLIPIDEMIA, GOAL LDL BELOW 70: ICD-10-CM

## 2021-03-04 DIAGNOSIS — I65.23 CAROTID STENOSIS, BILATERAL: ICD-10-CM

## 2021-03-04 DIAGNOSIS — Z95.1 S/P CABG (CORONARY ARTERY BYPASS GRAFT): ICD-10-CM

## 2021-03-04 DIAGNOSIS — E66.9 OBESITY, DIABETES, AND HYPERTENSION SYNDROME: ICD-10-CM

## 2021-03-04 DIAGNOSIS — Z79.02 ENCOUNTER FOR MONITORING ANTIPLATELET THERAPY: ICD-10-CM

## 2021-03-04 DIAGNOSIS — I87.2 VENOUS INSUFFICIENCY OF BOTH LOWER EXTREMITIES: ICD-10-CM

## 2021-03-04 DIAGNOSIS — R94.39 ABNORMAL STRESS TEST: ICD-10-CM

## 2021-03-04 DIAGNOSIS — E11.69 OBESITY, DIABETES, AND HYPERTENSION SYNDROME: ICD-10-CM

## 2021-03-04 DIAGNOSIS — Z98.61 POST PTCA: ICD-10-CM

## 2021-03-04 DIAGNOSIS — E11.59 OBESITY, DIABETES, AND HYPERTENSION SYNDROME: ICD-10-CM

## 2021-03-04 DIAGNOSIS — R06.09 DOE (DYSPNEA ON EXERTION): ICD-10-CM

## 2021-03-04 DIAGNOSIS — I10 ESSENTIAL HYPERTENSION: ICD-10-CM

## 2021-03-04 DIAGNOSIS — I15.2 OBESITY, DIABETES, AND HYPERTENSION SYNDROME: ICD-10-CM

## 2021-03-04 DIAGNOSIS — L29.9 SKIN PRURITUS: ICD-10-CM

## 2021-03-04 DIAGNOSIS — N18.30 STAGE 3 CHRONIC KIDNEY DISEASE, UNSPECIFIED WHETHER STAGE 3A OR 3B CKD: Chronic | ICD-10-CM

## 2021-03-04 DIAGNOSIS — Z87.19 HISTORY OF GI BLEED: ICD-10-CM

## 2021-03-04 DIAGNOSIS — Z51.81 ENCOUNTER FOR MONITORING ANTIPLATELET THERAPY: ICD-10-CM

## 2021-03-04 PROCEDURE — 99999 PR PBB SHADOW E&M-EST. PATIENT-LVL III: ICD-10-PCS | Mod: PBBFAC,,, | Performed by: NURSE PRACTITIONER

## 2021-03-04 PROCEDURE — 99999 PR PBB SHADOW E&M-EST. PATIENT-LVL III: CPT | Mod: PBBFAC,,, | Performed by: NURSE PRACTITIONER

## 2021-03-04 PROCEDURE — 99213 OFFICE O/P EST LOW 20 MIN: CPT | Mod: PBBFAC | Performed by: NURSE PRACTITIONER

## 2021-03-04 PROCEDURE — 99214 OFFICE O/P EST MOD 30 MIN: CPT | Mod: S$PBB,,, | Performed by: NURSE PRACTITIONER

## 2021-03-04 PROCEDURE — 99214 PR OFFICE/OUTPT VISIT, EST, LEVL IV, 30-39 MIN: ICD-10-PCS | Mod: S$PBB,,, | Performed by: NURSE PRACTITIONER

## 2021-03-13 ENCOUNTER — IMMUNIZATION (OUTPATIENT)
Dept: PRIMARY CARE CLINIC | Facility: CLINIC | Age: 81
End: 2021-03-13
Payer: MEDICARE

## 2021-03-13 DIAGNOSIS — Z23 NEED FOR VACCINATION: Primary | ICD-10-CM

## 2021-03-13 PROCEDURE — 0002A PR IMMUNIZ ADMIN, SARS-COV-2 COVID-19 VACC, 30MCG/0.3ML, 2ND DOSE: CPT | Mod: CV19,S$GLB,, | Performed by: INTERNAL MEDICINE

## 2021-03-13 PROCEDURE — 91300 PR SARS-COV- 2 COVID-19 VACCINE, NO PRSV, 30MCG/0.3ML, IM: CPT | Mod: S$GLB,,, | Performed by: INTERNAL MEDICINE

## 2021-03-13 PROCEDURE — 91300 PR SARS-COV- 2 COVID-19 VACCINE, NO PRSV, 30MCG/0.3ML, IM: ICD-10-PCS | Mod: S$GLB,,, | Performed by: INTERNAL MEDICINE

## 2021-03-13 PROCEDURE — 0002A PR IMMUNIZ ADMIN, SARS-COV-2 COVID-19 VACC, 30MCG/0.3ML, 2ND DOSE: ICD-10-PCS | Mod: CV19,S$GLB,, | Performed by: INTERNAL MEDICINE

## 2021-03-13 RX ADMIN — Medication 0.3 ML: at 02:03

## 2021-03-24 ENCOUNTER — HOSPITAL ENCOUNTER (OUTPATIENT)
Dept: CARDIOLOGY | Facility: CLINIC | Age: 81
Discharge: HOME | End: 2021-03-24
Payer: MEDICARE

## 2021-03-24 ENCOUNTER — OFFICE VISIT (OUTPATIENT)
Dept: CARDIOLOGY | Facility: CLINIC | Age: 81
End: 2021-03-24
Payer: MEDICARE

## 2021-03-24 VITALS
SYSTOLIC BLOOD PRESSURE: 126 MMHG | HEIGHT: 60 IN | OXYGEN SATURATION: 97 % | HEART RATE: 57 BPM | DIASTOLIC BLOOD PRESSURE: 60 MMHG | WEIGHT: 178.38 LBS | BODY MASS INDEX: 35.02 KG/M2

## 2021-03-24 DIAGNOSIS — I65.23 CAROTID STENOSIS, BILATERAL: ICD-10-CM

## 2021-03-24 DIAGNOSIS — I25.118 CORONARY ARTERY DISEASE OF NATIVE ARTERY OF NATIVE HEART WITH STABLE ANGINA PECTORIS: Primary | ICD-10-CM

## 2021-03-24 DIAGNOSIS — Z79.4 TYPE 2 DIABETES MELLITUS WITH DIABETIC POLYNEUROPATHY, WITH LONG-TERM CURRENT USE OF INSULIN: Chronic | ICD-10-CM

## 2021-03-24 DIAGNOSIS — E78.5 DYSLIPIDEMIA, GOAL LDL BELOW 70: ICD-10-CM

## 2021-03-24 DIAGNOSIS — Z95.1 S/P CABG (CORONARY ARTERY BYPASS GRAFT): Chronic | ICD-10-CM

## 2021-03-24 DIAGNOSIS — E11.42 TYPE 2 DIABETES MELLITUS WITH DIABETIC POLYNEUROPATHY, WITH LONG-TERM CURRENT USE OF INSULIN: Chronic | ICD-10-CM

## 2021-03-24 DIAGNOSIS — E66.01 MORBID OBESITY: ICD-10-CM

## 2021-03-24 DIAGNOSIS — I10 ESSENTIAL HYPERTENSION: Chronic | ICD-10-CM

## 2021-03-24 DIAGNOSIS — I87.2 VENOUS INSUFFICIENCY OF BOTH LOWER EXTREMITIES: ICD-10-CM

## 2021-03-24 DIAGNOSIS — D53.9 MACROCYTIC ANEMIA: ICD-10-CM

## 2021-03-24 DIAGNOSIS — E11.42 DM TYPE 2 WITH DIABETIC PERIPHERAL NEUROPATHY: ICD-10-CM

## 2021-03-24 DIAGNOSIS — I25.118 CORONARY ARTERY DISEASE OF NATIVE ARTERY OF NATIVE HEART WITH STABLE ANGINA PECTORIS: ICD-10-CM

## 2021-03-24 PROCEDURE — 99999 PR PBB SHADOW E&M-EST. PATIENT-LVL IV: ICD-10-PCS | Mod: PBBFAC,,, | Performed by: INTERNAL MEDICINE

## 2021-03-24 PROCEDURE — 93010 ELECTROCARDIOGRAM REPORT: CPT | Mod: S$PBB,,, | Performed by: INTERNAL MEDICINE

## 2021-03-24 PROCEDURE — 99999 PR PBB SHADOW E&M-EST. PATIENT-LVL IV: CPT | Mod: PBBFAC,,, | Performed by: INTERNAL MEDICINE

## 2021-03-24 PROCEDURE — 93010 EKG 12-LEAD: ICD-10-PCS | Mod: S$PBB,,, | Performed by: INTERNAL MEDICINE

## 2021-03-24 PROCEDURE — 99215 PR OFFICE/OUTPT VISIT, EST, LEVL V, 40-54 MIN: ICD-10-PCS | Mod: S$PBB,GC,, | Performed by: INTERNAL MEDICINE

## 2021-03-24 PROCEDURE — 93005 ELECTROCARDIOGRAM TRACING: CPT | Mod: PBBFAC | Performed by: INTERNAL MEDICINE

## 2021-03-24 PROCEDURE — 99214 OFFICE O/P EST MOD 30 MIN: CPT | Mod: PBBFAC,25 | Performed by: INTERNAL MEDICINE

## 2021-03-24 PROCEDURE — 99215 OFFICE O/P EST HI 40 MIN: CPT | Mod: S$PBB,GC,, | Performed by: INTERNAL MEDICINE

## 2021-03-24 RX ORDER — RANOLAZINE 500 MG/1
500 TABLET, EXTENDED RELEASE ORAL 2 TIMES DAILY
Qty: 60 TABLET | Refills: 11 | Status: SHIPPED | OUTPATIENT
Start: 2021-03-24 | End: 2022-03-24

## 2021-03-30 ENCOUNTER — OFFICE VISIT (OUTPATIENT)
Dept: DERMATOLOGY | Facility: CLINIC | Age: 81
End: 2021-03-30
Payer: MEDICARE

## 2021-03-30 DIAGNOSIS — L28.1 PRURIGO NODULARIS: Primary | ICD-10-CM

## 2021-03-30 DIAGNOSIS — R21 SKIN RASH: ICD-10-CM

## 2021-03-30 DIAGNOSIS — L29.9 SKIN PRURITUS: ICD-10-CM

## 2021-03-30 PROCEDURE — 99212 OFFICE O/P EST SF 10 MIN: CPT | Mod: PBBFAC

## 2021-03-30 PROCEDURE — 99999 PR PBB SHADOW E&M-EST. PATIENT-LVL II: ICD-10-PCS | Mod: PBBFAC,,,

## 2021-03-30 PROCEDURE — 99203 OFFICE O/P NEW LOW 30 MIN: CPT | Mod: S$PBB,,, | Performed by: DERMATOLOGY

## 2021-03-30 PROCEDURE — 99203 PR OFFICE/OUTPT VISIT, NEW, LEVL III, 30-44 MIN: ICD-10-PCS | Mod: S$PBB,,, | Performed by: DERMATOLOGY

## 2021-03-30 PROCEDURE — 99999 PR PBB SHADOW E&M-EST. PATIENT-LVL II: CPT | Mod: PBBFAC,,,

## 2021-04-09 ENCOUNTER — HOSPITAL ENCOUNTER (OUTPATIENT)
Dept: CARDIOLOGY | Facility: CLINIC | Age: 81
Discharge: HOME | End: 2021-04-09
Payer: MEDICARE

## 2021-04-09 DIAGNOSIS — Z95.1 S/P CABG (CORONARY ARTERY BYPASS GRAFT): Chronic | ICD-10-CM

## 2021-04-09 DIAGNOSIS — I25.118 CORONARY ARTERY DISEASE OF NATIVE ARTERY OF NATIVE HEART WITH STABLE ANGINA PECTORIS: ICD-10-CM

## 2021-04-09 PROCEDURE — 93005 ELECTROCARDIOGRAM TRACING: CPT | Mod: PBBFAC | Performed by: INTERNAL MEDICINE

## 2021-04-09 PROCEDURE — 93010 ELECTROCARDIOGRAM REPORT: CPT | Mod: S$PBB,,, | Performed by: INTERNAL MEDICINE

## 2021-04-09 PROCEDURE — 93010 EKG 12-LEAD: ICD-10-PCS | Mod: S$PBB,,, | Performed by: INTERNAL MEDICINE

## 2021-04-14 ENCOUNTER — TELEPHONE (OUTPATIENT)
Dept: INTERNAL MEDICINE | Facility: CLINIC | Age: 81
End: 2021-04-14

## 2021-04-15 ENCOUNTER — OFFICE VISIT (OUTPATIENT)
Dept: INTERNAL MEDICINE | Facility: CLINIC | Age: 81
End: 2021-04-15
Payer: MEDICARE

## 2021-04-15 VITALS
BODY MASS INDEX: 34.02 KG/M2 | SYSTOLIC BLOOD PRESSURE: 130 MMHG | HEIGHT: 60 IN | OXYGEN SATURATION: 99 % | WEIGHT: 173.31 LBS | DIASTOLIC BLOOD PRESSURE: 70 MMHG | HEART RATE: 84 BPM

## 2021-04-15 DIAGNOSIS — I25.10 CORONARY ARTERY DISEASE DUE TO LIPID RICH PLAQUE: Primary | ICD-10-CM

## 2021-04-15 DIAGNOSIS — M25.532 ARTHRALGIA OF LEFT WRIST: ICD-10-CM

## 2021-04-15 DIAGNOSIS — M25.40 JOINT SWELLING: ICD-10-CM

## 2021-04-15 DIAGNOSIS — I10 ESSENTIAL HYPERTENSION: ICD-10-CM

## 2021-04-15 DIAGNOSIS — I25.83 CORONARY ARTERY DISEASE DUE TO LIPID RICH PLAQUE: Primary | ICD-10-CM

## 2021-04-15 DIAGNOSIS — M79.642 PAIN OF LEFT HAND: ICD-10-CM

## 2021-04-15 DIAGNOSIS — I20.89 ANGINA OF EFFORT: ICD-10-CM

## 2021-04-15 PROCEDURE — 99215 PR OFFICE/OUTPT VISIT, EST, LEVL V, 40-54 MIN: ICD-10-PCS | Mod: S$PBB,,, | Performed by: INTERNAL MEDICINE

## 2021-04-15 PROCEDURE — 99999 PR PBB SHADOW E&M-EST. PATIENT-LVL V: CPT | Mod: PBBFAC,,, | Performed by: INTERNAL MEDICINE

## 2021-04-15 PROCEDURE — 99215 OFFICE O/P EST HI 40 MIN: CPT | Mod: S$PBB,,, | Performed by: INTERNAL MEDICINE

## 2021-04-15 PROCEDURE — 99215 OFFICE O/P EST HI 40 MIN: CPT | Mod: PBBFAC | Performed by: INTERNAL MEDICINE

## 2021-04-15 PROCEDURE — 99999 PR PBB SHADOW E&M-EST. PATIENT-LVL V: ICD-10-PCS | Mod: PBBFAC,,, | Performed by: INTERNAL MEDICINE

## 2021-04-15 RX ORDER — DICLOFENAC SODIUM 10 MG/G
2 GEL TOPICAL 4 TIMES DAILY
Qty: 100 G | Refills: 0 | Status: SHIPPED | OUTPATIENT
Start: 2021-04-15 | End: 2021-11-08

## 2021-06-10 ENCOUNTER — OFFICE VISIT (OUTPATIENT)
Dept: INTERNAL MEDICINE | Facility: CLINIC | Age: 81
End: 2021-06-10
Payer: MEDICARE

## 2021-06-10 VITALS
WEIGHT: 172.63 LBS | SYSTOLIC BLOOD PRESSURE: 130 MMHG | DIASTOLIC BLOOD PRESSURE: 64 MMHG | HEIGHT: 60 IN | BODY MASS INDEX: 33.89 KG/M2

## 2021-06-10 DIAGNOSIS — E11.22 TYPE 2 DIABETES MELLITUS WITH STAGE 3 CHRONIC KIDNEY DISEASE, WITH LONG-TERM CURRENT USE OF INSULIN: Primary | Chronic | ICD-10-CM

## 2021-06-10 DIAGNOSIS — N18.30 TYPE 2 DIABETES MELLITUS WITH STAGE 3 CHRONIC KIDNEY DISEASE, WITH LONG-TERM CURRENT USE OF INSULIN: Primary | Chronic | ICD-10-CM

## 2021-06-10 DIAGNOSIS — K59.00 CONSTIPATION, UNSPECIFIED CONSTIPATION TYPE: ICD-10-CM

## 2021-06-10 DIAGNOSIS — Z79.4 TYPE 2 DIABETES MELLITUS WITH STAGE 3 CHRONIC KIDNEY DISEASE, WITH LONG-TERM CURRENT USE OF INSULIN: Primary | Chronic | ICD-10-CM

## 2021-06-10 DIAGNOSIS — R60.0 BILATERAL LEG EDEMA: ICD-10-CM

## 2021-06-10 DIAGNOSIS — L29.9 SKIN PRURITUS: ICD-10-CM
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
## 2021-06-10 PROCEDURE — 99999 PR PBB SHADOW E&M-EST. PATIENT-LVL IV: CPT | Mod: PBBFAC,,, | Performed by: INTERNAL MEDICINE

## 2021-06-10 PROCEDURE — 99999 PR PBB SHADOW E&M-EST. PATIENT-LVL IV: ICD-10-PCS | Mod: PBBFAC,,, | Performed by: INTERNAL MEDICINE

## 2021-06-10 PROCEDURE — 99214 OFFICE O/P EST MOD 30 MIN: CPT | Mod: S$PBB,,, | Performed by: INTERNAL MEDICINE

## 2021-06-10 PROCEDURE — 99214 OFFICE O/P EST MOD 30 MIN: CPT | Mod: PBBFAC | Performed by: INTERNAL MEDICINE

## 2021-06-10 PROCEDURE — 99214 PR OFFICE/OUTPT VISIT, EST, LEVL IV, 30-39 MIN: ICD-10-PCS | Mod: S$PBB,,, | Performed by: INTERNAL MEDICINE

## 2021-06-10 RX ORDER — INSULIN ASPART 100 [IU]/ML
INJECTION, SUSPENSION SUBCUTANEOUS
Qty: 40 ML | Refills: 11
Start: 2021-06-10 | End: 2022-03-30

## 2021-06-30 ENCOUNTER — PES CALL (OUTPATIENT)
Dept: ADMINISTRATIVE | Facility: CLINIC | Age: 81
End: 2021-06-30

## 2021-07-06 ENCOUNTER — OFFICE VISIT (OUTPATIENT)
Dept: OPHTHALMOLOGY | Facility: CLINIC | Age: 81
End: 2021-07-06
Payer: MEDICARE

## 2021-07-06 ENCOUNTER — PATIENT OUTREACH (OUTPATIENT)
Dept: ADMINISTRATIVE | Facility: OTHER | Age: 81
End: 2021-07-06

## 2021-07-06 DIAGNOSIS — H25.013 CORTICAL AGE-RELATED CATARACT, BILATERAL: ICD-10-CM

## 2021-07-06 DIAGNOSIS — H35.3132 NONEXUDATIVE AGE-RELATED MACULAR DEGENERATION, BILATERAL, INTERMEDIATE DRY STAGE: ICD-10-CM

## 2021-07-06 DIAGNOSIS — H35.3131 EARLY DRY STAGE NONEXUDATIVE AGE-RELATED MACULAR DEGENERATION OF BOTH EYES: ICD-10-CM

## 2021-07-06 DIAGNOSIS — H35.363 MACULAR DRUSEN, BILATERAL: ICD-10-CM

## 2021-07-06 DIAGNOSIS — H40.1133 PRIMARY OPEN-ANGLE GLAUCOMA, BILATERAL, SEVERE STAGE: Primary | ICD-10-CM

## 2021-07-06 DIAGNOSIS — H25.13 SENILE NUCLEAR SCLEROSIS, BILATERAL: ICD-10-CM

## 2021-07-06 DIAGNOSIS — E11.9 TYPE 2 DIABETES MELLITUS WITHOUT OPHTHALMIC MANIFESTATIONS: ICD-10-CM

## 2021-07-06 DIAGNOSIS — H53.9 TRANSIENT VISION DISTURBANCE: ICD-10-CM

## 2021-07-06 PROCEDURE — 92014 COMPRE OPH EXAM EST PT 1/>: CPT | Mod: S$PBB,,, | Performed by: OPHTHALMOLOGY

## 2021-07-06 PROCEDURE — 99999 PR PBB SHADOW E&M-EST. PATIENT-LVL III: ICD-10-PCS | Mod: PBBFAC,,, | Performed by: OPHTHALMOLOGY

## 2021-07-06 PROCEDURE — 99999 PR PBB SHADOW E&M-EST. PATIENT-LVL III: CPT | Mod: PBBFAC,,, | Performed by: OPHTHALMOLOGY

## 2021-07-06 PROCEDURE — 92014 PR EYE EXAM, EST PATIENT,COMPREHESV: ICD-10-PCS | Mod: S$PBB,,, | Performed by: OPHTHALMOLOGY

## 2021-07-06 PROCEDURE — 92250 COLOR FUNDUS PHOTOGRAPHY - OU - BOTH EYES: ICD-10-PCS | Mod: 26,S$PBB,, | Performed by: OPHTHALMOLOGY

## 2021-07-06 PROCEDURE — 92250 FUNDUS PHOTOGRAPHY W/I&R: CPT | Mod: PBBFAC | Performed by: OPHTHALMOLOGY

## 2021-07-06 PROCEDURE — 99213 OFFICE O/P EST LOW 20 MIN: CPT | Mod: PBBFAC | Performed by: OPHTHALMOLOGY

## 2021-07-06 RX ORDER — CANDESARTAN 4 MG/1
4 TABLET ORAL DAILY
COMMUNITY
Start: 2021-04-09 | End: 2021-11-23

## 2021-07-22 DIAGNOSIS — G56.01 CARPAL TUNNEL SYNDROME, RIGHT: ICD-10-CM

## 2021-07-22 RX ORDER — TRAMADOL HYDROCHLORIDE 50 MG/1
TABLET ORAL
Qty: 40 TABLET | Refills: 0 | Status: SHIPPED | OUTPATIENT
Start: 2021-07-22 | End: 2022-04-29 | Stop reason: SDUPTHER

## 2021-08-04 ENCOUNTER — PES CALL (OUTPATIENT)
Dept: ADMINISTRATIVE | Facility: CLINIC | Age: 81
End: 2021-08-04

## 2021-08-05 ENCOUNTER — OFFICE VISIT (OUTPATIENT)
Dept: PODIATRY | Facility: CLINIC | Age: 81
End: 2021-08-05
Payer: MEDICARE

## 2021-08-05 VITALS
HEIGHT: 60 IN | HEART RATE: 75 BPM | DIASTOLIC BLOOD PRESSURE: 67 MMHG | SYSTOLIC BLOOD PRESSURE: 150 MMHG | BODY MASS INDEX: 33.77 KG/M2 | WEIGHT: 172 LBS

## 2021-08-05 DIAGNOSIS — M20.11 VALGUS DEFORMITY OF BOTH GREAT TOES: ICD-10-CM

## 2021-08-05 DIAGNOSIS — B35.1 ONYCHOMYCOSIS DUE TO DERMATOPHYTE: Primary | ICD-10-CM

## 2021-08-05 DIAGNOSIS — E11.49 TYPE II DIABETES MELLITUS WITH NEUROLOGICAL MANIFESTATIONS: ICD-10-CM

## 2021-08-05 DIAGNOSIS — M20.42 HAMMER TOES OF BOTH FEET: ICD-10-CM

## 2021-08-05 DIAGNOSIS — M20.41 HAMMER TOES OF BOTH FEET: ICD-10-CM

## 2021-08-05 DIAGNOSIS — M20.12 VALGUS DEFORMITY OF BOTH GREAT TOES: ICD-10-CM

## 2021-08-05 PROCEDURE — 99214 OFFICE O/P EST MOD 30 MIN: CPT | Mod: PBBFAC,PN | Performed by: PODIATRIST

## 2021-08-05 PROCEDURE — 11721 DEBRIDE NAIL 6 OR MORE: CPT | Mod: Q9,S$PBB,, | Performed by: PODIATRIST

## 2021-08-05 PROCEDURE — 99999 PR PBB SHADOW E&M-EST. PATIENT-LVL IV: CPT | Mod: PBBFAC,,, | Performed by: PODIATRIST

## 2021-08-05 PROCEDURE — 99214 OFFICE O/P EST MOD 30 MIN: CPT | Mod: 25,S$PBB,, | Performed by: PODIATRIST

## 2021-08-05 PROCEDURE — 99214 PR OFFICE/OUTPT VISIT, EST, LEVL IV, 30-39 MIN: ICD-10-PCS | Mod: 25,S$PBB,, | Performed by: PODIATRIST

## 2021-08-05 PROCEDURE — 11721 DEBRIDE NAIL 6 OR MORE: CPT | Mod: Q9,PBBFAC,PN | Performed by: PODIATRIST

## 2021-08-05 PROCEDURE — 99999 PR PBB SHADOW E&M-EST. PATIENT-LVL IV: ICD-10-PCS | Mod: PBBFAC,,, | Performed by: PODIATRIST

## 2021-08-05 PROCEDURE — 11721 PR DEBRIDEMENT OF NAILS, 6 OR MORE: ICD-10-PCS | Mod: Q9,S$PBB,, | Performed by: PODIATRIST

## 2021-08-05 RX ORDER — CICLOPIROX 80 MG/ML
SOLUTION TOPICAL NIGHTLY
Qty: 6.6 ML | Refills: 11 | Status: SHIPPED | OUTPATIENT
Start: 2021-08-05

## 2021-08-27 ENCOUNTER — TELEPHONE (OUTPATIENT)
Dept: PRIMARY CARE CLINIC | Facility: CLINIC | Age: 81
End: 2021-08-27

## 2021-09-03 ENCOUNTER — PATIENT MESSAGE (OUTPATIENT)
Dept: CARDIOLOGY | Facility: CLINIC | Age: 81
End: 2021-09-03

## 2021-09-13 ENCOUNTER — LAB VISIT (OUTPATIENT)
Dept: LAB | Facility: HOSPITAL | Age: 81
End: 2021-09-13
Payer: MEDICARE

## 2021-09-13 ENCOUNTER — OFFICE VISIT (OUTPATIENT)
Dept: CARDIOLOGY | Facility: CLINIC | Age: 81
End: 2021-09-13
Payer: MEDICARE

## 2021-09-13 VITALS
WEIGHT: 162.69 LBS | HEIGHT: 60 IN | SYSTOLIC BLOOD PRESSURE: 146 MMHG | DIASTOLIC BLOOD PRESSURE: 66 MMHG | HEART RATE: 68 BPM | OXYGEN SATURATION: 99 % | BODY MASS INDEX: 31.94 KG/M2

## 2021-09-13 DIAGNOSIS — I10 ESSENTIAL HYPERTENSION: Chronic | ICD-10-CM

## 2021-09-13 DIAGNOSIS — Z79.4 TYPE 2 DIABETES MELLITUS WITH DIABETIC POLYNEUROPATHY, WITH LONG-TERM CURRENT USE OF INSULIN: Chronic | ICD-10-CM

## 2021-09-13 DIAGNOSIS — E78.5 DYSLIPIDEMIA, GOAL LDL BELOW 70: ICD-10-CM

## 2021-09-13 DIAGNOSIS — I65.23 CAROTID STENOSIS, BILATERAL: ICD-10-CM

## 2021-09-13 DIAGNOSIS — I10 ESSENTIAL HYPERTENSION: ICD-10-CM

## 2021-09-13 DIAGNOSIS — E66.01 MORBID OBESITY: ICD-10-CM

## 2021-09-13 DIAGNOSIS — N18.32 STAGE 3B CHRONIC KIDNEY DISEASE: Chronic | ICD-10-CM

## 2021-09-13 DIAGNOSIS — E11.42 TYPE 2 DIABETES MELLITUS WITH DIABETIC POLYNEUROPATHY, WITH LONG-TERM CURRENT USE OF INSULIN: Chronic | ICD-10-CM

## 2021-09-13 DIAGNOSIS — I25.118 CORONARY ARTERY DISEASE OF NATIVE ARTERY OF NATIVE HEART WITH STABLE ANGINA PECTORIS: Primary | ICD-10-CM

## 2021-09-13 DIAGNOSIS — Z98.61 POST PTCA: Chronic | ICD-10-CM

## 2021-09-13 DIAGNOSIS — Z95.1 S/P CABG (CORONARY ARTERY BYPASS GRAFT): Chronic | ICD-10-CM

## 2021-09-13 DIAGNOSIS — Z86.73 HISTORY OF TIA (TRANSIENT ISCHEMIC ATTACK): Chronic | ICD-10-CM

## 2021-09-13 LAB
ANION GAP SERPL CALC-SCNC: 12 MMOL/L (ref 8–16)
BUN SERPL-MCNC: 23 MG/DL (ref 8–23)
CALCIUM SERPL-MCNC: 10.1 MG/DL (ref 8.7–10.5)
CHLORIDE SERPL-SCNC: 105 MMOL/L (ref 95–110)
CO2 SERPL-SCNC: 21 MMOL/L (ref 23–29)
CREAT SERPL-MCNC: 1.5 MG/DL (ref 0.5–1.4)
EST. GFR  (AFRICAN AMERICAN): 37.9 ML/MIN/1.73 M^2
EST. GFR  (NON AFRICAN AMERICAN): 32.9 ML/MIN/1.73 M^2
GLUCOSE SERPL-MCNC: 120 MG/DL (ref 70–110)
POTASSIUM SERPL-SCNC: 4.9 MMOL/L (ref 3.5–5.1)
SODIUM SERPL-SCNC: 138 MMOL/L (ref 136–145)

## 2021-09-13 PROCEDURE — 80048 BASIC METABOLIC PNL TOTAL CA: CPT | Performed by: NURSE PRACTITIONER

## 2021-09-13 PROCEDURE — 99214 OFFICE O/P EST MOD 30 MIN: CPT | Mod: S$PBB,,, | Performed by: PHYSICIAN ASSISTANT

## 2021-09-13 PROCEDURE — 36415 COLL VENOUS BLD VENIPUNCTURE: CPT | Performed by: NURSE PRACTITIONER

## 2021-09-13 PROCEDURE — 99999 PR PBB SHADOW E&M-EST. PATIENT-LVL V: CPT | Mod: PBBFAC,,, | Performed by: PHYSICIAN ASSISTANT

## 2021-09-13 PROCEDURE — 99214 PR OFFICE/OUTPT VISIT, EST, LEVL IV, 30-39 MIN: ICD-10-PCS | Mod: S$PBB,,, | Performed by: PHYSICIAN ASSISTANT

## 2021-09-13 PROCEDURE — 99215 OFFICE O/P EST HI 40 MIN: CPT | Mod: PBBFAC | Performed by: PHYSICIAN ASSISTANT

## 2021-09-13 PROCEDURE — 99999 PR PBB SHADOW E&M-EST. PATIENT-LVL V: ICD-10-PCS | Mod: PBBFAC,,, | Performed by: PHYSICIAN ASSISTANT

## 2021-09-17 ENCOUNTER — PES CALL (OUTPATIENT)
Dept: ADMINISTRATIVE | Facility: CLINIC | Age: 81
End: 2021-09-17

## 2021-09-18 NOTE — PROGRESS NOTES
Ms. Siddiqi was seen in the clinic today for an audiological evaluation.  Ms. Paz reported bilateral hearing loss.  She currently wears binaural amplification.     Audiological testing revealed a moderate to moderately-severe sensorineural hearing loss, bilaterally.  A speech reception threshold was obtained at 50 dBHL for the right ear and at 60 dBHL for the left ear.  Speech discrimination was 52% for the right ear and 52% for the left ear.      Tympanometry testing revealed Type A tympanograms, bilaterally.    Recommendations:  1. Otologic evaluation  2. Annual hearing evaluation  3. Hearing protection when in noise   4. Continue use of binaural amplification          
no

## 2021-10-05 ENCOUNTER — LAB VISIT (OUTPATIENT)
Dept: LAB | Facility: HOSPITAL | Age: 81
End: 2021-10-05
Attending: INTERNAL MEDICINE
Payer: MEDICARE

## 2021-10-05 ENCOUNTER — IMMUNIZATION (OUTPATIENT)
Dept: INTERNAL MEDICINE | Facility: CLINIC | Age: 81
End: 2021-10-05
Payer: MEDICARE

## 2021-10-05 ENCOUNTER — OFFICE VISIT (OUTPATIENT)
Dept: INTERNAL MEDICINE | Facility: CLINIC | Age: 81
End: 2021-10-05
Payer: MEDICARE

## 2021-10-05 VITALS
HEART RATE: 62 BPM | DIASTOLIC BLOOD PRESSURE: 66 MMHG | RESPIRATION RATE: 18 BRPM | SYSTOLIC BLOOD PRESSURE: 130 MMHG | HEIGHT: 60 IN | WEIGHT: 123.44 LBS | BODY MASS INDEX: 24.23 KG/M2 | OXYGEN SATURATION: 99 %

## 2021-10-05 DIAGNOSIS — N18.30 TYPE 2 DIABETES MELLITUS WITH STAGE 3 CHRONIC KIDNEY DISEASE, WITH LONG-TERM CURRENT USE OF INSULIN, UNSPECIFIED WHETHER STAGE 3A OR 3B CKD: Primary | ICD-10-CM

## 2021-10-05 DIAGNOSIS — K59.00 CONSTIPATION, UNSPECIFIED CONSTIPATION TYPE: ICD-10-CM

## 2021-10-05 DIAGNOSIS — R21 SKIN RASH: ICD-10-CM

## 2021-10-05 DIAGNOSIS — I25.118 CORONARY ARTERY DISEASE OF NATIVE ARTERY OF NATIVE HEART WITH STABLE ANGINA PECTORIS: ICD-10-CM

## 2021-10-05 DIAGNOSIS — Z23 NEED FOR VACCINATION: Primary | ICD-10-CM

## 2021-10-05 DIAGNOSIS — H61.21 CERUMEN DEBRIS ON TYMPANIC MEMBRANE OF RIGHT EAR: ICD-10-CM

## 2021-10-05 DIAGNOSIS — E11.22 TYPE 2 DIABETES MELLITUS WITH STAGE 3 CHRONIC KIDNEY DISEASE, WITH LONG-TERM CURRENT USE OF INSULIN, UNSPECIFIED WHETHER STAGE 3A OR 3B CKD: Primary | ICD-10-CM

## 2021-10-05 DIAGNOSIS — Z79.4 TYPE 2 DIABETES MELLITUS WITH STAGE 3 CHRONIC KIDNEY DISEASE, WITH LONG-TERM CURRENT USE OF INSULIN, UNSPECIFIED WHETHER STAGE 3A OR 3B CKD: Primary | ICD-10-CM

## 2021-10-05 DIAGNOSIS — K31.84 GASTROPARESIS: ICD-10-CM

## 2021-10-05 DIAGNOSIS — K21.00 GASTROESOPHAGEAL REFLUX DISEASE WITH ESOPHAGITIS, UNSPECIFIED WHETHER HEMORRHAGE: ICD-10-CM

## 2021-10-05 DIAGNOSIS — R60.0 EDEMA OF BOTH LOWER LEGS: ICD-10-CM

## 2021-10-05 DIAGNOSIS — Z79.4 TYPE 2 DIABETES MELLITUS WITH STAGE 3 CHRONIC KIDNEY DISEASE, WITH LONG-TERM CURRENT USE OF INSULIN, UNSPECIFIED WHETHER STAGE 3A OR 3B CKD: ICD-10-CM

## 2021-10-05 DIAGNOSIS — I10 ESSENTIAL HYPERTENSION: ICD-10-CM

## 2021-10-05 DIAGNOSIS — E11.22 TYPE 2 DIABETES MELLITUS WITH STAGE 3 CHRONIC KIDNEY DISEASE, WITH LONG-TERM CURRENT USE OF INSULIN, UNSPECIFIED WHETHER STAGE 3A OR 3B CKD: ICD-10-CM

## 2021-10-05 DIAGNOSIS — N18.30 TYPE 2 DIABETES MELLITUS WITH STAGE 3 CHRONIC KIDNEY DISEASE, WITH LONG-TERM CURRENT USE OF INSULIN, UNSPECIFIED WHETHER STAGE 3A OR 3B CKD: ICD-10-CM

## 2021-10-05 DIAGNOSIS — H90.3 SENSORINEURAL HEARING LOSS (SNHL) OF BOTH EARS: ICD-10-CM

## 2021-10-05 LAB
ESTIMATED AVG GLUCOSE: 143 MG/DL (ref 68–131)
HBA1C MFR BLD: 6.6 % (ref 4–5.6)

## 2021-10-05 PROCEDURE — 83036 HEMOGLOBIN GLYCOSYLATED A1C: CPT | Performed by: INTERNAL MEDICINE

## 2021-10-05 PROCEDURE — 99214 PR OFFICE/OUTPT VISIT, EST, LEVL IV, 30-39 MIN: ICD-10-PCS | Mod: S$PBB,,, | Performed by: INTERNAL MEDICINE

## 2021-10-05 PROCEDURE — 99214 OFFICE O/P EST MOD 30 MIN: CPT | Mod: S$PBB,,, | Performed by: INTERNAL MEDICINE

## 2021-10-05 PROCEDURE — 99999 PR PBB SHADOW E&M-EST. PATIENT-LVL III: CPT | Mod: PBBFAC,,, | Performed by: INTERNAL MEDICINE

## 2021-10-05 PROCEDURE — 99213 OFFICE O/P EST LOW 20 MIN: CPT | Mod: PBBFAC | Performed by: INTERNAL MEDICINE

## 2021-10-05 PROCEDURE — 36415 COLL VENOUS BLD VENIPUNCTURE: CPT | Performed by: INTERNAL MEDICINE

## 2021-10-05 PROCEDURE — 99999 PR PBB SHADOW E&M-EST. PATIENT-LVL III: ICD-10-PCS | Mod: PBBFAC,,, | Performed by: INTERNAL MEDICINE

## 2021-10-05 PROCEDURE — 0003A COVID-19, MRNA, LNP-S, PF, 30 MCG/0.3 ML DOSE VACCINE: CPT | Mod: PBBFAC,CV19

## 2021-10-05 PROCEDURE — 91300 COVID-19, MRNA, LNP-S, PF, 30 MCG/0.3 ML DOSE VACCINE: CPT | Mod: PBBFAC

## 2021-10-06 ENCOUNTER — OFFICE VISIT (OUTPATIENT)
Dept: OTOLARYNGOLOGY | Facility: CLINIC | Age: 81
End: 2021-10-06
Payer: MEDICARE

## 2021-10-06 DIAGNOSIS — H61.21 CERUMEN DEBRIS ON TYMPANIC MEMBRANE OF RIGHT EAR: ICD-10-CM

## 2021-10-06 PROCEDURE — 69210 PR REMOVAL IMPACTED CERUMEN REQUIRING INSTRUMENTATION, UNILATERAL: ICD-10-PCS | Mod: S$PBB,,, | Performed by: OTOLARYNGOLOGY

## 2021-10-06 PROCEDURE — 69210 REMOVE IMPACTED EAR WAX UNI: CPT | Mod: PBBFAC | Performed by: OTOLARYNGOLOGY

## 2021-10-06 PROCEDURE — 99213 OFFICE O/P EST LOW 20 MIN: CPT | Mod: PBBFAC | Performed by: OTOLARYNGOLOGY

## 2021-10-06 PROCEDURE — 99999 PR PBB SHADOW E&M-EST. PATIENT-LVL III: CPT | Mod: PBBFAC,,, | Performed by: OTOLARYNGOLOGY

## 2021-10-06 PROCEDURE — 69210 REMOVE IMPACTED EAR WAX UNI: CPT | Mod: S$PBB,,, | Performed by: OTOLARYNGOLOGY

## 2021-10-06 PROCEDURE — 99999 PR PBB SHADOW E&M-EST. PATIENT-LVL III: ICD-10-PCS | Mod: PBBFAC,,, | Performed by: OTOLARYNGOLOGY

## 2021-10-06 PROCEDURE — 99499 UNLISTED E&M SERVICE: CPT | Mod: S$PBB,,, | Performed by: OTOLARYNGOLOGY

## 2021-10-06 PROCEDURE — 99499 NO LOS: ICD-10-PCS | Mod: S$PBB,,, | Performed by: OTOLARYNGOLOGY

## 2021-10-08 ENCOUNTER — TELEPHONE (OUTPATIENT)
Dept: GASTROENTEROLOGY | Facility: CLINIC | Age: 81
End: 2021-10-08

## 2021-10-08 ENCOUNTER — HOSPITAL ENCOUNTER (OUTPATIENT)
Dept: RADIOLOGY | Facility: HOSPITAL | Age: 81
Discharge: HOME | End: 2021-10-08
Attending: INTERNAL MEDICINE
Payer: MEDICARE

## 2021-10-08 ENCOUNTER — OFFICE VISIT (OUTPATIENT)
Dept: GASTROENTEROLOGY | Facility: CLINIC | Age: 81
End: 2021-10-08
Payer: MEDICARE

## 2021-10-08 VITALS
WEIGHT: 161.19 LBS | DIASTOLIC BLOOD PRESSURE: 63 MMHG | HEIGHT: 61 IN | BODY MASS INDEX: 30.43 KG/M2 | HEART RATE: 71 BPM | SYSTOLIC BLOOD PRESSURE: 124 MMHG

## 2021-10-08 DIAGNOSIS — K59.09 CONSTIPATION, CHRONIC: ICD-10-CM

## 2021-10-08 DIAGNOSIS — K59.09 CONSTIPATION, CHRONIC: Primary | ICD-10-CM

## 2021-10-08 DIAGNOSIS — K31.A0 INTESTINAL METAPLASIA OF STOMACH: ICD-10-CM

## 2021-10-08 PROCEDURE — 74018 RADEX ABDOMEN 1 VIEW: CPT | Mod: TC

## 2021-10-08 PROCEDURE — 74018 XR ABDOMEN AP 1 VIEW: ICD-10-PCS | Mod: 26,,, | Performed by: RADIOLOGY

## 2021-10-08 PROCEDURE — 74018 RADEX ABDOMEN 1 VIEW: CPT | Mod: 26,,, | Performed by: RADIOLOGY

## 2021-10-08 PROCEDURE — 99214 OFFICE O/P EST MOD 30 MIN: CPT | Mod: S$PBB,,, | Performed by: INTERNAL MEDICINE

## 2021-10-08 PROCEDURE — 99214 OFFICE O/P EST MOD 30 MIN: CPT | Mod: PBBFAC | Performed by: INTERNAL MEDICINE

## 2021-10-08 PROCEDURE — 99999 PR PBB SHADOW E&M-EST. PATIENT-LVL IV: CPT | Mod: PBBFAC,,, | Performed by: INTERNAL MEDICINE

## 2021-10-08 PROCEDURE — 99214 PR OFFICE/OUTPT VISIT, EST, LEVL IV, 30-39 MIN: ICD-10-PCS | Mod: S$PBB,,, | Performed by: INTERNAL MEDICINE

## 2021-10-08 PROCEDURE — 99999 PR PBB SHADOW E&M-EST. PATIENT-LVL IV: ICD-10-PCS | Mod: PBBFAC,,, | Performed by: INTERNAL MEDICINE

## 2021-10-08 RX ORDER — LACTULOSE 10 G/15ML
10 SOLUTION ORAL; RECTAL DAILY PRN
Qty: 1892 ML | Refills: 3 | Status: SHIPPED | OUTPATIENT
Start: 2021-10-08

## 2021-10-14 ENCOUNTER — PATIENT OUTREACH (OUTPATIENT)
Dept: ADMINISTRATIVE | Facility: OTHER | Age: 81
End: 2021-10-14

## 2021-11-15 ENCOUNTER — PATIENT OUTREACH (OUTPATIENT)
Dept: ADMINISTRATIVE | Facility: OTHER | Age: 81
End: 2021-11-15
Payer: MEDICARE

## 2021-11-16 ENCOUNTER — OFFICE VISIT (OUTPATIENT)
Dept: OPHTHALMOLOGY | Facility: CLINIC | Age: 81
End: 2021-11-16
Payer: MEDICARE

## 2021-11-16 ENCOUNTER — CLINICAL SUPPORT (OUTPATIENT)
Dept: OPHTHALMOLOGY | Facility: CLINIC | Age: 81
End: 2021-11-16
Payer: MEDICARE

## 2021-11-16 DIAGNOSIS — H35.363 MACULAR DRUSEN, BILATERAL: ICD-10-CM

## 2021-11-16 DIAGNOSIS — H35.3132 NONEXUDATIVE AGE-RELATED MACULAR DEGENERATION, BILATERAL, INTERMEDIATE DRY STAGE: ICD-10-CM

## 2021-11-16 DIAGNOSIS — H35.3130 BILATERAL NONEXUDATIVE AGE-RELATED MACULAR DEGENERATION, UNSPECIFIED STAGE: ICD-10-CM

## 2021-11-16 DIAGNOSIS — H35.3131 EARLY DRY STAGE NONEXUDATIVE AGE-RELATED MACULAR DEGENERATION OF BOTH EYES: ICD-10-CM

## 2021-11-16 DIAGNOSIS — H25.13 SENILE NUCLEAR SCLEROSIS, BILATERAL: ICD-10-CM

## 2021-11-16 DIAGNOSIS — E11.9 TYPE 2 DIABETES MELLITUS WITHOUT OPHTHALMIC MANIFESTATIONS: ICD-10-CM

## 2021-11-16 DIAGNOSIS — H53.9 TRANSIENT VISION DISTURBANCE: ICD-10-CM

## 2021-11-16 DIAGNOSIS — H40.1133 PRIMARY OPEN-ANGLE GLAUCOMA, BILATERAL, SEVERE STAGE: Primary | ICD-10-CM

## 2021-11-16 DIAGNOSIS — H25.013 CORTICAL AGE-RELATED CATARACT, BILATERAL: ICD-10-CM

## 2021-11-16 DIAGNOSIS — H40.1133 PRIMARY OPEN-ANGLE GLAUCOMA, BILATERAL, SEVERE STAGE: ICD-10-CM

## 2021-11-16 PROCEDURE — 92133 POSTERIOR SEGMENT OCT OPTIC NERVE(OCULAR COHERENCE TOMOGRAPHY) - OU - BOTH EYES: ICD-10-PCS | Mod: 26,S$PBB,, | Performed by: OPHTHALMOLOGY

## 2021-11-16 PROCEDURE — 99213 OFFICE O/P EST LOW 20 MIN: CPT | Mod: PBBFAC | Performed by: OPHTHALMOLOGY

## 2021-11-16 PROCEDURE — 99999 PR PBB SHADOW E&M-EST. PATIENT-LVL III: CPT | Mod: PBBFAC,,, | Performed by: OPHTHALMOLOGY

## 2021-11-16 PROCEDURE — 92012 PR EYE EXAM, EST PATIENT,INTERMED: ICD-10-PCS | Mod: S$PBB,,, | Performed by: OPHTHALMOLOGY

## 2021-11-16 PROCEDURE — 92012 INTRM OPH EXAM EST PATIENT: CPT | Mod: S$PBB,,, | Performed by: OPHTHALMOLOGY

## 2021-11-16 PROCEDURE — 92133 CPTRZD OPH DX IMG PST SGM ON: CPT | Mod: PBBFAC | Performed by: OPHTHALMOLOGY

## 2021-11-16 PROCEDURE — 99999 PR PBB SHADOW E&M-EST. PATIENT-LVL III: ICD-10-PCS | Mod: PBBFAC,,, | Performed by: OPHTHALMOLOGY

## 2021-11-17 ENCOUNTER — PES CALL (OUTPATIENT)
Dept: ADMINISTRATIVE | Facility: CLINIC | Age: 81
End: 2021-11-17
Payer: MEDICARE

## 2021-11-23 ENCOUNTER — OFFICE VISIT (OUTPATIENT)
Dept: CARDIOLOGY | Facility: CLINIC | Age: 81
End: 2021-11-23
Payer: MEDICARE

## 2021-11-23 VITALS
HEIGHT: 60 IN | WEIGHT: 164.25 LBS | DIASTOLIC BLOOD PRESSURE: 65 MMHG | BODY MASS INDEX: 32.25 KG/M2 | SYSTOLIC BLOOD PRESSURE: 141 MMHG | HEART RATE: 63 BPM

## 2021-11-23 DIAGNOSIS — I25.118 CORONARY ARTERY DISEASE OF NATIVE ARTERY OF NATIVE HEART WITH STABLE ANGINA PECTORIS: Primary | ICD-10-CM

## 2021-11-23 DIAGNOSIS — I25.83 CORONARY ARTERY DISEASE DUE TO LIPID RICH PLAQUE: ICD-10-CM

## 2021-11-23 DIAGNOSIS — I25.10 CORONARY ARTERY DISEASE INVOLVING NATIVE CORONARY ARTERY OF NATIVE HEART WITHOUT ANGINA PECTORIS: Chronic | ICD-10-CM

## 2021-11-23 DIAGNOSIS — I65.23 CAROTID STENOSIS, BILATERAL: ICD-10-CM

## 2021-11-23 DIAGNOSIS — E66.01 MORBID OBESITY: ICD-10-CM

## 2021-11-23 DIAGNOSIS — E11.42 TYPE 2 DIABETES MELLITUS WITH DIABETIC POLYNEUROPATHY, WITH LONG-TERM CURRENT USE OF INSULIN: Chronic | ICD-10-CM

## 2021-11-23 DIAGNOSIS — Z95.1 S/P CABG (CORONARY ARTERY BYPASS GRAFT): Chronic | ICD-10-CM

## 2021-11-23 DIAGNOSIS — N18.32 STAGE 3B CHRONIC KIDNEY DISEASE: Chronic | ICD-10-CM

## 2021-11-23 DIAGNOSIS — Z98.61 POST PTCA: Chronic | ICD-10-CM

## 2021-11-23 DIAGNOSIS — Z53.1 REFUSAL OF BLOOD TRANSFUSIONS AS PATIENT IS JEHOVAH'S WITNESS: ICD-10-CM

## 2021-11-23 DIAGNOSIS — Z86.73 HISTORY OF TIA (TRANSIENT ISCHEMIC ATTACK): Chronic | ICD-10-CM

## 2021-11-23 DIAGNOSIS — I10 ESSENTIAL HYPERTENSION: Chronic | ICD-10-CM

## 2021-11-23 DIAGNOSIS — I25.10 CORONARY ARTERY DISEASE DUE TO LIPID RICH PLAQUE: ICD-10-CM

## 2021-11-23 DIAGNOSIS — E78.5 DYSLIPIDEMIA, GOAL LDL BELOW 70: ICD-10-CM

## 2021-11-23 DIAGNOSIS — Z79.4 TYPE 2 DIABETES MELLITUS WITH DIABETIC POLYNEUROPATHY, WITH LONG-TERM CURRENT USE OF INSULIN: Chronic | ICD-10-CM

## 2021-11-23 PROBLEM — R94.39 ABNORMAL STRESS TEST: Status: RESOLVED | Noted: 2021-01-22 | Resolved: 2021-11-23

## 2021-11-23 PROCEDURE — 99215 OFFICE O/P EST HI 40 MIN: CPT | Mod: PBBFAC | Performed by: PHYSICIAN ASSISTANT

## 2021-11-23 PROCEDURE — 99214 OFFICE O/P EST MOD 30 MIN: CPT | Mod: S$PBB,,, | Performed by: PHYSICIAN ASSISTANT

## 2021-11-23 PROCEDURE — 99999 PR PBB SHADOW E&M-EST. PATIENT-LVL V: ICD-10-PCS | Mod: PBBFAC,,, | Performed by: PHYSICIAN ASSISTANT

## 2021-11-23 PROCEDURE — 99214 PR OFFICE/OUTPT VISIT, EST, LEVL IV, 30-39 MIN: ICD-10-PCS | Mod: S$PBB,,, | Performed by: PHYSICIAN ASSISTANT

## 2021-11-23 PROCEDURE — 99999 PR PBB SHADOW E&M-EST. PATIENT-LVL V: CPT | Mod: PBBFAC,,, | Performed by: PHYSICIAN ASSISTANT

## 2021-11-23 RX ORDER — ISOSORBIDE MONONITRATE 120 MG/1
120 TABLET, EXTENDED RELEASE ORAL DAILY
Qty: 90 TABLET | Refills: 3 | Status: SHIPPED | OUTPATIENT
Start: 2021-11-23 | End: 2022-11-23

## 2021-12-06 ENCOUNTER — OFFICE VISIT (OUTPATIENT)
Dept: INTERNAL MEDICINE | Facility: CLINIC | Age: 81
End: 2021-12-06
Payer: MEDICARE

## 2021-12-06 ENCOUNTER — TELEPHONE (OUTPATIENT)
Dept: INTERNAL MEDICINE | Facility: CLINIC | Age: 81
End: 2021-12-06

## 2021-12-06 VITALS
BODY MASS INDEX: 31.82 KG/M2 | DIASTOLIC BLOOD PRESSURE: 60 MMHG | SYSTOLIC BLOOD PRESSURE: 118 MMHG | HEIGHT: 60 IN | WEIGHT: 162.06 LBS | HEART RATE: 67 BPM | OXYGEN SATURATION: 99 %

## 2021-12-06 DIAGNOSIS — K21.9 GASTROESOPHAGEAL REFLUX DISEASE, UNSPECIFIED WHETHER ESOPHAGITIS PRESENT: ICD-10-CM

## 2021-12-06 DIAGNOSIS — I77.1 TORTUOUS AORTA: ICD-10-CM

## 2021-12-06 DIAGNOSIS — D53.9 MACROCYTIC ANEMIA: ICD-10-CM

## 2021-12-06 DIAGNOSIS — N18.32 STAGE 3B CHRONIC KIDNEY DISEASE: Chronic | ICD-10-CM

## 2021-12-06 DIAGNOSIS — E11.59 HYPERTENSION ASSOCIATED WITH DIABETES: ICD-10-CM

## 2021-12-06 DIAGNOSIS — E78.5 DYSLIPIDEMIA ASSOCIATED WITH TYPE 2 DIABETES MELLITUS: ICD-10-CM

## 2021-12-06 DIAGNOSIS — I87.2 VENOUS INSUFFICIENCY OF BOTH LOWER EXTREMITIES: ICD-10-CM

## 2021-12-06 DIAGNOSIS — J84.10 CALCIFIED GRANULOMA OF LUNG: ICD-10-CM

## 2021-12-06 DIAGNOSIS — Z79.4 TYPE 2 DIABETES MELLITUS WITH DIABETIC POLYNEUROPATHY, WITH LONG-TERM CURRENT USE OF INSULIN: Chronic | ICD-10-CM

## 2021-12-06 DIAGNOSIS — H40.1133 PRIMARY OPEN-ANGLE GLAUCOMA, BILATERAL, SEVERE STAGE: ICD-10-CM

## 2021-12-06 DIAGNOSIS — E11.69 DYSLIPIDEMIA ASSOCIATED WITH TYPE 2 DIABETES MELLITUS: ICD-10-CM

## 2021-12-06 DIAGNOSIS — R26.9 ABNORMALITY OF GAIT AND MOBILITY: ICD-10-CM

## 2021-12-06 DIAGNOSIS — Z71.89 ADVANCED CARE PLANNING/COUNSELING DISCUSSION: ICD-10-CM

## 2021-12-06 DIAGNOSIS — I70.0 THORACIC AORTA ATHEROSCLEROSIS: ICD-10-CM

## 2021-12-06 DIAGNOSIS — E11.42 DM TYPE 2 WITH DIABETIC PERIPHERAL NEUROPATHY: ICD-10-CM

## 2021-12-06 DIAGNOSIS — E11.9 TYPE 2 DIABETES MELLITUS WITHOUT OPHTHALMIC MANIFESTATIONS: Chronic | ICD-10-CM

## 2021-12-06 DIAGNOSIS — I25.118 CORONARY ARTERY DISEASE OF NATIVE ARTERY OF NATIVE HEART WITH STABLE ANGINA PECTORIS: ICD-10-CM

## 2021-12-06 DIAGNOSIS — Z00.00 ENCOUNTER FOR PREVENTIVE HEALTH EXAMINATION: Primary | ICD-10-CM

## 2021-12-06 DIAGNOSIS — Z78.0 ASYMPTOMATIC POSTMENOPAUSAL STATE: ICD-10-CM

## 2021-12-06 DIAGNOSIS — H90.3 SENSORINEURAL HEARING LOSS (SNHL) OF BOTH EARS: ICD-10-CM

## 2021-12-06 DIAGNOSIS — E11.42 TYPE 2 DIABETES MELLITUS WITH DIABETIC POLYNEUROPATHY, WITH LONG-TERM CURRENT USE OF INSULIN: Chronic | ICD-10-CM

## 2021-12-06 DIAGNOSIS — Z99.89 DEPENDENCE ON OTHER ENABLING MACHINES AND DEVICES: ICD-10-CM

## 2021-12-06 DIAGNOSIS — K59.00 CONSTIPATION, UNSPECIFIED CONSTIPATION TYPE: ICD-10-CM

## 2021-12-06 DIAGNOSIS — D69.6 THROMBOCYTOPENIA: ICD-10-CM

## 2021-12-06 DIAGNOSIS — I15.2 HYPERTENSION ASSOCIATED WITH DIABETES: ICD-10-CM

## 2021-12-06 PROBLEM — E66.09 CLASS 1 OBESITY DUE TO EXCESS CALORIES WITH BODY MASS INDEX (BMI) OF 31.0 TO 31.9 IN ADULT: Status: ACTIVE | Noted: 2018-09-05

## 2021-12-06 PROCEDURE — G0439 PPPS, SUBSEQ VISIT: HCPCS | Mod: ,,, | Performed by: NURSE PRACTITIONER

## 2021-12-06 PROCEDURE — 99215 OFFICE O/P EST HI 40 MIN: CPT | Mod: PBBFAC | Performed by: NURSE PRACTITIONER

## 2021-12-06 PROCEDURE — G0439 PR MEDICARE ANNUAL WELLNESS SUBSEQUENT VISIT: ICD-10-PCS | Mod: ,,, | Performed by: NURSE PRACTITIONER

## 2021-12-06 PROCEDURE — 99999 PR PBB SHADOW E&M-EST. PATIENT-LVL V: CPT | Mod: PBBFAC,,, | Performed by: NURSE PRACTITIONER

## 2021-12-06 PROCEDURE — 99999 PR PBB SHADOW E&M-EST. PATIENT-LVL V: ICD-10-PCS | Mod: PBBFAC,,, | Performed by: NURSE PRACTITIONER

## 2021-12-07 DIAGNOSIS — M79.642 PAIN OF LEFT HAND: ICD-10-CM

## 2021-12-07 DIAGNOSIS — M25.532 ARTHRALGIA OF LEFT WRIST: ICD-10-CM

## 2021-12-07 DIAGNOSIS — M25.40 JOINT SWELLING: ICD-10-CM

## 2021-12-07 RX ORDER — DICLOFENAC SODIUM 10 MG/G
GEL TOPICAL
Qty: 100 G | Refills: 2 | Status: SHIPPED | OUTPATIENT
Start: 2021-12-07

## 2021-12-14 ENCOUNTER — TELEPHONE (OUTPATIENT)
Dept: INTERNAL MEDICINE | Facility: CLINIC | Age: 81
End: 2021-12-14
Payer: MEDICARE

## 2022-01-10 ENCOUNTER — HOSPITAL ENCOUNTER (OUTPATIENT)
Dept: RADIOLOGY | Facility: HOSPITAL | Age: 82
Discharge: HOME | End: 2022-01-10
Attending: PODIATRIST
Payer: MEDICARE

## 2022-01-10 ENCOUNTER — OFFICE VISIT (OUTPATIENT)
Dept: PODIATRY | Facility: CLINIC | Age: 82
End: 2022-01-10
Payer: MEDICARE

## 2022-01-10 ENCOUNTER — HOSPITAL ENCOUNTER (OUTPATIENT)
Dept: RADIOLOGY | Facility: CLINIC | Age: 82
Discharge: HOME | End: 2022-01-10
Attending: NURSE PRACTITIONER
Payer: MEDICARE

## 2022-01-10 VITALS
HEART RATE: 72 BPM | SYSTOLIC BLOOD PRESSURE: 122 MMHG | DIASTOLIC BLOOD PRESSURE: 60 MMHG | BODY MASS INDEX: 32.68 KG/M2 | WEIGHT: 166.44 LBS | HEIGHT: 60 IN

## 2022-01-10 DIAGNOSIS — M20.31 HALLUX MALLEUS OF RIGHT FOOT: ICD-10-CM

## 2022-01-10 DIAGNOSIS — M20.42 HAMMERTOES OF BOTH FEET: ICD-10-CM

## 2022-01-10 DIAGNOSIS — M20.31 HALLUX MALLEUS OF RIGHT FOOT: Primary | ICD-10-CM

## 2022-01-10 DIAGNOSIS — Z78.0 ASYMPTOMATIC POSTMENOPAUSAL STATE: ICD-10-CM

## 2022-01-10 DIAGNOSIS — I73.9 PVD (PERIPHERAL VASCULAR DISEASE): ICD-10-CM

## 2022-01-10 DIAGNOSIS — M20.41 HAMMERTOES OF BOTH FEET: ICD-10-CM

## 2022-01-10 DIAGNOSIS — E11.51 CONTROLLED TYPE 2 DM WITH PERIPHERAL CIRCULATORY DISORDER: ICD-10-CM

## 2022-01-10 DIAGNOSIS — L60.9 DISEASE OF NAIL: ICD-10-CM

## 2022-01-10 PROCEDURE — 99214 OFFICE O/P EST MOD 30 MIN: CPT | Mod: PBBFAC,25 | Performed by: PODIATRIST

## 2022-01-10 PROCEDURE — 77080 DXA BONE DENSITY AXIAL: CPT | Mod: TC

## 2022-01-10 PROCEDURE — 73630 X-RAY EXAM OF FOOT: CPT | Mod: 26,RT,, | Performed by: RADIOLOGY

## 2022-01-10 PROCEDURE — 77080 DEXA BONE DENSITY SPINE HIP: ICD-10-PCS | Mod: 26,,, | Performed by: INTERNAL MEDICINE

## 2022-01-10 PROCEDURE — 11721 DEBRIDE NAIL 6 OR MORE: CPT | Mod: Q8,PBBFAC | Performed by: PODIATRIST

## 2022-01-10 PROCEDURE — 99999 PR PBB SHADOW E&M-EST. PATIENT-LVL IV: ICD-10-PCS | Mod: PBBFAC,,, | Performed by: PODIATRIST

## 2022-01-10 PROCEDURE — 11721 PR DEBRIDEMENT OF NAILS, 6 OR MORE: ICD-10-PCS | Mod: Q8,S$PBB,, | Performed by: PODIATRIST

## 2022-01-10 PROCEDURE — 73630 XR FOOT COMPLETE 3 VIEW RIGHT: ICD-10-PCS | Mod: 26,RT,, | Performed by: RADIOLOGY

## 2022-01-10 PROCEDURE — 99999 PR PBB SHADOW E&M-EST. PATIENT-LVL IV: CPT | Mod: PBBFAC,,, | Performed by: PODIATRIST

## 2022-01-10 PROCEDURE — 73630 X-RAY EXAM OF FOOT: CPT | Mod: TC,RT

## 2022-01-10 PROCEDURE — 11721 DEBRIDE NAIL 6 OR MORE: CPT | Mod: Q8,S$PBB,, | Performed by: PODIATRIST

## 2022-01-10 PROCEDURE — 99214 OFFICE O/P EST MOD 30 MIN: CPT | Mod: 25,S$PBB,, | Performed by: PODIATRIST

## 2022-01-10 PROCEDURE — 77080 DXA BONE DENSITY AXIAL: CPT | Mod: 26,,, | Performed by: INTERNAL MEDICINE

## 2022-01-10 PROCEDURE — 99214 PR OFFICE/OUTPT VISIT, EST, LEVL IV, 30-39 MIN: ICD-10-PCS | Mod: 25,S$PBB,, | Performed by: PODIATRIST

## 2022-01-10 NOTE — PROGRESS NOTES
Subjective:      Patient ID: Eliu Paz is a 81 y.o. female.    Chief Complaint: Nail Care and Diabetes Mellitus (Pcp-Aldo 6 months ago)    Eliu Steinberg is a 81 y.o. female who presents to the clinic for evaluation and treatment of high risk feet. Eliu Steinberg has a past medical history of Allergy, Arteriosclerosis of arteries of extremities (8/25/2016), Arthritis, Brain infection, Cataract, CKD (chronic kidney disease) stage 3, GFR 30-59 ml/min, Coma, Coronary artery disease, Diabetes mellitus type II (1981), Diabetic neuropathy, Early dry stage nonexudative age-related macular degeneration of both eyes (6/17/2019), GERD (gastroesophageal reflux disease), Glaucoma, Hyperlipidemia, Hypertension, and Type 2 diabetes mellitus with diabetic peripheral angiopathy without gangrene, with long-term current use of insulin (8/21/2016). The patient's chief complaint is long, thick toenails. This patient has documented high risk feet requiring routine maintenance secondary to peripheral vascular disease. Patient denies any numbness or tingling in her feet. She is complaining of pain in her right great toe joint. Pain is present with ambulating, relieved with rest. Symptoms have been ongoing for approximately 1 year, reports she often stubs her right great toe. No prior treatments for the toe pain.     PCP: Ryan Carlson MD    Date Last Seen by PCP: 12/6/2021 Fouzia Islas NP    Current shoe gear:  Affected Foot: Casual shoes     Unaffected Foot: Casual shoes    Hemoglobin A1C   Date Value Ref Range Status   10/05/2021 6.6 (H) 4.0 - 5.6 % Final     Comment:     ADA Screening Guidelines:  5.7-6.4%  Consistent with prediabetes  >or=6.5%  Consistent with diabetes    High levels of fetal hemoglobin interfere with the HbA1C  assay. Heterozygous hemoglobin variants (HbS, HgC, etc)do  not significantly interfere with this assay.   However, presence of multiple variants may affect accuracy.     02/09/2021 7.6 (H) 4.0 - 5.6 % Final      Comment:     ADA Screening Guidelines:  5.7-6.4%  Consistent with prediabetes  >or=6.5%  Consistent with diabetes    High levels of fetal hemoglobin interfere with the HbA1C  assay. Heterozygous hemoglobin variants (HbS, HgC, etc)do  not significantly interfere with this assay.   However, presence of multiple variants may affect accuracy.     09/15/2020 7.5 (H) 4.0 - 5.6 % Final     Comment:     ADA Screening Guidelines:  5.7-6.4%  Consistent with prediabetes  >or=6.5%  Consistent with diabetes  High levels of fetal hemoglobin interfere with the HbA1C  assay. Heterozygous hemoglobin variants (HbS, HgC, etc)do  not significantly interfere with this assay.   However, presence of multiple variants may affect accuracy.         Review of Systems   Constitutional: Negative for chills, fever and malaise/fatigue.   HENT: Positive for hearing loss.    Cardiovascular: Negative for claudication.   Respiratory: Negative for cough, shortness of breath, sputum production and wheezing.    Skin: Negative for flushing and rash.   Musculoskeletal: Positive for arthritis, joint pain and myalgias.   Gastrointestinal: Negative for abdominal pain, nausea and vomiting.   Neurological: Negative for loss of balance, numbness, paresthesias and sensory change.   Psychiatric/Behavioral: Negative for altered mental status.           Objective:      Physical Exam  Constitutional:       General: She is not in acute distress.  Cardiovascular:      Pulses:           Dorsalis pedis pulses are 1+ on the right side and detected w/ Doppler on the left side.        Posterior tibial pulses are detected w/ Doppler on the right side and detected w/ Doppler on the left side.      Comments: CFT< 3 secs all toes bilateral foot, skin temp warm to cool bilateral foot, no hair growth bilateral lower extremity, diffuse pitting edema to bilateral lower extremities. No dependent rubor to bilateral lower extremities    Musculoskeletal:         General: Tenderness  and deformity present. No swelling or signs of injury.      Right lower leg: Edema present.      Left lower leg: Edema present.      Right foot: Deformity present.      Left foot: Deformity present.      Comments: 5/5 muscle strength with DF/PF/Inv/Ev bilateral.     Ankle joint dorsiflexion reaches +5 degrees with the knee extended bilateral.    Rigid flexion contracture of the right hallux IPJ. Pain with hallux IPJ range of motion. Semi rigid contracture of PIPJ 2,3,4 right foot. Flexible contracture of PIPJ 2,3,4 left foot.     No pain or crepitus with ankle joint, subtalar joint, midtarsal, or 1st MTP joint range of motion bilateral    Pes planus foot type bilateral.     Feet:      Right foot:      Protective Sensation: 10 sites tested. 10 sites sensed.      Left foot:      Protective Sensation: 10 sites tested. 10 sites sensed.   Skin:     General: Skin is warm and dry.      Capillary Refill: Capillary refill takes 2 to 3 seconds.      Findings: No bruising, erythema or lesion.      Comments: Skin warm, dry, and intact. No open wounds, lesions, or discoloration to bilateral feet. No interdigital maceration appreciated bilateral.    Nails 1-5 bilateral foot are significantly elongated 5-6 mm.  Hallux nail bilateral is dystrophic with moderate loosening and underlying debris.  Nails 2-5 bilateral are also yellow discoloration mild loosening underlying debris.     Neurological:      Mental Status: She is alert and oriented to person, place, and time.      Sensory: No sensory deficit.      Motor: No weakness.      Comments: Vibratory sensation intact via 128 Hz tuning fork bilateral   Psychiatric:         Behavior: Behavior normal.               Assessment:       Encounter Diagnoses   Name Primary?    DM type 2 with diabetic peripheral neuropathy     Hallux malleus of right foot Yes         Plan:       Eliu Steinberg was seen today for nail care and diabetes mellitus.    Diagnoses and all orders for this visit:    Hallux  malleus of right foot  -     X-Ray Foot Complete 3 view Right; Future    DM type 2 with diabetic peripheral neuropathy  -     Ambulatory referral/consult to Podiatry      I counseled the patient on her conditions, their implications and medical management.    Baseline xray of the right foot ordered.    Shoe inspection. Diabetic Foot Education. Patient reminded of the importance of good nutrition and blood sugar control to help prevent podiatric complications of diabetes. Patient instructed on proper foot hygeine. We discussed wearing proper shoe gear, daily foot inspections, never walking without protective shoe gear, never putting sharp instruments to feet    - With patient's permission, nails were aggressively reduced and debrided x 10 to their soft tissue attachment mechanically with a sterile nail nipper removing all offending nail and debris. Patient relates relief following the procedure. She will continue to monitor the areas daily, inspect her feet, wear protective shoe gear when ambulatory, moisturizer to maintain skin integrity and follow in this office in approximately 3-4 months, sooner p.r.n. Will review right foot xrays on her next visit      Annual comprehensive diabetic foot exam was performed today. Patient deemed to be intermediate risk of developing diabetic foot complications 2/2 PVD. RTC in 3-4 months for routine diabetic foot care.

## 2022-01-13 DIAGNOSIS — I25.10 CORONARY ARTERY DISEASE INVOLVING NATIVE CORONARY ARTERY OF NATIVE HEART WITHOUT ANGINA PECTORIS: Chronic | ICD-10-CM

## 2022-01-13 DIAGNOSIS — I10 ESSENTIAL HYPERTENSION: Chronic | ICD-10-CM

## 2022-01-13 NOTE — TELEPHONE ENCOUNTER
----- Message from Cammy Ruvalcaba sent at 1/12/2022 11:00 AM CST -----  Regarding: refill  Contact: patient [phone]  Patient has been out of medication for 3 weeks    Requesting an RX refill or new RX.  Is this a refill or new RX: refill  RX name and strength (carvediloL (COREG) 25 MG tablet  Is this a 30 day or 90 day RX: 90  Patient advised that in the future they can use their [facility] account to request a refill?:  yes  Pharmacy name and phone # (  [id number] - [place], [place] - [address]  [address] [address]  Phone: [phone] Fax: [phone]      Comments:

## 2022-01-13 NOTE — TELEPHONE ENCOUNTER
Care Due:                  Date            Visit Type   Department     Provider  --------------------------------------------------------------------------------                                             [facility] INTERNAL  Last Visit: 10-      formerly [facility]       Ryan Carlson                                           [facility] INTERNAL  Next Visit: 04-      formerly [facility]       Ryan Carlson                                                            Last  Test          Frequency    Reason                     Performed    Due Date  --------------------------------------------------------------------------------    CMP.........  12 months..  rosuvastatin.............  Not Found    Overdue    HBA1C.......  6 months...  insulin..................  10-   04-    Lipid Panel.  12 months..  rosuvastatin.............  Not Found    Overdue    Powered by [employer]. Reference number: [id number].   1/13/2022 2:55:55 PM CST

## 2022-01-14 RX ORDER — CARVEDILOL 25 MG/1
25 TABLET ORAL 2 TIMES DAILY
Qty: 180 TABLET | Refills: 11 | Status: SHIPPED | OUTPATIENT
Start: 2022-01-14

## 2022-02-11 ENCOUNTER — TELEPHONE (OUTPATIENT)
Dept: ENDOSCOPY | Facility: HOSPITAL | Age: 82
End: 2022-02-11
Payer: MEDICARE

## 2022-02-12 NOTE — TELEPHONE ENCOUNTER
----- Message from Garcia Cantu sent at 2/11/2022 11:02 AM CST -----  Regarding: scheduling  Contact: pt  Pt requesting a call back in regards to scheduling an appt.      Pt @ [phone]

## 2022-02-21 ENCOUNTER — TELEPHONE (OUTPATIENT)
Dept: ENDOSCOPY | Facility: HOSPITAL | Age: 82
End: 2022-02-21
Payer: MEDICARE

## 2022-02-21 DIAGNOSIS — H40.1133 PRIMARY OPEN-ANGLE GLAUCOMA, BILATERAL, SEVERE STAGE: ICD-10-CM

## 2022-02-21 NOTE — TELEPHONE ENCOUNTER
----- Message from Garcia Cantu sent at 2/21/2022 11:02 AM CST -----  Regarding: scheduling  Contact: pt  Pt requesting a call back in regards to scheduling an appt. Pt havent been having bowel movements.      Pt @ [phone]

## 2022-02-22 RX ORDER — DORZOLAMIDE HYDROCHLORIDE AND TIMOLOL MALEATE 20; 5 MG/ML; MG/ML
1 SOLUTION/ DROPS OPHTHALMIC 2 TIMES DAILY
Qty: 30 ML | Refills: 3 | Status: SHIPPED | OUTPATIENT
Start: 2022-02-22

## 2022-02-22 RX ORDER — BRIMONIDINE TARTRATE 2 MG/ML
1 SOLUTION/ DROPS OPHTHALMIC 3 TIMES DAILY
Qty: 45 ML | Refills: 3 | Status: SHIPPED | OUTPATIENT
Start: 2022-02-22

## 2022-02-22 RX ORDER — LATANOPROST 50 UG/ML
1 SOLUTION/ DROPS OPHTHALMIC NIGHTLY
Qty: 7.5 ML | Refills: 3 | Status: SHIPPED | OUTPATIENT
Start: 2022-02-22

## 2022-02-25 ENCOUNTER — TELEPHONE (OUTPATIENT)
Dept: GASTROENTEROLOGY | Facility: CLINIC | Age: 82
End: 2022-02-25
Payer: MEDICARE

## 2022-02-25 NOTE — TELEPHONE ENCOUNTER
Attempted to contact pt to scheduled 6 months follow up with Dr. Quiroga. Pt didn't answer and I left detail message to return call to our office.

## 2022-03-03 ENCOUNTER — TELEPHONE (OUTPATIENT)
Dept: GASTROENTEROLOGY | Facility: CLINIC | Age: 82
End: 2022-03-03
Payer: MEDICARE

## 2022-03-03 NOTE — TELEPHONE ENCOUNTER
Spoke with pt and scheduled 6m f/u with Dr. Quiroga on 3/31/2022 at 9 am. Pt verbalize understanding, confirmed appt and thank MA   ----- Message from Miguel Harrell sent at 3/3/2022 12:00 PM CST -----  Contact: [phone]  Pt been trying to schedule an appt for her stomach. Pt would like a call back to schedule. No access to appts. Pt states if she do not answer please leave a VM and also try and get her on mychart she states she only knows how to read mychart and nothing else.    [phone]

## 2022-03-12 NOTE — PROGRESS NOTES
HPI     DLS: 11/16/2021    Pt here for 4 Month Check;  Pt states no eye pain but eyes do itch at times.    Meds;  Cosopt BID OU   Brimonidine TID OU   Latanoprost QHS OU    1. POAG OU   2. Type 2 DM no DR   3. NS OU   4. ARMD OU   5. Ptosis OS>OD     Last edited by Kimberly Diaz on 3/14/2022  9:32 AM. (History)            Assessment /Plan     For exam results, see Encounter Report.    Primary open-angle glaucoma, bilateral, severe stage    Senile nuclear sclerosis, bilateral    Cortical age-related cataract, bilateral    Early dry stage nonexudative age-related macular degeneration of both eyes    Macular drusen, bilateral    Nonexudative age-related macular degeneration, bilateral, intermediate dry stage    Transient vision disturbance           Glaucoma (type and duration)    POAG severe   First HVF   2013   First photos   2017   Treatment / Drops started   Latanoprost, travatan           Family history    ?        Glaucoma meds    Latanoprost / cosopt (added - 1//15/2019)         H/O adverse rxn to glaucoma drops    none        LASERS    none        GLAUCOMA SURGERIES    none        OTHER EYE SURGERIES    none        CDR    0.85 w/ sup thinning /0.9 - sup and inf thinning         Tbase    16-20/16-20          Tmax    20/20            Ttarget    15-16 / 15-16        HVF    8 test 2013 to  2021 - SAD od // gen depression, SAD, IAD os (?prog os)        Gonio    3+ ou        CCT    544/525        OCT    7 test 2013 to 2021 - RNFL - decr thru out od // dec. Thru out  os        HRT   4 test 2017 to 2021-MR -  Dec SN/IN, bord N/IT/ST od // dec I, bord N/S/T os /// CDR 0.78 od // 0.81 os        Disc photos    2017, 2019 , 2021     - Ttoday    13/13  good resp to adding brimonidine   - Test done today  - IOP / vision check     POAG severe  Severe RNFL thinning with progressive field loss   - on latanoprost qhs OU, continue  - cosopt ou bid - added  (1/2019)   -brimonidine added 5/21/2019 - good resp 19/18 --> 11/10     NS  cataract OU - hold on CE - pt is not having any problems with vision presently     Macular Drusen OU  AMD OU, seeing [facility]   AREDS 2 Vitamins  Home [facility] Grid Testing   - seen by  retina - akshat rogers - saw Ai 6/1/2018 - to F/U in 6 months    Ptosis   Pt C/O droopy lid OS > OD     H/O TVO -  - resolved - no further episodes - 3/14/2022     PLAN     GLAUCOMA  Good response to adding brimonidine - below target      rec phaco/IOL os first    Pt does not want surgery - wants to wait - 3/2022     If IOP goes above target consider SLT's     Cont anticoagulants   Cont glaucoma drops   Warned to go to ED if has vision loss again - especially if does not resolve in 15 min or less   CSM     F/U me 4 months with IOP // Gonio

## 2022-03-14 ENCOUNTER — OFFICE VISIT (OUTPATIENT)
Dept: OPHTHALMOLOGY | Facility: CLINIC | Age: 82
End: 2022-03-14
Payer: MEDICARE

## 2022-03-14 DIAGNOSIS — H40.1133 PRIMARY OPEN-ANGLE GLAUCOMA, BILATERAL, SEVERE STAGE: Primary | ICD-10-CM

## 2022-03-14 DIAGNOSIS — H25.013 CORTICAL AGE-RELATED CATARACT, BILATERAL: ICD-10-CM

## 2022-03-14 DIAGNOSIS — H25.13 SENILE NUCLEAR SCLEROSIS, BILATERAL: ICD-10-CM

## 2022-03-14 DIAGNOSIS — H35.363 MACULAR DRUSEN, BILATERAL: ICD-10-CM

## 2022-03-14 DIAGNOSIS — H53.9 TRANSIENT VISION DISTURBANCE: ICD-10-CM

## 2022-03-14 DIAGNOSIS — H35.3132 NONEXUDATIVE AGE-RELATED MACULAR DEGENERATION, BILATERAL, INTERMEDIATE DRY STAGE: ICD-10-CM

## 2022-03-14 DIAGNOSIS — H35.3131 EARLY DRY STAGE NONEXUDATIVE AGE-RELATED MACULAR DEGENERATION OF BOTH EYES: ICD-10-CM

## 2022-03-14 PROCEDURE — 99999 PR PBB SHADOW E&M-EST. PATIENT-LVL III: ICD-10-PCS | Mod: PBBFAC,,, | Performed by: OPHTHALMOLOGY

## 2022-03-14 PROCEDURE — 92012 PR EYE EXAM, EST PATIENT,INTERMED: ICD-10-PCS | Mod: S$PBB,,, | Performed by: OPHTHALMOLOGY

## 2022-03-14 PROCEDURE — 99999 PR PBB SHADOW E&M-EST. PATIENT-LVL III: CPT | Mod: PBBFAC,,, | Performed by: OPHTHALMOLOGY

## 2022-03-14 PROCEDURE — 99213 OFFICE O/P EST LOW 20 MIN: CPT | Mod: PBBFAC | Performed by: OPHTHALMOLOGY

## 2022-03-14 PROCEDURE — 92012 INTRM OPH EXAM EST PATIENT: CPT | Mod: S$PBB,,, | Performed by: OPHTHALMOLOGY

## 2022-03-18 NOTE — PATIENT INSTRUCTIONS
Restart tylenol.  You can take 4 extra strength a day (500-650mg).  Take 2 pills twice a day.    Continue tizanidine may take it 3 times a day, or take a second pill at night   Improved

## 2022-03-28 DIAGNOSIS — N18.30 TYPE 2 DIABETES MELLITUS WITH STAGE 3 CHRONIC KIDNEY DISEASE, WITH LONG-TERM CURRENT USE OF INSULIN: Chronic | ICD-10-CM

## 2022-03-28 DIAGNOSIS — E11.22 TYPE 2 DIABETES MELLITUS WITH STAGE 3 CHRONIC KIDNEY DISEASE, WITH LONG-TERM CURRENT USE OF INSULIN: Chronic | ICD-10-CM

## 2022-03-28 DIAGNOSIS — Z79.4 TYPE 2 DIABETES MELLITUS WITH STAGE 3 CHRONIC KIDNEY DISEASE, WITH LONG-TERM CURRENT USE OF INSULIN: Chronic | ICD-10-CM

## 2022-03-30 RX ORDER — INSULIN ASPART 100 [IU]/ML
INJECTION, SUSPENSION SUBCUTANEOUS
Qty: 30 ML | Refills: 15 | Status: SHIPPED | OUTPATIENT
Start: 2022-03-30 | End: 2022-04-05

## 2022-03-30 NOTE — TELEPHONE ENCOUNTER
This Rx Request does not qualify for assessment with the OR   Please review protocol details and the Care Due Message for extra clinical information    Reasons Rx Request may be deferred:  Labs/Vitals overdue  Labs/Vitals abnormal    Note composed:11:56 AM 03/30/2022

## 2022-03-31 ENCOUNTER — OFFICE VISIT (OUTPATIENT)
Dept: GASTROENTEROLOGY | Facility: CLINIC | Age: 82
End: 2022-03-31
Payer: MEDICARE

## 2022-03-31 VITALS
SYSTOLIC BLOOD PRESSURE: 119 MMHG | DIASTOLIC BLOOD PRESSURE: 57 MMHG | HEART RATE: 60 BPM | BODY MASS INDEX: 31.6 KG/M2 | HEIGHT: 60 IN | WEIGHT: 160.94 LBS

## 2022-03-31 DIAGNOSIS — A09 DIARRHEA OF INFECTIOUS ORIGIN: Primary | ICD-10-CM

## 2022-03-31 PROCEDURE — 99999 PR PBB SHADOW E&M-EST. PATIENT-LVL IV: CPT | Mod: PBBFAC,,, | Performed by: INTERNAL MEDICINE

## 2022-03-31 PROCEDURE — 99214 OFFICE O/P EST MOD 30 MIN: CPT | Mod: PBBFAC | Performed by: INTERNAL MEDICINE

## 2022-03-31 PROCEDURE — 99999 PR PBB SHADOW E&M-EST. PATIENT-LVL IV: ICD-10-PCS | Mod: PBBFAC,,, | Performed by: INTERNAL MEDICINE

## 2022-03-31 PROCEDURE — 99213 PR OFFICE/OUTPT VISIT, EST, LEVL III, 20-29 MIN: ICD-10-PCS | Mod: S$PBB,,, | Performed by: INTERNAL MEDICINE

## 2022-03-31 PROCEDURE — 99213 OFFICE O/P EST LOW 20 MIN: CPT | Mod: S$PBB,,, | Performed by: INTERNAL MEDICINE

## 2022-03-31 NOTE — PROGRESS NOTES
[facility] Established Patient Visit    Reason for Visit:  The encounter diagnosis was Diarrhea of infectious origin.    PCP: Ryan Carlson    HPI:     This is a 82 y.o. female here for f/u of GERD,constipation, gastroparesis.  Ms. Paz's last GI visit was with me in 4/2019. previous GES revealed delayed gastric emptying. 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  Previously treated with acitigall for GB stones.      She is no longer treating her acid reflux with zantac 150 mg 2 times daily. She does have a hx of LA grade A esophagitis w/ documented healing. Now on omeprazole 40 mg daily with good control.     Nausea twice monthly or less. vomiting very rarely, and early satiety if eats high fiber meal- but has not been eating high fiber lately. On GP diet. Eats small portions daily, mostly soup.   Not on smoothies or shakes.  Has seen the  GI dietitian for GP diet.   Has DM.  last A1c normal 5.7. BS running 120s.  On insulin.   Was on trulicity, but d/c d/t s/e. Has not tried elis or FDgard for nausea because she has not needed it.     Gas and bloating. Some improvement recently. Occurs occasionally. Better with belching.     abd pain-none  Bowel habits -  Constipation. taking miralax QOD.  No longer taking lactulose PRN. Having 1 bristol type 4 BM every other day   GI bleeding - denies hematochezia, hematemesis, melena, BRBPR, black/tarry stools, and coffee ground emesis. Was admitted to the hospital in 8/2019 for low blood counts. EGD at that time with bleeding duodenal angioectasia-treated with APC. Subsequent labs trending up.most recent hgb 11.3. No longer seeing black stools, nor BRBPR.  On iron 325 mg daily and omeprazole 40 mg daily    NSAID usage - no longer taking ASA 81 daily since UGI bleed    Interval History 03/31/2022:  When she gets constipated she feels pain from backup under her left armpit  Takes lactulose and has a BM  Today she does not have  her hearing aids and keeps talking about rashes on her face and legs  And how she is so embarrassed.  I'm not sure if what I am saying is getting through to her      ROS:  Constitutional: No fevers, no chills, No unintentional weight loss, no fatigue,   ENT: No allergies  CV: No chest pain, no palpitations, + peripheral. edema, no sob on exertion  Pulm: No cough, No shortness of breath, no wheezes, no sputum  Ophtho: No vision changes  GI: see HPI;   Derm: No rash  Heme: No lymphadenopathy, No bruising  MSK: No arthritis, + muscle pain/back, no muscle weakness  : No dysuria, No hematuria  Endo: + cold intolerance  Neuro: No syncope, No seizure,     PMHX:  has a past medical history of Allergy, Arteriosclerosis of arteries of extremities (8/25/2016), Arthritis, Brain infection, Cataract, CKD (chronic kidney disease) stage 3, GFR 30-59 ml/min, Coma, Coronary artery disease, Diabetes mellitus type II (1981), Diabetic neuropathy, Early dry stage nonexudative age-related macular degeneration of both eyes (6/17/2019), GERD (gastroesophageal reflux disease), Glaucoma, Hyperlipidemia, Hypertension, and Type 2 diabetes mellitus with diabetic peripheral angiopathy without gangrene, with long-term current use of insulin (8/21/2016).    PSHX:  has a past surgical history that includes Carpal tunnel release; Tubal ligation (1970s); Colonoscopy (N/A, 11/2/2015); Cardiac catheterization (03/2010); Coronary artery bypass graft (08/13/1994); Cardiac surgery (1994); Colonoscopy (N/A, 2/5/2019); Esophagogastroduodenoscopy (N/A, 8/2/2019); Left heart catheterization (N/A, 1/28/2021); Coronary bypass graft angiography (1/28/2021); and Angiography of aortic arch (1/28/2021).    The patient's social and family histories were reviewed by me and updated in the appropriate section of the electronic medical record.    Review of patient's allergies indicates:   Allergen Reactions    Pcn [penicillins] Swelling     Swelling (extremities)^,  "Swelling (extremities)^  Swelling (extremities)^, Swelling (extremities)^  Swelling (extremities)^, Swelling (extremities)^    Trulicity [dulaglutide] Nausea Only and Rash       Current Outpatient Medications   Medication Sig    amLODIPine (NORVASC) 10 MG tablet TAKE 1 TABLET BY MOUTH EVERY DAY    BD INSULIN SYRINGE ULTRA-FINE 0.5 mL 31 gauge x 5/16" Syrg USE TO INJECT TWICE DAILY WITH INSULIN INJECTIONS    blood sugar diagnostic (ONETOUCH VERIO) Strp Inject 1 each into the skin 3 (three) times daily with meals. Use to test blood sugar four times a day.    brimonidine 0.2% (ALPHAGAN) 0.2 % Drop INSTILL 1 DROP INTO BOTH EYES 3 TIMES A DAY    brimonidine 0.2% (ALPHAGAN) 0.2 % Drop Place 1 drop into both eyes 3 (three) times daily.    carvediloL (COREG) 25 MG tablet Take 1 tablet (25 mg total) by mouth 2 (two) times daily.    ciclopirox (PENLAC) 8 % Soln Apply topically nightly.    clopidogreL (PLAVIX) 75 mg tablet Take 1 tablet (75 mg total) by mouth once daily.    diclofenac sodium (VOLTAREN) 1 % Gel APPLY 2 GRAMS TO AFFECTED AREA 4 TIMES A DAY    dorzolamide-timolol 2-0.5% (COSOPT) 22.3-6.8 mg/mL ophthalmic solution INSTILL 1 DROP INTO BOTH EYES TWICE A DAY    dorzolamide-timolol 2-0.5% (COSOPT) 22.3-6.8 mg/mL ophthalmic solution Place 1 drop into both eyes 2 (two) times daily.    insulin asp prt-insulin aspart, NovoLOG 70/30, (NOVOLOG MIX 70-30 U-100 INSULN) 100 unit/mL (70-30) Soln INJECT 18 UNITS INTO THE SKIN 2 TIMES DAILY WITH MEALS.    isosorbide mononitrate (IMDUR) 120 MG 24 hr tablet Take 1 tablet (120 mg total) by mouth once daily.    ketoconazole (NIZORAL) 2 % cream Apply topically once daily.    ketoconazole (NIZORAL) 2 % shampoo APPLY TOPICALLY EVERY OTHER DAY.    lactulose (CHRONULAC) 10 gram/15 mL solution Take 15 mLs (10 g total) by mouth daily as needed (constipation).    latanoprost 0.005 % ophthalmic solution PLACE 1 DROP INTO BOTH EYES EVERY EVENING.    latanoprost 0.005 % " ophthalmic solution Place 1 drop into both eyes every evening.    lidocaine (LIDODERM) 5 % Place 1 patch onto the skin once daily. Remove & Discard patch within 12 hours or as directed by MD    nitroGLYCERIN 0.4 MG/DOSE TL SPRY (NITROLINGUAL) 400 mcg/spray spray PLACE 2 SPRAYS UNDER THE TONGUE EVERY 5 MINUTES AS NEEDED FOR CHEST PAIN    omeprazole (PRILOSEC) 40 MG capsule TAKE ONE CAPSULE BY MOUTH EVERY MORNING    rosuvastatin (CRESTOR) 40 MG Tab TAKE 1 TABLET BY MOUTH EVERY DAY    spironolactone (ALDACTONE) 25 MG tablet TAKE 1 TABLET BY MOUTH EVERY DAY    tiZANidine (ZANAFLEX) 4 MG tablet 1/2 TO 1 TAB EVERY 6 HOURS AS NEEDED    traMADoL (ULTRAM) 50 mg tablet TAKE 1 TABLET BY MOUTH TWICE A DAY AS NEEDED    ranolazine (RANEXA) 500 MG Tb12 Take 1 tablet (500 mg total) by mouth 2 (two) times daily.     No current facility-administered medications for this visit.         Objective Findings:    Vital Signs:  BP (!) 119/57   Pulse 60   Ht 5' (1.524 m)   Wt 73 kg (160 lb 15 oz)   BMI 31.43 kg/m²  Body mass index is 31.43 kg/m².    Physical Exam:  General Appearance: Well appearing in no acute distress  Head:   Normocephalic, without obvious abnormality  Eyes:    No scleral icterus, EOMI  Throat: Lips, mucosa, and tongue normal; teeth and gums normal  Lungs: CTA bilaterally in anterior and posterior fields, no wheezes, no crackles.  Heart:  Regular rate and rhythm, S1, S2 normal, no murmurs heard  Abdomen: Soft, non tender, non distended with positive bowel sounds in all four quadrants. No hepatosplenomegaly, ascites, or mass  Extremities:    2+ radial pulses, no clubbing, or cyanosis + 2 edema to lower extremities bilaterally  Skin: No rash to exposed areas  Neurologic: A&Ox4      Labs:  Lab Results   Component Value Date    WBC 4.64 01/26/2021    HGB 10.7 (L) 01/26/2021    HCT 36.4 (L) 01/26/2021     (L) 01/26/2021    CHOL 109 (L) 01/26/2021    TRIG 49 01/26/2021    HDL 46 01/26/2021    ALT 15  03/19/2020    AST 25 03/19/2020     09/13/2021    K 4.9 09/13/2021     09/13/2021    CREATININE 1.5 (H) 09/13/2021    BUN 23 09/13/2021    CO2 21 (L) 09/13/2021    TSH 4.775 (H) 12/27/2018    INR 1.0 08/01/2019    HGBA1C 6.6 (H) 10/05/2021     Imaging:  GES 9/11/18:delayed gastric emptying. 14 % ret at 4 hrs.     Endoscopy:     8/2/19: EGD normal esopahgus. Hematin in gastric antrum. Single bleeding angioectasia in duodenum treated with APC.   2/2/19: colonoscopy: GPTC. SC-AC tics. 10 mm HF polyp (TVA). Rpt 3 yrs.  6/13/2017 esophageal manometry Dr. Quiroga- normal  4/18/2017 EGD -normal esophagus. Gastric erythema. bx-focal IM no dysplasia. Repeat in 3 years.   11/2015 Colonoscopy Dr. Clinton-perianal skin tags. 10 mm transverse colon polyp. Pan tics. Path- adenovillious Repeat in 3 years (2018). colonoscopy is scheduled  for 11/2018.   4/2015 EGD Dr. Quiroga- LA grade A reflux esophagitis. Small HH. Gastric erythema. Path- intestinal metaplasia. Repeat in 2-3 years (4319-6828).  2019 - EGD - AVM treated, no biopsies taken  2019 - Colonoscopy - 10 mm polyp    Assessment:    1. Diarrhea of infectious origin          Recommendations:  1. Gastroparesis - I believe this has resolved and her last gastric emptying was borderline normal  2. GERD- avoid zantac/ranitadine. Cont omeprazole 40 mg daily. Can take OTC gavsicon w aglinate PRN as well.  3. Constipation- continue lactulose  4. Gastric intestinal metaplasia - consider relook in 2022, 3 years from last EGD. With biopsies. Will hold off on this today as I don't think she can  Hear any instructions. Readdress at next visit  5. Check stool sample for parasites per patient request    F/u in 3 months. I have asked her to wear hearing aids or bring a family member next time    Order summary:  Orders Placed This Encounter    Stool Exam-Ova,Cysts,Parasites         Thank you for allowing me to participate in the care of Eliu Quiroga MD

## 2022-04-04 ENCOUNTER — TELEPHONE (OUTPATIENT)
Dept: GASTROENTEROLOGY | Facility: CLINIC | Age: 82
End: 2022-04-04
Payer: MEDICARE

## 2022-04-04 NOTE — TELEPHONE ENCOUNTER
Informed pt per Dr. Quiroga - Please let her know no parasite were seen on her stool sample that she turned in. Pt verbalize understanding and thank me

## 2022-04-05 ENCOUNTER — LAB VISIT (OUTPATIENT)
Dept: LAB | Facility: HOSPITAL | Age: 82
End: 2022-04-05
Attending: INTERNAL MEDICINE
Payer: MEDICARE

## 2022-04-05 ENCOUNTER — OFFICE VISIT (OUTPATIENT)
Dept: INTERNAL MEDICINE | Facility: CLINIC | Age: 82
End: 2022-04-05
Payer: MEDICARE

## 2022-04-05 VITALS
DIASTOLIC BLOOD PRESSURE: 60 MMHG | RESPIRATION RATE: 18 BRPM | HEIGHT: 60 IN | BODY MASS INDEX: 32.08 KG/M2 | WEIGHT: 163.38 LBS | OXYGEN SATURATION: 97 % | SYSTOLIC BLOOD PRESSURE: 128 MMHG | HEART RATE: 75 BPM

## 2022-04-05 DIAGNOSIS — K59.00 CONSTIPATION, UNSPECIFIED CONSTIPATION TYPE: ICD-10-CM

## 2022-04-05 DIAGNOSIS — I25.118 CORONARY ARTERY DISEASE OF NATIVE ARTERY OF NATIVE HEART WITH STABLE ANGINA PECTORIS: ICD-10-CM

## 2022-04-05 DIAGNOSIS — E11.22 TYPE 2 DIABETES MELLITUS WITH STAGE 3 CHRONIC KIDNEY DISEASE, WITH LONG-TERM CURRENT USE OF INSULIN: Chronic | ICD-10-CM

## 2022-04-05 DIAGNOSIS — Z79.4 TYPE 2 DIABETES MELLITUS WITH STAGE 3 CHRONIC KIDNEY DISEASE, WITH LONG-TERM CURRENT USE OF INSULIN: Chronic | ICD-10-CM

## 2022-04-05 DIAGNOSIS — F22 DELUSIONAL DISORDER: ICD-10-CM

## 2022-04-05 DIAGNOSIS — K21.9 GASTROESOPHAGEAL REFLUX DISEASE, UNSPECIFIED WHETHER ESOPHAGITIS PRESENT: Primary | ICD-10-CM

## 2022-04-05 DIAGNOSIS — N18.30 TYPE 2 DIABETES MELLITUS WITH STAGE 3 CHRONIC KIDNEY DISEASE, WITH LONG-TERM CURRENT USE OF INSULIN: Chronic | ICD-10-CM

## 2022-04-05 LAB
ALBUMIN SERPL BCP-MCNC: 3.8 G/DL (ref 3.5–5.2)
ALP SERPL-CCNC: 66 U/L (ref 55–135)
ALT SERPL W/O P-5'-P-CCNC: 14 U/L (ref 10–44)
ANION GAP SERPL CALC-SCNC: 9 MMOL/L (ref 8–16)
AST SERPL-CCNC: 29 U/L (ref 10–40)
BASOPHILS # BLD AUTO: 0.03 K/UL (ref 0–0.2)
BASOPHILS NFR BLD: 0.5 % (ref 0–1.9)
BILIRUB SERPL-MCNC: 0.4 MG/DL (ref 0.1–1)
BUN SERPL-MCNC: 24 MG/DL (ref 8–23)
CALCIUM SERPL-MCNC: 9.4 MG/DL (ref 8.7–10.5)
CHLORIDE SERPL-SCNC: 106 MMOL/L (ref 95–110)
CHOLEST SERPL-MCNC: 133 MG/DL (ref 120–199)
CHOLEST/HDLC SERPL: 2.6 {RATIO} (ref 2–5)
CO2 SERPL-SCNC: 23 MMOL/L (ref 23–29)
CREAT SERPL-MCNC: 1.2 MG/DL (ref 0.5–1.4)
DIFFERENTIAL METHOD: ABNORMAL
EOSINOPHIL # BLD AUTO: 0.1 K/UL (ref 0–0.5)
EOSINOPHIL NFR BLD: 1.3 % (ref 0–8)
ERYTHROCYTE [DISTWIDTH] IN BLOOD BY AUTOMATED COUNT: 16 % (ref 11.5–14.5)
EST. GFR  (AFRICAN AMERICAN): 49.3 ML/MIN/1.73 M^2
EST. GFR  (NON AFRICAN AMERICAN): 42.8 ML/MIN/1.73 M^2
ESTIMATED AVG GLUCOSE: 146 MG/DL (ref 68–131)
GLUCOSE SERPL-MCNC: 90 MG/DL (ref 70–110)
HBA1C MFR BLD: 6.7 % (ref 4–5.6)
HCT VFR BLD AUTO: 38.1 % (ref 37–48.5)
HDLC SERPL-MCNC: 52 MG/DL (ref 40–75)
HDLC SERPL: 39.1 % (ref 20–50)
HGB BLD-MCNC: 11.7 G/DL (ref 12–16)
IMM GRANULOCYTES # BLD AUTO: 0.02 K/UL (ref 0–0.04)
IMM GRANULOCYTES NFR BLD AUTO: 0.3 % (ref 0–0.5)
LDLC SERPL CALC-MCNC: 66.6 MG/DL (ref 63–159)
LYMPHOCYTES # BLD AUTO: 1.3 K/UL (ref 1–4.8)
LYMPHOCYTES NFR BLD: 20.6 % (ref 18–48)
MCH RBC QN AUTO: 27.9 PG (ref 27–31)
MCHC RBC AUTO-ENTMCNC: 30.7 G/DL (ref 32–36)
MCV RBC AUTO: 91 FL (ref 82–98)
MONOCYTES # BLD AUTO: 0.8 K/UL (ref 0.3–1)
MONOCYTES NFR BLD: 12.7 % (ref 4–15)
NEUTROPHILS # BLD AUTO: 4.1 K/UL (ref 1.8–7.7)
NEUTROPHILS NFR BLD: 64.6 % (ref 38–73)
NONHDLC SERPL-MCNC: 81 MG/DL
NRBC BLD-RTO: 0 /100 WBC
PLATELET # BLD AUTO: 168 K/UL (ref 150–450)
PMV BLD AUTO: 11.3 FL (ref 9.2–12.9)
POTASSIUM SERPL-SCNC: 4.1 MMOL/L (ref 3.5–5.1)
PROT SERPL-MCNC: 7.6 G/DL (ref 6–8.4)
RBC # BLD AUTO: 4.2 M/UL (ref 4–5.4)
SODIUM SERPL-SCNC: 138 MMOL/L (ref 136–145)
TRIGL SERPL-MCNC: 72 MG/DL (ref 30–150)
WBC # BLD AUTO: 6.3 K/UL (ref 3.9–12.7)

## 2022-04-05 PROCEDURE — 99215 OFFICE O/P EST HI 40 MIN: CPT | Mod: PBBFAC | Performed by: INTERNAL MEDICINE

## 2022-04-05 PROCEDURE — 83036 HEMOGLOBIN GLYCOSYLATED A1C: CPT | Performed by: INTERNAL MEDICINE

## 2022-04-05 PROCEDURE — 99999 PR PBB SHADOW E&M-EST. PATIENT-LVL V: ICD-10-PCS | Mod: PBBFAC,,, | Performed by: INTERNAL MEDICINE

## 2022-04-05 PROCEDURE — 85025 COMPLETE CBC W/AUTO DIFF WBC: CPT | Performed by: INTERNAL MEDICINE

## 2022-04-05 PROCEDURE — 80053 COMPREHEN METABOLIC PANEL: CPT | Performed by: INTERNAL MEDICINE

## 2022-04-05 PROCEDURE — 99214 PR OFFICE/OUTPT VISIT, EST, LEVL IV, 30-39 MIN: ICD-10-PCS | Mod: S$PBB,,, | Performed by: INTERNAL MEDICINE

## 2022-04-05 PROCEDURE — 36415 COLL VENOUS BLD VENIPUNCTURE: CPT | Performed by: INTERNAL MEDICINE

## 2022-04-05 PROCEDURE — 80061 LIPID PANEL: CPT | Performed by: INTERNAL MEDICINE

## 2022-04-05 PROCEDURE — 99214 OFFICE O/P EST MOD 30 MIN: CPT | Mod: S$PBB,,, | Performed by: INTERNAL MEDICINE

## 2022-04-05 PROCEDURE — 99999 PR PBB SHADOW E&M-EST. PATIENT-LVL V: CPT | Mod: PBBFAC,,, | Performed by: INTERNAL MEDICINE

## 2022-04-05 RX ORDER — INSULIN ASPART 100 [IU]/ML
INJECTION, SUSPENSION SUBCUTANEOUS
Qty: 30 ML | Refills: 15
Start: 2022-04-05 | End: 2022-08-09

## 2022-04-05 NOTE — PROGRESS NOTES
Subjective:       Patient ID: Eliu Paz is a 82 y.o. female.    Chief Complaint: Follow-up    Patient is here for followup for chronic conditions.    She gets tired.    She c/o of bowel difficulties unless she takes liquid medicine Lactulose.    Today she is again concerned that she has an undiagnosed parasite, causing chronic skin changes (some hyper, some hypo-pigmented changes) and spots in her underwear.    DM2:  good med adherence  sometimes misses meals, but does eat after insulin use, changed Diet  < 140 AM sugars  Some lows in middle of noc, occ under 70  no Numbness in feet  no sores in feet  No new Visual changes  no Polyuria/polydipsia.        Review of Systems   Constitutional: Negative for activity change, chills, fatigue and fever.   HENT: Negative for congestion.    Respiratory: Positive for shortness of breath. Negative for chest tightness.    Cardiovascular: Positive for chest pain (when she has constipation pains) and leg swelling (stable, mild). Negative for palpitations.   Gastrointestinal: Positive for constipation. Negative for abdominal distention, abdominal pain, nausea and vomiting.   Genitourinary: Negative for decreased urine volume and dysuria.   Musculoskeletal: Negative for arthralgias and myalgias.   Skin: Positive for rash.   Neurological: Negative for weakness.   Psychiatric/Behavioral: Negative for confusion and dysphoric mood. The patient is nervous/anxious.            Objective:      Physical Exam  Vitals reviewed.   Constitutional:       General: She is not in acute distress.     Appearance: Normal appearance. She is well-developed. She is obese. She is not ill-appearing, toxic-appearing or diaphoretic.   HENT:      Head: Normocephalic and atraumatic.      Left Ear: Tympanic membrane and ear canal normal. There is no impacted cerumen.   Eyes:      General: No scleral icterus.     Pupils: Pupils are equal, round, and reactive to light.   Neck:      Thyroid: No thyromegaly.    Cardiovascular:      Rate and Rhythm: Normal rate and regular rhythm.      Heart sounds: Normal heart sounds. No murmur heard.    No friction rub. No gallop.   Pulmonary:      Effort: Pulmonary effort is normal. No respiratory distress.      Breath sounds: Normal breath sounds. No wheezing or rales.   Abdominal:      General: Bowel sounds are normal. There is no distension.      Palpations: Abdomen is soft. There is no mass.      Tenderness: There is no abdominal tenderness. There is no guarding or rebound.      Hernia: No hernia is present.      Comments:      Musculoskeletal:         General: No tenderness. Normal range of motion.      Cervical back: Normal range of motion.      Comments: Trace edema   Lymphadenopathy:      Cervical: No cervical adenopathy.   Skin:     Comments: Some areas of hyperpigmentation (eg bilat anterior knees)  Hyperkeratotic skin lesion L earlobe   Neurological:      General: No focal deficit present.      Mental Status: She is alert and oriented to person, place, and time.   Psychiatric:         Mood and Affect: Mood normal.         Speech: Speech normal.         Behavior: Behavior normal.      Comments: convinced she has an internal infection causing skin color changes and heart issues. Links L earlobe skin lesion to her heart         Assessment:       1. Gastroesophageal reflux disease, unspecified whether esophagitis present    2. Type 2 diabetes mellitus with stage 3 chronic kidney disease, with long-term current use of insulin    3. Coronary artery disease of native artery of native heart with stable angina pectoris    4. Constipation, unspecified constipation type    5. Delusional disorder        Plan:       Eliu Steinberg was seen today for follow-up.    Diagnoses and all orders for this visit:    Gastroesophageal reflux disease, unspecified whether esophagitis present  Controlled on PPT    Type 2 diabetes mellitus with stage 3 chronic kidney disease, with long-term current use of  insulin  -     insulin asp prt-insulin aspart, NovoLOG 70/30, (NOVOLOG MIX 70-30 U-100 INSULN) 100 unit/mL (70-30) Soln; INJECT 12 UNITS INTO THE SKIN 2 TIMES DAILY WITH MEALS.  She will call if continues to have frequ noc lows, in which case we should lower 70/30 to 10 U  -     CBC Auto Differential; Future  -     Comprehensive Metabolic Panel; Future  -     Lipid Panel; Future  -     Hemoglobin A1C; Future    Coronary artery disease of native artery of native heart with stable angina pectoris  On statin, symptoms stable    Constipation, unspecified constipation type  Sees GI    Delusional disorder  Lengthy discussion re that she does not have an infection in her body, I reviewed recent parasite test also      Health Maintenance       Date Due Completion Date    Shingles Vaccine (1 of 2) Never done ---    Pneumococcal Vaccines (Age 65+) (1 of 1 - PPSV23) Never done ---    Diabetes Urine Screening 05/30/2018 5/30/2017    Influenza Vaccine (1) 09/01/2021 12/17/2019 (Declined)    Override on 12/17/2019: Declined    Override on 2/7/2017: Declined (per provider note)    Override on 10/15/2015: Not Clinically Appropriate (declines)    Lipid Panel 01/26/2022 1/26/2021    COVID-19 Vaccine (4 - Booster for Pfizer series) 03/05/2022 10/5/2021    Hemoglobin A1c 04/05/2022 10/5/2021    Foot Exam 01/10/2023 1/10/2022    Override on 4/15/2021: Done    Override on 3/19/2020: Done    Override on 12/20/2018: Done    Override on 9/5/2018: Done    Override on 9/1/2017: Done (podiatry)    Override on 12/2/2016: Done    Override on 8/24/2016: Done (by cardiologist)    Override on 7/14/2015: Done    Eye Exam 03/14/2023 3/14/2022    DEXA Scan 01/10/2024 1/10/2022    Colonoscopy 02/05/2024 2/5/2019    TETANUS VACCINE 02/07/2027 2/7/2017 (Declined)    Override on 2/7/2017: Declined (per provider note)          Follow up in about 6 months (around 10/5/2022) for Pfizer 2nd booster please.    Future Appointments   Date Time Provider  Department Center   4/11/2022  9:00 AM Serena Gray DPM [facility]   7/18/2022  8:30 AM Zuleika Francis MD [facility]   10/3/2022 10:20 AM Ryan Carlson MD [facility]

## 2022-04-11 ENCOUNTER — OFFICE VISIT (OUTPATIENT)
Dept: PODIATRY | Facility: CLINIC | Age: 82
End: 2022-04-11
Payer: MEDICARE

## 2022-04-11 VITALS
HEIGHT: 60 IN | HEART RATE: 77 BPM | SYSTOLIC BLOOD PRESSURE: 134 MMHG | BODY MASS INDEX: 32.08 KG/M2 | WEIGHT: 163.38 LBS | DIASTOLIC BLOOD PRESSURE: 66 MMHG

## 2022-04-11 DIAGNOSIS — B35.1 ONYCHOMYCOSIS DUE TO DERMATOPHYTE: ICD-10-CM

## 2022-04-11 DIAGNOSIS — I73.9 PVD (PERIPHERAL VASCULAR DISEASE): Primary | ICD-10-CM

## 2022-04-11 PROCEDURE — 99999 PR PBB SHADOW E&M-EST. PATIENT-LVL IV: ICD-10-PCS | Mod: PBBFAC,,, | Performed by: PODIATRIST

## 2022-04-11 PROCEDURE — 99999 PR PBB SHADOW E&M-EST. PATIENT-LVL IV: CPT | Mod: PBBFAC,,, | Performed by: PODIATRIST

## 2022-04-11 PROCEDURE — 99499 NO LOS: ICD-10-PCS | Mod: S$PBB,,, | Performed by: PODIATRIST

## 2022-04-11 PROCEDURE — 99214 OFFICE O/P EST MOD 30 MIN: CPT | Mod: PBBFAC | Performed by: PODIATRIST

## 2022-04-11 PROCEDURE — 11721 DEBRIDE NAIL 6 OR MORE: CPT | Mod: Q8,S$PBB,, | Performed by: PODIATRIST

## 2022-04-11 PROCEDURE — 99499 UNLISTED E&M SERVICE: CPT | Mod: S$PBB,,, | Performed by: PODIATRIST

## 2022-04-11 PROCEDURE — 11721 DEBRIDE NAIL 6 OR MORE: CPT | Mod: Q8,PBBFAC | Performed by: PODIATRIST

## 2022-04-11 PROCEDURE — 11721 PR DEBRIDEMENT OF NAILS, 6 OR MORE: ICD-10-PCS | Mod: Q8,S$PBB,, | Performed by: PODIATRIST

## 2022-04-11 NOTE — PROGRESS NOTES
Subjective:      Patient ID: Eliu Paz is a 82 y.o. female.    Chief Complaint: Nail Care and Diabetes Mellitus (Aldo-4/5/2022)    Eliu Steinberg is a 82 y.o. female who presents to the clinic for evaluation and treatment of high risk feet. Eliu Steinberg has a past medical history of Allergy, Arteriosclerosis of arteries of extremities (8/25/2016), Arthritis, Brain infection, Cataract, CKD (chronic kidney disease) stage 3, GFR 30-59 ml/min, Coma, Coronary artery disease, Diabetes mellitus type II (1981), Diabetic neuropathy, Early dry stage nonexudative age-related macular degeneration of both eyes (6/17/2019), GERD (gastroesophageal reflux disease), Glaucoma, Hyperlipidemia, Hypertension, and Type 2 diabetes mellitus with diabetic peripheral angiopathy without gangrene, with long-term current use of insulin (8/21/2016). The patient's chief complaint is long, thick toenails. This patient has documented high risk feet requiring routine maintenance secondary to peripheral vascular disease. Patient denies any numbness or tingling in her feet. She is complaining of pain in her right great toe joint. Pain is present with ambulating, relieved with rest. Symptoms have been ongoing for approximately 1 year, reports she often stubs her right great toe. No prior treatments for the toe pain.     PCP: Ryan Carlson MD    Date Last Seen by PCP: 12/6/2021 Fouzia Islas NP    Current shoe gear:  Affected Foot: Casual shoes     Unaffected Foot: Casual shoes    Hemoglobin A1C   Date Value Ref Range Status   04/05/2022 6.7 (H) 4.0 - 5.6 % Final     Comment:     ADA Screening Guidelines:  5.7-6.4%  Consistent with prediabetes  >or=6.5%  Consistent with diabetes    High levels of fetal hemoglobin interfere with the HbA1C  assay. Heterozygous hemoglobin variants (HbS, HgC, etc)do  not significantly interfere with this assay.   However, presence of multiple variants may affect accuracy.     10/05/2021 6.6 (H) 4.0 - 5.6 % Final     Comment:      ADA Screening Guidelines:  5.7-6.4%  Consistent with prediabetes  >or=6.5%  Consistent with diabetes    High levels of fetal hemoglobin interfere with the HbA1C  assay. Heterozygous hemoglobin variants (HbS, HgC, etc)do  not significantly interfere with this assay.   However, presence of multiple variants may affect accuracy.     02/09/2021 7.6 (H) 4.0 - 5.6 % Final     Comment:     ADA Screening Guidelines:  5.7-6.4%  Consistent with prediabetes  >or=6.5%  Consistent with diabetes    High levels of fetal hemoglobin interfere with the HbA1C  assay. Heterozygous hemoglobin variants (HbS, HgC, etc)do  not significantly interfere with this assay.   However, presence of multiple variants may affect accuracy.         Review of Systems   Constitutional: Negative for chills, fever and malaise/fatigue.   HENT: Positive for hearing loss.    Cardiovascular: Negative for claudication.   Respiratory: Negative for cough, shortness of breath, sputum production and wheezing.    Skin: Negative for flushing and rash.   Musculoskeletal: Positive for arthritis, joint pain and myalgias.   Gastrointestinal: Negative for abdominal pain, nausea and vomiting.   Neurological: Negative for loss of balance, numbness, paresthesias and sensory change.   Psychiatric/Behavioral: Negative for altered mental status.           Objective:      Physical Exam  Constitutional:       General: She is not in acute distress.  Cardiovascular:      Pulses:           Dorsalis pedis pulses are 1+ on the right side and detected w/ Doppler on the left side.        Posterior tibial pulses are detected w/ Doppler on the right side and detected w/ Doppler on the left side.      Comments: CFT< 3 secs all toes bilateral foot, skin temp warm to cool bilateral foot, no hair growth bilateral lower extremity, diffuse pitting edema to bilateral lower extremities. No dependent rubor to bilateral lower extremities    Musculoskeletal:         General: Tenderness and  deformity present. No swelling or signs of injury.      Right lower leg: Edema present.      Left lower leg: Edema present.      Right foot: Deformity present.      Left foot: Deformity present.      Comments: 5/5 muscle strength with DF/PF/Inv/Ev bilateral.     Ankle joint dorsiflexion reaches +5 degrees with the knee extended bilateral.    Rigid flexion contracture of the right hallux IPJ. Pain with hallux IPJ range of motion. Semi rigid contracture of PIPJ 2,3,4 right foot. Flexible contracture of PIPJ 2,3,4 left foot.     No pain or crepitus with ankle joint, subtalar joint, midtarsal, or 1st MTP joint range of motion bilateral    Pes planus foot type bilateral.     Feet:      Right foot:      Protective Sensation: 10 sites tested. 10 sites sensed.      Left foot:      Protective Sensation: 10 sites tested. 10 sites sensed.   Skin:     General: Skin is warm and dry.      Capillary Refill: Capillary refill takes 2 to 3 seconds.      Findings: No bruising, erythema or lesion.      Comments: Skin warm, dry, and intact. No open wounds, lesions, or discoloration to bilateral feet. No interdigital maceration appreciated bilateral.    Nails 1-5 bilateral foot are significantly elongated 5-7 mm.  Hallux nail bilateral is dystrophic with moderate loosening and underlying debris.  Nails 2-5 bilateral are also yellow discoloration mild loosening underlying debris.     Neurological:      Mental Status: She is alert and oriented to person, place, and time.      Sensory: No sensory deficit.      Motor: No weakness.      Comments: Vibratory sensation intact via 128 Hz tuning fork bilateral   Psychiatric:         Behavior: Behavior normal.               Assessment:       Encounter Diagnoses   Name Primary?    PVD (peripheral vascular disease) Yes    Onychomycosis due to dermatophyte          Plan:       Eliu Steinberg was seen today for nail care and diabetes mellitus.    Diagnoses and all orders for this visit:    PVD (peripheral  vascular disease)    Onychomycosis due to dermatophyte      I counseled the patient on her conditions, their implications and medical management.      Shoe inspection. Diabetic Foot Education. Patient reminded of the importance of good nutrition and blood sugar control to help prevent podiatric complications of diabetes. Patient instructed on proper foot hygeine. We discussed wearing proper shoe gear, daily foot inspections, never walking without protective shoe gear, never putting sharp instruments to feet    - With patient's permission, nails were aggressively reduced and debrided x 10 to their soft tissue attachment mechanically with a sterile nail nipper removing all offending nail and debris. Patient relates relief following the procedure. She will continue to monitor the areas daily, inspect her feet, wear protective shoe gear when ambulatory, moisturizer to maintain skin integrity and follow in this office in approximately 3-4 months, sooner p.r.n. Will review right foot xrays on her next visit

## 2022-04-29 DIAGNOSIS — G56.01 CARPAL TUNNEL SYNDROME, RIGHT: ICD-10-CM

## 2022-04-29 RX ORDER — TRAMADOL HYDROCHLORIDE 50 MG/1
TABLET ORAL
Qty: 40 TABLET | Refills: 0 | OUTPATIENT
Start: 2022-04-29

## 2022-04-29 RX ORDER — TRAMADOL HYDROCHLORIDE 50 MG/1
50 TABLET ORAL 2 TIMES DAILY PRN
Qty: 40 TABLET | Refills: 0 | Status: SHIPPED | OUTPATIENT
Start: 2022-04-29 | End: 2022-08-09 | Stop reason: SDUPTHER

## 2022-04-29 NOTE — TELEPHONE ENCOUNTER
No new care gaps identified.  Powered by [employer]. Reference number: [id number].   4/29/2022 3:19:24 PM CDT

## 2022-04-29 NOTE — TELEPHONE ENCOUNTER
----- Message from Nataliya Gallardo sent at 4/29/2022  2:51 PM CDT -----  Contact: Pt Mobile  [phone]  Requesting an RX refill or new RX.  Is this a refill or new RX: Refill  RX name and strength  traMADoL (ULTRAM) 50 mg tablet  Is this a 30 day or 90 day RX: N/A  Pharmacy name and phone #   [id number] - [place] [id number] [place] [id number] [facility] [address]  Phone: [phone] Fax: [phone]  The doctors have asked that we provide their patients with the following 2 reminders -- prescription refills can take up to 72 hours, and a friendly reminder that in the future you can use your [facility] account to request refills:   Comment: Patient said that she usually get fourteen pills.

## 2022-04-29 NOTE — TELEPHONE ENCOUNTER
No new care gaps identified.  Powered by [employer]. Reference number: [id number].   4/29/2022 10:10:21 AM CDT

## 2022-06-27 DIAGNOSIS — N18.30 TYPE 2 DIABETES MELLITUS WITH STAGE 3 CHRONIC KIDNEY DISEASE, WITH LONG-TERM CURRENT USE OF INSULIN: Chronic | ICD-10-CM

## 2022-06-27 DIAGNOSIS — Z79.4 TYPE 2 DIABETES MELLITUS WITH STAGE 3 CHRONIC KIDNEY DISEASE, WITH LONG-TERM CURRENT USE OF INSULIN: Chronic | ICD-10-CM

## 2022-06-27 DIAGNOSIS — E11.22 TYPE 2 DIABETES MELLITUS WITH STAGE 3 CHRONIC KIDNEY DISEASE, WITH LONG-TERM CURRENT USE OF INSULIN: Chronic | ICD-10-CM

## 2022-06-28 RX ORDER — PEN NEEDLE, DIABETIC 29 G X1/2"
NEEDLE, DISPOSABLE MISCELLANEOUS
Qty: 200 EACH | Refills: 3 | Status: SHIPPED | OUTPATIENT
Start: 2022-06-28

## 2022-06-28 NOTE — TELEPHONE ENCOUNTER
Refill Decision Note   De Maryan Jackson  is requesting a refill authorization.  Brief Assessment and Rationale for Refill:  Approve     Medication Therapy Plan:       Medication Reconciliation Completed: No   Comments:     No Care Gaps recommended.     Note composed:12:15 PM 06/28/2022

## 2022-06-28 NOTE — TELEPHONE ENCOUNTER
No new care gaps identified.  [facility] Embedded Care Gaps. Reference number: [id number]. 6/27/2022   8:02:52 PM CDT

## 2022-06-28 NOTE — TELEPHONE ENCOUNTER
"----- Message from Amada Lentz sent at 6/28/2022 10:30 AM CDT -----  Contact: Eliu Steinberg   Eliu Steinberg would like a script sent for the BD Insulin Syringe to [facility] on [address]. She also said any other scripts that she needs. I did not know what meds   "She  is out of syringes       "

## 2022-06-29 ENCOUNTER — TELEPHONE (OUTPATIENT)
Dept: INTERNAL MEDICINE | Facility: CLINIC | Age: 82
End: 2022-06-29

## 2022-06-29 NOTE — TELEPHONE ENCOUNTER
----- Message from Kirsten Ricks sent at 6/29/2022 10:14 AM CDT -----  Contact: [phone]  Pt is calling she states she had a text or something stating an earlier appt she has an appt in October and she states she does not know all the computer stuff please give return call

## 2022-07-07 ENCOUNTER — OFFICE VISIT (OUTPATIENT)
Dept: PODIATRY | Facility: CLINIC | Age: 82
End: 2022-07-07
Payer: MEDICARE

## 2022-07-07 VITALS
HEART RATE: 60 BPM | WEIGHT: 156.94 LBS | BODY MASS INDEX: 30.66 KG/M2 | DIASTOLIC BLOOD PRESSURE: 60 MMHG | SYSTOLIC BLOOD PRESSURE: 143 MMHG

## 2022-07-07 DIAGNOSIS — B35.1 ONYCHOMYCOSIS DUE TO DERMATOPHYTE: ICD-10-CM

## 2022-07-07 DIAGNOSIS — I73.9 PVD (PERIPHERAL VASCULAR DISEASE): Primary | ICD-10-CM

## 2022-07-07 PROCEDURE — 11721 PR DEBRIDEMENT OF NAILS, 6 OR MORE: ICD-10-PCS | Mod: Q8,S$PBB,, | Performed by: PODIATRIST

## 2022-07-07 PROCEDURE — 99499 NO LOS: ICD-10-PCS | Mod: S$PBB,,, | Performed by: PODIATRIST

## 2022-07-07 PROCEDURE — 11721 DEBRIDE NAIL 6 OR MORE: CPT | Mod: Q8,PBBFAC | Performed by: PODIATRIST

## 2022-07-07 PROCEDURE — 99999 PR PBB SHADOW E&M-EST. PATIENT-LVL III: ICD-10-PCS | Mod: PBBFAC,,, | Performed by: PODIATRIST

## 2022-07-07 PROCEDURE — 99213 OFFICE O/P EST LOW 20 MIN: CPT | Mod: PBBFAC | Performed by: PODIATRIST

## 2022-07-07 PROCEDURE — 99999 PR PBB SHADOW E&M-EST. PATIENT-LVL III: CPT | Mod: PBBFAC,,, | Performed by: PODIATRIST

## 2022-07-07 PROCEDURE — 11721 DEBRIDE NAIL 6 OR MORE: CPT | Mod: Q8,S$PBB,, | Performed by: PODIATRIST

## 2022-07-07 PROCEDURE — 99499 UNLISTED E&M SERVICE: CPT | Mod: S$PBB,,, | Performed by: PODIATRIST

## 2022-07-07 NOTE — PROGRESS NOTES
Subjective:      Patient ID: Eliu Paz is a 82 y.o. female.    Chief Complaint: Diabetes Mellitus and Nail Care (Ryan Carlson MD  PCP/04/05/22)    Eliu Steinberg is a 82 y.o. female who presents to the clinic for evaluation and treatment of high risk feet. Eliu Steinberg has a past medical history of Allergy, Arteriosclerosis of arteries of extremities (8/25/2016), Arthritis, Brain infection, Cataract, CKD (chronic kidney disease) stage 3, GFR 30-59 ml/min, Coma, Coronary artery disease, Diabetes mellitus type II (1981), Diabetic neuropathy, Early dry stage nonexudative age-related macular degeneration of both eyes (6/17/2019), GERD (gastroesophageal reflux disease), Glaucoma, Hyperlipidemia, Hypertension, and Type 2 diabetes mellitus with diabetic peripheral angiopathy without gangrene, with long-term current use of insulin (8/21/2016). The patient's chief complaint is long, thick toenails. This patient has documented high risk feet requiring routine maintenance secondary to peripheral vascular disease. Patient denies any numbness or tingling in her feet. She is complaining of pain in her right great toe joint. Pain is present with ambulating, relieved with rest. Symptoms have been ongoing for approximately 1 year, reports she often stubs her right great toe. No prior treatments for the toe pain.     PCP: Ryan Carlson MD    Date Last Seen by PCP: 12/6/2021 Fouzia Islas NP    Current shoe gear:  Affected Foot: Casual shoes     Unaffected Foot: Casual shoes    Hemoglobin A1C   Date Value Ref Range Status   04/05/2022 6.7 (H) 4.0 - 5.6 % Final     Comment:     ADA Screening Guidelines:  5.7-6.4%  Consistent with prediabetes  >or=6.5%  Consistent with diabetes    High levels of fetal hemoglobin interfere with the HbA1C  assay. Heterozygous hemoglobin variants (HbS, HgC, etc)do  not significantly interfere with this assay.   However, presence of multiple variants may affect accuracy.     10/05/2021 6.6 (H) 4.0 - 5.6 %  Final     Comment:     ADA Screening Guidelines:  5.7-6.4%  Consistent with prediabetes  >or=6.5%  Consistent with diabetes    High levels of fetal hemoglobin interfere with the HbA1C  assay. Heterozygous hemoglobin variants (HbS, HgC, etc)do  not significantly interfere with this assay.   However, presence of multiple variants may affect accuracy.     02/09/2021 7.6 (H) 4.0 - 5.6 % Final     Comment:     ADA Screening Guidelines:  5.7-6.4%  Consistent with prediabetes  >or=6.5%  Consistent with diabetes    High levels of fetal hemoglobin interfere with the HbA1C  assay. Heterozygous hemoglobin variants (HbS, HgC, etc)do  not significantly interfere with this assay.   However, presence of multiple variants may affect accuracy.         Review of Systems   Constitutional: Negative for chills, fever and malaise/fatigue.   HENT: Positive for hearing loss.    Cardiovascular: Negative for claudication.   Respiratory: Negative for cough, shortness of breath, sputum production and wheezing.    Skin: Negative for flushing and rash.   Musculoskeletal: Positive for arthritis, joint pain and myalgias.   Gastrointestinal: Negative for abdominal pain, nausea and vomiting.   Neurological: Negative for loss of balance, numbness, paresthesias and sensory change.   Psychiatric/Behavioral: Negative for altered mental status.           Objective:      Physical Exam  Vitals reviewed. Exam conducted with a chaperone present.   Constitutional:       General: She is not in acute distress.  Cardiovascular:      Pulses:           Dorsalis pedis pulses are 1+ on the right side and detected w/ Doppler on the left side.        Posterior tibial pulses are detected w/ Doppler on the right side and detected w/ Doppler on the left side.      Comments: CFT< 3 secs all toes bilateral foot, skin temp warm to cool bilateral foot, no hair growth bilateral lower extremity, diffuse pitting edema to bilateral lower extremities. No dependent rubor to  bilateral lower extremities    Musculoskeletal:         General: Tenderness and deformity present. No swelling or signs of injury.      Right lower leg: Edema present.      Left lower leg: Edema present.      Right foot: Deformity present.      Left foot: Deformity present.      Comments: 5/5 muscle strength with DF/PF/Inv/Ev bilateral.     Ankle joint dorsiflexion reaches +5 degrees with the knee extended bilateral.    Rigid flexion contracture of the right hallux IPJ. Pain with hallux IPJ range of motion. Semi rigid contracture of PIPJ 2,3,4 right foot. Flexible contracture of PIPJ 2,3,4 left foot.     No pain or crepitus with ankle joint, subtalar joint, midtarsal, or 1st MTP joint range of motion bilateral    Pes planus foot type bilateral.     Feet:      Right foot:      Protective Sensation: 10 sites tested. 10 sites sensed.      Left foot:      Protective Sensation: 10 sites tested. 10 sites sensed.   Skin:     General: Skin is warm and dry.      Capillary Refill: Capillary refill takes 2 to 3 seconds.      Findings: No bruising, erythema or lesion.      Comments: Skin warm, dry, and intact. No open wounds, lesions, or discoloration to bilateral feet. No interdigital maceration appreciated bilateral.    Nails 1-5 bilateral foot are significantly elongated 4-7 mm.  Hallux nail bilateral is dystrophic with moderate loosening and underlying debris.  Nails 2-5 bilateral are also yellow discoloration mild loosening underlying debris.     Neurological:      Mental Status: She is alert and oriented to person, place, and time.      Sensory: No sensory deficit.      Motor: No weakness.      Comments: Vibratory sensation intact via 128 Hz tuning fork bilateral   Psychiatric:         Behavior: Behavior normal.               Assessment:       Encounter Diagnoses   Name Primary?    PVD (peripheral vascular disease) Yes    Onychomycosis due to dermatophyte          Plan:       Eliu Steinberg was seen today for diabetes mellitus  and nail care.    Diagnoses and all orders for this visit:    PVD (peripheral vascular disease)    Onychomycosis due to dermatophyte      I counseled the patient on her conditions, their implications and medical management.      Shoe inspection. Diabetic Foot Education. Patient reminded of the importance of good nutrition and blood sugar control to help prevent podiatric complications of diabetes. Patient instructed on proper foot hygeine. We discussed wearing proper shoe gear, daily foot inspections, never walking without protective shoe gear, never putting sharp instruments to feet    - With patient's permission, nails were aggressively reduced and debrided x 10 to their soft tissue attachment mechanically with a sterile nail nipper removing all offending nail and debris. Patient relates relief following the procedure. She will continue to monitor the areas daily, inspect her feet, wear protective shoe gear when ambulatory, moisturizer to maintain skin integrity and follow in this office in approximately 3-4 months, sooner p.r.n. Will review right foot xrays on her next visit

## 2022-07-16 NOTE — PROGRESS NOTES
HPI     DLS:  3/14/2022 Dr. Francis    82 y.o. female is here for 4 months IOP/gonio. Denies eye pain and   flashes. Notice black floaters and on one occasion notice green and red   spots for several minutes. No noticeable VA changes since last visit.     Eye Med's: Cosopt BID OU                      Brimonidine TID OU                     Latanoprost qhs OU     Last edited by BELINDA Broden on 7/18/2022  8:34 AM. (History)              Assessment /Plan     For exam results, see Encounter Report.    Primary open-angle glaucoma, bilateral, severe stage  -     Calvo Visual Field - OU - Extended - Both Eyes; Future  -     Posterior Segment OCT Optic Nerve- Both eyes; Future    Senile nuclear sclerosis, bilateral    Cortical age-related cataract, bilateral    Early dry stage nonexudative age-related macular degeneration of both eyes    Macular drusen, bilateral    Nonexudative age-related macular degeneration, bilateral, intermediate dry stage    Transient vision disturbance    Type 2 diabetes mellitus without ophthalmic manifestations    Abnormal red reflex of eye             Glaucoma (type and duration)    POAG severe   First HVF   2013   First photos   2017   Treatment / Drops started   Latanoprost, travatan           Family history    ?        Glaucoma meds    Latanoprost / cosopt (added - 1//15/2019)         H/O adverse rxn to glaucoma drops    none        LASERS    none        GLAUCOMA SURGERIES    none        OTHER EYE SURGERIES    none        CDR    0.85 w/ sup thinning /0.9 - sup and inf thinning         Tbase    16-20/16-20          Tmax    20/20            Ttarget    15-16 / 15-16        HVF    8 test 2013 to  2021 - SAD od // gen depression, SAD, IAD os (?prog os)        Gonio    3+ ou        CCT    [phone]        OCT    7 test 2013 to 2021 - RNFL - decr thru out od // dec. Thru out  os        HRT   4 test 2017 to 2021-MR -  Dec SN/IN, bord N/IT/ST od // dec I, bord N/S/T os /// CDR 0.78 od // 0.81  os        Disc photos    2017, 2019 , 2021     - Ttoday    14-15 // 14-14  good resp to adding brimonidine   - Test done today  - IOP / vision check // gonio     POAG severe  Severe RNFL thinning with progressive field loss   - on latanoprost qhs OU, continue  - cosopt ou bid - added  (1/2019)   -brimonidine added 5/21/2019 - good resp 19/18 --> 11/10     NS cataract OU - hold on CE - pt is not having any problems with vision presently     Macular Drusen OU  AMD OU, seeing [facility]   AREDS 2 Vitamins  Home [employer] Grid Testing   - seen by  retina - akshat rogers - saw Ai 6/1/2018 - to F/U in 6 months    Ptosis   Pt C/O droopy lid OS > OD     H/O TVO -  - resolved - no further episodes - 3/14/2022     PLAN     GLAUCOMA  Good response to adding brimonidine - below target      rec phaco/IOL ?? os first    Pt does not want surgery - wants to wait still wants to wait 7/18/2022    If IOP goes above target consider SLT's     Cont anticoagulants   Cont glaucoma drops   Warned to go to ED if has vision loss again - especially if does not resolve in 15 min or less   CSM     F/U me 4 months with IOP // HVF 24-2 ss ou and DFE and OCT // review retina notes -    Pt would like to see better // but with the adv glaucoma and some mac degeneration the potential is uncertain // also she is a jehova witness and refuses blood products - she has sign CAD and is no longer a candidate for heart surgery - according to her they had to abort the procedure last time attempted. Pt has to stay on anticoagulation therapy .     appt made to see Dr rizo   F/U me 4 months with HVF / DFE / OCT - consider phaco/IOL - ?? Which eye might have better potential

## 2022-07-18 ENCOUNTER — OFFICE VISIT (OUTPATIENT)
Dept: OPHTHALMOLOGY | Facility: CLINIC | Age: 82
End: 2022-07-18
Payer: MEDICARE

## 2022-07-18 DIAGNOSIS — H53.9 TRANSIENT VISION DISTURBANCE: ICD-10-CM

## 2022-07-18 DIAGNOSIS — H35.3131 EARLY DRY STAGE NONEXUDATIVE AGE-RELATED MACULAR DEGENERATION OF BOTH EYES: ICD-10-CM

## 2022-07-18 DIAGNOSIS — H35.363 MACULAR DRUSEN, BILATERAL: ICD-10-CM

## 2022-07-18 DIAGNOSIS — H57.89 ABNORMAL RED REFLEX OF EYE: ICD-10-CM

## 2022-07-18 DIAGNOSIS — H25.13 SENILE NUCLEAR SCLEROSIS, BILATERAL: ICD-10-CM

## 2022-07-18 DIAGNOSIS — E11.9 TYPE 2 DIABETES MELLITUS WITHOUT OPHTHALMIC MANIFESTATIONS: ICD-10-CM

## 2022-07-18 DIAGNOSIS — H35.3132 NONEXUDATIVE AGE-RELATED MACULAR DEGENERATION, BILATERAL, INTERMEDIATE DRY STAGE: ICD-10-CM

## 2022-07-18 DIAGNOSIS — H40.1133 PRIMARY OPEN-ANGLE GLAUCOMA, BILATERAL, SEVERE STAGE: Primary | ICD-10-CM

## 2022-07-18 DIAGNOSIS — H25.013 CORTICAL AGE-RELATED CATARACT, BILATERAL: ICD-10-CM

## 2022-07-18 PROCEDURE — 99999 PR PBB SHADOW E&M-EST. PATIENT-LVL III: ICD-10-PCS | Mod: PBBFAC,,, | Performed by: OPHTHALMOLOGY

## 2022-07-18 PROCEDURE — 92020 GONIOSCOPY: CPT | Mod: PBBFAC | Performed by: OPHTHALMOLOGY

## 2022-07-18 PROCEDURE — 92020 PR SPECIAL EYE EVAL,GONIOSCOPY: ICD-10-PCS | Mod: S$PBB,,, | Performed by: OPHTHALMOLOGY

## 2022-07-18 PROCEDURE — 92020 GONIOSCOPY: CPT | Mod: S$PBB,,, | Performed by: OPHTHALMOLOGY

## 2022-07-18 PROCEDURE — 92012 INTRM OPH EXAM EST PATIENT: CPT | Mod: S$PBB,,, | Performed by: OPHTHALMOLOGY

## 2022-07-18 PROCEDURE — 99213 OFFICE O/P EST LOW 20 MIN: CPT | Mod: PBBFAC | Performed by: OPHTHALMOLOGY

## 2022-07-18 PROCEDURE — 99999 PR PBB SHADOW E&M-EST. PATIENT-LVL III: CPT | Mod: PBBFAC,,, | Performed by: OPHTHALMOLOGY

## 2022-07-18 PROCEDURE — 92012 PR EYE EXAM, EST PATIENT,INTERMED: ICD-10-PCS | Mod: S$PBB,,, | Performed by: OPHTHALMOLOGY

## 2022-08-09 ENCOUNTER — LAB VISIT (OUTPATIENT)
Dept: LAB | Facility: HOSPITAL | Age: 82
End: 2022-08-09
Attending: INTERNAL MEDICINE
Payer: MEDICARE

## 2022-08-09 ENCOUNTER — OFFICE VISIT (OUTPATIENT)
Dept: INTERNAL MEDICINE | Facility: CLINIC | Age: 82
End: 2022-08-09
Payer: MEDICARE

## 2022-08-09 VITALS
BODY MASS INDEX: 29.52 KG/M2 | SYSTOLIC BLOOD PRESSURE: 128 MMHG | OXYGEN SATURATION: 98 % | DIASTOLIC BLOOD PRESSURE: 60 MMHG | HEIGHT: 60 IN | RESPIRATION RATE: 18 BRPM | WEIGHT: 150.38 LBS | HEART RATE: 67 BPM

## 2022-08-09 DIAGNOSIS — Z79.4 TYPE 2 DIABETES MELLITUS WITH STAGE 3 CHRONIC KIDNEY DISEASE, WITH LONG-TERM CURRENT USE OF INSULIN: Chronic | ICD-10-CM

## 2022-08-09 DIAGNOSIS — N18.30 TYPE 2 DIABETES MELLITUS WITH STAGE 3 CHRONIC KIDNEY DISEASE, WITH LONG-TERM CURRENT USE OF INSULIN: Chronic | ICD-10-CM

## 2022-08-09 DIAGNOSIS — E11.22 TYPE 2 DIABETES MELLITUS WITH STAGE 3 CHRONIC KIDNEY DISEASE, WITH LONG-TERM CURRENT USE OF INSULIN: Chronic | ICD-10-CM

## 2022-08-09 DIAGNOSIS — G56.01 CARPAL TUNNEL SYNDROME, RIGHT: ICD-10-CM

## 2022-08-09 DIAGNOSIS — R06.09 DOE (DYSPNEA ON EXERTION): Primary | ICD-10-CM

## 2022-08-09 DIAGNOSIS — R06.09 DOE (DYSPNEA ON EXERTION): ICD-10-CM

## 2022-08-09 LAB
ANION GAP SERPL CALC-SCNC: 9 MMOL/L (ref 8–16)
BASOPHILS # BLD AUTO: 0.02 K/UL (ref 0–0.2)
BASOPHILS NFR BLD: 0.4 % (ref 0–1.9)
BUN SERPL-MCNC: 19 MG/DL (ref 8–23)
CALCIUM SERPL-MCNC: 9.7 MG/DL (ref 8.7–10.5)
CHLORIDE SERPL-SCNC: 108 MMOL/L (ref 95–110)
CO2 SERPL-SCNC: 24 MMOL/L (ref 23–29)
CREAT SERPL-MCNC: 1.1 MG/DL (ref 0.5–1.4)
DIFFERENTIAL METHOD: ABNORMAL
EOSINOPHIL # BLD AUTO: 0.1 K/UL (ref 0–0.5)
EOSINOPHIL NFR BLD: 1.2 % (ref 0–8)
ERYTHROCYTE [DISTWIDTH] IN BLOOD BY AUTOMATED COUNT: 15.2 % (ref 11.5–14.5)
EST. GFR  (NO RACE VARIABLE): 50.8 ML/MIN/1.73 M^2
ESTIMATED AVG GLUCOSE: 148 MG/DL (ref 68–131)
GLUCOSE SERPL-MCNC: 248 MG/DL (ref 70–110)
HBA1C MFR BLD: 6.8 % (ref 4–5.6)
HCT VFR BLD AUTO: 35 % (ref 37–48.5)
HGB BLD-MCNC: 10.9 G/DL (ref 12–16)
IMM GRANULOCYTES # BLD AUTO: 0.02 K/UL (ref 0–0.04)
IMM GRANULOCYTES NFR BLD AUTO: 0.4 % (ref 0–0.5)
LYMPHOCYTES # BLD AUTO: 1.1 K/UL (ref 1–4.8)
LYMPHOCYTES NFR BLD: 22.3 % (ref 18–48)
MCH RBC QN AUTO: 28.5 PG (ref 27–31)
MCHC RBC AUTO-ENTMCNC: 31.1 G/DL (ref 32–36)
MCV RBC AUTO: 92 FL (ref 82–98)
MONOCYTES # BLD AUTO: 0.7 K/UL (ref 0.3–1)
MONOCYTES NFR BLD: 14.9 % (ref 4–15)
NEUTROPHILS # BLD AUTO: 2.9 K/UL (ref 1.8–7.7)
NEUTROPHILS NFR BLD: 60.8 % (ref 38–73)
NRBC BLD-RTO: 0 /100 WBC
PLATELET # BLD AUTO: 149 K/UL (ref 150–450)
PMV BLD AUTO: 11.4 FL (ref 9.2–12.9)
POTASSIUM SERPL-SCNC: 4.2 MMOL/L (ref 3.5–5.1)
RBC # BLD AUTO: 3.82 M/UL (ref 4–5.4)
SODIUM SERPL-SCNC: 141 MMOL/L (ref 136–145)
WBC # BLD AUTO: 4.84 K/UL (ref 3.9–12.7)

## 2022-08-09 PROCEDURE — 36415 COLL VENOUS BLD VENIPUNCTURE: CPT | Performed by: INTERNAL MEDICINE

## 2022-08-09 PROCEDURE — 85025 COMPLETE CBC W/AUTO DIFF WBC: CPT | Performed by: INTERNAL MEDICINE

## 2022-08-09 PROCEDURE — 99215 OFFICE O/P EST HI 40 MIN: CPT | Mod: PBBFAC | Performed by: INTERNAL MEDICINE

## 2022-08-09 PROCEDURE — 99214 PR OFFICE/OUTPT VISIT, EST, LEVL IV, 30-39 MIN: ICD-10-PCS | Mod: S$PBB,,, | Performed by: INTERNAL MEDICINE

## 2022-08-09 PROCEDURE — 99999 PR PBB SHADOW E&M-EST. PATIENT-LVL V: ICD-10-PCS | Mod: PBBFAC,,, | Performed by: INTERNAL MEDICINE

## 2022-08-09 PROCEDURE — 80048 BASIC METABOLIC PNL TOTAL CA: CPT | Performed by: INTERNAL MEDICINE

## 2022-08-09 PROCEDURE — 99214 OFFICE O/P EST MOD 30 MIN: CPT | Mod: S$PBB,,, | Performed by: INTERNAL MEDICINE

## 2022-08-09 PROCEDURE — 99999 PR PBB SHADOW E&M-EST. PATIENT-LVL V: CPT | Mod: PBBFAC,,, | Performed by: INTERNAL MEDICINE

## 2022-08-09 PROCEDURE — 83036 HEMOGLOBIN GLYCOSYLATED A1C: CPT | Performed by: INTERNAL MEDICINE

## 2022-08-09 RX ORDER — INSULIN ASPART 100 [IU]/ML
INJECTION, SUSPENSION SUBCUTANEOUS
Qty: 30 ML | Refills: 15
Start: 2022-08-09

## 2022-08-09 RX ORDER — TRAMADOL HYDROCHLORIDE 50 MG/1
50 TABLET ORAL 2 TIMES DAILY PRN
Qty: 40 TABLET | Refills: 0 | Status: SHIPPED | OUTPATIENT
Start: 2022-08-09

## 2022-08-09 NOTE — PROGRESS NOTES
Subjective:       Patient ID: Eliu Paz is a 82 y.o. female.    Chief Complaint: Follow-up    Patient is here for followup for chronic conditions.    Slower and harder to move this morning. More easily tired. Stressed at home caring for her  and her brotehr in [facility] who also lives with them.    Has had continued constipation. Takes lactulose for it which helps.    DM2:  good med adherence  no changed Diet  < 140 AM sugars  occ lows in middle of noc  <200 Post-prandial sugars  Stable Numbness in feet  no sores in feet  no Visual changes  no Polyuria/polydipsia.        Review of Systems   Constitutional: Negative for activity change, chills, fatigue and fever.   HENT: Negative for congestion.    Respiratory: Positive for shortness of breath. Negative for chest tightness.    Cardiovascular: Positive for chest pain (when she has constipation pains) and leg swelling (stable, mild). Negative for palpitations.   Gastrointestinal: Positive for constipation. Negative for abdominal distention, abdominal pain, nausea and vomiting.   Genitourinary: Negative for decreased urine volume and dysuria.   Musculoskeletal: Negative for arthralgias and myalgias.   Skin: Positive for rash.   Neurological: Negative for weakness.   Psychiatric/Behavioral: Negative for confusion and dysphoric mood. The patient is nervous/anxious.            Objective:      Physical Exam  Vitals reviewed.   Constitutional:       General: She is not in acute distress.     Appearance: Normal appearance. She is well-developed. She is obese. She is not ill-appearing, toxic-appearing or diaphoretic.   HENT:      Head: Normocephalic and atraumatic.      Left Ear: Tympanic membrane and ear canal normal. There is no impacted cerumen.   Eyes:      General: No scleral icterus.     Pupils: Pupils are equal, round, and reactive to light.   Neck:      Thyroid: No thyromegaly.   Cardiovascular:      Rate and Rhythm: Normal rate and regular rhythm.       Heart sounds: Normal heart sounds. No murmur heard.    No friction rub. No gallop.   Pulmonary:      Effort: Pulmonary effort is normal. No respiratory distress.      Breath sounds: Normal breath sounds. No wheezing or rales.   Abdominal:      General: Bowel sounds are normal. There is no distension.      Palpations: Abdomen is soft. There is no mass.      Tenderness: There is no abdominal tenderness. There is no guarding or rebound.      Hernia: No hernia is present.      Comments:      Musculoskeletal:         General: No tenderness. Normal range of motion.      Cervical back: Normal range of motion.      Comments: No edema today   Lymphadenopathy:      Cervical: No cervical adenopathy.   Neurological:      General: No focal deficit present.      Mental Status: She is alert and oriented to person, place, and time.   Psychiatric:         Mood and Affect: Mood normal.         Speech: Speech normal.         Behavior: Behavior normal.      Comments: Continues concern that she has been passing a parasite in her bowels         Assessment:       1. COATES (dyspnea on exertion)    2. Type 2 diabetes mellitus with stage 3 chronic kidney disease, with long-term current use of insulin    3. Carpal tunnel syndrome, right    4. Type II diabetes mellitus with neurological manifestations, uncontrolled        Plan:       Eliu Steinberg was seen today for follow-up.    Diagnoses and all orders for this visit:    COATES (dyspnea on exertion)  -     CBC Auto Differential; Future    Type 2 diabetes mellitus with stage 3 chronic kidney disease, with long-term current use of insulin  -     insulin asp prt-insulin aspart, NovoLOG 70/30, (NOVOLOG MIX 70-30 U-100 INSULN) 100 unit/mL (70-30) Soln; INJECT 10 UNITS INTO THE SKIN 2 TIMES DAILY WITH MEALS.  Lower dose, call if still hypos  -     Hemoglobin A1C; Future  -     CBC Auto Differential; Future  -     BASIC METABOLIC PANEL; Future    Carpal tunnel syndrome, right  -     traMADoL (ULTRAM) 50 mg  tablet; Take 1 tablet (50 mg total) by mouth 2 (two) times daily as needed.  Type II diabetes mellitus with neurological manifestations, uncontrolled  -     traMADoL (ULTRAM) 50 mg tablet; Take 1 tablet (50 mg total) by mouth 2 (two) times daily as needed.  Takes prn      Health Maintenance       Date Due Completion Date    Pneumococcal Vaccines (Age 65+) (1 - PCV) Never done ---    Shingles Vaccine (1 of 2) Never done ---    Diabetes Urine Screening 05/30/2018 5/30/2017    COVID-19 Vaccine (4 - Booster for Pfizer series) 01/05/2022 10/5/2021    Influenza Vaccine (1) 09/01/2022 12/17/2019 (Declined)    Override on 12/17/2019: Declined    Override on 2/7/2017: Declined (per provider note)    Override on 10/15/2015: Not Clinically Appropriate (declines)    Hemoglobin A1c 10/05/2022 4/5/2022    Foot Exam 01/10/2023 1/10/2022    Override on 4/15/2021: Done    Override on 3/19/2020: Done    Override on 12/20/2018: Done    Override on 9/5/2018: Done    Override on 9/1/2017: Done (podiatry)    Override on 12/2/2016: Done    Override on 8/24/2016: Done (by cardiologist)    Override on 7/14/2015: Done    Lipid Panel 04/05/2023 4/5/2022    Eye Exam 07/18/2023 7/18/2022    DEXA Scan 01/10/2024 1/10/2022    Colonoscopy 02/05/2024 2/5/2019    TETANUS VACCINE 02/07/2027 2/7/2017 (Declined)    Override on 2/7/2017: Declined (per provider note)          Follow up in about 6 months (around 2/9/2023) for Prevnar 20 vaccine please.    Future Appointments   Date Time Provider Department Center   8/9/2022  9:50 AM [facility]   9/9/2022  8:15 AM Ramos Cloud MD [facility] OPHTHAL [facility]   10/10/2022 10:00 AM Serena Gray DPM [facility] POD [facility]   11/18/2022  8:45 AM [facility] OPHTHAL [facility]   11/18/2022  9:45 AM Zuleika Francis MD [facility] OPHTHAL [facility]   2/9/2023  9:20 AM Ryan Carlson MD [facility] IM [facility]

## 2022-08-18 ENCOUNTER — HOSPITAL ENCOUNTER (OUTPATIENT)
Dept: CARDIOLOGY | Facility: CLINIC | Age: 82
Discharge: HOME | End: 2022-08-18
Payer: MEDICARE

## 2022-08-18 ENCOUNTER — OFFICE VISIT (OUTPATIENT)
Dept: CARDIOLOGY | Facility: CLINIC | Age: 82
End: 2022-08-18
Payer: MEDICARE

## 2022-08-18 VITALS
DIASTOLIC BLOOD PRESSURE: 57 MMHG | HEART RATE: 70 BPM | SYSTOLIC BLOOD PRESSURE: 122 MMHG | BODY MASS INDEX: 29.61 KG/M2 | WEIGHT: 150.81 LBS | HEIGHT: 60 IN | OXYGEN SATURATION: 95 %

## 2022-08-18 DIAGNOSIS — E11.42 TYPE 2 DIABETES MELLITUS WITH DIABETIC POLYNEUROPATHY, WITH LONG-TERM CURRENT USE OF INSULIN: Chronic | ICD-10-CM

## 2022-08-18 DIAGNOSIS — E78.5 DYSLIPIDEMIA ASSOCIATED WITH TYPE 2 DIABETES MELLITUS: ICD-10-CM

## 2022-08-18 DIAGNOSIS — Z53.1 REFUSAL OF BLOOD TRANSFUSIONS AS PATIENT IS JEHOVAH'S WITNESS: ICD-10-CM

## 2022-08-18 DIAGNOSIS — I25.118 CORONARY ARTERY DISEASE OF NATIVE ARTERY OF NATIVE HEART WITH STABLE ANGINA PECTORIS: Primary | ICD-10-CM

## 2022-08-18 DIAGNOSIS — I25.10 CORONARY ARTERY DISEASE, UNSPECIFIED VESSEL OR LESION TYPE, UNSPECIFIED WHETHER ANGINA PRESENT, UNSPECIFIED WHETHER NATIVE OR TRANSPLANTED HEART: ICD-10-CM

## 2022-08-18 DIAGNOSIS — E11.59 HYPERTENSION ASSOCIATED WITH DIABETES: ICD-10-CM

## 2022-08-18 DIAGNOSIS — I65.23 CAROTID STENOSIS, BILATERAL: ICD-10-CM

## 2022-08-18 DIAGNOSIS — Z87.19 HISTORY OF GI BLEED: ICD-10-CM

## 2022-08-18 DIAGNOSIS — I15.2 HYPERTENSION ASSOCIATED WITH DIABETES: ICD-10-CM

## 2022-08-18 DIAGNOSIS — Z86.73 HISTORY OF TIA (TRANSIENT ISCHEMIC ATTACK): Chronic | ICD-10-CM

## 2022-08-18 DIAGNOSIS — Z79.4 TYPE 2 DIABETES MELLITUS WITH DIABETIC POLYNEUROPATHY, WITH LONG-TERM CURRENT USE OF INSULIN: Chronic | ICD-10-CM

## 2022-08-18 DIAGNOSIS — I73.9 PVD (PERIPHERAL VASCULAR DISEASE): ICD-10-CM

## 2022-08-18 DIAGNOSIS — I70.0 THORACIC AORTA ATHEROSCLEROSIS: ICD-10-CM

## 2022-08-18 DIAGNOSIS — E11.69 DYSLIPIDEMIA ASSOCIATED WITH TYPE 2 DIABETES MELLITUS: ICD-10-CM

## 2022-08-18 DIAGNOSIS — N18.32 STAGE 3B CHRONIC KIDNEY DISEASE: Chronic | ICD-10-CM

## 2022-08-18 DIAGNOSIS — I87.2 VENOUS INSUFFICIENCY OF BOTH LOWER EXTREMITIES: ICD-10-CM

## 2022-08-18 DIAGNOSIS — Z95.1 S/P CABG (CORONARY ARTERY BYPASS GRAFT): Chronic | ICD-10-CM

## 2022-08-18 PROBLEM — R07.9 EXERTIONAL CHEST PAIN: Status: RESOLVED | Noted: 2020-12-24 | Resolved: 2022-08-18

## 2022-08-18 PROCEDURE — 93010 ELECTROCARDIOGRAM REPORT: CPT | Mod: S$PBB,,, | Performed by: INTERNAL MEDICINE

## 2022-08-18 PROCEDURE — 99214 PR OFFICE/OUTPT VISIT, EST, LEVL IV, 30-39 MIN: ICD-10-PCS | Mod: 25,S$PBB,, | Performed by: PHYSICIAN ASSISTANT

## 2022-08-18 PROCEDURE — 93005 ELECTROCARDIOGRAM TRACING: CPT | Mod: PBBFAC | Performed by: INTERNAL MEDICINE

## 2022-08-18 PROCEDURE — 99214 OFFICE O/P EST MOD 30 MIN: CPT | Mod: 25,S$PBB,, | Performed by: PHYSICIAN ASSISTANT

## 2022-08-18 PROCEDURE — 99999 PR PBB SHADOW E&M-EST. PATIENT-LVL V: ICD-10-PCS | Mod: PBBFAC,,, | Performed by: PHYSICIAN ASSISTANT

## 2022-08-18 PROCEDURE — 99215 OFFICE O/P EST HI 40 MIN: CPT | Mod: PBBFAC | Performed by: PHYSICIAN ASSISTANT

## 2022-08-18 PROCEDURE — 99999 PR PBB SHADOW E&M-EST. PATIENT-LVL V: CPT | Mod: PBBFAC,,, | Performed by: PHYSICIAN ASSISTANT

## 2022-08-18 PROCEDURE — 93010 EKG 12-LEAD: ICD-10-PCS | Mod: S$PBB,,, | Performed by: INTERNAL MEDICINE

## 2022-08-18 NOTE — PROGRESS NOTES
"    General Cardiology Clinic Note  Reason for Visit: Follow up   Last Clinic Visit: 11/23/2021    HPI:   Eliu Paz is a 82 y.o. female who presents for follow up.    Medical History:    CAD s/p CABG in 1994 at [facility] (grafts all occluded) and s/p PCI at [facility] 3/10/10, [facility] 1/2021 with severe multivessel disease, on medical management (PCI too high risk)  Bilateral carotid artery disease, 20-39%  TIA in 2014  HLD  HTN  DM type II  Obesity  GERD  Buddhist     Interval History:  Patient reports slight chest pain "at times" if she "moves too fast". Uses Nitro spray at least once a week. Seems to be relatively stable since last visit. She still reports intermittent pedal edema that she is convinced is related to iPad use. She denies COATES, palpitations, lightheadedness, syncope. She is still very preoccupied with noncardiac symptoms: constipation, spots on her skin, rashes, vaginal discharge, possible parasitic infection. Difficult to redirect conversation.       11/23/2021 HPI  Pt reports mild chest discomfort, typically at rest, and relieved with belching or Nitro spray. Uses Nitro spray 1-2 times/week. Reports SOB if she "moves too much" or "walks too fast". She states these symptoms are stable. Still reports pedal edema when using iPad, relieved with leg elevation. She is mainly concerned with her back pain, skin rashes, vaginal discharge, and constipation. She does not check her BP at home or do any regimented exercise.      9/13/2021 HPI  Pt states she doesn't get "hard" chest pains, but does get a mild chest pressure if she eats a heavy meal, relieved with belching, and occasionally gets CP with too much exertion. She uses Nitro spray occasionally. Chest pressure is stable. She is c/o multiple GI issues: constipation, indigestion, reflux. Hard to differentiate GI symptoms from cardiac symptoms. She also reports white spots on her skin that she thinks they are related to having parasites inside her " body. Also has a pain in her LLQ and left ribs, related to eating. She denies COATES, lightheadedness, syncope, palpitations. Gets pedal edema only when she uses her iPad. No persistent or worsening edema. She does not check her BP at home or do any regimented exercise.      3/4/2021 HPI (Rolanda Navarrete): Eliu Paz is a 80 y.o. female patient of Dr. Aguilar who presents for f/u Exertional chest pain (4 weeks fu).  She was last seen in cardiology clinic on 1/21/2021 with referral for LHC which was done 1/28/2021 showing 3 vessel disease, high risk for PCI, [employer] and does not prefer blood transfusion and no intervention done.  She was last seen by Dr. Umana 2/24/2021 and Imdur was increased to 90mg daily and stopped candesartan.  She reports feeling less episodes of chest pressure since increased imdur dose.  She was previously taking NTG spray about once a week.  Clinic /53, HR 55, not checking home BP.  Weight-4# decreased.   Exercise-stopped because she feels chest pain/SOB with ambulation after 15min    ROS:      Review of Systems   Constitutional: Negative for diaphoresis, malaise/fatigue and weight gain.   HENT: Positive for hearing loss. Negative for nosebleeds.    Eyes: Negative for vision loss in left eye, vision loss in right eye and visual disturbance.   Cardiovascular: Positive for chest pain and leg swelling. Negative for claudication, dyspnea on exertion, irregular heartbeat, near-syncope, orthopnea, palpitations, paroxysmal nocturnal dyspnea and syncope.   Respiratory: Negative for cough, shortness of breath, sleep disturbances due to breathing, snoring and wheezing.    Hematologic/Lymphatic: Negative for bleeding problem. Does not bruise/bleed easily.   Skin: Negative for poor wound healing and rash.   Musculoskeletal: Positive for back pain. Negative for muscle cramps and myalgias.   Gastrointestinal: Positive for constipation. Negative for bloating, abdominal pain, diarrhea,  heartburn, melena, nausea and vomiting.   Genitourinary: Negative for hematuria and nocturia.   Neurological: Negative for brief paralysis, dizziness, headaches, light-headedness, numbness and weakness.   Psychiatric/Behavioral: Negative for depression.   Allergic/Immunologic: Negative for hives.       PMH:     Past Medical History:   Diagnosis Date    Allergy     Arteriosclerosis of arteries of extremities 8/25/2016    Arthritis     Brain infection     Cataract     CKD (chronic kidney disease) stage 3, GFR 30-59 ml/min     Coma     Coronary artery disease     Diabetes mellitus type II 1981    Diabetic neuropathy     Early dry stage nonexudative age-related macular degeneration of both eyes 6/17/2019    GERD (gastroesophageal reflux disease)     Glaucoma     Hyperlipidemia     Hypertension     Type 2 diabetes mellitus with diabetic peripheral angiopathy without gangrene, with long-term current use of insulin 8/21/2016     Past Surgical History:   Procedure Laterality Date    ANGIOGRAPHY OF AORTIC ARCH  1/28/2021    Procedure: Aortogram, Aortic Arch;  Surgeon: Austin Umana MD;  Location: [facility] CATH LAB;  Service: Cardiology;;    CARDIAC CATHETERIZATION  03/2010    x 2    CARDIAC SURGERY  1994    CARPAL TUNNEL RELEASE      L    COLONOSCOPY N/A 11/2/2015    Procedure: COLONOSCOPY;  Surgeon: Sanju Clinton MD;  Location: [facility] ENDO (4TH FLR);  Service: Endoscopy;  Laterality: N/A;    COLONOSCOPY N/A 2/5/2019    Procedure: COLONOSCOPY;  Surgeon: Kenneth Oconnell MD;  Location: [facility] ENDO (4TH FLR);  Service: Endoscopy;  Laterality: N/A;    CORONARY ARTERY BYPASS GRAFT  08/13/1994    x 1    CORONARY BYPASS GRAFT ANGIOGRAPHY  1/28/2021    Procedure: Bypass graft study;  Surgeon: Austin Umana MD;  Location: [facility] CATH LAB;  Service: Cardiology;;    ESOPHAGOGASTRODUODENOSCOPY N/A 8/2/2019    Procedure: EGD (ESOPHAGOGASTRODUODENOSCOPY);  Surgeon: Wesly Garza MD;  Location: [facility] ENDO (2ND FLR);   "Service: Endoscopy;  Laterality: N/A;    LEFT HEART CATHETERIZATION N/A 1/28/2021    Procedure: Left heart cath;  Surgeon: Austin Umana MD;  Location: [facility] CATH LAB;  Service: Cardiology;  Laterality: N/A;    TUBAL LIGATION  1970s     Allergies:     Review of patient's allergies indicates:   Allergen Reactions    Penicillins Swelling    Trulicity [dulaglutide] Nausea Only and Rash     Medications:     Current Outpatient Medications on File Prior to Visit   Medication Sig Dispense Refill    amLODIPine (NORVASC) 10 MG tablet TAKE 1 TABLET BY MOUTH EVERY DAY 90 tablet 11    BD INSULIN SYRINGE ULTRA-FINE 0.5 mL 31 gauge x 5/16" Syrg USE TO INJECT TWICE DAILY WITH INSULIN INJECTIONS 200 each 3    blood sugar diagnostic (ONETOUCH VERIO) Strp Inject 1 each into the skin 3 (three) times daily with meals. Use to test blood sugar four times a day. 200 strip 11    brimonidine 0.2% (ALPHAGAN) 0.2 % Drop INSTILL 1 DROP INTO BOTH EYES 3 TIMES A DAY 30 mL 3    brimonidine 0.2% (ALPHAGAN) 0.2 % Drop Place 1 drop into both eyes 3 (three) times daily. 45 mL 3    carvediloL (COREG) 25 MG tablet Take 1 tablet (25 mg total) by mouth 2 (two) times daily. 180 tablet 11    ciclopirox (PENLAC) 8 % Soln Apply topically nightly. 6.6 mL 11    clopidogreL (PLAVIX) 75 mg tablet Take 1 tablet (75 mg total) by mouth once daily. 90 tablet 3    diclofenac sodium (VOLTAREN) 1 % Gel APPLY 2 GRAMS TO AFFECTED AREA 4 TIMES A  g 2    dorzolamide-timolol 2-0.5% (COSOPT) 22.3-6.8 mg/mL ophthalmic solution INSTILL 1 DROP INTO BOTH EYES TWICE A DAY 30 mL 3    dorzolamide-timolol 2-0.5% (COSOPT) 22.3-6.8 mg/mL ophthalmic solution Place 1 drop into both eyes 2 (two) times daily. 30 mL 3    insulin asp prt-insulin aspart, NovoLOG 70/30, (NOVOLOG MIX 70-30 U-100 INSULN) 100 unit/mL (70-30) Soln INJECT 10 UNITS INTO THE SKIN 2 TIMES DAILY WITH MEALS. 30 mL 15    isosorbide mononitrate (IMDUR) 120 MG 24 hr tablet Take 1 tablet (120 mg " total) by mouth once daily. 90 tablet 3    ketoconazole (NIZORAL) 2 % cream Apply topically once daily. 30 g 1    ketoconazole (NIZORAL) 2 % shampoo APPLY TOPICALLY EVERY OTHER DAY. 360 mL 1    lactulose (CHRONULAC) 10 gram/15 mL solution Take 15 mLs (10 g total) by mouth daily as needed (constipation). 1892 mL 3    latanoprost 0.005 % ophthalmic solution PLACE 1 DROP INTO BOTH EYES EVERY EVENING. 10 mL 3    latanoprost 0.005 % ophthalmic solution Place 1 drop into both eyes every evening. 7.5 mL 3    lidocaine (LIDODERM) 5 % Place 1 patch onto the skin once daily. Remove & Discard patch within 12 hours or as directed by MD 7 patch 0    nitroGLYCERIN 0.4 MG/DOSE TL SPRY (NITROLINGUAL) 400 mcg/spray spray PLACE 2 SPRAYS UNDER THE TONGUE EVERY 5 MINUTES AS NEEDED FOR CHEST PAIN 36 g 11    omeprazole (PRILOSEC) 40 MG capsule TAKE 1 CAPSULE BY MOUTH EVERY MORNING 90 capsule 3    ranolazine (RANEXA) 500 MG Tb12 Take 1 tablet (500 mg total) by mouth 2 (two) times daily. 60 tablet 11    rosuvastatin (CRESTOR) 40 MG Tab TAKE 1 TABLET BY MOUTH EVERY DAY 90 tablet 11    spironolactone (ALDACTONE) 25 MG tablet TAKE 1 TABLET BY MOUTH EVERY DAY 90 tablet 11    tiZANidine (ZANAFLEX) 4 MG tablet 1/2 TO 1 TAB EVERY 6 HOURS AS NEEDED 360 tablet 2    traMADoL (ULTRAM) 50 mg tablet Take 1 tablet (50 mg total) by mouth 2 (two) times daily as needed. 40 tablet 0     No current facility-administered medications on file prior to visit.     Social History:     Social History     Tobacco Use    Smoking status: Former Smoker     Years: 2.00     Quit date: 3/25/1963     Years since quittin.4    Smokeless tobacco: Never Used   Substance Use Topics    Alcohol use: Yes     Alcohol/week: 1.0 standard drink     Types: 1 Glasses of wine per week     Comment: nightly     Family History:     Family History   Problem Relation Age of Onset    Diabetes Father     Diabetes Brother     Blindness Brother     Diabetes Maternal  Grandmother     Diabetes Paternal Aunt     Diabetes Paternal Uncle     Amblyopia Neg Hx     Cancer Neg Hx     Cataracts Neg Hx     Glaucoma Neg Hx     Hypertension Neg Hx     Macular degeneration Neg Hx     Retinal detachment Neg Hx     Strabismus Neg Hx     Stroke Neg Hx     Thyroid disease Neg Hx     Colon cancer Neg Hx     Esophageal cancer Neg Hx     Stomach cancer Neg Hx     Rectal cancer Neg Hx     Ulcerative colitis Neg Hx     Crohn's disease Neg Hx     Irritable bowel syndrome Neg Hx     Celiac disease Neg Hx     Eczema Neg Hx     Lupus Neg Hx     Psoriasis Neg Hx     Melanoma Neg Hx      Physical Exam:   BP (!) 122/57 (BP Location: Left arm, Patient Position: Sitting, BP Method: Large (Automatic))   Pulse 70   Ht 5' (1.524 m)   Wt 68.4 kg (150 lb 12.7 oz)   SpO2 95%   BMI 29.45 kg/m²        Physical Exam  Vitals and nursing note reviewed.   Constitutional:       General: She is not in acute distress.     Appearance: Normal appearance. She is not ill-appearing.   HENT:      Head: Normocephalic and atraumatic.   Eyes:      General: No scleral icterus.     Conjunctiva/sclera: Conjunctivae normal.   Neck:      Thyroid: No thyromegaly.      Vascular: No carotid bruit or JVD.   Cardiovascular:      Rate and Rhythm: Normal rate and regular rhythm.      Pulses: Normal pulses.      Heart sounds: Normal heart sounds. No murmur heard.    No friction rub. No gallop.   Pulmonary:      Effort: Pulmonary effort is normal.      Breath sounds: Normal breath sounds. No wheezing, rhonchi or rales.   Chest:      Chest wall: No tenderness.   Abdominal:      General: Bowel sounds are normal. There is no distension.      Palpations: Abdomen is soft.      Tenderness: There is no abdominal tenderness.   Musculoskeletal:         General: No swelling.      Cervical back: Neck supple.      Right lower leg: No edema.      Left lower leg: No edema.   Skin:     General: Skin is warm and dry.      Coloration:  Skin is not pale.      Findings: No erythema or rash.      Nails: There is no clubbing.   Neurological:      Mental Status: She is alert and oriented to person, place, and time. Mental status is at baseline.   Psychiatric:         Mood and Affect: Mood and affect normal.         Behavior: Behavior normal.          Labs:     Lab Results   Component Value Date     08/09/2022    K 4.2 08/09/2022     08/09/2022    CO2 24 08/09/2022    BUN 19 08/09/2022    CREATININE 1.1 08/09/2022    ANIONGAP 9 08/09/2022     Lab Results   Component Value Date    HGBA1C 6.8 (H) 08/09/2022     Lab Results   Component Value Date     (H) 12/24/2020    BNP 89 10/20/2016    BNP 43 03/17/2015    Lab Results   Component Value Date    WBC 4.84 08/09/2022    HGB 10.9 (L) 08/09/2022    HCT 35.0 (L) 08/09/2022     (L) 08/09/2022    GRAN 2.9 08/09/2022    GRAN 60.8 08/09/2022     Lab Results   Component Value Date    CHOL 133 04/05/2022    HDL 52 04/05/2022    LDLCALC 66.6 04/05/2022    TRIG 72 04/05/2022          Imaging:   Echocardiograms:   TTE 1/8/2021  · The left ventricle is normal in size with normal systolic function. The estimated ejection fraction is 63%  · Normal left ventricular diastolic function.  · Normal right ventricular size with low normal right ventricular systolic function.  · Mild mitral regurgitation.  · Normal central venous pressure (3 mmHg).  · The estimated PA systolic pressure is 18 mmHg.     TTE 12/2017   1 - Moderate left atrial enlargement.     2 - Normal left ventricular systolic function (EF 60-65%).     3 - No wall motion abnormalities.     4 - Indeterminate LV diastolic function.     5 - Normal right ventricular systolic function .     6 - The estimated PA systolic pressure is 29 mmHg.      Stress Tests:   PET Stress Test 1/8/2021    There is a moderate to large sized, mild intensity basal to distal anteroseptal, mid to distal anterior, distal inferoseptal, and apical reversible  perfusion abnormality involving 18% of the LV myocardium in the distribution of the proximal LAD indicative of focal coronary stenosis.    Within the perfusion abnormality, absolute myocardial perfusion (cc/min/gm) averaged 0.87 cc/min/g at rest, 0.73 cc/min/g at stress and CFR was 0.84 cc/min/g, which equates to severely reduced coronary flow capacity in 18% of the myocardium (within the LAD territory). The presence of myocardial steal is suggestive of an occluded vessel.    Whole heart absolute myocardial perfusion (cc/min/g) averaged 1.01 cc/min/g at rest, which is elevated, 1.11 cc/min/g at stress, which is mildly reduced, and 1.09  CFR, which is severely reduced in part due to elevated resting flow.    Visually estimated ejection fraction is normal at rest and normal at stress.    Wall motion was normal at rest. There is basal to distal anteroseptal wall hypokinesis at stress.    LV cavity size is normal at rest and stress.    The EKG portion of this study is positive for ischemia.    Arrhythmias during stress: occasional PACs.    The patient reported chest pain during the stress test.    Hypotensive response to stress.    There are no prior studies for comparison.     Caths:   Cardiac cath 1/28/2021  · There was three vessel coronary artery disease.  · The Ost RCA to Prox RCA lesion was 75% stenosed. The Mid RCA lesion was 75% stenosed. The Dist RCA lesion was 80% stenosed.  · The Ost LM to Mid LM lesion was 70% stenosed.  · The Mid LM to Ost LAD lesion was 80% stenosed. The Ost Cx lesion was 70% stenosed. heavily calcified distal LM bifurcaion stenosis  · The 2nd Diag lesion was 60% stenosed. Mid LAD 60% stenosis.  · The Mid Cx to Dist Cx lesion was 80% stenosed.  · FUNK to LAD CABG graft very small caliber with significantly atretic  · SVG to OM- Not visualized in aortogram, possibly occluded  · The filling pressures on the left were moderately elevated.  · The pre-procedure left ventricular end  diastolic pressure was 22.  · The estimated blood loss was <50 mL.     Other:  Carotid ultrasound 9/9/2016  There is 20 - 39% right Internal Carotid stenosis.   There is 20 - 39% left Internal Carotid stenosis.      Lower extremity arterial ultrasound 9/1/2016  1. Possible left PTA occlusin, no other significant stenosis.   2. Normal THIAGO.      EKG 4/9/2021: NSR    Assessment:     1. Coronary artery disease of native artery of native heart with stable angina pectoris    2. S/P CABG (coronary artery bypass graft)    3. Hypertension associated with diabetes    4. Dyslipidemia associated with type 2 diabetes mellitus    5. Thoracic aorta atherosclerosis    6. Carotid stenosis, bilateral    7. Venous insufficiency of both lower extremities    8. PVD (peripheral vascular disease)    9. History of TIA (transient ischemic attack)    10. Stage 3b chronic kidney disease    11. Type 2 diabetes mellitus with diabetic polyneuropathy, with long-term current use of insulin    12. History of GI bleed    13. Refusal of blood transfusions as patient is [employer]      Plan:     CAD s/p CABG in 1994 (grafts occluded), s/p remote PCI, [facility] 1/2021 with severe multivessel disease   Aortic atherosclerosis  [facility] 1/2021 revealed severe multivessel CAD. Follows with interventional cards who states PCI would very high risk, and situation is complicated by patient being [employer] and unwilling to accept blood products. She is on medical management for angina.  Pt reports stable angina.   Continue Plavix, beta blocker, high intensity statin, Ranexa, Imdur, Nitro spray      Mild carotid artery disease  Asymptomatic. 2016 ultrasound with mild nonobstructive disease.      History of TIA   Stable  Continue Plavix and statin     Hypertension  Controlled  Continue Coreg 25 mg bid  Continue Amlodipine 10 mg daily   Continue Spironolactone 25 mg daily      Dyslipidemia  LDL at goal on Crestor 40 mg   Mediterranean  diet     Obesity   Increase aerobic exercise with a goal of at least 30 minutes, 5 days a week.    CKD Stage 3  Stable    DM Type 2  Well controlled    Follow up in one year    Signed:  Jennifer Hinds PA-C  Cardiology   [phone] - General  8/18/2022 8:32 AM

## 2023-01-03 NOTE — TELEPHONE ENCOUNTER
----- Message from Hitesh Quiroga MD sent at 4/21/2017  9:14 AM CDT -----  Please let her know her stomach biopsies came back with no signs of infection and recommend to repeat in 3 years and followup with Jeri   Tazorac Counseling:  Patient advised that medication is irritating and drying.  Patient may need to apply sparingly and wash off after an hour before eventually leaving it on overnight.  The patient verbalized understanding of the proper use and possible adverse effects of tazorac.  All of the patient's questions and concerns were addressed.

## 2023-05-10 ENCOUNTER — PATIENT MESSAGE (OUTPATIENT)
Dept: ADMINISTRATIVE | Facility: HOSPITAL | Age: 83
End: 2023-05-10
Payer: COMMERCIAL

## 2023-10-11 ENCOUNTER — PATIENT OUTREACH (OUTPATIENT)
Dept: ADMINISTRATIVE | Facility: HOSPITAL | Age: 83
End: 2023-10-11
Payer: COMMERCIAL

## 2023-10-11 NOTE — PROGRESS NOTES
Health Maintenance Due   Topic Date Due    Pneumococcal Vaccines (Age 65+) (1 - PCV) Never done    Shingles Vaccine (1 of 2) Never done    Diabetes Urine Screening  05/30/2018    Hemoglobin A1c  02/09/2023    Lipid Panel  04/05/2023    Eye Exam  07/18/2023    Influenza Vaccine (1) 09/01/2023    COVID-19 Vaccine (4 - 2023-24 season) 09/01/2023    DEXA Scan  01/10/2024     Triggered LINKS. Updated Care Everywhere. Chart review completed.

## 2023-10-29 NOTE — PROGRESS NOTES
[facility] Established Patient Visit    Reason for Visit:  The primary encounter diagnosis was Gastroesophageal reflux disease, esophagitis presence not specified. Diagnoses of Gastroparesis and Constipation, unspecified constipation type were also pertinent to this visit.    PCP: Ryan Carlson    HPI:     This is a 79 y.o. female here for f/u of GERD,constipation, gastroparesis.  Ms. Paz's last GI visit was with me in 1/2019. previous GES revealed delayed gastric emptying. 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  Previously treated with acitigall for GB stones.      She is treating her acid reflux with zantac 150 mg 2 times daily with good control of pyrosis. She does have a hx of LA grade A esophagitis w/ documented healing. She had been on daily PPIs in the past, but opted to switch to H2RAs in stead d/t potential s/e of long term PPI use.     Nausea twice monthly or less, vomiting very rarely, and early satiety if eats high fiber meal. On GP diet. Eats 6 small portions daily, mostly soup.   Not on smoothies or shakes.  Is followed by the  GI dietitian for GP diet. Has DM.   hba1c high 7.9. BS running 100s.  On insulin.   Was on trulicity, but d/c d/t s/e. Has not tried elis or FDgard for nausea.     abd pain-none  Bowel habits -  constipation.taking miralax prn.  Still taking lactulose PRN- it does cause gas. Having 1 bristol type 4 BM most days weekly.   GI bleeding - denies hematochezia, hematemesis, melena, BRBPR, black/tarry stools, and coffee ground emesis  NSAID usage - ASA 81 daily.     ROS:  Constitutional: No fevers, no chills, No unintentional weight loss, no fatigue,   ENT: No allergies  CV: No chest pain, no palpitations, + perif. edema, no sob on exertion  Pulm: No cough, No shortness of breath, no wheezes, no sputum  Ophtho: No vision changes  GI: see HPI;   Derm: No rash  Heme: No lymphadenopathy, No bruising  MSK: No arthritis, + muscle  Patient had unwitnessed fall.  + Lightheadedness, CP  - Syncopal workup: EKG Unremarkable, Troponin's Negative  - Will order ECHO  - RUE Pain  - Right Xray of hand/forearm/elbow Negative for Osseous injury  - Concern for Compartment syndrome  - Monitor pain/back, no muscle weakness  : No dysuria, No hematuria  Endo: No hot or cold intolerance  Neuro: No syncope, No seizure,     PMHX:  has a past medical history of Allergy, Arteriosclerosis of arteries of extremities (8/25/2016), Arthritis, Brain infection, Cataract, CKD (chronic kidney disease) stage 3, GFR 30-59 ml/min, Coma, Coronary artery disease, Diabetes mellitus type II (1981), Diabetic neuropathy, GERD (gastroesophageal reflux disease), Glaucoma, Hyperlipidemia, Hypertension, and Type 2 diabetes mellitus with diabetic peripheral angiopathy without gangrene, with long-term current use of insulin (8/21/2016).    PSHX:  has a past surgical history that includes Carpal tunnel release; Tubal ligation (1970s); Colonoscopy (N/A, 11/2/2015); Cardiac catheterization (03/2010); Coronary artery bypass graft (08/13/1994); Cardiac surgery (1994); and Colonoscopy (N/A, 2/5/2019).    The patient's social and family histories were reviewed by me and updated in the appropriate section of the electronic medical record.    Review of patient's allergies indicates:   Allergen Reactions    Pcn [penicillins] Swelling     Swelling (extremities)^, Swelling (extremities)^  Swelling (extremities)^, Swelling (extremities)^  Swelling (extremities)^, Swelling (extremities)^    Trulicity [dulaglutide] Nausea Only and Rash       Current Outpatient Medications   Medication Sig    amLODIPine (NORVASC) 10 MG tablet TAKE 1 TABLET (10 MG TOTAL) BY MOUTH ONCE DAILY.    aspirin (ECOTRIN) 81 MG EC tablet Take 1 tablet (81 mg total) by mouth once daily.    carvedilol (COREG) 25 MG tablet TAKE 1 TABLET (25 MG TOTAL) BY MOUTH 2 (TWO) TIMES DAILY.    ciclopirox (PENLAC) 8 % Soln Apply topically nightly.    clobetasol 0.05% (TEMOVATE) 0.05 % Oint Apply small amount to vulva area twice a day for 4 weeks then once a day for 4 weeks then once a day on Mondays and Thursdays    dorzolamide-timolol 2-0.5% (COSOPT) 22.3-6.8 mg/mL ophthalmic  "solution Place 1 drop into both eyes 2 (two) times daily.    fluocinonide (LIDEX) 0.05 % external solution Apply topically 2 (two) times daily.    insulin asp prt-insulin aspart, NOVOLOG 70/30, (NOVOLOG MIX 70-30 U-100 INSULN) 100 unit/mL (70-30) Soln Inject 16 units w/ breakfast and 20 units at dinner time.    insulin syringe-needle U-100 (BD INSULIN SYRINGE ULTRA-FINE) 0.5 mL 31 gauge x 5/16" Syrg USE TO INJECT TWICE DAILY WITH INSULIN INJECTIONS    isosorbide mononitrate (IMDUR) 30 MG 24 hr tablet TAKE 1 TABLET (30 MG TOTAL) BY MOUTH ONCE DAILY.    ketoconazole (NIZORAL) 2 % cream Apply topically once daily.    ketoconazole (NIZORAL) 2 % shampoo Apply topically every other day.    latanoprost 0.005 % ophthalmic solution Place 1 drop into both eyes every evening.    losartan (COZAAR) 100 MG tablet TAKE 1 TABLET (100 MG TOTAL) BY MOUTH ONCE DAILY.    ranitidine (ZANTAC) 150 MG tablet Take 1 tablet (150 mg total) by mouth 2 (two) times daily.    rosuvastatin (CRESTOR) 40 MG Tab TAKE 1 TABLET BY MOUTH ONCE DAILY.    spironolactone (ALDACTONE) 25 MG tablet TAKE 2 TABLETS BY MOUTH EVERY DAY    traMADol (ULTRAM) 50 mg tablet TAKE 1 TABLET BY MOUTH TWICE DAILY AS NEEDED    triamcinolone acetonide 0.1% (KENALOG) 0.1 % ointment Apply topically 2 (two) times daily. Apply small amount to itchy areas on body BID PRN.  Do not use on face or in groin.    fluticasone (FLONASE) 50 mcg/actuation nasal spray SPRAY TWICE IN EACH NOSTRIL EVERY DAY    lactulose (CHRONULAC) 10 gram/15 mL solution TAKE 30 MLS (20 G TOTAL) BY MOUTH DAILY AS NEEDED (CONSTIPATION).    nitroGLYCERIN 0.4 MG/DOSE TL SPRY (NITROLINGUAL) 400 mcg/spray spray PLACE 2 SPRAYS UNDER THE TONGUE EVERY 5 MINUTES AS NEEDED FOR CHEST PAIN     No current facility-administered medications for this visit.          Objective Findings:    Vital Signs:  /72 (BP Location: Left arm)   Pulse (!) 56   Ht 5' (1.524 m)   Wt 81.2 kg (179 lb 0.2 oz)   BMI " 34.96 kg/m²  Body mass index is 34.96 kg/m².    Physical Exam:  General Appearance: Well appearing in no acute distress  Head:   Normocephalic, without obvious abnormality  Eyes:    No scleral icterus, EOMI  Throat: Lips, mucosa, and tongue normal; teeth and gums normal  Lungs: CTA bilaterally in anterior and posterior fields, no wheezes, no crackles.  Heart:  Regular rate and rhythm, S1, S2 normal, no murmurs heard  Abdomen: Soft, non tender, non distended with positive bowel sounds in all four quadrants. No hepatosplenomegaly, ascites, or mass  Extremities:    2+ radial pulses, no clubbing, or cyanosis + 2 edema to lower extremities bilaterally  Skin: No rash to exposed areas  Neurologic: A&Ox4      Labs:  Lab Results   Component Value Date    WBC 8.62 12/25/2018    HGB 12.4 12/25/2018    HCT 39.2 12/25/2018     12/25/2018    CHOL 113 (L) 12/04/2018    TRIG 86 12/04/2018    HDL 50 12/04/2018    ALT 8 (L) 12/25/2018    AST 18 12/25/2018     (L) 12/25/2018    K 4.4 12/25/2018     12/25/2018    CREATININE 0.9 12/25/2018    BUN 15 12/25/2018    CO2 22 (L) 12/25/2018    TSH 4.775 (H) 12/27/2018    INR 1.0 03/17/2015    HGBA1C 7.9 (H) 12/04/2018     Imaging:  GES 9/11/18:delayed gastric emptying. 14 % ret at 4 hrs.     Endoscopy:   2/2/19: colonoscopy: GPTC. SC-AC tics. 10 mm HF polyp (TVA). Rpt 3 yrs.    6/13/2017 esophageal manometry Dr. Quiroga- branden    4/18/2017 EGD normal esophagus. Gastric erythema. bx-focal IM no dysplasia. Repeat in 3 years.     11/2015 Colonoscopy Dr. Clinton-perianal skin tags. 10 mm transverse colon polyp. Pan tics. Path- adenovillious Repeat in 3 years (2018). colonoscopy is scheduled  for 11/2018.     4/2015 EGD Dr. Means LA grade A reflux esophagitis. Small HH. Gastric erythema. Path- intestinal metaplasia. Repeat in 2-3 years ([id number]).    Assessment:    1. Gastroesophageal reflux disease, esophagitis presence not specified    2. Gastroparesis    3. Constipation,  unspecified constipation type          Recommendations:  1. Gastroparesis - Try elis and FDgard PRN for nausea.  Pt prefers natural treatment. Continue GP diet.  2. GERD- continue Zantac 150 mg BID.   3. Constipation- take miralax daily     Visit time: 30 minutes with greater than 50% of the time spent in face to face pt  discussing the aforementioned diagnoses, recommendations, test results, and answering pt questions.    F/u in 6 months.    Order summary:         Thank you for allowing me to participate in the care of Eliu Izquierdo, APRN, FNP-C

## 2024-12-26 NOTE — TELEPHONE ENCOUNTER
Followed by GI  Continue Aldactone and torsemide  Continue rifaximin and lactulose  LFT and T bili at baseline   Patient aware and expressed understanding.

## (undated) DEVICE — SEE MEDLINE ITEM 157187

## (undated) DEVICE — CATH 5FR AR1.0 5/BX

## (undated) DEVICE — SEE MEDLINE ITEM 156894

## (undated) DEVICE — CATH FL 3.5 5FR

## (undated) DEVICE — CATH IMPULSE FL4 5FR 100CM

## (undated) DEVICE — OMNIPAQUE 350 200ML

## (undated) DEVICE — SYR MED RAD 150ML

## (undated) DEVICE — WIRE GUIDE SAFE-T-J .035 260CM

## (undated) DEVICE — GUIDE WIRE WHOLLY FLOPPY

## (undated) DEVICE — KIT GLIDESHEATH SLEND 6FR 10CM

## (undated) DEVICE — CATH IMPULSE 5F 100CM FR4

## (undated) DEVICE — CATH ANG PIGTAIL 4FR INFINITY

## (undated) DEVICE — CATH IMA INFINITI 4FRX100CM

## (undated) DEVICE — CATH EMPULSE ANGLED 5FR PIGTAI

## (undated) DEVICE — SPIKE CONTRAST CONTROLLER

## (undated) DEVICE — KIT CUSTOM MANIFOLD

## (undated) DEVICE — HEMOSTAT VASC BAND REG 24CM